# Patient Record
Sex: MALE | Race: WHITE | Employment: UNEMPLOYED | URBAN - METROPOLITAN AREA
[De-identification: names, ages, dates, MRNs, and addresses within clinical notes are randomized per-mention and may not be internally consistent; named-entity substitution may affect disease eponyms.]

---

## 2017-02-07 DIAGNOSIS — F64.9 GENDER IDENTITY DISORDER: Primary | ICD-10-CM

## 2017-03-21 DIAGNOSIS — E29.1 MALE HYPOGONADISM: ICD-10-CM

## 2017-03-21 NOTE — TELEPHONE ENCOUNTER
Request for medication refill:    Date of last visit at clinic: 7/27/16    Please complete refill if appropriate and CLOSE ENCOUNTER.    Closing the encounter signifies the refill is complete.    If refill has been denied, please complete the smart phrase .smirefuse and route it to the [facility] RN TRIAGE pool to inform the patient and the pharmacy.    Sanjana Lockhart RN

## 2017-03-21 NOTE — TELEPHONE ENCOUNTER
[facility] phone call message- patient requesting a refill:    Full Medication Name: Testosterone cyp    Dose: inject 0.25ml/mg into the muscle one time a week    Pharmacy confirmed as   [facility] [id number] [place] [address] AT [address] [address]  Phone: [phone] Fax: [phone]  : Yes    Additional Comments: please fax the paper copy to the pharmacy  OK to leave a message on voice mail? Yes    Primary language: English      needed? No    Call taken on March 21, 2017 at 10:07 AM by Keely Sheppard

## 2017-03-22 NOTE — TELEPHONE ENCOUNTER
Please process per controlled substance protocol. I have reviewed pt's chart and this refill is appropriate. Please provide rx to preceptor to sign, then provide to patient's pharmacy and notify patient. THANKS  Clarissa Stroud MD

## 2017-03-23 ENCOUNTER — OFFICE VISIT (OUTPATIENT)
Dept: FAMILY MEDICINE | Facility: CLINIC | Age: 32
End: 2017-03-23

## 2017-03-23 VITALS
WEIGHT: 150.4 LBS | HEIGHT: 66 IN | HEART RATE: 48 BPM | SYSTOLIC BLOOD PRESSURE: 107 MMHG | TEMPERATURE: 98.4 F | OXYGEN SATURATION: 99 % | RESPIRATION RATE: 16 BRPM | DIASTOLIC BLOOD PRESSURE: 70 MMHG | BODY MASS INDEX: 24.17 KG/M2

## 2017-03-23 DIAGNOSIS — S86.811A STRAIN OF CALF MUSCLE, RIGHT, INITIAL ENCOUNTER: Primary | ICD-10-CM

## 2017-03-23 RX ORDER — TESTOSTERONE CYPIONATE 200 MG/ML
50 INJECTION, SOLUTION INTRAMUSCULAR WEEKLY
Qty: 10 ML | Refills: 3 | Status: SHIPPED | OUTPATIENT
Start: 2017-03-23 | End: 2017-10-09

## 2017-03-23 NOTE — PROGRESS NOTES
"       HPI       Myrna Valadez is a 31 year old who presents with Right calf after an injury at work.      Worker's Compensation Covered Injury       When?: 3/9/17 initially, re-injured on 3/17    What Happened? 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 He shift his weight from his left leg to right leg and felt a sharp pain in his right calf. \"It felt like a pulled muscle.\" On 3/17/17 he was working on a planning day and took a break with his [employer] to double Cuban jump rope and again felt the sharp pain in his right calf.    Where ? [facility]    Work Related? Yes    Has the injury been reported to a manager at your work? Y    Place of employment [employer],  [employer], Phone number [phone]-.    QRC (Qualified Rehabilitation Consultant) Absentfname and number     Able to Work? Yes    Job requirements:prolonged sitting, prolonged standing      Description of pain /Injury:     Location: Right calf    Character: sharp    Radicular pain?  No    Intensity: 4-5/10 at worst,  1/10 at best    Muscle weakness? Yes Details: pt is adjusting the way that they walk to avoid pain.    Pain is fluctuating         Provocative Factors:prolonged standing, walking    Palliative Factors: heat, ice, rest and OTC NSAIDs        Therapies Tried and outcome: ice, rest, activity as tolerated including simple back exercises, NSAIDS, otc use at prescription doses with food in stomach and Physical Therapy           Parafaprofesional. 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              Review of Systems:   + right calf pain              Physical Exam:     Vitals:    03/23/17 1645   BP: 107/70   Pulse: (!) 48   Resp: 16   Temp: 98.4  F (36.9  C)   TempSrc: Oral   SpO2: 99%   Weight: 150 lb 6.4 oz (68.2 kg)   Height: 5' 5.9\" (167.4 cm)     Body mass index is 24.35 " kg/(m^2).    Constitutional: Awake, alert, cooperative, no apparent distress, and appears stated age  HEENT: MMM, no conjunctival pallor  MSK:   Right Knee Exam   Right knee exam is normal.    Range of Motion   Normal right knee ROM    Muscle Strength   Normal right knee strength      + right sided calf tenderness..     Neuro:  No focal neurological deficit  Psych : Good eye contact, well groomed, very interactive and collaborative. Good insight. Thought process is linear and coherent. Associations are tight. Mood is good. Affect euthymic.     No results found for this or any previous visit (from the past 24 hour(s)).    Assessment and Plan      1. Strain of calf muscle, right, initial encounter; work-related injury on 3/17 - appears to have been sprained twice while at work. Will recommend he return to work with some restrictions as well as recommended physical therapy, pain control with ibuprofen/tylenol and then follow up in 2 weeks. Letter completed and sent with patient.   - Physical Therapy Referral  - Limitations at work     There are no discontinued medications.     Options for treatment and follow-up care were reviewed with the patient. Myrna Valadez  engaged in the decision making process and verbalized understanding of the options discussed and agreed with the final plan.    Tawny Hammer MD G3  [facility]  Family Medicine Resident  Pager [phone]

## 2017-03-23 NOTE — PROGRESS NOTES
Preceptor Attestation:   Patient seen and discussed with the resident.   Assessment and plan reviewed with resident and agreed upon.   Supervising Physician:  Ovidio Urban MD  [facility] Medicine

## 2017-03-23 NOTE — MR AVS SNAPSHOT
After Visit Summary   3/23/2017    Myrna Valadez    MRN: [id number]           Patient Information     Date Of Birth          1985        Visit Information        Provider Department      3/23/2017 4:20 PM Tawny Hammer MD [facility]        Today's Diagnoses     Strain of calf muscle, right, initial encounter    -  1       Follow-ups after your visit        Additional Services     [facility]       *This therapy referral will be filtered to a centralized scheduling office at [facility] and the patient will receive a call to schedule an appointment at a [place] location most convenient for them. *     [facility] provides Physical Therapy evaluation and treatment and many specialty services across the [place] system.  If requesting a specialty program, please choose from the list below.    If you have not heard from the scheduling office within 2 business days, please call [phone] for all locations, with the exception of [place], please call [phone].  Treatment: Evaluation & Treatment  Special Instructions/Modalities:   Special Programs: None    Please be aware that coverage of these services is subject to the terms and limitations of your health insurance plan.  Call member services at your health plan with any benefit or coverage questions.      **Note to Provider:  If you are referring outside of [place] for the therapy appointment, please list the name of the location in the  special instructions  above, print the referral and give to the patient to schedule the appointment.                  Who to contact     Please call your clinic at [phone] to:    Ask questions about your health    Make or cancel appointments    Discuss your medicines    Learn about your test results    Speak to your doctor   If you have compliments or concerns about an experience at your clinic, or if you wish to file a  "complaint, please contact [facility] Patient Relations at [phone] or email us at [email or web address]         Additional Information About Your Visit        [employer] Information     [employer] gives you secure access to your electronic health record. If you see a primary care provider, you can also send messages to your care team and make appointments. If you have questions, please call your primary care clinic.  If you do not have a primary care provider, please call [phone] and they will assist you.      [employer] is an electronic gateway that provides easy, online access to your medical records. With [employer], you can request a clinic appointment, read your test results, renew a prescription or communicate with your care team.     To access your existing account, please contact your [facility] Clinic or call [phone] for assistance.        Care EveryWhere ID     This is your Care EveryWhere ID. This could be used by other organizations to access your [place] medical records  [id number]        Your Vitals Were     Pulse Temperature Respirations Height Pulse Oximetry BMI (Body Mass Index)    48 98.4  F (36.9  C) (Oral) 16 5' 5.9\" (167.4 cm) 99% 24.35 kg/m2       Blood Pressure from Last 3 Encounters:   03/23/17 107/70   07/27/16 127/72   05/07/15 127/66    Weight from Last 3 Encounters:   03/23/17 150 lb 6.4 oz (68.2 kg)   07/27/16 146 lb 6.4 oz (66.4 kg)   05/07/15 148 lb 6.4 oz (67.3 kg)              We Performed the Following     [facility]        Primary Care Provider Office Phone # Fax #    Clarissa Stroud [phone]       [facility] [address] [facility] [address]        Thank you!     Thank you for choosing [facility]  for your care. Our goal is always to provide you with excellent care. Hearing back from our patients is one way we can continue to improve our services. Please " "take a few minutes to complete the written survey that you may receive in the mail after your visit with us. Thank you!             Your Updated Medication List - Protect others around you: Learn how to safely use, store and throw away your medicines at www.disposemymeds.org.          This list is accurate as of: 3/23/17  5:17 PM.  Always use your most recent med list.                   Brand Name Dispense Instructions for use    conjugated estrogens cream    PREMARIN    42.5 g    Place 0.5 g vaginally twice a week       FLINTSTONES GUMMIES PO      Take 1 tablet by mouth 2 times daily.       meloxicam 7.5 MG tablet    MOBIC    30 tablet    Take 1 tablet (7.5 mg) by mouth daily       Syringe/Needle (Disp) 22G X 1-1/2\" 1 ML Misc     30 each    Inject 0.25 mLs into the muscle once a week       testosterone cypionate 200 MG/ML injection    DEPOTESTOTERONE CYPIONATE    10 mL    Inject 0.25 mLs (50 mg) into the muscle once a week         "

## 2017-03-23 NOTE — LETTER
[facility]  [address],  [address]  [facility] [address]  [phone]    REPORT OF WORK ABILITY    NOTE TO EMPLOYEE: You must promptly provide a copy of this report to your  employer or worker's compensation insurer, and Qualified Rehabilitation Consultant.    Date: 3/23/2017                     Employee Name: Myrna Valadez         YOB: 1985  Medical Record Number: [id number]   Soc.Sec.No: [id number]  Employer: [employer]               Date of Injury: 3/17/17  Managed Care Organization / Insurance Company Name: UNKNOWN    Diagnosis: right calf strain  Work Related: yes    Permanent Partial Disability(PPD) likely: NO    EMPLOYEE IS ABLE TO WORK: with restrictions from 3/24/17 to 4/7/17 -  Full shift     RESTRICTIONS IF ANY:   Stand: Occasionally (2-4 hours)   Sit: Full time    Walk: Occasionally limited to 30 minutes out of every hour   Kneel/Nelagoney: Frequently (4-6 hours)  Per day   Lift, carry no more than:  20 pounds at a time   May use hands repetitively for:    Simple grasping: yes      Pushing/pulling: yes    Fine manipulation:  yes    Firm grasping:  yes     Operate Foot controls:  Right foot: not applicable   Left foot: not applicable     Bend:  Full time      Stoop: Full time     Twist: Full time      Reach Above Shoulders: Full time     OTHER RESTRICTIONS: None    TREATMENT PLAN/NOTES: may need to restrict work activities for comfort and continue PT and do home program.      Tawny Hammer MD

## 2017-03-23 NOTE — TELEPHONE ENCOUNTER
RN printed pended script, had preceptor sign, and faxed to [facility].    Shonda Cuellar RN

## 2017-04-05 ENCOUNTER — THERAPY VISIT (OUTPATIENT)
Dept: PHYSICAL THERAPY | Facility: CLINIC | Age: 32
End: 2017-04-05
Payer: OTHER MISCELLANEOUS

## 2017-04-05 DIAGNOSIS — M79.661 RIGHT CALF PAIN: Primary | ICD-10-CM

## 2017-04-05 PROCEDURE — 97161 PT EVAL LOW COMPLEX 20 MIN: CPT | Mod: GP | Performed by: PHYSICAL THERAPIST

## 2017-04-05 PROCEDURE — 97530 THERAPEUTIC ACTIVITIES: CPT | Mod: GP | Performed by: PHYSICAL THERAPIST

## 2017-04-05 PROCEDURE — 97110 THERAPEUTIC EXERCISES: CPT | Mod: GP | Performed by: PHYSICAL THERAPIST

## 2017-04-05 NOTE — MR AVS SNAPSHOT
After Visit Summary   4/5/2017    Myrna Valadez    MRN: [id number]           Patient Information     Date Of Birth          1985        Visit Information        Provider Department      4/5/2017 2:40 PM Carlos Boswell PT [facility] Physical Therapy        Today's Diagnoses     Right calf pain    -  1       Follow-ups after your visit        Your next 10 appointments already scheduled     Apr 12, 2017  5:20 PM CDT   WARNER Extremity with Carlos Boswell PT   [facility] Physical Therapy ([facility]  )    [address] [place] [address]   [phone]            Apr 18, 2017  5:20 PM CDT   WARNER Extremity with Ubaldo Zaragoza PT   [facility] Physical Therapy ([facility]  )    [address] [place] [address]   [phone]            Apr 27, 2017  4:00 PM CDT   WARNER Extremity with Carlos Boswell PT   [facility] Physical Therapy ([facility]  )    [address] [place] [address]   [phone]            May 02, 2017  4:00 PM CDT   WARNER Extremity with Carlos Boswell PT   [facility] Physical Therapy ([facility]  )    [address] [place] [address]   [phone]            May 09, 2017  4:00 PM CDT   WARNER Extremity with Carlos Boswell PT   [facility] Physical Therapy ([facility]  )    [address] [place] [address]   [phone]            May 16, 2017  4:00 PM CDT   WARNER Extremity with Carlos Boswell PT   [facility] Physical Therapy ([facility]  )    [address] [place] [address]   [phone]              Who to contact     If you have questions or need follow up information about today's clinic visit or your schedule please contact [facility] directly at [phone].  Normal or non-critical lab and imaging results will be communicated to you by MyChart, letter or phone within 4 business days after the clinic has received the results. If you do not hear from us within 7 days, please contact the clinic through [employer] or phone. If you have a critical or abnormal lab result, we will notify you by phone as soon as possible.  Submit refill requests through [employer] or call your pharmacy and they will forward the refill request to us. Please allow 3 business days for your refill to be completed.          Additional Information About Your Visit        [employer]endr Information     [employer] gives you secure access to your electronic health record. If you see a primary care provider, you can also send messages to your care team and make appointments. If you have questions, please call your primary care clinic.  If you do not have a primary care provider, please call [phone] and they will assist you.        Care EveryWhere ID     This is your Care EveryWhere ID. This could be used by other organizations to access your [place] medical records  [id number]         Blood Pressure from Last 3 Encounters:   03/23/17 107/70   07/27/16 127/72   05/07/15 127/66    Weight from Last 3 Encounters:   03/23/17 68.2 kg (150 lb 6.4 oz)   07/27/16 66.4 kg (146 lb 6.4 oz)   05/07/15 67.3 kg (148 lb 6.4 oz)              We Performed the Following     HC PT EVAL, LOW COMPLEXITY     WARNER INITIAL EVAL REPORT     THERAPEUTIC ACTIVITIES     THERAPEUTIC EXERCISES        Primary Care Provider Office Phone # Fax #    Clarissa Stroud [phone]       [facility] [address] [address]        Thank you!     Thank you for choosing [facility]  for your care. Our goal is always to provide you with excellent care. Hearing back from our patients is one way we can continue to  "improve our services. Please take a few minutes to complete the written survey that you may receive in the mail after your visit with us. Thank you!             Your Updated Medication List - Protect others around you: Learn how to safely use, store and throw away your medicines at www.disposemymeds.org.          This list is accurate as of: 4/5/17 11:59 PM.  Always use your most recent med list.                   Brand Name Dispense Instructions for use    conjugated estrogens cream    PREMARIN    42.5 g    Place 0.5 g vaginally twice a week       FLINTSTONES GUMMIES PO      Take 1 tablet by mouth 2 times daily.       meloxicam 7.5 MG tablet    MOBIC    30 tablet    Take 1 tablet (7.5 mg) by mouth daily       Syringe/Needle (Disp) 22G X 1-1/2\" 1 ML Misc     30 each    Inject 0.25 mLs into the muscle once a week       testosterone cypionate 200 MG/ML injection    DEPOTESTOTERONE CYPIONATE    10 mL    Inject 0.25 mLs (50 mg) into the muscle once a week         "

## 2017-04-05 NOTE — PROGRESS NOTES
Subjective:    Affected Side: R calf.  Condition occurred with:  A wrong landing and a jump.  Condition occurred: at work.  This is a new condition  3/9/17 sprained at work, then resprained again about a week later.  First time was dodging a ball shiftng weight from left to right, second time was jumping rope.  Pt has hx of R ankle injury from running on ice, later gave out during yoga.  Was seen by PT for that, has kept up on that HEP 5x per week.  Pt has altered his gait and decreased activity to avoid the pain..    Patient reports pain:  Medial (calf medial muscle belly, never lateral).  Radiates to: never distal to muscle belly.  Pain is described as stabbing and sharp and is intermittent Pain Scale: no pain currently, 8/10 at worst.  Associated with: had edema initially. Pain is the same all the time.  Symptoms are exacerbated by running, descending stairs and other (pushing off from big toe; jumping/landing) and relieved by ice and other (changing position/shifting weight).  Since onset symptoms are gradually improving.        General health as reported by patient is excellent.  Pertinent medical history includes:  Anemia.  Medical allergies: no.  Other surgeries include:  Other (complete hysterectomy, mastectomy).  Current medications:  Hormone replacement therapy.  Current occupation is paraprofessional - elementary school.  Patient is working in normal job with restrictions.  Primary job tasks include:  Prolonged standing (pushing/pulling, being active with children; has modified activities but is still required to move a lot; not lifting kids up).    Barriers include:  Stairs.    Red flags:  None as reported by the patient.                        Objective:    Standing Alignment:        Lumbar:  Lordosis decr  Pelvis deviations alignment: CCW rotation.  Hip deviations alignment: B femoral medial rotation.  Knee:  Genu varus R and genu varus L  Ankle/foot deviations: slight calcaneal inversion  R.              Physical Exam     Functional Tests:  L single leg stance: femoral medial rotation  R single leg stance: femoral medial rotation, no change in sxs  Partial squat: R femoral add/MR, no change in sxs    Sensation:  Light touch intact and symmetrical B LEs    Strength:  Hip flexion: B 5/5 pain free  Knee extension: B 5/5 pain free  Glut med: B 4+/5 pain free  Hamstrings: B 5/5 pain free  5/5 pain free all motions at ankle, including PF with knee flexed and extended  5/5 L gastrocsoleus, R not tested due to pain with lowering    ROM:  Motion L R Comments   PF 43 41    DF 17 26    DF with knee straight 3 12      Palpation:  Tenderness at medial R gastroc muscle belly    Edema:  None today    Gait:  B Trendelenberg, B femoral MR, decreased push off R        General     ROS    Assessment/Plan:      Patient is a 31 year old female with R calf complaints.    Patient has the following significant findings with corresponding treatment plan.                Diagnosis 1:  R calf pain  Pain -  hot/cold therapy, manual therapy, education and home program  Decreased ROM/flexibility - manual therapy, therapeutic exercise, therapeutic activity and home program  Decreased strength - therapeutic exercise, therapeutic activities and home program  Impaired gait - gait training and home program  Impaired muscle performance - neuro re-education and home program  Decreased function - therapeutic activities and home program  Impaired posture - neuro re-education, therapeutic activities and home program    Therapy Evaluation Codes:   1) History comprised of:   Personal factors that impact the plan of care:      Profession.    Comorbidity factors that impact the plan of care are:      anemia.     Medications impacting care: hormone replacement.  2) Examination of Body Systems comprised of:   Body structures and functions that impact the plan of care:      calf.   Activity limitations that impact the plan of care are:       Jumping, Stairs, Walking and Working.  3) Clinical presentation characteristics are:   Stable/Uncomplicated.  4) Decision-Making    Low complexity using standardized patient assessment instrument and/or measureable assessment of functional outcome.  Cumulative Therapy Evaluation is: Low complexity.    Previous and current functional limitations:  (See Goal Flow Sheet for this information)    Short term and Long term goals: (See Goal Flow Sheet for this information)     Communication ability:  Patient appears to be able to clearly communicate and understand verbal and written communication and follow directions correctly.  Treatment Explanation - The following has been discussed with the patient:   RX ordered/plan of care  Anticipated outcomes  Possible risks and side effects  This patient would benefit from PT intervention to resume normal activities.   Rehab potential is good.    Frequency:  1 X week, once daily  Duration:  for 6 weeks  Discharge Plan:  Achieve all LTG.  Independent in home treatment program.  Reach maximal therapeutic benefit.    Please refer to the daily flowsheet for treatment today, total treatment time and time spent performing 1:1 timed codes.

## 2017-04-05 NOTE — LETTER
[facility] PHYSICAL THERAPY  [address] [address]  [phone]    May 25, 2017    Re: Myrna Valadez   :   1985  MRN:  [id number]   REFERRING PHYSICIAN:   Ovidio Urban    [facility] PHYSICAL THERAPY    Date of Initial Evaluation:  2017  Visits:  1  Rxs Used: 1  Reason for Referral:  Right calf pain    EVALUATION SUMMARY    Subjective:  Affected Side: R calf.  Condition occurred with:  A wrong landing and a jump.  Condition occurred: at work.  This is a new condition  3/9/17 sprained at work, then resprained again about a week later.  First time was dodging a ball shiftng weight from left to right, second time was jumping rope.  Pt has hx of R ankle injury from running on ice, later gave out during yoga.  Was seen by PT for that, has kept up on that HEP 5x per week.  Pt has altered his gait and decreased activity to avoid the pain..    Patient reports pain:  Medial (calf medial muscle belly, never lateral).  Radiates to: never distal to muscle belly.  Pain is described as stabbing and sharp and is intermittent Pain Scale: no pain currently, 8/10 at worst.  Associated with: had edema initially. Pain is the same all the time.  Symptoms are exacerbated by running, descending stairs and other (pushing off from big toe; jumping/landing) and relieved by ice and other (changing position/shifting weight).  Since onset symptoms are gradually improving.        General health as reported by patient is excellent.  Pertinent medical history includes:  Anemia.  Medical allergies: no.  Other surgeries include:  Other (complete hysterectomy, mastectomy).  Current medications:  Hormone replacement therapy.  Current occupation is paraprofessional - elementary school.  Patient is working in normal job with restrictions.  Primary job tasks include:  Prolonged standing (pushing/pulling, being active with children; has  modified activities but is still required to move a lot; not lifting kids up).  Barriers include:  Stairs.  Red flags:  None as reported by the patient.              Objective:  Standing Alignment:    Lumbar:  Lordosis decr  Pelvis deviations alignment: CCW rotation.  Hip deviations alignment: B femoral medial rotation.  Knee:  Genu varus R and genu varus L  Ankle/foot deviations: slight calcaneal inversion R.  Re: Myrna Valadez   :   1985    Functional Tests:  L single leg stance: femoral medial rotation  R single leg stance: femoral medial rotation, no change in sxs  Partial squat: R femoral add/MR, no change in sxs  Sensation:  Light touch intact and symmetrical B LEs  Strength:  Hip flexion: B 5/5 pain free  Knee extension: B 5/5 pain free  Glut med: B 4+/5 pain free  Hamstrings: B 5/5 pain free  5/5 pain free all motions at ankle, including PF with knee flexed and extended  5/5 L gastrocsoleus, R not tested due to pain with lowering    ROM:  Motion L R Comments   PF 43 41    DF 17 26    DF with knee straight 3 12      Palpation:  Tenderness at medial R gastroc muscle belly  Edema:  None today  Gait:  B Trendelenberg, B femoral MR, decreased push off R    Assessment/Plan:    Patient is a 31 year old female with R calf complaints.    Patient has the following significant findings with corresponding treatment plan.                Diagnosis 1:  R calf pain  Pain -  hot/cold therapy, manual therapy, education and home program  Decreased ROM/flexibility - manual therapy, therapeutic exercise, therapeutic activity and home program  Decreased strength - therapeutic exercise, therapeutic activities and home program  Impaired gait - gait training and home program  Impaired muscle performance - neuro re-education and home program  Decreased function - therapeutic activities and home program  Impaired posture - neuro re-education, therapeutic activities and home program    Therapy Evaluation Codes:    1) History comprised of:   Personal factors that impact the plan of care:      Profession.    Comorbidity factors that impact the plan of care are:      anemia.    Re: Myrna Valadez   :   1985     Medications impacting care: hormone replacement.  2) Examination of Body Systems comprised of:   Body structures and functions that impact the plan of care:      calf.   Activity limitations that impact the plan of care are:      Jumping, Stairs, Walking and Working.  3) Clinical presentation characteristics are:   Stable/Uncomplicated.  4) Decision-Making    Low complexity using standardized patient assessment instrument and/or measureable assessment of functional outcome.  Cumulative Therapy Evaluation is: Low complexity.  Previous and current functional limitations:  (See Goal Flow Sheet for this information)    Short term and Long term goals: (See Goal Flow Sheet for this information)   Communication ability:  Patient appears to be able to clearly communicate and understand verbal and written communication and follow directions correctly.  Treatment Explanation - The following has been discussed with the patient:   RX ordered/plan of care  Anticipated outcomes  Possible risks and side effects  This patient would benefit from PT intervention to resume normal activities.   Rehab potential is good.  Frequency:  1 X week, once daily  Duration:  for 6 weeks  Discharge Plan:  Achieve all LTG.  Independent in home treatment program.  Reach maximal therapeutic benefit.              Thank you for your referral.    INQUIRIES  Therapist: Carlos Boswell PT  [facility] PHYSICAL THERAPY  [address] [place] [address]  Phone: [phone]  Fax: [phone]

## 2017-04-06 PROBLEM — M79.661 RIGHT CALF PAIN: Status: ACTIVE | Noted: 2017-04-06

## 2017-04-11 ENCOUNTER — OFFICE VISIT (OUTPATIENT)
Dept: FAMILY MEDICINE | Facility: CLINIC | Age: 32
End: 2017-04-11

## 2017-04-11 VITALS
DIASTOLIC BLOOD PRESSURE: 72 MMHG | SYSTOLIC BLOOD PRESSURE: 112 MMHG | OXYGEN SATURATION: 99 % | HEART RATE: 60 BPM | BODY MASS INDEX: 23.53 KG/M2 | RESPIRATION RATE: 20 BRPM | WEIGHT: 146.4 LBS | HEIGHT: 66 IN | TEMPERATURE: 97.6 F

## 2017-04-11 DIAGNOSIS — M79.661 RIGHT CALF PAIN: Primary | ICD-10-CM

## 2017-04-11 NOTE — PATIENT INSTRUCTIONS
Right Calf Pain  Continue physical therapy  Ibuprofen as needed  Cold and massage as needed    Updated Work Restrictions:    - No lifting greater than 20 pounds  - No pivoting or twisting while lifting  - No running      Thank you for coming to [facility] today.  Lab Testing:  **If you had lab testing today and your results are reassuring or normal they will be mailed to you or sent through [employer]wilt within 7 days.   **If the lab tests need quick action we will call you with the results.  The phone number we will call with results is # [phone] (home) . If this is not the best number please call our clinic and change the number.  Medication Refills:  If you need any refills please call your pharmacy and they will contact us.   If you need to  your refill at a new pharmacy, please contact the new pharmacy directly. The new pharmacy will help you get your medications transferred faster.   Scheduling:  If you have any concerns about today's visit or wish to schedule another appointment please call our office during normal business hours [phone] (8-5:00 M-F)  If a referral was made to a [facility] and you don't get a call from central scheduling please call [phone].  If a Mammogram was ordered for you at [facility] call [phone] to schedule or change your appointment.  If you had an XRay/CT/Ultrasound/MRI ordered the number is [phone] to schedule or change your radiology appointment.   Medical Concerns:  If you have urgent medical concerns please call [phone] at any time of the day.

## 2017-04-11 NOTE — MR AVS SNAPSHOT
After Visit Summary   4/11/2017    Myrna Valadez    MRN: [id number]           Patient Information     Date Of Birth          1985        Visit Information        Provider Department      4/11/2017 3:40 PM Brigitte Marroquin MD [facility]        Care Instructions    Right Calf Pain  Continue physical therapy  Ibuprofen as needed  Cold and massage as needed    Updated Work Restrictions:    - No lifting greater than 20 pounds  - No pivoting or twisting while lifting  - No running      Thank you for coming to [facility] today.  Lab Testing:  **If you had lab testing today and your results are reassuring or normal they will be mailed to you or sent through Weizoom within 7 days.   **If the lab tests need quick action we will call you with the results.  The phone number we will call with results is # [phone] (home) . If this is not the best number please call our clinic and change the number.  Medication Refills:  If you need any refills please call your pharmacy and they will contact us.   If you need to  your refill at a new pharmacy, please contact the new pharmacy directly. The new pharmacy will help you get your medications transferred faster.   Scheduling:  If you have any concerns about today's visit or wish to schedule another appointment please call our office during normal business hours [phone] (8-5:00 M-F)  If a referral was made to a [facility] and you don't get a call from central scheduling please call [phone].  If a Mammogram was ordered for you at [facility] call [phone] to schedule or change your appointment.  If you had an XRay/CT/Ultrasound/MRI ordered the number is [phone] to schedule or change your radiology appointment.   Medical Concerns:  If you have urgent medical concerns please call [phone] at any time of the day.            Follow-ups after your visit        Your next 10  appointments already scheduled     Apr 12, 2017  5:20 PM CDT   [facility] Extremity with Carlos Boswell PT   [facility] [facility] Physical [facility] ([facility]  )    [address] [phone]            Apr 18, 2017  5:20 PM CDT   [facility] Extremity with Ubaldo Zaragoza PT   [facility] ([facility]  )    [address] [place] [id number] [phone]            Apr 27, 2017  4:00 PM CDT   [facility] Extremity with Carlos Boswell PT   [facility] Physical [facility] ([facility]  )    [address] [place] [id number] [phone]            May 02, 2017  4:00 PM CDT   WARNER Extremity with Carlos Boswell PT   [facility] [place] [facility] ([facility]  )    [address] [place] [id number] [phone]            May 09, 2017  4:00 PM CDT   WARNER Extremity with Carlos Boswell PT   [facility] [place] [facility] ([facility]  )    [address] [place] [phone]            May 16, 2017  4:00 PM CDT   WARNER Extremity with Carlos Boswell PT   [facility] [place] [facility] ([facility]  )    [address] [place] [id number] [phone]              Who to contact     Please call your clinic at [phone] to:    Ask questions about your health    Make or cancel appointments    Discuss your medicines    Learn about your test results    Speak to your doctor   If you have compliments or concerns about an experience at your clinic, or if you wish to file a complaint, please contact [facility] Patient Relations at [phone] or email us at [email or web address]         Additional Information About Your Visit        [employer] Information     [employer] gives you secure access to your  "electronic health record. If you see a primary care provider, you can also send messages to your care team and make appointments. If you have questions, please call your primary care clinic.  If you do not have a primary care provider, please call [phone] and they will assist you.      EasyQasa is an electronic gateway that provides easy, online access to your medical records. With EasyQasa, you can request a clinic appointment, read your test results, renew a prescription or communicate with your care team.     To access your existing account, please contact your [facility] or call [phone] for assistance.        Care EveryWhere ID     This is your Care EveryWhere ID. This could be used by other organizations to access your [facility] medical records  [id number]        Your Vitals Were     Pulse Temperature Respirations Height Pulse Oximetry BMI (Body Mass Index)    60 97.6  F (36.4  C) (Oral) 20 5' 6\" (167.6 cm) 99% 23.63 kg/m2       Blood Pressure from Last 3 Encounters:   04/11/17 112/72   03/23/17 107/70   07/27/16 127/72    Weight from Last 3 Encounters:   04/11/17 146 lb 6.4 oz (66.4 kg)   03/23/17 150 lb 6.4 oz (68.2 kg)   07/27/16 146 lb 6.4 oz (66.4 kg)              Today, you had the following     No orders found for display       Primary Care Provider Office Phone # Fax #    Clarissakarina Stroud [phone]       [facility] [address] [place] [address]        Thank you!     Thank you for choosing [facility]  for your care. Our goal is always to provide you with excellent care. Hearing back from our patients is one way we can continue to improve our services. Please take a few minutes to complete the written survey that you may receive in the mail after your visit with us. Thank you!             Your Updated Medication List - Protect others around you: Learn how to safely use, store and throw away your medicines at " "www.disposemymeds.org.          This list is accurate as of: 4/11/17  4:14 PM.  Always use your most recent med list.                   Brand Name Dispense Instructions for use    conjugated estrogens cream    PREMARIN    42.5 g    Place 0.5 g vaginally twice a week       FLINTSTONES GUMMIES PO      Take 1 tablet by mouth 2 times daily.       meloxicam 7.5 MG tablet    MOBIC    30 tablet    Take 1 tablet (7.5 mg) by mouth daily       Syringe/Needle (Disp) 22G X 1-1/2\" 1 ML Misc     30 each    Inject 0.25 mLs into the muscle once a week       testosterone cypionate 200 MG/ML injection    DEPOTESTOTERONE CYPIONATE    10 mL    Inject 0.25 mLs (50 mg) into the muscle once a week         "

## 2017-04-11 NOTE — PROGRESS NOTES
"       HPI       Myrna Valadez is a 31 year old female Patient seen  for a Follow Up of an injury covered by Workman's Compensation    Evaluated in clinic 3/23/17 with R calf strain, twice while at work. PT referral placed and work restrictions given.   Went to PT on 4/5. Doing some gentle gastroc stretches with therabands and seated calf-raises. Standing calf raises and wall stretches causing pain, thus instructed not to do those yet, but understands the trajectory. Is able to be on feet all day pain-free with normal breaks. Issue is with lifting weight and twisting, or just twisting. No running. No longer using ibuprofen. Is icing as needed per PT, once daily and reassess tomorrow in PT. Denies muscle weakness.     Active medical problems and medication list reviewed          Review of Systems:                 Physical Exam:     Vitals:    04/11/17 1536   BP: 112/72   Pulse: 60   Resp: 20   Temp: 97.6  F (36.4  C)   TempSrc: Oral   SpO2: 99%   Weight: 146 lb 6.4 oz (66.4 kg)   Height: 5' 6\" (167.6 cm)         GENERAL: healthy, alert, well nourished, well hydrated, no distress  RLE: no swelling. Nontender. Normal strength.          Results       Assessment and Plan       Right Calf Pain  Continue physical therapy  Ibuprofen as needed  Cold and massage as needed    Updated Work Restrictions:    - No lifting greater than 20 pounds  - No pivoting or twisting while lifting  - No running    Options for treatment and follow-up care were reviewed with the patient and/or guardian. Myrna Valadez and/or guardian engaged in the decision making process and verbalized understanding of the options discussed and agreed with the final plan.    I spent 15 min face to face with the patient and >50% was spent counselling the patient about the above medical conditions, educating patient and discussing the recommendations and followup .    Brigitte Marroquin MD  [facility], " [place]

## 2017-04-11 NOTE — LETTER
RETURN TO WORK/SCHOOL FORM    4/11/2017    Re: Myrna Valadez  1985      To Whom It May Concern:     Myrna Valadez was seen in clinic today..  She may return to work with restrictions on 4/12/17. All previous restrictions lifted except those listed below:     Restrictions:    - No lifting greater than 20 pounds  - No pivoting or twisting while lifting  - No running      Brigitte Marroquin MD  4/11/2017 4:10 PM

## 2017-04-12 ENCOUNTER — THERAPY VISIT (OUTPATIENT)
Dept: PHYSICAL THERAPY | Facility: CLINIC | Age: 32
End: 2017-04-12
Payer: OTHER MISCELLANEOUS

## 2017-04-12 DIAGNOSIS — M79.661 RIGHT CALF PAIN: Primary | ICD-10-CM

## 2017-04-12 PROCEDURE — 97112 NEUROMUSCULAR REEDUCATION: CPT | Mod: GP | Performed by: PHYSICAL THERAPIST

## 2017-04-12 PROCEDURE — 97110 THERAPEUTIC EXERCISES: CPT | Mod: GP | Performed by: PHYSICAL THERAPIST

## 2017-04-18 ENCOUNTER — THERAPY VISIT (OUTPATIENT)
Dept: PHYSICAL THERAPY | Facility: CLINIC | Age: 32
End: 2017-04-18
Payer: OTHER MISCELLANEOUS

## 2017-04-18 DIAGNOSIS — M79.661 RIGHT CALF PAIN: ICD-10-CM

## 2017-04-18 PROCEDURE — 97112 NEUROMUSCULAR REEDUCATION: CPT | Mod: GP | Performed by: PHYSICAL THERAPIST

## 2017-04-18 PROCEDURE — 97110 THERAPEUTIC EXERCISES: CPT | Mod: GP | Performed by: PHYSICAL THERAPIST

## 2017-04-27 ENCOUNTER — THERAPY VISIT (OUTPATIENT)
Dept: PHYSICAL THERAPY | Facility: CLINIC | Age: 32
End: 2017-04-27
Payer: OTHER MISCELLANEOUS

## 2017-04-27 DIAGNOSIS — M79.661 RIGHT CALF PAIN: ICD-10-CM

## 2017-04-27 PROCEDURE — 97110 THERAPEUTIC EXERCISES: CPT | Mod: GP | Performed by: PHYSICAL THERAPIST

## 2017-04-27 PROCEDURE — 97112 NEUROMUSCULAR REEDUCATION: CPT | Mod: GP | Performed by: PHYSICAL THERAPIST

## 2017-04-27 PROCEDURE — 97530 THERAPEUTIC ACTIVITIES: CPT | Mod: GP | Performed by: PHYSICAL THERAPIST

## 2017-05-02 ENCOUNTER — THERAPY VISIT (OUTPATIENT)
Dept: PHYSICAL THERAPY | Facility: CLINIC | Age: 32
End: 2017-05-02
Payer: OTHER MISCELLANEOUS

## 2017-05-02 DIAGNOSIS — M79.661 RIGHT CALF PAIN: ICD-10-CM

## 2017-05-02 PROCEDURE — 97530 THERAPEUTIC ACTIVITIES: CPT | Mod: GP | Performed by: PHYSICAL THERAPIST

## 2017-05-02 PROCEDURE — 97112 NEUROMUSCULAR REEDUCATION: CPT | Mod: GP | Performed by: PHYSICAL THERAPIST

## 2017-05-16 ENCOUNTER — THERAPY VISIT (OUTPATIENT)
Dept: PHYSICAL THERAPY | Facility: CLINIC | Age: 32
End: 2017-05-16
Payer: OTHER MISCELLANEOUS

## 2017-05-16 DIAGNOSIS — M79.661 RIGHT CALF PAIN: ICD-10-CM

## 2017-05-16 PROCEDURE — 97110 THERAPEUTIC EXERCISES: CPT | Mod: GP | Performed by: PHYSICAL THERAPIST

## 2017-05-16 PROCEDURE — 97530 THERAPEUTIC ACTIVITIES: CPT | Mod: GP | Performed by: PHYSICAL THERAPIST

## 2017-05-16 PROCEDURE — 97112 NEUROMUSCULAR REEDUCATION: CPT | Mod: GP | Performed by: PHYSICAL THERAPIST

## 2017-05-16 NOTE — PROGRESS NOTES
Subjective:    HPI                    Objective:    System    Physical Exam    General     ROS    Assessment/Plan:      PROGRESS  REPORT    Progress reporting period is from 4/5/17 to 5/16/17.       SUBJECTIVE  Subjective changes noted by patient:  Pt stated he has been working on progressing his running, feels that it is going well (3 minutes walking/2 min jogging for 30 min).  All the other exercises have been going well.  He has been able to push himself a little more at work, which is also going well, however he has not yet returned to full duty - hoping to do so soon.  He feels he has better understanding of what activities are aggravating and what are not.    Current pain level is 0/10.     Previous pain level was 0/10 (8/10 at worst).   Changes in function:  Yes (See Goal flowsheet attached for changes in current functional level)  Adverse reaction to treatment or activity: None    OBJECTIVE  Changes noted in objective findings:  Improving calf strength and running mechanics, though the pt continues to report increased difficulty R compared to L with LE exercises.  Pt progressing through running interval program well, should be returning to full duty at work soon.     ASSESSMENT/PLAN  Updated problem list and treatment plan: Diagnosis 1:  R calf pain  Pain -  hot/cold therapy, manual therapy, education and home program  Impaired gait - gait training and home program  Impaired muscle performance - neuro re-education and home program  Decreased function - therapeutic activities and home program  STG/LTGs have been met or progress has been made towards goals:  Yes (See Goal flow sheet completed today.)  Assessment of Progress: The patient's condition is improving.  Self Management Plans:  Patient has been instructed in a home treatment program.  I have re-evaluated this patient and find that the nature, scope, duration and intensity of the therapy is appropriate for the medical condition of the  patient.  Myrna continues to require the following intervention to meet STG and LTG's:  PT    Recommendations:  This patient would benefit from continued therapy to ensure safe return to full duty responsibilities at work.    Frequency:  1 X every other week, once daily  Duration:  for 6 weeks        Please refer to the daily flowsheet for treatment today, total treatment time and time spent performing 1:1 timed codes.

## 2017-05-16 NOTE — MR AVS SNAPSHOT
After Visit Summary   5/16/2017    Myrna Valadez    MRN: [id number]           Patient Information     Date Of Birth          1985        Visit Information        Provider Department      5/16/2017 4:00 PM Carlos Boswell PT [facility]        Today's Diagnoses     Right calf pain           Follow-ups after your visit        Your next 10 appointments already scheduled     Jun 06, 2017  4:00 PM CDT   WARN[facility] Extremity with Carlos Boswell PT   [facility] Physical Therapy ([facility]  )    [address] [place] [id number] [phone]              Who to contact     If you have questions or need follow up information about today's clinic visit or your schedule please contact [facility] ATHLETIC [facility] PHYSICAL [facility] directly at [phone].  Normal or non-critical lab and imaging results will be communicated to you by MyChart, letter or phone within 4 business days after the clinic has received the results. If you do not hear from us within 7 days, please contact the clinic through [email or web address] or phone. If you have a critical or abnormal lab result, we will notify you by phone as soon as possible.  Submit refill requests through [employer] or call your pharmacy and they will forward the refill request to us. Please allow 3 business days for your refill to be completed.          Additional Information About Your Visit        MyChart Information     [employer] gives you secure access to your electronic health record. If you see a primary care provider, you can also send messages to your care team and make appointments. If you have questions, please call your primary care clinic.  If you do not have a primary care provider, please call [phone] and they will assist you.        Care EveryWhere ID     This is your Care EveryWhere ID. This could be used by other organizations to access  "your [place] medical records  [id number]         Blood Pressure from Last 3 Encounters:   04/11/17 112/72   03/23/17 107/70   07/27/16 127/72    Weight from Last 3 Encounters:   04/11/17 66.4 kg (146 lb 6.4 oz)   03/23/17 68.2 kg (150 lb 6.4 oz)   07/27/16 66.4 kg (146 lb 6.4 oz)              We Performed the Following     [facility] PROGRESS NOTES REPORT     NEUROMUSCULAR RE-EDUCATION     THERAPEUTIC ACTIVITIES     THERAPEUTIC EXERCISES        Primary Care Provider Office Phone # Fax #    Clarissa Stroud [phone]       [facility] [address] [address]        Thank you!     Thank you for choosing [place] [facility] PHYSICAL THERAPY  for your care. Our goal is always to provide you with excellent care. Hearing back from our patients is one way we can continue to improve our services. Please take a few minutes to complete the written survey that you may receive in the mail after your visit with us. Thank you!             Your Updated Medication List - Protect others around you: Learn how to safely use, store and throw away your medicines at www.disposemymeds.org.          This list is accurate as of: 5/16/17  5:31 PM.  Always use your most recent med list.                   Brand Name Dispense Instructions for use    conjugated estrogens cream    PREMARIN    42.5 g    Place 0.5 g vaginally twice a week       FLINTSTONES GUMMIES PO      Take 1 tablet by mouth 2 times daily.       meloxicam 7.5 MG tablet    MOBIC    30 tablet    Take 1 tablet (7.5 mg) by mouth daily       Syringe/Needle (Disp) 22G X 1-1/2\" 1 ML Misc     30 each    Inject 0.25 mLs into the muscle once a week       testosterone cypionate 200 MG/ML injection    DEPOTESTOTERONE    10 mL    Inject 0.25 mLs (50 mg) into the muscle once a week         "

## 2017-05-16 NOTE — LETTER
[facility] ATHLETIC [facility] PHYSICAL THERAPY  [address] [facility] [address]  [phone]    May 25, 2017    Re: Myrna Valadez   :   1985  MRN:  [id number]   REFERRING PHYSICIAN:   Ovidio Urban    [facility]THLETIC [facility]    Date of Initial Evaluation:  2017  Visits:  6  Rxs Used: 6  Reason for Referral:  Right calf pain    PROGRESS  REPORT     Progress reporting period is from 17 to 17.       SUBJECTIVE  Subjective changes noted by patient:  Pt stated he has been working on progressing his running, feels that it is going well (3 minutes walking/2 min jogging for 30 min).  All the other exercises have been going well.  He has been able to push himself a little more at work, which is also going well, however he has not yet returned to full duty - hoping to do so soon.  He feels he has better understanding of what activities are aggravating and what are not.    Current pain level is 0/10.     Previous pain level was 0/10 (8/10 at worst).   Changes in function:  Yes (See Goal flowsheet attached for changes in current functional level)  Adverse reaction to treatment or activity: None    OBJECTIVE  Changes noted in objective findings:  Improving calf strength and running mechanics, though the pt continues to report increased difficulty R compared to L with LE exercises.  Pt progressing through running interval program well, should be returning to full duty at work soon.     ASSESSMENT/PLAN  Updated problem list and treatment plan: Diagnosis 1:  R calf pain  Pain -  hot/cold therapy, manual therapy, education and home program  Impaired gait - gait training and home program  Impaired muscle performance - neuro re-education and home program  Decreased function - therapeutic activities and home program  STG/LTGs have been met or progress has been made towards goals:  Yes (See Goal flow sheet completed today.)  Assessment of  Progress: The patient's condition is improving.  Re: Myrna Valadez   :   1985    Self Management Plans:  Patient has been instructed in a home treatment program.  I have re-evaluated this patient and find that the nature, scope, duration and intensity of the therapy is appropriate for the medical condition of the patient.  Myrna continues to require the following intervention to meet STG and LTG's:  PT    Recommendations:  This patient would benefit from continued therapy to ensure safe return to full duty responsibilities at work.    Frequency:  1 X every other week, once daily  Duration:  for 6 weeks                Thank you for your referral.    INQUIRIES  Therapist: Carlos Boswell, PT  [facility]  [address] [place] [address]  Phone: [phone]  Fax: [phone]

## 2017-05-23 ENCOUNTER — TELEPHONE (OUTPATIENT)
Dept: PHYSICAL THERAPY | Facility: CLINIC | Age: 32
End: 2017-05-23

## 2017-05-25 NOTE — TELEPHONE ENCOUNTER
Email received from Lynn Mcdonald on 5/24/17 as follows:    I am responding to your phone message to me for authorization for the above patient to be seen for an additional 3 more visits, for a total of 9.  I have authorized this.  This is good thru the end of June.

## 2017-06-06 ENCOUNTER — THERAPY VISIT (OUTPATIENT)
Dept: PHYSICAL THERAPY | Facility: CLINIC | Age: 32
End: 2017-06-06
Payer: OTHER MISCELLANEOUS

## 2017-06-06 DIAGNOSIS — M79.661 RIGHT CALF PAIN: ICD-10-CM

## 2017-06-06 PROCEDURE — 97110 THERAPEUTIC EXERCISES: CPT | Mod: GP | Performed by: PHYSICAL THERAPIST

## 2017-06-06 PROCEDURE — 97112 NEUROMUSCULAR REEDUCATION: CPT | Mod: GP | Performed by: PHYSICAL THERAPIST

## 2017-06-06 PROCEDURE — 97530 THERAPEUTIC ACTIVITIES: CPT | Mod: GP | Performed by: PHYSICAL THERAPIST

## 2017-06-06 NOTE — PROGRESS NOTES
Subjective:    HPI                    Objective:    System    Physical Exam    General     ROS    Assessment/Plan:      DISCHARGE REPORT    Progress reporting period is from 5/16/17 to 6/6/17.       SUBJECTIVE  Subjective changes noted by patient:  Pt has not had any trouble with the calf.  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   Current pain level is 0/10.     Previous pain level was 0/10 (8/10 at worst).   Changes in function:  Yes (See Goal flowsheet attached for changes in current functional level)  Adverse reaction to treatment or activity: None    OBJECTIVE  Changes noted in objective findings:  Yes, improved calf strength (equal bilaterally now), improved running mechanics, femoral control during double and single leg exercises.    ASSESSMENT/PLAN  STG/LTGs have been met or progress has been made towards goals:  Yes (See Goal flow sheet completed today.)  Assessment of Progress: The patient's condition is improving.  Self Management Plans:  Patient has been instructed in a home treatment program.  Myrna continues to require the following intervention to meet STG and LTG's:  PT intervention is no longer required to meet STG/LTG.    Recommendations:  This patient is ready to be discharged from therapy and continue their home treatment program.    Please refer to the daily flowsheet for treatment today, total treatment time and time spent performing 1:1 timed codes.

## 2017-06-06 NOTE — MR AVS SNAPSHOT
After Visit Summary   6/6/2017    Myrna Valadez    MRN: [id number]           Patient Information     Date Of Birth          1985        Visit Information        Provider Department      6/6/2017 4:00 PM Carlos Boswell PT [facility] Athletic [facility] Physical [facility]        Today's Diagnoses     Right calf pain           Follow-ups after your visit        Who to contact     If you have questions or need follow up information about today's clinic visit or your schedule please contact [facility] ATHLETIC [facility] PHYSICAL [facility] directly at [phone].  Normal or non-critical lab and imaging results will be communicated to you by [facility], letter or phone within 4 business days after the clinic has received the results. If you do not hear from us within 7 days, please contact the clinic through [employer] or phone. If you have a critical or abnormal lab result, we will notify you by phone as soon as possible.  Submit refill requests through [employer] or call your pharmacy and they will forward the refill request to us. Please allow 3 business days for your refill to be completed.          Additional Information About Your Visit        MyChart Information     [employer] gives you secure access to your electronic health record. If you see a primary care provider, you can also send messages to your care team and make appointments. If you have questions, please call your primary care clinic.  If you do not have a primary care provider, please call [phone] and they will assist you.        Care EveryWhere ID     This is your Care EveryWhere ID. This could be used by other organizations to access your [place] medical records  [id number]         Blood Pressure from Last 3 Encounters:   04/11/17 112/72   03/23/17 107/70   07/27/16 127/72    Weight from Last 3 Encounters:   04/11/17 66.4 kg (146 lb 6.4 oz)   03/23/17 68.2 kg (150 lb 6.4 oz)   07/27/16 66.4 kg (146 lb  "6.4 oz)              We Performed the Following     WARNER PROGRESS NOTES REPORT     NEUROMUSCULAR RE-EDUCATION     THERAPEUTIC ACTIVITIES     THERAPEUTIC EXERCISES        Primary Care Provider Office Phone # Fax #    Clarissakarina Stroud [phone]       [facility] [address] [place] [id number]        Thank you!     Thank you for choosing [facility] PH[facility] THERAP[facility]  for your care. Our goal is always to provide you with excellent care. Hearing back from our patients is one way we can continue to improve our services. Please take a few minutes to complete the written survey that you may receive in the mail after your visit with us. Thank you!             Your Updated Medication List - Protect others around you: Learn how to safely use, store and throw away your medicines at www.disposemymeds.org.          This list is accurate as of: 6/6/17  4:51 PM.  Always use your most recent med list.                   Brand Name Dispense Instructions for use    conjugated estrogens cream    PREMARIN    42.5 g    Place 0.5 g vaginally twice a week       FLINTSTONES GUMMIES PO      Take 1 tablet by mouth 2 times daily.       meloxicam 7.5 MG tablet    MOBIC    30 tablet    Take 1 tablet (7.5 mg) by mouth daily       Syringe/Needle (Disp) 22G X 1-1/2\" 1 ML Misc     30 each    Inject 0.25 mLs into the muscle once a week       testosterone cypionate 200 MG/ML injection    DEPOTESTOTERONE    10 mL    Inject 0.25 mLs (50 mg) into the muscle once a week         "

## 2017-06-21 DIAGNOSIS — F64.9 GENDER IDENTITY DISORDER: ICD-10-CM

## 2017-10-09 DIAGNOSIS — E29.1 MALE HYPOGONADISM: ICD-10-CM

## 2017-10-09 NOTE — TELEPHONE ENCOUNTER
Request for medication refill:    Date of last visit at clinic: 4-11-17    Please complete refill if appropriate and CLOSE ENCOUNTER.    Closing the encounter signifies the refill is complete.    If refill has been denied, please complete the smart phrase .smirefuse and route it to the [facility] RN TRIAGE pool to inform the patient and the pharmacy.    Michelle Pace MA

## 2017-10-10 RX ORDER — TESTOSTERONE CYPIONATE 200 MG/ML
50 INJECTION, SOLUTION INTRAMUSCULAR WEEKLY
Qty: 10 ML | Refills: 3 | Status: SHIPPED | OUTPATIENT
Start: 2017-10-10 | End: 2018-06-05

## 2017-10-10 NOTE — TELEPHONE ENCOUNTER
Please process per controlled substance protocol. I have reviewed pt's chart and this refill is appropriate. Please provide rx to preceptor to sign, then provide to patient's pharmacy and notify patient.   Clarissa Stroud MD

## 2017-10-10 NOTE — TELEPHONE ENCOUNTER
RN printed pended script, had preceptor sign, and faxed to [facility] pharmacy.    Shonda Cuellar RN

## 2017-10-12 ENCOUNTER — TELEPHONE (OUTPATIENT)
Dept: FAMILY MEDICINE | Facility: CLINIC | Age: 32
End: 2017-10-12

## 2017-10-12 NOTE — TELEPHONE ENCOUNTER
Prior Authorization Retail Medication Request  Medication/Dose: testosterone cypionate (DEPOTESTOTERONE) 200 MG/ML injection  Diagnosis and ICD code: Male hypogonadism [E29.1]   New/Renewal/Insurance Change PA: NEW  Previously Tried and Failed Therapies: See Chart    Insurance ID (if provided):  [id number]  Insurance Phone (if provided): [phone]    Any additional info from fax request:     If you received a fax notification from an outside Pharmacy:  [facility] [address] [place] [address]  Pharmacy #[phone]  Pharmacy Fax:See Chart

## 2017-10-13 NOTE — TELEPHONE ENCOUNTER
[facility] Prior Authorization Team   Phone: [phone]  Fax: [phone]      PA Initiation    Medication: testosterone cypionate (DEPOTESTOTERONE) 200 MG/ML injection  Insurance Company: [employer] - Phone [phone] Fax [phone]  Pharmacy Filling the Rx: [id number] - [place], [place] - [address] AT [facility] [address]  Filling Pharmacy Phone: [phone]  Filling Pharmacy Fax:    Start Date: 10/13/2017  ]

## 2017-10-15 ENCOUNTER — HEALTH MAINTENANCE LETTER (OUTPATIENT)
Age: 32
End: 2017-10-15

## 2017-10-19 NOTE — TELEPHONE ENCOUNTER
[facility] Prior Authorization Team   Phone: [phone]  Fax: [phone]  Prior Authorization Approval    Authorization Effective Date: 10/18/2017  Authorization Expiration Date: 10/18/2018  Medication: testosterone cypionate (DEPOTESTOTERONE) 200 MG/ML injection - Approved  Insurance Company: [employer] - Phone [phone] Fax [phone]  Expected CoPay: 10.36$   Which Pharmacy is filling the prescription (Not needed for infusion/clinic administered): [id number] - [place], [place] - [address] AT [facility] [address]  Pharmacy Notified: Yes  Patient Notified: Yes

## 2018-01-05 ENCOUNTER — TELEPHONE (OUTPATIENT)
Dept: FAMILY MEDICINE | Facility: CLINIC | Age: 33
End: 2018-01-05

## 2018-01-05 NOTE — TELEPHONE ENCOUNTER
[facility] Family Medicine phone call message- general phone call:    Reason for call: Patient called to schedule physical exam appointment and inquired if Dr Maximus would like them to have labs completed prior to appointment. If so, please add lab orders to chart. Note: patient ultimately did not schedule physical exam appointment, will call back to schedule.    Return call needed: Yes, to schedule lab appointment    OK to leave a message on voice mail? Yes    Primary language: English      needed? No    Call taken on January 5, 2018 at 10:48 AM by Lata Camara

## 2018-01-05 NOTE — TELEPHONE ENCOUNTER
Message routed to PCP to review and place orders for labs if appropriate. Patient will call back to schedule appointment    Sanjana Fraga RN

## 2018-01-23 DIAGNOSIS — Z00.00 PREVENTATIVE HEALTH CARE: Primary | ICD-10-CM

## 2018-01-23 DIAGNOSIS — F64.0 GENDER DYSPHORIA IN ADOLESCENT AND ADULT: ICD-10-CM

## 2018-01-23 NOTE — TELEPHONE ENCOUNTER
Will order labs. Pt does NOT have to fast.   Will order STI labs as well unless he objects. Please call

## 2018-01-23 NOTE — TELEPHONE ENCOUNTER
Patient calling to reschedule lab appointment. Rescheduled lab only appointment for 2/8/18 and CPE for 2/16/18. Patient inquiring which labs Dr Stroud would like drawn on 2/8/18 and if fasting is required. Please return call to advise.

## 2018-01-23 NOTE — TELEPHONE ENCOUNTER
Message routed to PCP. Please review and advise  once labs are placed to schedule and if fasting labs ordered.    Shonda Cuellar RN

## 2018-02-08 DIAGNOSIS — Z00.00 PREVENTATIVE HEALTH CARE: ICD-10-CM

## 2018-02-08 DIAGNOSIS — F64.0 GENDER DYSPHORIA IN ADOLESCENT AND ADULT: ICD-10-CM

## 2018-02-08 LAB
ALBUMIN SERPL-MCNC: 4.9 MG/DL (ref 3.8–5)
ALP SERPL-CCNC: 41.9 U/L (ref 31.7–110.5)
ALT SERPL-CCNC: 11.1 U/L (ref 0–45)
AST SERPL-CCNC: 15.2 U/L (ref 0–45)
BILIRUB SERPL-MCNC: 2.2 MG/DL (ref 0.2–1.3)
BILIRUBIN DIRECT: 0.8 MG/DL (ref 0.1–0.3)
CHOLEST SERPL-MCNC: 216.4 MG/DL (ref 0–200)
CHOLEST/HDLC SERPL: 2 {RATIO} (ref 0–5)
GLUCOSE CASUAL: 101 MG/DL (ref 51–200)
HDLC SERPL-MCNC: 108 MG/DL
HGB BLD-MCNC: 13.5 G/DL (ref 11.7–15.7)
LDLC SERPL CALC-MCNC: 99 MG/DL (ref 0–129)
PROT SERPL-MCNC: 7.6 G/DL (ref 6.8–8.8)
TRIGL SERPL-MCNC: 44.7 MG/DL (ref 0–150)
VLDL CHOLESTEROL: 8.9 MG/DL (ref 7–32)

## 2018-02-09 LAB
C TRACH DNA SPEC QL NAA+PROBE: NEGATIVE
HBV SURFACE AB SERPL IA-ACNC: 723.63 M[IU]/ML
HBV SURFACE AG SERPL QL IA: NONREACTIVE
HIV 1+2 AB+HIV1 P24 AG SERPL QL IA: NONREACTIVE
N GONORRHOEA DNA SPEC QL NAA+PROBE: NEGATIVE
SPECIMEN SOURCE: NORMAL
SPECIMEN SOURCE: NORMAL
T PALLIDUM IGG+IGM SER QL: NEGATIVE

## 2018-02-10 LAB — TESTOST SERPL-MCNC: 957 NG/DL (ref 8–60)

## 2018-02-16 ENCOUNTER — OFFICE VISIT (OUTPATIENT)
Dept: FAMILY MEDICINE | Facility: CLINIC | Age: 33
End: 2018-02-16
Payer: COMMERCIAL

## 2018-02-16 VITALS
BODY MASS INDEX: 23.63 KG/M2 | DIASTOLIC BLOOD PRESSURE: 76 MMHG | HEART RATE: 81 BPM | OXYGEN SATURATION: 97 % | TEMPERATURE: 97.4 F | WEIGHT: 146.4 LBS | SYSTOLIC BLOOD PRESSURE: 127 MMHG

## 2018-02-16 DIAGNOSIS — F64.0 GENDER DYSPHORIA IN ADOLESCENT AND ADULT: ICD-10-CM

## 2018-02-16 DIAGNOSIS — N95.2 VAGINAL ATROPHY: ICD-10-CM

## 2018-02-16 DIAGNOSIS — R87.611 PAPANICOLAOU SMEAR OF CERVIX WITH ATYPICAL SQUAMOUS CELLS CANNOT EXCLUDE HIGH GRADE SQUAMOUS INTRAEPITHELIAL LESION (ASC-H): ICD-10-CM

## 2018-02-16 DIAGNOSIS — Z00.00 ROUTINE GENERAL MEDICAL EXAMINATION AT A HEALTH CARE FACILITY: Primary | ICD-10-CM

## 2018-02-16 DIAGNOSIS — Z23 ENCOUNTER FOR IMMUNIZATION: ICD-10-CM

## 2018-02-16 NOTE — MR AVS SNAPSHOT
After Visit Summary   2/16/2018    Myrna Valadez    MRN: [id number]           Patient Information     Date Of Birth          1985        Visit Information        Provider Department      2/16/2018 1:40 PM Clarissa Stroud MD [facility]        Today's Diagnoses     Routine general medical examination at a health care facility    -  1    Vaginal atrophy        Transgender          Care Instructions      Preventive Health Recommendations   Ages 26 - 39  1. Total testosterone in 3 mos on a Friday.  2. Return to clinic in 1 for hormone check     Yearly exam:   See your health care provider every year in order to    Review health changes.     Discuss preventive care.      Review your medicines if you your doctor has prescribed any.    Until age 30: Get a Pap test every three years (more often if you have had an abnormal result).    After age 30: Talk to your doctor about whether you should have a Pap test every 3 years or have a Pap test with HPV screening every 5 years.   You do not need a Pap test if your uterus was removed (hysterectomy) and you have not had cancer.  You should be tested each year for STDs (sexually transmitted diseases), if you're at risk.   Talk to your provider about how often to have your cholesterol checked.  If you are at risk for diabetes, you should have a diabetes test (fasting glucose).  Shots: Get a flu shot each year. Get a tetanus shot every 10 years.   Nutrition:     Eat at least 5 servings of fruits and vegetables each day.    Eat whole-grain bread, whole-wheat pasta and brown rice instead of white grains and rice.    Talk to your provider about Calcium and Vitamin D.     Lifestyle    Exercise at least 150 minutes a week (30 minutes a day, 5 days of the week). This will help you control your weight and prevent disease.    Limit alcohol to one drink per day.    No smoking.     Wear sunscreen to prevent skin cancer.    See your dentist every six  months for an exam and cleaning.            Follow-ups after your visit        Future tests that were ordered for you today     Open Future Orders        Priority Expected Expires Ordered    Testosterone total Routine  4/17/2018 2/16/2018            Who to contact     Please call your clinic at [phone] to:    Ask questions about your health    Make or cancel appointments    Discuss your medicines    Learn about your test results    Speak to your doctor            Additional Information About Your Visit        [employer] Information     W[employer] gives you secure access to your electronic health record. If you see a primary care provider, you can also send messages to your care team and make appointments. If you have questions, please call your primary care clinic.  If you do not have a primary care provider, please call [phone] and they will assist you.      Wink is an electronic gateway that provides easy, online access to your medical records. With [employer], you can request a clinic appointment, read your test results, renew a prescription or communicate with your care team.     To access your existing account, please contact your [facility] or call [phone] for assistance.        Care EveryWhere ID     This is your Care EveryWhere ID. This could be used by other organizations to access your [place] medical records  [id number]        Your Vitals Were     Pulse Temperature Pulse Oximetry BMI (Body Mass Index)          81 97.4  F (36.3  C) (Oral) 97% 23.63 kg/m2         Blood Pressure from Last 3 Encounters:   02/16/18 127/76   04/11/17 112/72   03/23/17 107/70    Weight from Last 3 Encounters:   02/16/18 146 lb 6.4 oz (66.4 kg)   04/11/17 146 lb 6.4 oz (66.4 kg)   03/23/17 150 lb 6.4 oz (68.2 kg)              We Performed the Following     HPV High Risk Types DNA Cervical     Pap imaged thin layer screen with HPV - recommended age 30 - 65 years (select HPV order below)           Today's Medication Changes          These changes are accurate as of 2/16/18  2:40 PM.  If you have any questions, ask your nurse or doctor.               Start taking these medicines.        Dose/Directions    estradiol 2 MG vaginal ring   Commonly known as:  ESTRING   Used for:  Vaginal atrophy   Started by:  Clarissa Stroud MD        Dose:  1 each   Place 1 each vaginally every 3 months   Quantity:  1 each   Refills:  3            Where to get your medicines      These medications were sent to [facility] [address] [place] [address] AT [address] [address]     Phone:  [phone]     estradiol 2 MG vaginal ring                Primary Care Provider Office Phone # Fax #    Clarissa Stroud [phone]       [address] [address]        Equal Access to Services     HAWA COELHO AH: Hadii aly masters hadasho Soomaali, waaxda luqadaha, qaybta kaalmada adeegyada, ruperto quinones haykorey de santiago . So [facility] [phone].    ATENCIÓN: Si habla español, tiene a gupta disposición servicios gratuitos de asistencia lingüística. Chelsey al [phone].    We comply with applicable federal civil rights laws and [place] laws. We do not discriminate on the basis of race, color, national origin, age, disability, sex, sexual orientation, or gender identity.            Thank you!     Thank you for choosing [facility]  for your care. Our goal is always to provide you with excellent care. Hearing back from our patients is one way we can continue to improve our services. Please take a few minutes to complete the written survey that you may receive in the mail after your visit with us. Thank you!             Your Updated Medication List - Protect others around you: Learn how to safely use, store and throw away your medicines at www.disposemymeds.org.          This list is accurate as of 2/16/18  2:40  "PM.  Always use your most recent med list.                   Brand Name Dispense Instructions for use Diagnosis    conjugated estrogens cream    PREMARIN    42.5 g    Place 0.5 g vaginally twice a week    Vaginal atrophy       estradiol 2 MG vaginal ring    ESTRING    1 each    Place 1 each vaginally every 3 months    Vaginal atrophy       FLINTSTONES GUMMIES PO      Take 1 tablet by mouth 2 times daily.        Syringe/Needle (Disp) 22G X 1-1/2\" 1 ML Misc     30 each    Inject 0.25 mLs into the muscle once a week    Gender identity disorder       testosterone cypionate 200 MG/ML injection    DEPOTESTOTERONE    10 mL    Inject 0.25 mLs (50 mg) into the muscle once a week Due for repeat visit 7-2018    Male hypogonadism         "

## 2018-02-16 NOTE — PROGRESS NOTES
"  Physical Note          HPI         Concerns today: considering bottom surgery, discharge with estrogen gel, drinking pattern      Patient Active Problem List   Diagnosis     Male hypogonadism     Problem drinking     High risk HPV infection       Past Medical History:   Diagnosis Date     Aseptic meningitis           Family History   Problem Relation Age of Onset     Dementia Father      frontotemporal dementia        Social: likes to drink and clean, how he manages his anxiety. Tracking how much he drinks since last visit when he had concern about overuse. Asks questions about how much they should drink in a week. Drinks primarily beer. On average has 6 days a month without alcohol. 4 days a month where they have more than 5. Average 2-3 drinks/day. Denies any other addictive substances. All beer. Thinks about smoking mariajuana sometimes but doesn't want \"the hassle\". Friends do not express concern about their drinking. Setting limits and being aware of drinking. No hx of eye openers ever.   The CAGE screening questions (asking whether patients felt they should cut down on drinking, were annoyed by others criticizing her drinking, felt guilty about use, or ever had an eye opener) were asked of the patient to determine possible ETOH or chemical abuse issues.   positive answers are as follows.    Have you ever:  None of the patient's responses to the CAGE screening were positive / Negative CAGE score  FHx: Believes brother is an alcoholic. Sees brother once every 2 years. Brothers wife is heroin addict. No other known fhx..    Diet: Eats a lot of vegetables with protein. Getting calcium and vit d through foods. Goes outside a lot during the winter.   Mental Health: one report of a panic attack. Was able to work through this with therapist, Jie Serrano at [facility], [place]. Happy with therapist. Gender issues resurfacing.  Would like to get bottom surgery, but still have a vagina. Was unaware bottom " surgery could be covered by insurance. [place] going outside of [place] for [place].Saving. Doesn't want metoidioplasty - wants full phallo. Wonders about phallo +keeping vagina, which he likes. Stable on hormones. Review labs today. T high. Took T shot 10pm on Monday night and got blood pulled at 4:20 on Thursday, almost 3 days apart. Will commit to lab draws on Fri. Anxious about changing (reducing) T because feels great on current dose.      Exercise: Runs for exercise, no problems.     Family History   Problem Relation Age of Onset     Dementia Father      frontotemporal dementia      Alcoholism Brother      possible     DIABETES No family hx of      .sochx       Review of Systems:     Review of Systems:  CONSTITUTIONAL: NEGATIVE for fever, chills, change in weight  INTEGUMENTARY/SKIN: NEGATIVE for worrisome rashes, moles or lesions  EYES: NEGATIVE for vision changes or irritation  ENT/MOUTH: NEGATIVE for ear, mouth and throat problems  RESP: NEGATIVE for significant cough or SOB  BREAST: NEGATIVE for masses, tenderness or discharge  CV: NEGATIVE for chest pain, palpitations or peripheral edema  GI: NEGATIVE for nausea, abdominal pain, heartburn, or change in bowel habits  : NEGATIVE for frequency, dysuria, or hematuria  MUSCULOSKELETAL: NEGATIVE for significant arthralgias or myalgia  NEURO: NEGATIVE for weakness, dizziness or paresthesias  ENDOCRINE: NEGATIVE for temperature intolerance, skin/hair changes  HEME/ALLERGY: NEGATIVE for bleeding problems  PSYCHIATRIC: NEGATIVE for changes in mood or affect. Had single panic attacks increasing anxiousness around gender.   Sees dentist 2x/year    This document serves as a record of the services and decisions personally performed and made by Clarissa Stroud MD. It was created on his/her behalf by Venice Lopez, a trained medical scribe. The creation of this document is based the provider's statements to the medical scribe.  Scribe Venice Lopez 1:43 PM,  2018           Social History     Social History     Social History     Marital status: Single     Spouse name: N/A     Number of children: N/A     Years of education: N/A     Occupational History     Not on file.     Social History Main Topics     Smoking status: Never Smoker     Smokeless tobacco: Never Used     Alcohol use No     Drug use: No     Sexual activity: Yes     Other Topics Concern     Not on file     Social History Narrative    Lives alone. Works as a paraprofessional at "[employer] Bikes or runs to commute, very fit. Good dairy intake.        Marital Status: dating someone new  Who lives in your household? Living alone     Has anyone hurt you physically, for example by pushing, hitting, slapping or kicking you or forcing you to have sex? Denies  Do you feel threatened or controlled by a partner, ex-partner or anyone in your life? Denies      Sexual Health     Sexual concerns: Yes   STI History: Neg  Pregnancy History:   LMP No LMP recorded. Patient is not currently having periods (Reason: UNKNOWN). on testosterone   Last Pap Smear Date:   Lab Results   Component Value Date    PAP NIL 2016    PAP NIL 2015    PAP NIL 2009     Abnormal Pap History: hpv other hr +     New partner with all female parts. She does not have any other partners. Eugenides shares fluids with other partners x2. The trio are the only fluid bonded partners they have. Otherwise using barrier protection for all encounters including sex toys from [employer]. Interested in repeat co-testing today to determine if it is safe to have unprotected penetrative sex with new female partner. If fluid bonds w/ new cisfemale partner, will use barrier protection w/ 2 regular male partners.     Using estrogen gel, causing large vol of white/cream discharge. Asks questions about using estrogen ring.       Recommended Screening     Pap/HPV cotest every 5 years for 30-65   Recommended and  patient accepted testing.  Breast CA Screening (>39 yo or 10 y before 1st degree relative diagnosis): Testing not indicated   Chest wall exam is indicated for s/p transmastectomy.          Physical Exam:     Vitals: /76  Pulse 81  Temp 97.4  F (36.3  C) (Oral)  Wt 146 lb 6.4 oz (66.4 kg)  SpO2 97%  BMI 23.63 kg/m2  BMI= Body mass index is 23.63 kg/(m^2).   GENERAL: healthy, alert and no distress  EYES: Eyes grossly normal to inspection, extraocular movements - intact, and PERRL  HENT: ear canals- normal; TMs- normal; Nose- normal; Mouth- no ulcers, no lesions  NECK: no tenderness, no adenopathy, no asymmetry, no masses, no stiffness; thyroid- normal to palpation  RESP: lungs clear to auscultation - no rales, no rhonchi, no wheezes  BREAST: transmasculine chest wall exam no masses, no tenderness, grafted nipples no palpable axillary masses or adenopathy  CV: regular rates and rhythm, normal S1 S2, no S3 or S4 and no murmur, no click or rub -  ABDOMEN: soft, no tenderness, no  hepatosplenomegaly, no masses, normal bowel sounds  MS: extremities- no gross deformities noted, no edema  SKIN: no suspicious lesions, no rashes. Fleshy skin tag on left anterior chest.   NEURO: strength and tone- normal, sensory exam- grossly normal, mentation- intact, speech- normal, reflexes- symmetric  BACK: no CVA tenderness, no paralumbar tenderness  - : cervix- normal, no masses, no discharge, narrowed introitus and enlarged clitoris c/w masculinizing hormone therapy  PSYCH: Alert and oriented times 3; speech- coherent , normal rate and volume; able to articulate logical thoughts, able to abstract reason, no tangential thoughts, no hallucinations or delusions, affect- normal  LYMPHATICS: ant. cervical- normal, post. cervical- normal, axillary- normal, supraclavicular- normal, tiny inguinal lymphadenopathy non tender to palpation       Assessment and Plan      Eugenides was seen today for physical.    Diagnoses and all orders  for this visit:    Routine general medical examination at a health care facility  -     Pap imaged thin layer screen with HPV - recommended age 30 - 65 years (select HPV order below)  -     HPV High Risk Types DNA Cervical  Health Care Maintenance: Normal Physical Exam  1. Routine follow up in one year.  2.Contraception: Details: condoms   3. Never received influenza vaccination, accepts today    Other medical problems addressed during this visit  Problem drinking   need to keep drinking under control which shouldn't be a problem given he is tracking it He is very aware and motivated and cut back since last year when binging was common.   Discussed problem drinking and lack of data about whether to consider him in the male or female risk category  No more than 2 drinks/day recommended   W/o blackouts and w/ neg cage no further recs at this time     Vaginal atrophy  -     estradiol (ESTRING) 2 MG vaginal ring; Place 1 each vaginally every 3 months  Using to prevent post coital bleeding   Trial use several times per week vs daily   This is a much smaller dose than the cream     Gender dysphoria in adult   testosterone is high but lab was early in shot cycle. Feels good. Doesn't necessarily want to decr dose but doesn't need to be >800 since fully masculinized.   -     Testosterone total; Future  Return in 3 mos for total testosterone on a Friday.  Consider surgical options, educated about this     Options for treatment and follow-up care were reviewed with the patient . Judielijah NONA Rory and/or guardian engaged in the decision making process and verbalized understanding of the options discussed and agreed with the final plan.    This document serves as a record of the services and decisions personally performed and made by Clarissa Stroud MD. It was created on his/her behalf by Venice Lopez, a trained medical scribe. The creation of this document is based the provider's statements to the medical scribe.  Hortencia Millard  Jessica 1:43 PM, February 16, 2018  I spent 30 min face to face with the patient and >50% was spent counselling the patient about the above medical conditions, educating patient on their medical conditions, behavioral interventions and supports.This is in addition to the 20 min spent on preventive cares.     Clarissa Stroud MD

## 2018-02-16 NOTE — NURSING NOTE
"Injectable Influenza Immunization Documentation    1.  Has the patient received the information for the injectable influenza vaccine? YES     2. Is the patient 6 months of age or older? YES     3. Does the patient have any of the following contraindications?         Severe allergy to eggs? No     Severe allergic reaction to previous influenza vaccines? No   Severe allergy to latex? No       History of Guillain-Schwertner syndrome? No     Currently have a temperature greater than 100.4F? No        4.  Severely egg allergic patients should have flu vaccine eligibility assessed by an MD, RN, or pharmacist, and those who received flu vaccine should be observed for 15 min by an MD, RN, Pharmacist, Medical Technician, or member of clinic staff.\": YES    5. Latex-allergic patients should be given latex-free influenza vaccine Yes. Please reference the Vaccine latex table to determine if your clinic s product is latex-containing.       Vaccination given by MAULIK Norman          "

## 2018-02-21 PROBLEM — F64.0 GENDER DYSPHORIA IN ADOLESCENT AND ADULT: Status: ACTIVE | Noted: 2018-02-21

## 2018-02-23 LAB
COPATH REPORT: ABNORMAL
PAP: ABNORMAL

## 2018-02-26 PROBLEM — R87.611 PAPANICOLAOU SMEAR OF CERVIX WITH ATYPICAL SQUAMOUS CELLS CANNOT EXCLUDE HIGH GRADE SQUAMOUS INTRAEPITHELIAL LESION (ASC-H): Status: ACTIVE | Noted: 2018-02-26

## 2018-02-26 LAB
FINAL DIAGNOSIS: ABNORMAL
HPV HR 12 DNA CVX QL NAA+PROBE: POSITIVE
HPV16 DNA SPEC QL NAA+PROBE: NEGATIVE
HPV18 DNA SPEC QL NAA+PROBE: NEGATIVE
SPECIMEN DESCRIPTION: ABNORMAL
SPECIMEN SOURCE CVX/VAG CYTO: ABNORMAL

## 2018-02-28 DIAGNOSIS — R87.619 ABNORMAL CERVICAL PAPANICOLAOU SMEAR, UNSPECIFIED ABNORMAL PAP FINDING: Primary | ICD-10-CM

## 2018-03-05 ENCOUNTER — TELEPHONE (OUTPATIENT)
Dept: FAMILY MEDICINE | Facility: CLINIC | Age: 33
End: 2018-03-05

## 2018-03-05 NOTE — TELEPHONE ENCOUNTER
[facility] phone call message- medication clarification/question:    Full Medication Name: estradiol (ESTRING) 2 MG vaginal ring       Question: It was recommenced the patient get a estrogen cream when speaking to Dr. Hodges in regards to questions about scheduling this. Discussed with patient and they were wondering if the above medication is something they are to be using in place of the estrogen cream before their colp/gyn appointment.  If so, how long? Patient would like to be contacted via phone as they check that more often than email. Please send to [facility]  to get appointment scheduled after question for patient is answered.     Pharmacy confirmed as [facility] [id number] [place], [place] [address] AT [address]: Yes    OK to leave a message on voice mail? Yes    Primary language: English      needed? No    Call taken on March 5, 2018 at 11:40 AM by Maral Milian

## 2018-03-05 NOTE — TELEPHONE ENCOUNTER
RN returned call to patient. Relayed message below. Verbalized understanding. Patient has not been able to  the ring yet. But will follow up with the pharmacy and once medication is started, patient will then call to schedule colposcopy 1-2 weeks out.    Shonda Cuellar RN

## 2018-03-05 NOTE — LETTER
2018      Myrna Valadez  : 1985  MRN: [id number]    To Whom it May Concern:      Myrna Valadez is my patient. He was female sex assigned at birth but has been living as male and henceforth in this document will be referred to as male. He has been prescribed an estrogen ring for the treatment vaginal atrophy. He has trialed estrogen cream and had side effects - this ring is a lower, safer dose. Treatment of the vaginal atrophy is considered medically necessary.     I am a [place]-licensed family physician practicing at [facility], [facility]. I am experienced in evaluation and treatment of patients with gender dysphoria, nonbinary gender identities and presentations and in the management of sexual health.     If you have any questions, please do not hesitate to contact me.          Respectfully,           Clarissa Stroud MD

## 2018-03-05 NOTE — Clinical Note
PCS:   Please put in my folder to sign, then will go to PA pool  PA folks: EMILY this is coming. Rx is for vaginal ring.   radha

## 2018-03-05 NOTE — TELEPHONE ENCOUNTER
No note of cream being prescribed prior to Colposcopy. Message routed to PCP to prescribe if patient should be taking and how long patient should take prior to [place]. Please review and advise.    Please route to  to schedule appointment.    Shonda Cuellar RN

## 2018-03-05 NOTE — TELEPHONE ENCOUNTER
Please call Eugenides. He needs vaginal estrogen, doesn't matter if cream or ring.   He has prescription for ring.   He should use daily for a week to 2 weeks before colp.

## 2018-03-16 NOTE — TELEPHONE ENCOUNTER
Patient calling to let provider know that the reason the prescription did not go through insurance is due to gender marker. Pharmacy unable to fill prescription because insurance rejected it due to gender marker.

## 2018-03-19 ENCOUNTER — MEDICAL CORRESPONDENCE (OUTPATIENT)
Dept: HEALTH INFORMATION MANAGEMENT | Facility: CLINIC | Age: 33
End: 2018-03-19

## 2018-03-22 ENCOUNTER — TELEPHONE (OUTPATIENT)
Dept: FAMILY MEDICINE | Facility: CLINIC | Age: 33
End: 2018-03-22

## 2018-03-22 NOTE — TELEPHONE ENCOUNTER
Prior Authorization Retail Medication Request    Medication/Dose: Estring 2MG Vaginal Ring  ICD code (if different than what is on RX):  See chart  Previously Tried and Failed:  See Chart  Rationale:  See Chart    Insurance Name: see chart  Insurance ID: [id number]      Pharmacy Information (if different than what is on RX)  Name:  [facility]  Phone: [phone]

## 2018-03-23 NOTE — TELEPHONE ENCOUNTER
Per [facility], patient has [employer]; BIN-[id number]; PCN-[id number]; ID-[id number]    Central Prior Authorization Team   Phone: [phone]      PA Initiation    Medication: Estring 2MG Vaginal Ring-PA initiated  Insurance Company:    Pharmacy Filling the Rx: [facility] [id number] [address] [address] AT [facility] & [address] [place] [address]  Filling Pharmacy Phone: [phone]  Filling Pharmacy Fax:    Start Date: 3/23/2018

## 2018-03-27 DIAGNOSIS — F64.9 GENDER IDENTITY DISORDER: ICD-10-CM

## 2018-03-27 NOTE — TELEPHONE ENCOUNTER
I called [employer]. They are working on PAs received 3/22 and are not able to tell me if they have this. We need to allow 72 hours.

## 2018-03-28 NOTE — TELEPHONE ENCOUNTER
I spoke to Brigitte at [facility]. Case is currently in review. Tesha will be approving it today and will fax approval to me.

## 2018-03-29 NOTE — TELEPHONE ENCOUNTER
Prior Authorization Approval    Authorization Effective Date: 3/28/2018  Authorization Expiration Date: 3/28/2019  Medication: Estring 2MG Vaginal Ring-PA initiated-Approved-  Approved Dose/Quantity:   Reference #:     Insurance Company: [employer] - Phone [phone] Fax [phone]  Expected CoPay: $270.00     CoPay Card Available:      Foundation Assistance Needed:    Which Pharmacy is filling the prescription (Not needed for infusion/clinic administered): [facility] [address] [place] [address] [address] AT [address]  Pharmacy Notified: Yes  Patient Notified: Yes

## 2018-03-29 NOTE — TELEPHONE ENCOUNTER
Central Prior Authorization Team   Phone: [phone]      There is a letter of medical necessity in the patients chart for this medication, should we ever need it. Just wanted that noted for future reference.

## 2018-05-10 ENCOUNTER — TELEPHONE (OUTPATIENT)
Dept: FAMILY MEDICINE | Facility: CLINIC | Age: 33
End: 2018-05-10

## 2018-05-10 NOTE — TELEPHONE ENCOUNTER
I called the pt about the 3pm today.. Unable to keep the appt due to Glenn gómez does not do colposcopy.  Please have the patient speak with me when they call back as I have other schedule times I can put them in earlier than what colp has available.

## 2018-06-05 DIAGNOSIS — E29.1 MALE HYPOGONADISM: ICD-10-CM

## 2018-06-05 NOTE — TELEPHONE ENCOUNTER

## 2018-06-07 RX ORDER — TESTOSTERONE CYPIONATE 200 MG/ML
50 INJECTION, SOLUTION INTRAMUSCULAR WEEKLY
Qty: 10 ML | Refills: 3 | Status: SHIPPED | OUTPATIENT
Start: 2018-06-07 | End: 2019-08-16

## 2018-06-07 NOTE — TELEPHONE ENCOUNTER
RN printed pended script, had preceptor sign,and faxed to [facility] pharmacy.    Shonda Cuellar RN

## 2018-06-12 ENCOUNTER — OFFICE VISIT (OUTPATIENT)
Dept: FAMILY MEDICINE | Facility: CLINIC | Age: 33
End: 2018-06-12
Payer: COMMERCIAL

## 2018-06-12 VITALS
RESPIRATION RATE: 16 BRPM | DIASTOLIC BLOOD PRESSURE: 68 MMHG | BODY MASS INDEX: 23.82 KG/M2 | OXYGEN SATURATION: 100 % | SYSTOLIC BLOOD PRESSURE: 109 MMHG | HEIGHT: 66 IN | TEMPERATURE: 97.4 F | HEART RATE: 60 BPM | WEIGHT: 148.2 LBS

## 2018-06-12 DIAGNOSIS — R87.611 PAPANICOLAOU SMEAR OF CERVIX WITH ATYPICAL SQUAMOUS CELLS CANNOT EXCLUDE HIGH GRADE SQUAMOUS INTRAEPITHELIAL LESION (ASC-H): ICD-10-CM

## 2018-06-12 DIAGNOSIS — B97.7 HIGH RISK HPV INFECTION: ICD-10-CM

## 2018-06-12 DIAGNOSIS — R87.611 ATYPICAL SQUAMOUS CELLS CANNOT EXCLUDE HIGH GRADE SQUAMOUS INTRAEPITHELIAL LESION ON CYTOLOGIC SMEAR OF CERVIX (ASC-H): Primary | ICD-10-CM

## 2018-06-12 RX ORDER — FOLIC ACID 1 MG/1
2 TABLET ORAL DAILY
Qty: 100 TABLET | Refills: 1 | Status: SHIPPED | OUTPATIENT
Start: 2018-06-12 | End: 2018-09-18

## 2018-06-12 NOTE — LETTER
"June 27, 2018      Myrna NONA Valadez  [address]  [place] [address]        Dear Myrna,    Thank you for getting your care at [facility]. Please see below for your test results.  The findings from inside the cervix were normal.  There were mild changes (CIN1) on 2/3 of the biopsies.  This often heals on it's own.  Because of the previous pap finding of \"ASC-H\" which is a little concerning for development to higher-grade lesions on the cervix, I recommend a follow up pap/HPV test in 12 and 24 months.  In the meantime - take your folic acid!    Resulted Orders   Pap imaged thin layer diagnostic only   Result Value Ref Range    PAP ASC-US (A)     Copath Report         Acc#: [id number]   Signed: 6/18/2018 05:43   MR#: [id number]    SPECIMEN/STAIN PROCESS:  Pap Imaged thin layer prep diagnostic (SurePath, FocalPoint with guided   screening)       Pap-Cyto x 1    SOURCE: Cervical, endocervical  ----------------------------------------------------------------   Pap Imaged thin layer prep diagnostic (SurePath, FocalPoint with guided   screening)  SPECIMEN ADEQUACY:  Satisfactory for evaluation.  -Transitional zone component could not be determined due to atrophy.    CYTOLOGIC INTERPRETATION:    Epithelial cell abnormality:  squamous cell:  atypical squamous cells-of   undetermined significance (ASC-US).    Electronically signed by: Baldemar Bright M.D., PhD    CLINICAL HISTORY:    Currently not having periods: unknown  Complete Hysterectomy, Previous abnormal pap: ASC-H  Date of Last Pap: 2/16/2018,    Papanicolaou Test Limitations:  Cervical cytology is a screening test with   limited sensitivity; regular  screening is critical for cancer prevention; Pap  tests are primarily   effective for the diagnosis/prevention of  squamous cell carcinoma, not adenocarcinomas or other cancers.  TESTING LAB LOCATION:  [facility], [facility], [address] [place] [address] " "[id number], [phone]  Processed and screened at [facility],   [facility]     Surgical pathology exam   Result Value Ref Range    Copath Report       Patient Name: DELROY LIZMAA  MR#: [id number]  Specimen #: [id number]  Collected: 6/12/2018  Received: 6/14/2018  Reported: 6/19/2018 13:25  Ordering Phy(s): DILSHAD PALOMO    For improved result formatting, select 'View Enhanced Report Format' under   Linked Documents section.    SPECIMEN(S):  A: Endocervical curettings  B: Cervical biopsy, 3 o'clock  C: Cervical biopsy, 6 o'clock  D: Cervical biopsy, 12 o'clock    FINAL DIAGNOSIS:  A. Endocervical curettings:  - Focal atypical squamous epithelium (see comment).    B. 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 Cervical biopsy, 12 o'clock:  - Focal atypical squamous epithelium (see comment).    COMMENT:  This case was seen in intradepartmental consultation. The atypia in the   biopsies from the endocervical  curettings and cervical  biopsy 12:00 (specimens \"A\" and \"D\", respectively)   fall short of morphologic criteria  clearly diagnostic of dysplasia although given the findings in the other   biopsies the atypia is likely  secondary to viral transformation of the epithelium.    The patient's prior Pap smear from 2/19/18 is reviewed and the original   diagnosis of \"atypical squamous cells  - cannot exclude high grade lesion\" confirmed ([id number]). Concurrent   molecular testing performed 2/16/18 was  positive for high risk HPV DNA type other than 16 and 18.  A recent Pap   smear concurrent with the present  biopsy from 6/14/18 is reported to show \"atypical squamous cells of   undetermined significance\" confirmed  ([id number]).    Electronically signed out by:    ELAINA Chowdhury " "GILBERTO Maciel    CLINICAL HISTORY:  33-year-old female with history of ASC-H Pap smear.    GROSS:  Four specimen containers with formalin are received labeled with the   patient's name, date of birth, and  medical record number.  Information on the re quisition slip, containers,   and associated labels is confirmed.    A. The specimen is designated \"endocervical curettings\" consisting of a   Cytobrush in formalin from which a 0.5  cm aggregate of mucoid translucent to tan non-cohesive material is   obtained. The entire specimen is filtered  over a tissue wrap. The Cytobrush is scraped and is submitted with the   filtered tissue.  Entirely submitted in  one cassette.    B. The specimen is designated \"cervical biopsy 3:00\". The specimen   consists of a single pale tan soft tissue  fragment measuring 0.2 cm in greatest dimension. The specimen is entirely   submitted in one cassette.    C. The specimen is designated \"cervical biopsy 6:00\". The specimen   consists of a single pale tan soft tissue  fragment measuring 0.4 cm in greatest dimension. The specimen is entirely   submitted in one cassette.    D. The specimen is designated \"cervical biopsy 12:00\". The specimen   consists of a single pale tan soft tissue  fragment measuring 0.3 cm  in greatest dimension. The specimen is entirely   submitted in one cassette. (Dictated  by: Brenna Cassidy 6/14/2018 01:45 PM)    MICROSCOPIC:  A. The sections show portions of squamous and endocervical tissue in a   background of mucous.  A single  atypical squamous fragment is present showing slight variation in nuclear   size with a relative oval size  without clear nuclear irregularity, some the cells showing patent   cytoplasmic vacuolation.    B and C. The sections from each of the biopsies have similar features   showing portions of squamous epithelium  with focal areas of nuclear irregularity and variation in size with a   cytoplasmic vacuolation consistent with  low grade " "koilocytic dysplasia.    D. Central focal areas of squamous epithelium with chronic inflammation   and focal atypia without conclusive  evidence of virally transformed epithelium or dysplasia.    Immunohistochemical stains to p16 and Ki-67 were performed on each of the   biopsies (specimens \"A\" thru \"D\")  show  no evidence of a high grade squamous intraepithelial lesion in any of   the specimens submitted.    The immunohistochemical stain controls were reviewed showing appropriate   reaction patterns for interpretation  of the respective antibodies. (Dictated by: UMESH Maciel MD   06/19/2018)    CPT Codes:  A: 68255-NZ1, 44486-VJX, 22527-XLQ  B: 42666-FQ0, 07686-TGK, 12803-JXJ  C: 50126-FR4, 82530-IIW, 00715-FEQ  D: 05232-LJ6, 55651-ZUW, 85202-WKU    TESTING LAB LOCATION:  [address] [id number] [phone]    COLLECTION SITE:  Client: [facility]  Location: [facility] (B)         If you have any concerns about these results please call and leave a message for me or send a MyChart message to the clinic.    Sincerely,    Paty Mansfield MD    "

## 2018-06-12 NOTE — MR AVS SNAPSHOT
After Visit Summary   6/12/2018    Myrna Valadez    MRN: [id number]           Patient Information     Date Of Birth          1985        Visit Information        Provider Department      6/12/2018 3:40 PM Paty Mansfield MD [facility]        Today's Diagnoses     Atypical squamous cells cannot exclude high grade squamous intraepithelial lesion on cytologic smear of cervix (ASC-H)    -  1      Care Instructions    You had a colposcopy today in order to have a closer look at your cervix to figure out why your testing (pap smear, HPV or exam) was not normal.      If we took some biopsies:  --- you will have some spotting for the next few days.  The spotting will most likely be brown/black in color but might have some red in it.  This is normal.  Please call right away if you have bleeding that is like a menstrual period.      --- you will also have some cramping. The cramping should be mild and be better by the end of the day.  Ibuprofen, 400 - 600 mg every 6 hours is helpful (make sure that you are OK to take ibuprofen).     --- we recommend that you do not have vaginal intercourse, use tampons or douche in the next week. This assures that the biopsies heal safely.    --- you will get a letter and/or a call in the next week with the results of your biopsy.  If they show moderate or severe dysplasia (changes in the cells), we will most likely recommend treatment. Treatment can be either cryotherapy (freezing of the cervix) or LEEP (loop electrical excision of the affected area of your cervix), based on characteristics of the biopsy and how much of the cervix has the dysplasia.  Your provider will help guide you on which treatment is best for you.               Follow-ups after your visit        Who to contact     Please call your clinic at [phone] to:    Ask questions about your health    Make or cancel appointments    Discuss your medicines    Learn about your test  "results    Speak to your doctor            Additional Information About Your Visit        [employer] Information     [employer] gives you secure access to your electronic health record. If you see a primary care provider, you can also send messages to your care team and make appointments. If you have questions, please call your primary care clinic.  If you do not have a primary care provider, please call [phone] and they will assist you.      [employer] is an electronic gateway that provides easy, online access to your medical records. With [employer], you can request a clinic appointment, read your test results, renew a prescription or communicate with your care team.     To access your existing account, please contact your [facility] or call [phone] for assistance.        Care EveryWhere ID     This is your Care EveryWhere ID. This could be used by other organizations to access your [place] medical records  [id number]        Your Vitals Were     Pulse Temperature Respirations Height Pulse Oximetry Breastfeeding?    60 97.4  F (36.3  C) (Oral) 16 5' 6\" (167.6 cm) 100% No    BMI (Body Mass Index)                   23.92 kg/m2            Blood Pressure from Last 3 Encounters:   06/12/18 109/68   02/16/18 127/76   04/11/17 112/72    Weight from Last 3 Encounters:   06/12/18 148 lb 3.2 oz (67.2 kg)   02/16/18 146 lb 6.4 oz (66.4 kg)   04/11/17 146 lb 6.4 oz (66.4 kg)              Today, you had the following     No orders found for display         Today's Medication Changes          These changes are accurate as of 6/12/18  4:37 PM.  If you have any questions, ask your nurse or doctor.               Start taking these medicines.        Dose/Directions    folic acid 1 MG tablet   Commonly known as:  FOLVITE   Used for:  Atypical squamous cells cannot exclude high grade squamous intraepithelial lesion on cytologic smear of cervix (ASC-H)   Started by:  Paty Mansfield MD        Dose:  2 " mg   Take 2 tablets (2 mg) by mouth daily   Quantity:  100 tablet   Refills:  1            Where to get your medicines      These medications were sent to [facility] [id number] [place], [place] [address]A AVE AT [facility] & [address] [place], [place] [address] [address]    Hours:  24-hours Phone:  [phone]     folic acid 1 MG tablet                Primary Care Provider Office Phone # Fax #    Clarissa Stroud [phone]       [address] [address]        Equal Access to Services     [facility]: Hadii aad ku hadasho Soomaali, waaxda luqadaha, qaybta kaalmada adeegyada, ruperto de santiago . So [facility] [phone].    ATENCIÓN: Si habla español, tiene a gupta disposición servicios gratuitos de asistencia lingüística. [facility] [phone].    We comply with applicable federal civil rights laws and [place] laws. We do not discriminate on the basis of race, color, national origin, age, disability, sex, sexual orientation, or gender identity.            Thank you!     Thank you for choosing [facility]  for your care. Our goal is always to provide you with excellent care. Hearing back from our patients is one way we can continue to improve our services. Please take a few minutes to complete the written survey that you may receive in the mail after your visit with us. Thank you!             Your Updated Medication List - Protect others around you: Learn how to safely use, store and throw away your medicines at www.disposemymeds.org.          This list is accurate as of 6/12/18  4:37 PM.  Always use your most recent med list.                   Brand Name Dispense Instructions for use Diagnosis    estradiol 2 MG vaginal ring    ESTRING    1 each    Place 1 each vaginally every 3 months    Vaginal atrophy       FLINTSTONES GUMMIES PO      Take 1 tablet by mouth 2 times daily.        folic acid 1 MG tablet    FOLVITE     "100 tablet    Take 2 tablets (2 mg) by mouth daily    Atypical squamous cells cannot exclude high grade squamous intraepithelial lesion on cytologic smear of cervix (ASC-H)       Syringe/Needle (Disp) 22G X 1-1/2\" 1 ML Misc     30 each    Inject 0.25 mLs into the muscle once a week    Gender identity disorder       testosterone cypionate 200 MG/ML injection    DEPOTESTOTERONE    10 mL    Inject 0.25 mLs (50 mg) into the muscle once a week Due for repeat visit 7-2018    Male hypogonadism         "

## 2018-06-12 NOTE — Clinical Note
EMILY - would you like to call Eugenides with these results?  I did send a letter, but it's nice to give a ring : )  Paty

## 2018-06-12 NOTE — PROGRESS NOTES
[facility]   [facility] Procedure Note    History:  Myrna Valadez is a patient of Clarissa Elliott that  presents for colposcopy for ASC-H HPV high risk positive (other HR HPV not 16 or 18)  STI history: none  Contraception  none  Smoking?  No  Taking folate?   No  ASA in last week? No  NSAID taken today? Yes    Consent: Affirmation of informed consent signed and scanned into medical record. Risks, benefits and alternatives discussed. Patient's questions were elicited and answered.  Procedure safety checklist was completed:  Yes  Time Out (Pause for the Cause) completed: Yes    Labs:   UPT:  Not required, patient has had a hysterectomy/BSO    Procedure:  Patient positioned for colposcopy.   Pap smear repeated.  Acetic acid applied. Lugol's applied.   SCJ seen entirely? No  [place] was satisfactory with biopsy and ECC  Endocervical curettage performed, cervical biopsies taken at 3 o'clock, 6 o'clock and 12 o'clock, specimen labelled and sent to pathology and hemostasis achieved with Monsel's solution  Bleeding controlled with: pressure and Monsel's solution  EBL: 25ml    Findings:   Vagina   vaginal colposcopy not performed    Vulva  vulvar colposcopy not performed    Cervix  Atrophic cervix 2/2 testosterone HRT, therefore changes were not observed in setting of ASC-H pap smear. Random biopsies taken at 3 o'clock, 6 o'clock and 12 o'clock position along with ECC.      Colposcopic Impression: Dysplasia difficult to grade in setting of atrophic cervix             Tolerance:   Patient tolerated procedure well    Plan:  Follow up in 2 weeks for biopsy and pap smear results.  Folic acid 2mg po daily  Discharge instructions discussed including RTC or call if bleeding >1pph, fever, abdominal pain or foul smelling discharge.       Resident: Isabel Oquendo MD  Faculty: Paty Mansfield MD present for and supervised this entire procedure

## 2018-06-12 NOTE — PATIENT INSTRUCTIONS
You had a colposcopy today in order to have a closer look at your cervix to figure out why your testing (pap smear, HPV or exam) was not normal.      If we took some biopsies:  --- you will have some spotting for the next few days.  The spotting will most likely be brown/black in color but might have some red in it.  This is normal.  Please call right away if you have bleeding that is like a menstrual period.      --- you will also have some cramping. The cramping should be mild and be better by the end of the day.  Ibuprofen, 400 - 600 mg every 6 hours is helpful (make sure that you are OK to take ibuprofen).     --- we recommend that you do not have vaginal intercourse, use tampons or douche in the next week. This assures that the biopsies heal safely.    --- you will get a letter and/or a call in the next week with the results of your biopsy.  If they show moderate or severe dysplasia (changes in the cells), we will most likely recommend treatment. Treatment can be either cryotherapy (freezing of the cervix) or LEEP (loop electrical excision of the affected area of your cervix), based on characteristics of the biopsy and how much of the cervix has the dysplasia.  Your provider will help guide you on which treatment is best for you.

## 2018-06-14 NOTE — PROGRESS NOTES
Preceptor Attestation:   Patient seen, evaluated and discussed with the resident. I was present for and supervised the entire procedure. I have verified the content of the note, which accurately reflects my assessment of the patient and the plan of care.   Supervising Physician:  Paty Mansfield MD

## 2018-06-18 LAB
COPATH REPORT: ABNORMAL
PAP: ABNORMAL

## 2018-06-19 LAB — COPATH REPORT: NORMAL

## 2018-07-03 ENCOUNTER — TELEPHONE (OUTPATIENT)
Dept: PLASTIC SURGERY | Facility: CLINIC | Age: 33
End: 2018-07-03

## 2018-07-03 DIAGNOSIS — F64.0 GENDER DYSPHORIA IN ADULT: Primary | ICD-10-CM

## 2018-07-03 NOTE — TELEPHONE ENCOUNTER
Health Call Center    Phone Message    May a detailed message be left on voicemail: no    Reason for Call: Other: Pt called in to schedule a consult for phalloplasty with Dr. Eastman. Pt also wanted to know some detail about Dr. Eastman and his care approaches. Please contact Pt to discuss.     Action Taken: Message routed to:  [facility] ([facility]AT[facility] SURG UC

## 2018-07-06 NOTE — TELEPHONE ENCOUNTER
Left VM for pt to call back to schedule consult and answer questions.     Writer entered in Gender care referral.

## 2018-08-17 ENCOUNTER — MEDICAL CORRESPONDENCE (OUTPATIENT)
Dept: HEALTH INFORMATION MANAGEMENT | Facility: CLINIC | Age: 33
End: 2018-08-17

## 2018-08-20 ENCOUNTER — MEDICAL CORRESPONDENCE (OUTPATIENT)
Dept: HEALTH INFORMATION MANAGEMENT | Facility: CLINIC | Age: 33
End: 2018-08-20

## 2018-08-23 ENCOUNTER — TELEPHONE (OUTPATIENT)
Dept: PLASTIC SURGERY | Facility: CLINIC | Age: 33
End: 2018-08-23

## 2018-08-23 NOTE — TELEPHONE ENCOUNTER
Pt (Myrna or Inocencio, He/Him Pronouns) currently sees Dr. Stroud, pt reports he has been on hormones for 8 years, hysterectomy completed in Oct 2011, Top surgery completed in April 2013. Pt has two letters of support in hand and will bring them both with him.  Pt reports first letter from his regular psychologist Dr. Jie Serrano, and psychiatrist/psychologist Maria Del Rosario Espinal write his second letter. Pt reports he does not smoke and does not have diabetes.    [facility] referral was entered by PCP, pt scheduled for lower surgery consult 11/9/18 1pm.

## 2018-09-18 DIAGNOSIS — R87.611 ATYPICAL SQUAMOUS CELLS CANNOT EXCLUDE HIGH GRADE SQUAMOUS INTRAEPITHELIAL LESION ON CYTOLOGIC SMEAR OF CERVIX (ASC-H): ICD-10-CM

## 2018-09-19 RX ORDER — FOLIC ACID 1 MG/1
2 TABLET ORAL DAILY
Qty: 100 TABLET | Refills: 1 | Status: SHIPPED | OUTPATIENT
Start: 2018-09-19 | End: 2019-02-01

## 2018-10-18 ENCOUNTER — TELEPHONE (OUTPATIENT)
Dept: DERMATOLOGY | Facility: CLINIC | Age: 33
End: 2018-10-18

## 2018-10-18 ENCOUNTER — OFFICE VISIT (OUTPATIENT)
Dept: PLASTIC SURGERY | Facility: CLINIC | Age: 33
End: 2018-10-18
Payer: COMMERCIAL

## 2018-10-18 VITALS
OXYGEN SATURATION: 99 % | SYSTOLIC BLOOD PRESSURE: 123 MMHG | WEIGHT: 148.7 LBS | HEART RATE: 48 BPM | DIASTOLIC BLOOD PRESSURE: 70 MMHG | TEMPERATURE: 97.9 F | HEIGHT: 66 IN | BODY MASS INDEX: 23.9 KG/M2

## 2018-10-18 DIAGNOSIS — F64.0 GENDER DYSPHORIA IN ADULT: Primary | ICD-10-CM

## 2018-10-18 NOTE — NURSING NOTE
Pt contacted and left message to schedule CTA at [phone].  I asked him to call me back with questions.

## 2018-10-18 NOTE — PROGRESS NOTES
"REFERRING PROVIDER: Clarissa Stroud    REASON FOR CONSULTATION: Gender dysphoria, requesting phalloplasty.    HPI: Patient is a 33-year-old right-hand-dominant trans-man who prefers he him pronouns, referred to me by Dr. Clarissa Stroud for possible phalloplasty.  Patient reports that he has been interested in undergoing surgery for the past year.  His main goal with surgery is for the ability to urinate while standing.  He is also interested in penetrative coitus.  Overall, he is hoping to better align his physical body with his chosen gender identity.  He transitioned 10 years ago.  New name is Inocencio.  He has been on hormone therapy for the past 8 years.  This is prescribed through the [place].  He has so far undergone hysterectomy and mastectomy.  However, he still maintains his cervix and vagina.  He reports to me that he is interested and possibly undergoing phalloplasty while keeping his feminine genitalia.    He denies any previous urinary issues including urinary tract infections, kidney stones, and stream abnormalities.  Denies any lower GI issues.  He has had history of HPV.  He has been vaccinated.  He is a currently able to achieve orgasm via clitoral stimulation.  He does masturbate.    MEDS:   Prior to Admission medications    Medication Sig Start Date End Date Taking? Authorizing Provider   folic acid (FOLVITE) 1 MG tablet Take 2 tablets (2 mg) by mouth daily 9/19/18  Yes Clarissa Stroud MD   Pediatric Multivit-Minerals-C (FLINTSTONES GUMMIES PO) Take 1 tablet by mouth 2 times daily.   Yes Reported, Patient   Syringe/Needle, Disp, 22G X 1-1/2\" 1 ML MISC Inject 0.25 mLs into the muscle once a week 3/27/18  Yes Clarissa Stroud MD   testosterone cypionate (DEPOTESTOTERONE) 200 MG/ML injection Inject 0.25 mLs (50 mg) into the muscle once a week Due for repeat visit 7-2018 6/7/18  Yes Kris Rosenthal MD               ALLERGIES: No Known Allergies    PMH:   Past Medical History:   Diagnosis Date     " "Aseptic meningitis        PSH:   Past Surgical History:   Procedure Laterality Date     HYSTERECTOMY      and oophorectomy, but still has cervix and will require pap every 3 years     MASTECTOMY PARTIAL      chest surgery w/ contouring        SH:   Social History   Substance Use Topics     Smoking status: Never Smoker     Smokeless tobacco: Never Used     Alcohol use No   Works as a teaching aid.    FH:   Family History   Problem Relation Age of Onset     Dementia Father      frontotemporal dementia      Alcoholism Brother      possible     Diabetes No family hx of        ROS: Denies chest pain, shortness of breath, MI, CVA, diabetes, DVT, PE, and bleeding disorders.    PHYSICAL EXAMINATION: /70  Pulse (!) 48  Temp 97.9  F (36.6  C) (Oral)  Ht 5' 6\"  Wt 148 lb 11.2 oz  SpO2 99%  BMI 24 kg/m2  General: No acute distress.  Appears masculine.  On exam of the left nondominant forearm, there is no rash or lesion.  Ulnar volar aspect of the forearm is not hirsute.  The radial and dorsal forearm is mildly hirsute.  Skin pinch of the forearm tissues is less than 1 cm.  Zeus's testing shows filling of the hand through the ulnar artery system.  Patient is able to make a composite fist and extend all digits.  Sensation to light touch is intact in all III nerve distributions.  On examination of bilateral groin, there is medial aspect tear.  There is no rash or lesion.  Skin pinch is less than 1 cm.  On examination of bilateral thighs, there is moderate hair.  Skin pinch is approximately 1 cm.  There is no rash or lesion.  On examination of the genitalia, clitoris is hypertrophied.  There is no rash or lesion or scars.    ASSESSMENT: Gender dysphoria, requesting phalloplasty but still keeping vagina and cervix.    PLAN: Patient has obtained 2 letters of support.  Given the thin groin and thigh tissues, if the appropriate perforators are present on CT angiogram, patient is a potential candidate for 2 flap ALT and " SCIP phalloplasty.  Given that his request is atypical, I told him that at this point, we do not perform atypical requests.  If he does wish to undergo removal of his cervix and vagina prior to phalloplasty, he will let us know.  However, I did explain the process of 2 flap ALT & SCIP phalloplasty and the goals of the surgery.  I explained to him that this is a commitment to multiple stages including free tissue transfer, glansplasty, scrotal expansion, and prosthesis placement over many  years with the goals being obtaining ability to urinate while standing and have penetrative coitus once sensation regenerates into the phallus and erectile prosthesis placed.  He understands that this may require multiple stages and may be complicated by flap loss and urethral problems.  I described the procedures in detail today and that it will be as an inpatient with a 1 - 2 week postoperative admission.  I explained the risks to include flap loss, bleeding, infection, urethral stricture, urethral fistula, asymmetry, contour deformity, wound healing problems, DVT/PE, and need for revision surgery.  I explained to him that hair removal is medically necessary over the groin from where the neourethra will be fashioned.  This is to prevent lumen narrowing, urinary abnormalities, and kidney stones.  Patient is willing to undergo hair removal prior surgery.  I explained to him that this may take 6-12 sessions.  I also explained to the patient that the initial surgery will be performed in conjunction with Dr. Raven Andrea my  and Dr. Octavio Carter our colleague in urology.  This is because we require the expertise of different specialties and there is no qualified assistance available.  I will be giving him a referral to see dermatology for hair removal of his bilateral medial groin and thighs, and to obtain a CT angiogram today.  If he wishes to undergo removal of cervix and vagina, we will give him a referral to see  our OBGYN colleagues.    Total time spent with patient was 30 min of which greater than 50% was in counseling.

## 2018-10-18 NOTE — TELEPHONE ENCOUNTER
----- Message from Hudson Chaidez sent at 10/18/2018  8:42 AM CDT -----  Regarding: New Trans URO & Derm referral  Hi Friends,     This patient just had a consult with Dr. Eastman. He needs a urology and derm consult.     THANK YOU!  Hudson

## 2018-10-18 NOTE — LETTER
"10/18/2018      RE: Myrna Valadez  [address]  [facility] [address]       REFERRING PROVIDER: Clarissa Stroud    REASON FOR CONSULTATION: Gender dysphoria, requesting phalloplasty.    HPI: Patient is a 33-year-old right-hand-dominant trans-man who prefers he him pronouns, referred to me by Dr. Clarissa Stroud for possible phalloplasty.  Patient reports that he has been interested in undergoing surgery for the past year.  His main goal with surgery is for the ability to urinate while standing.  He is also interested in penetrative coitus.  Overall, he is hoping to better align his physical body with his chosen gender identity.  He transitioned 10 years ago.  New name is Inocencio.  He has been on hormone therapy for the past 8 years.  This is prescribed through the [place].  He has so far undergone hysterectomy and mastectomy.  However, he still maintains his cervix and vagina.  He reports to me that he is interested and possibly undergoing phalloplasty while keeping his feminine genitalia.    He denies any previous urinary issues including urinary tract infections, kidney stones, and stream abnormalities.  Denies any lower GI issues.  He has had history of HPV.  He has been vaccinated.  He is a currently able to achieve orgasm via clitoral stimulation.  He does masturbate.    MEDS:   Prior to Admission medications    Medication Sig Start Date End Date Taking? Authorizing Provider   folic acid (FOLVITE) 1 MG tablet Take 2 tablets (2 mg) by mouth daily 9/19/18  Yes Clarissa Stroud MD   Pediatric Multivit-Minerals-C (FLINTSTONES GUMMIES PO) Take 1 tablet by mouth 2 times daily.   Yes Reported, Patient   Syringe/Needle, Disp, 22G X 1-1/2\" 1 ML MISC Inject 0.25 mLs into the muscle once a week 3/27/18  Yes Clarissa Stroud MD   testosterone cypionate (DEPOTESTOTERONE) 200 MG/ML injection Inject 0.25 mLs (50 mg) into the muscle once a week Due for repeat visit 7-2018 6/7/18  Yes Kris Rosenthal MD         " "      ALLERGIES: No Known Allergies    PMH:   Past Medical History:   Diagnosis Date     Aseptic meningitis        PSH:   Past Surgical History:   Procedure Laterality Date     HYSTERECTOMY      and oophorectomy, but still has cervix and will require pap every 3 years     MASTECTOMY PARTIAL      chest surgery w/ contouring        SH:   Social History   Substance Use Topics     Smoking status: Never Smoker     Smokeless tobacco: Never Used     Alcohol use No   Works as a teaching aid.    FH:   Family History   Problem Relation Age of Onset     Dementia Father      frontotemporal dementia      Alcoholism Brother      possible     Diabetes No family hx of        ROS: Denies chest pain, shortness of breath, MI, CVA, diabetes, DVT, PE, and bleeding disorders.    PHYSICAL EXAMINATION: /70  Pulse (!) 48  Temp 97.9  F (36.6  C) (Oral)  Ht 5' 6\"  Wt 148 lb 11.2 oz  SpO2 99%  BMI 24 kg/m2  General: No acute distress.  Appears masculine.  On exam of the left nondominant forearm, there is no rash or lesion.  Ulnar volar aspect of the forearm is not hirsute.  The radial and dorsal forearm is mildly hirsute.  Skin pinch of the forearm tissues is less than 1 cm.  Zeus's testing shows filling of the hand through the ulnar artery system.  Patient is able to make a composite fist and extend all digits.  Sensation to light touch is intact in all III nerve distributions.  On examination of bilateral groin, there is medial aspect tear.  There is no rash or lesion.  Skin pinch is less than 1 cm.  On examination of bilateral thighs, there is moderate hair.  Skin pinch is approximately 1 cm.  There is no rash or lesion.  On examination of the genitalia, clitoris is hypertrophied.  There is no rash or lesion or scars.    ASSESSMENT: Gender dysphoria, requesting phalloplasty but still keeping vagina and cervix.    PLAN: Patient has obtained 2 letters of support.  Given the thin groin and thigh tissues, if the appropriate " perforators are present on CT angiogram, patient is a potential candidate for 2 flap ALT and SCIP phalloplasty.  Given that his request is atypical, I told him that at this point, we do not perform atypical requests.  If he does wish to undergo removal of his cervix and vagina prior to phalloplasty, he will let us know.  However,  I did explain the process of 2 flap ALT & SCIP phalloplasty and the goals of the surgery.  I explained to him that this is a commitment to multiple stages including free tissue transfer, glansplasty, scrotal expansion, and prosthesis placement over many  years with the goals being obtaining ability to urinate while standing and have penetrative coitus once sensation regenerates into the phallus and erectile prosthesis placed.  He understands that this may require multiple stages and may be complicated by flap loss and urethral problems.  I described the procedures in detail today and that it will be as an inpatient with a 1 - 2 week postoperative admission.  I explained the risks to include flap loss, bleeding, infection, urethral stricture, urethral fistula, asymmetry, contour deformity, wound healing problems, DVT/PE, and need for revision surgery.  I explained to him that hair removal is medically necessary over the groin from where the neourethra will be fashioned.  This is to prevent lumen narrowing, urinary abnormalities, and kidney stones.  Patient is willing to undergo hair removal prior surgery.  I explained to him that this may take 6-12 sessions.  I also explained to the patient that the initial surgery will be performed in conjunction with Dr. Raven Andrea my  and Dr. Octavio Carter our colleague in urology.  This is because we require the expertise of different specialties and there is no qualified assistance available.  I will be giving him a referral to see dermatology for hair removal of his bilateral medial groin and thighs, and to obtain a CT angiogram  today.  If he wishes to undergo removal of cervix and vagina, we will give him a referral to see our OBGYN colleagues.    Total time spent with patient was 30 min of which greater than 50% was in counseling.    Gavin Eastman MD

## 2018-10-18 NOTE — LETTER
"10/18/2018       RE: Myrna Valadez  [address]  [facility] [address]     Dear Colleague,    Thank you for referring your patient, Myrna Valadez, to the [facility] at [facility]. Please see a copy of my visit note below.    REFERRING PROVIDER: Clarissa Stroud    REASON FOR CONSULTATION: Gender dysphoria, requesting phalloplasty.    HPI: Patient is a 33-year-old right-hand-dominant trans-man who prefers he him pronouns, referred to me by Dr. Clarissa Stroud for possible phalloplasty.  Patient reports that he has been interested in undergoing surgery for the past year.  His main goal with surgery is for the ability to urinate while standing.  He is also interested in penetrative coitus.  Overall, he is hoping to better align his physical body with his chosen gender identity.  He transitioned 10 years ago.  New name is Inocencio.  He has been on hormone therapy for the past 8 years.  This is prescribed through the [place].  He has so far undergone hysterectomy and mastectomy.  However, he still maintains his cervix and vagina.  He reports to me that he is interested and possibly undergoing phalloplasty while keeping his feminine genitalia.    He denies any previous urinary issues including urinary tract infections, kidney stones, and stream abnormalities.  Denies any lower GI issues.  He has had history of HPV.  He has been vaccinated.  He is a currently able to achieve orgasm via clitoral stimulation.  He does masturbate.    MEDS:   Prior to Admission medications    Medication Sig Start Date End Date Taking? Authorizing Provider   folic acid (FOLVITE) 1 MG tablet Take 2 tablets (2 mg) by mouth daily 9/19/18  Yes Clarissa Stroud MD   Pediatric Multivit-Minerals-C (FLINTSTONES GUMMIES PO) Take 1 tablet by mouth 2 times daily.   Yes Reported, Patient   Syringe/Needle, Disp, 22G X 1-1/2\" 1 ML MISC Inject 0.25 mLs into the muscle once a week " "3/27/18  Yes Clarissa Stroud MD   testosterone cypionate (DEPOTESTOTERONE) 200 MG/ML injection Inject 0.25 mLs (50 mg) into the muscle once a week Due for repeat visit 7-2018 6/7/18  Yes Kris Rosenthal MD               ALLERGIES: No Known Allergies    PMH:   Past Medical History:   Diagnosis Date     Aseptic meningitis        PSH:   Past Surgical History:   Procedure Laterality Date     HYSTERECTOMY      and oophorectomy, but still has cervix and will require pap every 3 years     MASTECTOMY PARTIAL      chest surgery w/ contouring        SH:   Social History   Substance Use Topics     Smoking status: Never Smoker     Smokeless tobacco: Never Used     Alcohol use No   Works as a teaching aid.    FH:   Family History   Problem Relation Age of Onset     Dementia Father      frontotemporal dementia      Alcoholism Brother      possible     Diabetes No family hx of        ROS: Denies chest pain, shortness of breath, MI, CVA, diabetes, DVT, PE, and bleeding disorders.    PHYSICAL EXAMINATION: /70  Pulse (!) 48  Temp 97.9  F (36.6  C) (Oral)  Ht 5' 6\"  Wt 148 lb 11.2 oz  SpO2 99%  BMI 24 kg/m2  General: No acute distress.  Appears masculine.  On exam of the left nondominant forearm, there is no rash or lesion.  Ulnar volar aspect of the forearm is not hirsute.  The radial and dorsal forearm is mildly hirsute.  Skin pinch of the forearm tissues is less than 1 cm.  Zeus's testing shows filling of the hand through the ulnar artery system.  Patient is able to make a composite fist and extend all digits.  Sensation to light touch is intact in all III nerve distributions.  On examination of bilateral groin, there is medial aspect tear.  There is no rash or lesion.  Skin pinch is less than 1 cm.  On examination of bilateral thighs, there is moderate hair.  Skin pinch is approximately 1 cm.  There is no rash or lesion.  On examination of the genitalia, clitoris is hypertrophied.  There is no rash or lesion " or scars.    ASSESSMENT: Gender dysphoria, requesting phalloplasty but still keeping vagina and cervix.    PLAN: Patient has obtained 2 letters of support.  Given the thin groin and thigh tissues, if the appropriate perforators are present on CT angiogram, patient is a potential candidate for 2 flap ALT and SCIP phalloplasty.  Given that his request is atypical, I told him that at this point, we do not perform atypical requests.  If he does wish to undergo removal of his cervix and vagina prior to phalloplasty, he will let us know.  However,  I did explain the process of 2 flap ALT & SCIP phalloplasty and the goals of the surgery.  I explained to him that this is a commitment to multiple stages including free tissue transfer, glansplasty, scrotal expansion, and prosthesis placement over many  years with the goals being obtaining ability to urinate while standing and have penetrative coitus once sensation regenerates into the phallus and erectile prosthesis placed.  He understands that this may require multiple stages and may be complicated by flap loss and urethral problems.  I described the procedures in detail today and that it will be as an inpatient with a 1 - 2 week postoperative admission.  I explained the risks to include flap loss, bleeding, infection, urethral stricture, urethral fistula, asymmetry, contour deformity, wound healing problems, DVT/PE, and need for revision surgery.  I explained to him that hair removal is medically necessary over the groin from where the neourethra will be fashioned.  This is to prevent lumen narrowing, urinary abnormalities, and kidney stones.  Patient is willing to undergo hair removal prior surgery.  I explained to him that this may take 6-12 sessions.  I also explained to the patient that the initial surgery will be performed in conjunction with Dr. Raven Andrea my  and Dr. Octavio Carter our colleague in urology.  This is because we require the expertise of  different specialties and there is no qualified assistance available.  I will be giving him a referral to see dermatology for hair removal of his bilateral medial groin and thighs, and to obtain a CT angiogram today.  If he wishes to undergo removal of cervix and vagina, we will give him a referral to see our OBGYN colleagues.    Total time spent with patient was 30 min of which greater than 50% was in counseling.    Again, thank you for allowing me to participate in the care of your patient.      Sincerely,    Gavin Eastman MD

## 2018-10-18 NOTE — NURSING NOTE
"  Chief Complaint   Patient presents with     Consult     New consult for Lower      Vitals:    10/18/18 0646   BP: 123/70   Pulse: (!) 48   Temp: 97.9  F (36.6  C)   TempSrc: Oral   SpO2: 99%   Weight: 148 lb 11.2 oz   Height: 5' 6\"     Body mass index is 24 kg/(m^2).  Elkin Nieto CMA    "

## 2018-10-18 NOTE — TELEPHONE ENCOUNTER
Patient called back. He reports wanting to wait until CT results are reviewed by Dr. Eastman before scheduling a consult w/Dr. Oconnor. Patient provided with direct call back number for scheduling.

## 2018-10-19 ENCOUNTER — RADIANT APPOINTMENT (OUTPATIENT)
Dept: CT IMAGING | Facility: CLINIC | Age: 33
End: 2018-10-19
Attending: PLASTIC SURGERY
Payer: COMMERCIAL

## 2018-10-19 DIAGNOSIS — F64.0 GENDER DYSPHORIA IN ADULT: ICD-10-CM

## 2018-10-19 RX ORDER — IOPAMIDOL 755 MG/ML
150 INJECTION, SOLUTION INTRAVASCULAR ONCE
Status: COMPLETED | OUTPATIENT
Start: 2018-10-19 | End: 2018-10-19

## 2018-10-19 RX ADMIN — IOPAMIDOL 150 ML: 755 INJECTION, SOLUTION INTRAVASCULAR at 17:02

## 2018-10-19 NOTE — DISCHARGE INSTRUCTIONS

## 2018-11-01 ENCOUNTER — PRE VISIT (OUTPATIENT)
Dept: UROLOGY | Facility: CLINIC | Age: 33
End: 2018-11-01

## 2018-11-01 NOTE — TELEPHONE ENCOUNTER
MEDICAL RECORDS REQUEST   [facility]  [address] [place] [address]  PHONE: [phone]  Fax: [phone]        FUTURE VISIT INFORMATION                                                   Myrna Valadez, : 1985 scheduled for future visit at [facility]    APPOINTMENT INFORMATION:    Date: 2018    Provider:  Octavio Carter    Reason for Visit/Diagnosis: Gender Care    REFERRAL INFORMATION:    Referring provider:  Gavin Eastman    Specialty: MD    Referring providers clinic:  Pl[facility]    Clinic contact number:    [phone]  RECORDS REQUESTED FOR VISIT                                                     NOTES  STATUS/DETAILS   OFFICE NOTE from referring provider  yes   OFFICE NOTE from other specialist  no   DISCHARGE SUMMARY from hospital  no   DISCHARGE REPORT from the ER  no   OPERATIVE REPORT  no   MEDICATION LIST  yes       PRE-VISIT CHECKLIST      Record collection complete Yes   Appointment appropriately scheduled           (right time/right provider) Yes   MyChart activation Yes   Questionnaire complete If no, please explain in process     Completed by: Amina Smith

## 2018-11-05 ENCOUNTER — MYC MEDICAL ADVICE (OUTPATIENT)
Dept: PLASTIC SURGERY | Facility: CLINIC | Age: 33
End: 2018-11-05

## 2018-11-23 ENCOUNTER — PRE VISIT (OUTPATIENT)
Dept: UROLOGY | Facility: CLINIC | Age: 33
End: 2018-11-23

## 2018-11-23 NOTE — TELEPHONE ENCOUNTER
Patient with history of gender dysphoria coming in for surgery consult. Patient chart reviewed, no need for call, all records available and ready for appointment.

## 2018-11-29 ASSESSMENT — ENCOUNTER SYMPTOMS
SMELL DISTURBANCE: 0
SINUS CONGESTION: 1
SORE THROAT: 0
NECK MASS: 0
TASTE DISTURBANCE: 0
TROUBLE SWALLOWING: 0
HOARSE VOICE: 0
SINUS PAIN: 0

## 2018-11-30 ENCOUNTER — TELEPHONE (OUTPATIENT)
Dept: DERMATOLOGY | Facility: CLINIC | Age: 33
End: 2018-11-30

## 2018-11-30 ENCOUNTER — OFFICE VISIT (OUTPATIENT)
Dept: UROLOGY | Facility: CLINIC | Age: 33
End: 2018-11-30
Payer: COMMERCIAL

## 2018-11-30 VITALS
WEIGHT: 148.4 LBS | HEART RATE: 52 BPM | DIASTOLIC BLOOD PRESSURE: 75 MMHG | SYSTOLIC BLOOD PRESSURE: 133 MMHG | HEIGHT: 65 IN | BODY MASS INDEX: 24.72 KG/M2

## 2018-11-30 DIAGNOSIS — F64.9 GENDER DYSPHORIA: Primary | ICD-10-CM

## 2018-11-30 ASSESSMENT — PAIN SCALES - GENERAL: PAINLEVEL: NO PAIN (0)

## 2018-11-30 NOTE — NURSING NOTE
"Chief Complaint   Patient presents with     Consult     history of gender dysphoria coming in for surgery consult       Blood pressure 133/75, pulse 52, height 1.651 m (5' 5\"), weight 67.3 kg (148 lb 6.4 oz), not currently breastfeeding. Body mass index is 24.7 kg/(m^2).    Patient Active Problem List   Diagnosis     Problem drinking     High risk HPV infection     Gender dysphoria in adolescent and adult     Papanicolaou smear of cervix with atypical squamous cells cannot exclude high grade squamous intraepithelial lesion (ASC-H)       No Known Allergies    Current Outpatient Prescriptions   Medication Sig Dispense Refill     estradiol (ESTRING) 2 MG vaginal ring Place 1 each vaginally every 3 months (Patient not taking: Reported on 10/18/2018) 1 each 3     folic acid (FOLVITE) 1 MG tablet Take 2 tablets (2 mg) by mouth daily 100 tablet 1     Pediatric Multivit-Minerals-C (FLINTSTONES GUMMIES PO) Take 1 tablet by mouth 2 times daily.       Syringe/Needle, Disp, 22G X 1-1/2\" 1 ML MISC Inject 0.25 mLs into the muscle once a week 30 each 4     testosterone cypionate (DEPOTESTOTERONE) 200 MG/ML injection Inject 0.25 mLs (50 mg) into the muscle once a week Due for repeat visit 7-2018 10 mL 3       Social History   Substance Use Topics     Smoking status: Never Smoker     Smokeless tobacco: Never Used     Alcohol use No       Damaris Valentino LPN  11/30/2018  2:52 PM    "

## 2018-11-30 NOTE — PROGRESS NOTES
[facility] Consult H&P    Name: Myrna Valadez    MRN: [id number]   YOB: 1985                 Chief Complaint:   Gender Dysphoria          History of Present Illness:   Myrna Valadez is a 33 year old transgender male seen in consultation for gender dysphoria    Patient has been living as a male for 10 years.  Preferred pronouns are: male  The patient has been on exogenous hormones since: 9 yrs.  In terms of an intimate relationship, the patient has a female partner.  In terms of fertility, the patient: already had oophorectomy.    The patient has previously undergone hysterectomy and mastectomy. Still has a cervix. Has oophorectomy and salpingectomy.    Long-term surgical goals for the patient include: phalloplasty with erectile device.    The patient is here today expressing interest in phalloplasty. Very interested in ALT phalloplasty.  Feels that arm phalloplasties have been small. He also doesn't want an arm scar.    Saw Dr. Eastman. He suggested a SCIP + ALT phalloplasty.         Past Medical History:     Past Medical History:   Diagnosis Date     Aseptic meningitis      Gender dysphoria in adolescent and adult      High risk HPV infection      Papanicolaou smear of cervix with atypical squamous cells cannot exclude high grade squamous intraepithelial lesion (ASC-H)             Past Surgical History:     Past Surgical History:   Procedure Laterality Date     HYSTERECTOMY      and oophorectomy, but still has cervix and will require pap every 3 years     MASTECTOMY PARTIAL      chest surgery w/ contouring             Social History:     Social History   Substance Use Topics     Smoking status: Never Smoker     Smokeless tobacco: Never Used     Alcohol use Yes            Family History:     Family History   Problem Relation Age of Onset     Dementia Father      frontotemporal dementia      Alcoholism Brother      possible     Diabetes Maternal Grandfather      Diabetes  "No family hx of               Allergies:   No Known Allergies         Medications:     Current Outpatient Prescriptions   Medication Sig     estradiol (ESTRING) 2 MG vaginal ring Place 1 each vaginally every 3 months (Patient not taking: Reported on 10/18/2018)     folic acid (FOLVITE) 1 MG tablet Take 2 tablets (2 mg) by mouth daily     Pediatric Multivit-Minerals-C (FLINTSTONES GUMMIES PO) Take 1 tablet by mouth 2 times daily.     Syringe/Needle, Disp, 22G X 1-1/2\" 1 ML MISC Inject 0.25 mLs into the muscle once a week     testosterone cypionate (DEPOTESTOTERONE) 200 MG/ML injection Inject 0.25 mLs (50 mg) into the muscle once a week Due for repeat visit 7-2018     No current facility-administered medications for this visit.              Review of Systems:    ROS: 14 point ROS neg other than the symptoms noted above in the HPI.          Physical Exam:   /75  Pulse 52  Ht 1.651 m (5' 5\")  Wt 67.3 kg (148 lb 6.4 oz)  BMI 24.7 kg/m2  General: age-appropriate in NAD  HEENT: Head AT/NC, EOMI, CN Grossly intact  Resp: no respiratory distress, lung sounds clear.  CV: heart rate regular, S1, S2.  Lymph: No cervical, supraclavicular or axillary lymphadenopathy  Back: bony spine is non-tender, flanks are nontender  Abdomen: not obese, soft, non-distended, non-tender. No organomegaly  LE: no edema.   Neuro: grossly intact  Motor: excellent strength throughout  Skin: clear of rashes or ecchymoses. Thin arms on skin pinch. Thighs favorable on skin pinch,.            Assessment and Plan:   33 year old transgender male with Gender Dysphoria    Discussed phalloplasty at length with patient. Discussed options of RFFF, ALT, with or without SCIP flap. We discussed the risks and benefits to those approached.     He strongly prefers a thigh flap.    We discussed risks including flap loss, graft loss, urethral stricture/fistula, poor cosmetic result, injury to surrounding structures, hand function loss, DVT/PE, bleeding, " infection, poor sensation and need for revisions. We also discussed some of the urologic consequences of the surgery, such as management of urethral stricture, testicular prosthesis and penile prosthesis.    We also discussed that he still has a cervix as he had a supracervical hysterectomy. The cervix would need to be removed given that it won't be accessible after a usual vaginectomy/phalloplasty and he has a hx of HPV. Trachelectomy could be done separately with a partial vaginectomy robotically. It is possible to remove the cervix with the usual phalloplasty, though I would tentatively plan to do cervix removal and partial vaginectomy separately. I do fear about adding additional steps to an already long and complicated procedure (ALT + SCIP phalloplasty).     I discussed that surgery would generally involve two plastic surgeons and one reconstructive urologist.    He should continue hair removal at this time.     Will discuss with Dr. Raiza Catrer MD   Reconstructive Urology  [facility]

## 2018-11-30 NOTE — MR AVS SNAPSHOT
"              After Visit Summary   11/30/2018    Myrna Valadez    MRN: [id number]           Patient Information     Date Of Birth          1985        Visit Information        Provider Department      11/30/2018 3:00 PM Octavio Carter MD [facility] and [facility]        Today's Diagnoses     Gender dysphoria    -  1       Follow-ups after your visit        Who to contact     Please call your clinic at [phone] to:    Ask questions about your health    Make or cancel appointments    Discuss your medicines    Learn about your test results    Speak to your doctor            Additional Information About Your Visit        MyChart Information     [employer] gives you secure access to your electronic health record. If you see a primary care provider, you can also send messages to your care team and make appointments. If you have questions, please call your primary care clinic.  If you do not have a primary care provider, please call [phone] and they will assist you.      [employer] is an electronic gateway that provides easy, online access to your medical records. With [employer], you can request a clinic appointment, read your test results, renew a prescription or communicate with your care team.     To access your existing account, please contact your [facility] or call [phone] for assistance.        Care EveryWhere ID     This is your Care EveryWhere ID. This could be used by other organizations to access your [facility] medical records  [id number]        Your Vitals Were     Pulse Height BMI (Body Mass Index)             52 1.651 m (5' 5\") 24.7 kg/m2          Blood Pressure from Last 3 Encounters:   11/30/18 133/75   10/18/18 123/70   06/12/18 109/68    Weight from Last 3 Encounters:   11/30/18 67.3 kg (148 lb 6.4 oz)   10/18/18 67.4 kg (148 lb 11.2 oz)   06/12/18 67.2 kg (148 lb 3.2 oz)              Today, you had the following     No " "orders found for display       Primary Care Provider Office Phone # Fax #    Clarissa Stroud [phone]       [address] [address]        Equal Access to Services     MIGUELHAWA MELITON : Hadbeatriz aly masters glenny Franco, waroopada lujia, qasonyata kacliffordda kassandra, ruperto fernandes latedmisael javad. [facility] [phone].    ATENCIÓN: Si habla español, tiene a gupta disposición servicios gratuitos de asistencia lingüística. Robertaame al [phone].    We comply with applicable federal civil rights laws and [place] laws. We do not discriminate on the basis of race, color, national origin, age, disability, sex, sexual orientation, or gender identity.            Thank you!     Thank you for choosing [facility] UROLOGY AND [facility] FOR PROSTATE AND UROLOGIC CANCERS  for your care. Our goal is always to provide you with excellent care. Hearing back from our patients is one way we can continue to improve our services. Please take a few minutes to complete the written survey that you may receive in the mail after your visit with us. Thank you!             Your Updated Medication List - Protect others around you: Learn how to safely use, store and throw away your medicines at www.disposemymeds.org.          This list is accurate as of 11/30/18 11:59 PM.  Always use your most recent med list.                   Brand Name Dispense Instructions for use Diagnosis    estradiol 2 MG vaginal ring    ESTRING    1 each    Place 1 each vaginally every 3 months    Vaginal atrophy       FLINTSTONES GUMMIES PO      Take 1 tablet by mouth 2 times daily.        folic acid 1 MG tablet    FOLVITE    100 tablet    Take 2 tablets (2 mg) by mouth daily    Atypical squamous cells cannot exclude high grade squamous intraepithelial lesion on cytologic smear of cervix (ASC-H)       Syringe/Needle (Disp) 22G X 1-1/2\" 1 ML Misc     30 each    Inject 0.25 mLs into the muscle once a week    Gender identity disorder       " testosterone cypionate 200 MG/ML injection    DEPOTESTOSTERONE    10 mL    Inject 0.25 mLs (50 mg) into the muscle once a week Due for repeat visit 7-2018    Male hypogonadism

## 2018-11-30 NOTE — LETTER
11/30/2018       RE: Myrna Valadez  [address]  [facility] [address]     Dear Colleague,    Thank you for referring your patient, Myrna Valadez, to the [facility] UROLOGY AND INST FOR PROSTATE AND UROLOGIC CANCERS at [facility]. Please see a copy of my visit note below.    [facility] Consult H&P    Name: Myrna Valadez    MRN: [id number]   YOB: 1985                 Chief Complaint:   Gender Dysphoria          History of Present Illness:   Myrna Valadez is a 33 year old transgender male seen in consultation for gender dysphoria    Patient has been living as a male for 10 years.  Preferred pronouns are: male  The patient has been on exogenous hormones since: 9 yrs.  In terms of an intimate relationship, the patient has a female partner.  In terms of fertility, the patient: already had oophorectomy.    The patient has previously undergone hysterectomy and mastectomy. Still has a cervix. Has oophorectomy and salpingectomy.    Long-term surgical goals for the patient include: phalloplasty with erectile device.    The patient is here today expressing interest in phalloplasty. Very interested in ALT phalloplasty.  Feels that arm phalloplasties have been small. He also doesn't want an arm scar.    Saw Dr. Eastman. He suggested a SCIP + ALT phalloplasty.         Past Medical History:     Past Medical History:   Diagnosis Date     Aseptic meningitis      Gender dysphoria in adolescent and adult      High risk HPV infection      Papanicolaou smear of cervix with atypical squamous cells cannot exclude high grade squamous intraepithelial lesion (ASC-H)             Past Surgical History:     Past Surgical History:   Procedure Laterality Date     HYSTERECTOMY      and oophorectomy, but still has cervix and will require pap every 3 years     MASTECTOMY PARTIAL      chest surgery w/ contouring             Social History:     Social  "History   Substance Use Topics     Smoking status: Never Smoker     Smokeless tobacco: Never Used     Alcohol use Yes            Family History:     Family History   Problem Relation Age of Onset     Dementia Father      frontotemporal dementia      Alcoholism Brother      possible     Diabetes Maternal Grandfather      Diabetes No family hx of               Allergies:   No Known Allergies         Medications:     Current Outpatient Prescriptions   Medication Sig     estradiol (ESTRING) 2 MG vaginal ring Place 1 each vaginally every 3 months (Patient not taking: Reported on 10/18/2018)     folic acid (FOLVITE) 1 MG tablet Take 2 tablets (2 mg) by mouth daily     Pediatric Multivit-Minerals-C (FLINTSTONES GUMMIES PO) Take 1 tablet by mouth 2 times daily.     Syringe/Needle, Disp, 22G X 1-1/2\" 1 ML MISC Inject 0.25 mLs into the muscle once a week     testosterone cypionate (DEPOTESTOTERONE) 200 MG/ML injection Inject 0.25 mLs (50 mg) into the muscle once a week Due for repeat visit 7-2018     No current facility-administered medications for this visit.           Physical Exam:   /75  Pulse 52  Ht 1.651 m (5' 5\")  Wt 67.3 kg (148 lb 6.4 oz)  BMI 24.7 kg/m2  General: age-appropriate in NAD  HEENT: Head AT/NC, EOMI, CN Grossly intact  Resp: no respiratory distress, lung sounds clear.  CV: heart rate regular, S1, S2.  Lymph: No cervical, supraclavicular or axillary lymphadenopathy  Back: bony spine is non-tender, flanks are nontender  Abdomen: not obese, soft, non-distended, non-tender. No organomegaly  LE: no edema.   Neuro: grossly intact  Motor: excellent strength throughout  Skin: clear of rashes or ecchymoses. Thin arms on skin pinch. Thighs favorable on skin pinch,.            Assessment and Plan:   33 year old transgender male with Gender Dysphoria    Discussed phalloplasty at length with patient. Discussed options of RFFF, ALT, with or without SCIP flap. We discussed the risks and benefits to those " approached.     He strongly prefers a thigh flap.    We discussed risks including flap loss, graft loss, urethral stricture/fistula, poor cosmetic result, injury to surrounding structures, hand function loss, DVT/PE, bleeding, infection, poor sensation and need for revisions. We also discussed some of the urologic consequences of the surgery, such as management of urethral stricture, testicular prosthesis and penile prosthesis.    We also discussed that he still has a cervix as he had a supracervical hysterectomy. The cervix would need to be removed given that it won't be accessible after a usual vaginectomy/phalloplasty and he has a hx of HPV. Trachelectomy could be done separately with a partial vaginectomy robotically. It is possible to remove the cervix with the usual phalloplasty, though I would tentatively plan to do cervix removal and partial vaginectomy separately. I do fear about adding additional steps to an already long and complicated procedure (ALT + SCIP phalloplasty).     I discussed that surgery would generally involve two plastic surgeons and one reconstructive urologist.    He should continue hair removal at this time.     Will discuss with Dr. Raiza Carter MD   Reconstructive Urology  [facility]

## 2018-12-04 ENCOUNTER — TELEPHONE (OUTPATIENT)
Dept: DERMATOLOGY | Facility: CLINIC | Age: 33
End: 2018-12-04

## 2018-12-12 DIAGNOSIS — F64.9 GENDER DYSPHORIA: Primary | ICD-10-CM

## 2018-12-13 ENCOUNTER — PATIENT OUTREACH (OUTPATIENT)
Dept: PLASTIC SURGERY | Facility: CLINIC | Age: 33
End: 2018-12-13

## 2019-01-02 ENCOUNTER — TELEPHONE (OUTPATIENT)
Dept: DERMATOLOGY | Facility: CLINIC | Age: 34
End: 2019-01-02

## 2019-01-02 ENCOUNTER — OFFICE VISIT (OUTPATIENT)
Dept: DERMATOLOGY | Facility: CLINIC | Age: 34
End: 2019-01-02
Payer: COMMERCIAL

## 2019-01-02 DIAGNOSIS — F64.9 GENDER DYSPHORIA: ICD-10-CM

## 2019-01-02 DIAGNOSIS — R68.89 UNWANTED HAIR: Primary | ICD-10-CM

## 2019-01-02 ASSESSMENT — PAIN SCALES - GENERAL: PAINLEVEL: NO PAIN (0)

## 2019-01-02 NOTE — NURSING NOTE
Dermatology Rooming Note    Myrna Valadez's goals for this visit include:   Chief Complaint   Patient presents with     Derm Problem     Hair removal consult.       Yessi Alvarado LPN

## 2019-01-02 NOTE — TELEPHONE ENCOUNTER
----- Message from Khushbu Oconnor MD sent at 1/2/2019 11:30 AM CST -----  To Whom This May Concern,     We are writing to request coverage of laser hair removal as part of preoperative preparation for phalloplasty to treat gender dysphoria. Laser hair removal will be performed at the tissue donor sites and any genital areas that may result in hair growth postoperatively. These areas include, but are not limited to the abdomen, thighs, mons pubis, and genitals. Hair removal is MEDICALLY NECESSARY where neourethra will be fashioned.  This is to prevent lumen narrowing, urinary abnormalities, and stones. There is no way to remove hair at the site of the neourthra post procedure.     We would expect 1 treatment every 2- 8 weeks for a maximum of 12  treatments.    The CPT Code Description utilized would be 88679: Unlisted procedure, skin, mucous membrane and subcutaneous tissue.   The cost is estimated at $250 to 500 per treatment.     We strive to provide our patient with outstanding care. Therefore, I request you please contact me at [phone] if you have any questions regarding coverage or our treatment rationale.     Khushbu Oconnor MD    Department of Dermatology  [facility]: Phone: [phone], Fax:[phone]  [facility]: Phone: [phone], Fax: [phone]

## 2019-01-02 NOTE — PROGRESS NOTES
Dermatology Laser Intake Checklist:  History of psoriasis:No  History of recent tan, indoor or outdoor tanning/vacation or other sun exposure: No  History of vitiligo:No  Family history of vitiligo:No  Recent other cosmetic procedure(microderm abrasion/peel/hair removal/facial etc):No  History of HSV:No   Did the patient start valtrex: No  For genital laser hair removal patient only: Is there a history of genital warts or condyloma:No  Tattoo in the area to be treated:No  Is patient using hydroquinone:NA   Retinoids and other acne medications stopped for 2 weeks:NA  Has the patient had accutane in the last 6-12 months:No  Pregnant or breastfeeding: No  History of skin cancer in area planned for treatment: No  History of treatment with gold:Does not know  Changes in medical history: No  Photos obtained: No  Does the patient smoke:No  Is the patient on ibuprofen/aspirin/plavix/coumadin/other blood thinner: No  If patient is taking narcotic or diazepam(valium)-does patient have : No  There were no vitals taken for this visit.

## 2019-01-02 NOTE — PATIENT INSTRUCTIONS
[facility] Laser Hair Reduction Instructions    Laser hair reduction: I will have redness, pain and swelling. I may have skin discoloration, bruising and itching. Risks are permenant discoloration, hives, infection scarring, eye injury,hair growth, and blister. The outcome could be no improvement or slight improvement. Multiple treatments are required. All hair will not be removed.     Before the procedure:  Sun Protection:  Do not allow the area to become tan or come in with a tan for a treatment. Tans will increased the risks of the procedure. Do not use spray or any over counter product self tanners prior to the procedure.     Shaving:   Gently shave the area the evening before the procedure.     Other:  Avoid any retinols such as Differin, adapalene, retinol or tretinoin to the treated area 1 week prior. Hold bleaching creams such as hydroquinone until 1 week prior.  Avoid any hair removal procedures such as waxing, electrolysis or other lasers on the skin within 6 weeks prior. Do not have any other cosmetic procedures done on the area within the prior 6 weeks such as chemical peels or dermabrasion.      Post Procedure:  Day 1-7  The healing time for any given treatment varies between clients. The following represents the general recovery phases you might expect. Individual clients may experience variations from this course.     Swelling/Discomfort/Redness:  The most common side effects are erythema and edema(redness and swelling) which generally occur immediately after and typically resolve within 48 hours. Crusting and rarely blistering may occur.       Activity:  Avoid swimming the day of laser hair removal treatment. Also, no rigorous exercise the day of treatment.     Moisturizers and Sunscreens:  Wait until the skin has returned to normal to start topical products.     Sun Protection:  Do not allow the area to become tan or come in with a tan for a treatment. Tans will increased the risks of the procedure.  Do not use spray or any over counter product self tanners prior to the procedure.     Warning signs:  Call the clinic if you have significant pain, increasing redness, pus, blisters/crusting or for any other concerns.     Who should I call with questions?    [facility]: [phone]     [facility]: [phone]    For urgent needs outside of business hours call the [facility] at [phone] and ask for the dermatology resident on call

## 2019-01-02 NOTE — PROGRESS NOTES
[facility] Dermatology Note      Dermatology Problem List:  1.Gender dysphoria    Encounter Date: Jan 2, 2019    CC:   Chief Complaint   Patient presents with     Derm Problem     Hair removal consult.       History of Present Illness:  Mr. Myrna Valadez is a 33 year old female (identifies as male and uses he/his pronouns) who presents for evaluation of laser hair removal in anticipation of gender confirmation surgery.  Want laser hair removal.     Dermatology Safety Transgender Patient Intake:  Is the patient pending upcoming gender affirmation surgery: Yes  IF yes, what type of surgery is the upcoming expected affirmation surgery: phalloplasty  What is the goal date of the upcoming expected affirmation surgery (date/ NA)? Summer 2019   Is the patient on hormonal therapy(if yes list what type (estrogen/stefanie/estradiol or testosoterone) and length of time treated):  Yes, testosterone injections since 2009   Is the patient here for laser hair removal assessment:  Yes  Has the patient had laser hair removal in the past?  No  If, so, how many approximate weeks/months/years prior: N/A  Thurston skin type: I-II    Has the patient ever had any other minimally invasive cosmetic procedures such as the following:  Electrolysis:  No  Botulinum toxin:  No  Fillers (list filler type and location):  No  Chemical peels: No  Lasers: No  Intense pulsed light: No  Broad band light: No  Microneedling: No  Has the patient every had any unknown injectable products or other injectable products? (If yes, please list details if available):  If any minimally invasive  cosmetic procedures performed prior to presentation to our clinic were they performed by a kerry SUMMERS/ NP/RN/ other or maheshwn: N/A    Has the patient had any prior surgeries as follows:  Breast augmentation:  No  Breast removal:  Yes, mastectomy, April 2011  Genital:  Yes, complete hysterectomy w/ retention of cervix 10/2010  Face:  No  Hair  transplant:  No    Was insurance coverage pursued for laser hair removal procedure (Yes/no/pending)?  Yes  Was insurance coverage obtained (yes/no/pending)? Pending     Does the patient have any other concerns regarding the following they would like to address at future appointments:  Skin tone/texture:  No  Rhytides:  No  Scars:  Yes but doesn't want to pursue treatment at this time   Hx of skin cancer:No  History of keloids?:No  History of hypertrophic scars?:No  Other:  No  Does patient have acne concerns: No    Dermatology Laser Intake Checklist:  History of psoriasis:No  History of recent tan, indoor or outdoor tanning/vacation or other sun exposure: No  History of vitiligo:No  Family history of vitiligo:No  Recent other cosmetic procedure(microderm abrasion/peel/hair removal/facial etc):No  History of HSV:No   Did the patient start valtrex: No  For genital laser hair removal patient only: Is there a history of genital warts or condyloma:No  Tattoo in the area to be treated:Does not know, tattoo near area but likely not in treatment zone  Is patient using hydroquinone:No  Retinoids and other acne medications stopped for 2 weeks: N/A  Has the patient had accutane in the last 6-12 months:No  Pregnant or breastfeeding: No  History of skin cancer in area planned for treatment: No  History of treatment with gold:No  Changes in medical history: No  Photos obtained: No  Does the patient smoke:No  Is the patient on ibuprofen/aspirin/plavix/coumadin/other blood thinner: No  If patient is taking narcotic or diazepam(valium)-does patient have : No  There were no vitals taken for this visit.    Past Medical History:   Patient Active Problem List   Diagnosis     Problem drinking     High risk HPV infection     Gender dysphoria in adolescent and adult     Papanicolaou smear of cervix with atypical squamous cells cannot exclude high grade squamous intraepithelial lesion (ASC-H)     Past Medical History:   Diagnosis Date  "    Aseptic meningitis      Gender dysphoria in adolescent and adult      High risk HPV infection      Papanicolaou smear of cervix with atypical squamous cells cannot exclude high grade squamous intraepithelial lesion (ASC-H)      Past Surgical History:   Procedure Laterality Date     HYSTERECTOMY      and oophorectomy, but still has cervix and will require pap every 3 years     MASTECTOMY PARTIAL      chest surgery w/ contouring        Social History:  Patient reports that  has never smoked. she has never used smokeless tobacco. She reports that she drinks alcohol. She reports that she does not use drugs.    Family History:  Family History   Problem Relation Age of Onset     Dementia Father         frontotemporal dementia      Alcoholism Brother         possible     Diabetes Maternal Grandfather      Diabetes No family hx of        Medications:  Current Outpatient Medications   Medication Sig Dispense Refill     Pediatric Multivit-Minerals-C (FLINTSTONES GUMMIES PO) Take 1 tablet by mouth 2 times daily.       Syringe/Needle, Disp, 22G X 1-1/2\" 1 ML MISC Inject 0.25 mLs into the muscle once a week 30 each 4     testosterone cypionate (DEPOTESTOTERONE) 200 MG/ML injection Inject 0.25 mLs (50 mg) into the muscle once a week Due for repeat visit 7-2018 10 mL 3     estradiol (ESTRING) 2 MG vaginal ring Place 1 each vaginally every 3 months (Patient not taking: Reported on 10/18/2018) 1 each 3     folic acid (FOLVITE) 1 MG tablet Take 2 tablets (2 mg) by mouth daily (Patient not taking: Reported on 1/2/2019) 100 tablet 1     testosterone cypionate (DEPOTESTOTERONE CYPIONATE) 200 MG/ML injection Inject 0.2 mLs into the muscle once for 1 dose. 0.2 mL 0        No Known Allergies      Review of Systems:     -Constitutional: Otherwise feeling well today, in usual state of health.       Physical exam:  Vitals: There were no vitals taken for this visit.  GEN: This is a well developed, well-nourished male in no acute distress, " in a pleasant mood.    SKIN: Focused examination of the thighs, hands, forehead, digits, groin was performed.  -terminal hair dark brown color on  type II skin  -No other lesions of concern on areas examined.     Impression/Plan:  1. Gender dysphoria pending phalloplasty- thin groin and thigh issues    Hair emoval is medically necessary over the groin from where the neourethra will be fashioned.   Patient is willing to undergo hair removal prior surgery, removal is medically necessary over the groin from where the neourethra will be fashioned.  This is to prevent lumen narrowing, urinary abnormalities, and kidney stones.   Reviewed each treatment will target existing hair bulbs, but to treat all hair multiple treatments will be needed. Patient informed laser hair removal is not permenant and hair may regrow years later. Reviewed this will not work for blond or white hair. Any blonde or white hair will need electrolysis. Recommend the 755 laser. Reviewed that home laser units may not be as efficacious.   We will initiate a letter to insurance to check for insurance coverage.   We have confirmed the patient is not on minoxidil  Shaving hair prior to LHRwill improve efficacy. Any plucking, waxing, electrolysis, or needs to be avoided t least 4 weeks before LHR. Adhere to strict sun avoidance for a minimum of 6 weeks before and after each treatment.   We reviewed with the patient that even with laser hair removal there is still risk for hair growth within the neovaginal cavity/neourethra resulting in trapping mostuire, skin products, build of of stones or urine flow.     After treatment we recommend the patient wait 3 months after the last planned hair removal treatment before proceeding with surgery, in and effort to confirm that no further hair regrowth will occur.  Hair removal from bilateral thighs and inguinal crease up to iliac crest.      CC aGvin Eastman MD  [address] [place] [address] on close of  this encounter.  Pt to message in 2 weeks to check status of insurance coverage.       Dr. Oconnor staffed the patient.    Staff Involved:  Resident(Huyen)/Staff    Staff Physician Comments:   I saw and evaluated the patient with the resident and I agree with the assessment and plan.  I was present for the examination.    Khushbu Oconnor MD    Department of Dermatology  [facility]: Phone: [phone], Fax:[phone]  [facility]: Phone: [phone], Fax: [phone]

## 2019-01-02 NOTE — LETTER
1/2/2019       RE: Myrna Valadez  [address]  [facility] [address]     Dear Colleague,    Thank you for referring your patient, Myrna Valadez, to the [facility] DERMATOLOGY at [facility]. Please see a copy of my visit note below.    Dermatology Laser Intake Checklist:  History of psoriasis:No  History of recent tan, indoor or outdoor tanning/vacation or other sun exposure: No  History of vitiligo:No  Family history of vitiligo:No  Recent other cosmetic procedure(microderm abrasion/peel/hair removal/facial etc):No  History of HSV:No   Did the patient start valtrex: No  For genital laser hair removal patient only: Is there a history of genital warts or condyloma:No  Tattoo in the area to be treated:No  Is patient using hydroquinone:NA   Retinoids and other acne medications stopped for 2 weeks:NA  Has the patient had accutane in the last 6-12 months:No  Pregnant or breastfeeding: No  History of skin cancer in area planned for treatment: No  History of treatment with gold:Does not know  Changes in medical history: No  Photos obtained: No  Does the patient smoke:No  Is the patient on ibuprofen/aspirin/plavix/coumadin/other blood thinner: No  If patient is taking narcotic or diazepam(valium)-does patient have : No  There were no vitals taken for this visit.        [facility] Dermatology Note      Dermatology Problem List:  1.Gender dysphoria    Encounter Date: Jan 2, 2019    CC:   Chief Complaint   Patient presents with     Derm Problem     Hair removal consult.       History of Present Illness:  Mr. Myrna Valadez is a 33 year old female (identifies as male and uses he/his pronouns) who presents for evaluation of laser hair removal in anticipation of gender confirmation surgery.  Want laser hair removal.     Dermatology Safety Transgender Patient Intake:  Is the patient pending upcoming gender affirmation surgery: Yes  IF yes, what type of  surgery is the upcoming expected affirmation surgery: phalloplasty  What is the goal date of the upcoming expected affirmation surgery (date/ NA)? Summer 2019   Is the patient on hormonal therapy(if yes list what type (estrogen/stefanie/estradiol or testosoterone) and length of time treated):  Yes, testosterone injections since 2009   Is the patient here for laser hair removal assessment:  Yes  Has the patient had laser hair removal in the past?  No  If, so, how many approximate weeks/months/years prior: N/A  Thurston skin type: I-II    Has the patient ever had any other minimally invasive cosmetic procedures such as the following:  Electrolysis:  No  Botulinum toxin:  No  Fillers (list filler type and location):  No  Chemical peels: No  Lasers: No  Intense pulsed light: No  Broad band light: No  Microneedling: No  Has the patient every had any unknown injectable products or other injectable products? (If yes, please list details if available):  If any minimally invasive  cosmetic procedures performed prior to presentation to our clinic were they performed by a kerry SUMMERS/ NP/RN/ other or josen: N/A    Has the patient had any prior surgeries as follows:  Breast augmentation:  No  Breast removal:  Yes, mastectomy, April 2011  Genital:  Yes, complete hysterectomy w/ retention of cervix 10/2010  Face:  No  Hair transplant:  No    Was insurance coverage pursued for laser hair removal procedure (Yes/no/pending)?  Yes  Was insurance coverage obtained (yes/no/pending)? Pending     Does the patient have any other concerns regarding the following they would like to address at future appointments:  Skin tone/texture:  No  Rhytides:  No  Scars:  Yes but doesn't want to pursue treatment at this time   Hx of skin cancer:No  History of keloids?:No  History of hypertrophic scars?:No  Other:  No  Does patient have acne concerns: No    Dermatology Laser Intake Checklist:  History of psoriasis:No  History of recent tan, indoor or  outdoor tanning/vacation or other sun exposure: No  History of vitiligo:No  Family history of vitiligo:No  Recent other cosmetic procedure(microderm abrasion/peel/hair removal/facial etc):No  History of HSV:No   Did the patient start valtrex: No  For genital laser hair removal patient only: Is there a history of genital warts or condyloma:No  Tattoo in the area to be treated:Does not know, tattoo near area but likely not in treatment zone  Is patient using hydroquinone:No  Retinoids and other acne medications stopped for 2 weeks: N/A  Has the patient had accutane in the last 6-12 months:No  Pregnant or breastfeeding: No  History of skin cancer in area planned for treatment: No  History of treatment with gold:No  Changes in medical history: No  Photos obtained: No  Does the patient smoke:No  Is the patient on ibuprofen/aspirin/plavix/coumadin/other blood thinner: No  If patient is taking narcotic or diazepam(valium)-does patient have : No  There were no vitals taken for this visit.    Past Medical History:   Patient Active Problem List   Diagnosis     Problem drinking     High risk HPV infection     Gender dysphoria in adolescent and adult     Papanicolaou smear of cervix with atypical squamous cells cannot exclude high grade squamous intraepithelial lesion (ASC-H)     Past Medical History:   Diagnosis Date     Aseptic meningitis      Gender dysphoria in adolescent and adult      High risk HPV infection      Papanicolaou smear of cervix with atypical squamous cells cannot exclude high grade squamous intraepithelial lesion (ASC-H)      Past Surgical History:   Procedure Laterality Date     HYSTERECTOMY      and oophorectomy, but still has cervix and will require pap every 3 years     MASTECTOMY PARTIAL      chest surgery w/ contouring        Social History:  Patient reports that  has never smoked. she has never used smokeless tobacco. She reports that she drinks alcohol. She reports that she does not use  "drugs.    Family History:  Family History   Problem Relation Age of Onset     Dementia Father         frontotemporal dementia      Alcoholism Brother         possible     Diabetes Maternal Grandfather      Diabetes No family hx of        Medications:  Current Outpatient Medications   Medication Sig Dispense Refill     Pediatric Multivit-Minerals-C (FLINTSTONES GUMMIES PO) Take 1 tablet by mouth 2 times daily.       Syringe/Needle, Disp, 22G X 1-1/2\" 1 ML MISC Inject 0.25 mLs into the muscle once a week 30 each 4     testosterone cypionate (DEPOTESTOTERONE) 200 MG/ML injection Inject 0.25 mLs (50 mg) into the muscle once a week Due for repeat visit 7-2018 10 mL 3     estradiol (ESTRING) 2 MG vaginal ring Place 1 each vaginally every 3 months (Patient not taking: Reported on 10/18/2018) 1 each 3     folic acid (FOLVITE) 1 MG tablet Take 2 tablets (2 mg) by mouth daily (Patient not taking: Reported on 1/2/2019) 100 tablet 1     testosterone cypionate (DEPOTESTOTERONE CYPIONATE) 200 MG/ML injection Inject 0.2 mLs into the muscle once for 1 dose. 0.2 mL 0        No Known Allergies      Review of Systems:     -Constitutional: Otherwise feeling well today, in usual state of health.       Physical exam:  Vitals: There were no vitals taken for this visit.  GEN: This is a well developed, well-nourished male in no acute distress, in a pleasant mood.    SKIN: Focused examination of the thighs, hands, forehead, digits, groin was performed.  -terminal hair dark brown color on  type II skin  -No other lesions of concern on areas examined.     Impression/Plan:  1. Gender dysphoria pending phalloplasty- thin groin and thigh issues    Hair emoval is medically necessary over the groin from where the neourethra will be fashioned.   Patient is willing to undergo hair removal prior surgery, removal is medically necessary over the groin from where the neourethra will be fashioned.  This is to prevent lumen narrowing, urinary " abnormalities, and kidney stones.   Reviewed each treatment will target existing hair bulbs, but to treat all hair multiple treatments will be needed. Patient informed laser hair removal is not permenant and hair may regrow years later. Reviewed this will not work for blond or white hair. Any blonde or white hair will need electrolysis. Recommend the 755 laser. Reviewed that home laser units may not be as efficacious.   We will initiate a letter to insurance to check for insurance coverage.   We have confirmed the patient is not on minoxidil  Shaving hair prior to LHRwill improve efficacy. Any plucking, waxing, electrolysis, or needs to be avoided t least 4 weeks before LHR. Adhere to strict sun avoidance for a minimum of 6 weeks before and after each treatment.   We reviewed with the patient that even with laser hair removal there is still risk for hair growth within the neovaginal cavity/neourethra resulting in trapping mostuire, skin products, build of of stones or urine flow.     After treatment we recommend the patient wait 3 months after the last planned hair removal treatment before proceeding with surgery, in and effort to confirm that no further hair regrowth will occur.  Hair removal from bilateral thighs and inguinal crease up to iliac crest.      CC Gavin Eastman MD  [address] [id number] on close of this encounter.  Pt to message in 2 weeks to check status of insurance coverage.       Dr. Oconnor staffed the patient.    Staff Involved:  Resident(Huyen)/Staff    Staff Physician Comments:   I saw and evaluated the patient with the resident and I agree with the assessment and plan.  I was present for the examination.    Khushbu Oconnor MD    Department of Dermatology  [facility]: Phone: [phone], Fax:[phone]  [facility]: Phone: [phone], Fax:  [phone]

## 2019-01-22 ENCOUNTER — TELEPHONE (OUTPATIENT)
Dept: OBGYN | Facility: CLINIC | Age: 34
End: 2019-01-22

## 2019-01-22 NOTE — TELEPHONE ENCOUNTER
Attempted to reach Inocencio about his appointment scheduled at [facility] for Hyst consult. His appt has been switched so want to make sure he is aware at now appt scheduled for 2/21/19 at 11am. Asked he call triage back to confirm

## 2019-01-22 NOTE — TELEPHONE ENCOUNTER
Inocencio called back and only wants to see Dr Capps for HYST consult. He is aware scheduled now for 2/21 at 11am. He is hoping to get seen sooner. I let him know I'd forward to schedulers in case something opens up sooner.Pt indicated understanding and agreed with plan.

## 2019-01-24 NOTE — TELEPHONE ENCOUNTER
Pt called at this time wanting to schedule the appt for LHR. Appt scheduled for February 1st at 2:45 pm. Prep instructions and address sent via [employer] per pt request.     Paty Perez LPN

## 2019-01-24 NOTE — TELEPHONE ENCOUNTER
I left a message for patient to call [facility].    Treatment area(s): bilateral thighs and inguinal crease up to iliac crest.  Dates of coverage: end date 1/11/20  Treatments covered: 12    Prep instructions:  1. Shave night before or morning of procedure.  2. Avoid plucking, waxing, or electrolysis 4 weeks before.  3. Strict sun avoidance 6 weeks prior to and 6 weeks after treatment.    Melva Schulz RN

## 2019-02-01 ENCOUNTER — OFFICE VISIT (OUTPATIENT)
Dept: DERMATOLOGY | Facility: CLINIC | Age: 34
End: 2019-02-01
Payer: COMMERCIAL

## 2019-02-01 DIAGNOSIS — R68.89 UNWANTED HAIR: ICD-10-CM

## 2019-02-01 DIAGNOSIS — F64.9 GENDER DYSPHORIA: Primary | ICD-10-CM

## 2019-02-01 PROCEDURE — 17999 UNLISTD PX SKN MUC MEMB SUBQ: CPT | Performed by: DERMATOLOGY

## 2019-02-01 ASSESSMENT — PAIN SCALES - GENERAL: PAINLEVEL: NO PAIN (0)

## 2019-02-01 NOTE — NURSING NOTE
Dermatology Laser Intake Checklist:  History of psoriasis:No  History of recent tan, indoor or outdoor tanning/vacation or other sun exposure: No  History of vitiligo:No  Family history of vitiligo:No  Recent other cosmetic procedure(microderm abrasion/peel/hair removal/facial etc):No  History of HSV:No   Did the patient start valtrex: No  For genital laser hair removal patient only: Is there a history of genital warts or condyloma:No  Tattoo in the area to be treated:Does not know, tattoo near area but likely not in treatment zone  Is patient using hydroquinone:No  Retinoids and other acne medications stopped for 2 weeks: N/A  Has the patient had accutane in the last 6-12 months:No  Pregnant or breastfeeding: No  History of skin cancer in area planned for treatment: No  History of treatment with gold:No  Changes in medical history: No  Photos obtained: No  Does the patient smoke:No  Is the patient on ibuprofen/aspirin/plavix/coumadin/other blood thinner: No  If patient is taking narcotic or diazepam(valium)-does patient have : No    Melva Schulz RN

## 2019-02-01 NOTE — PROCEDURES
Laser Hair Removal Gentle Max Prox (755/1064nm)  Procedure Date: 2019    Staff Surgeon: Khushbu Oconnor MD    Assistant: Melva Schulz RN and Angela Figueredo RN    Trimming required prior to treatment in clinic?: yes  Thurston skin type: 2  Diagnosis/Treatment Reason: gender dysphoria and unwanted hair    Treatment#: 1     Laser Settings:  Wavelength(755/1064nm): 755  Location: bilateral inguinal crease and 5cm on either side  Fluence: 14J   Spot size: 18mm  Pulse width:  3 mS ( mS)   Total Number of Pulses: 77  Dynamic cooling spray settin mS  Dynamic cooling device delay:  30 mS    Laser Settings:  Wavelength(755/1064nm): 755  Location: bilateral anterolateral thigh  Fluence: 14J   Spot size: 18mm  Pulse width:  3 mS ( mS)   Total Number of Pulses: 348  Mode(continuous/pulsed):continuous  Dynamic cooling spray settin mS  Dynamic cooling device delay:  30 mS    Anesthesia:  None    Jose used?:No  Ice used?: No    Description of Operation/Procedure:   The nature and purpose of the procedure, associated risks, possible consequences, complications and alternative methods of treatment were explained in detail, this includes but is not limited to hyperpigmentation, hypopigmentation, scarring, bruising, hair loss pain/discomfort, eye injury, paradoxical hair growth, hives, infection, itching  and blister.   We reviewed that the outcome could be any of the following: no improvement, slight improvement or change in skin color & texture, the skin might be permanently lighter or darker, and though uncommon, superficial scarring may occur.  Multiple treatments are required. We reviewed with patients that laser hair removal is a reduction in hair and not permanent.   Consent was obtained. Time-out was performed.  The patient was positioned to optimally expose the area treated. Dynamic cooling was verified and functioning properly.  Protective eyewear was worn by the patient and goggles on all personnel in  the treatment room.  The patient confirmed the site to be treated. The laser energy output was verified by meter reading.  N95 and buffalo filtration was used.     The clinically evident lesion(s) was/were treated with laser beam as above.  The patient tolerated the procedure well and no complications were noted. Post operative instructions were provided. The total laser operation and preparation time was 20 minutes.      Numeric pain scale after treatment: 3/10    The patient will follow-up in 6 weeks.  Bill to insurance.  Melva Schulz RN performed procedure.      Staff Involved:  Staff Only    Melva Schulz RN      I was present for the lamb portions  Khushbu Oconnor MD    Department of Dermatology  [facility]: Phone: [phone], Fax:[phone]  [facility]: Phone: [phone], Fax: [phone]

## 2019-02-01 NOTE — PATIENT INSTRUCTIONS
Gentle Max Laser Hair Reduction Instructions    Laser hair reduction: I will have redness, pain and swelling. I may have skin discoloration, bruising and itching. Risks are permenant discoloration, hives, infection scarring, eye injury,hair growth, and blister. The outcome could be no improvement or slight improvement. Multiple treatments are required. All hair will not be removed.     Before the procedure:  Sun Protection:  Do not allow the area to become tan or come in with a tan for a treatment. Tans will increased the risks of the procedure. Do not use spray or any over counter product self tanners prior to the procedure.     Shaving:   Gently shave the area the evening before the procedure.     Other:  Avoid any retinols such as Differin, adapalene, retinol or tretinoin to the treated area 1 week prior. Hold bleaching creams such as hydroquinone until 1 week prior.  Avoid any hair removal procedures such as waxing, electrolysis or other lasers on the skin within 6 weeks prior. Do not have any other cosmetic procedures done on the area within the prior 6 weeks such as chemical peels or dermabrasion.      Post Procedure:  Day 1-7  The healing time for any given treatment varies between clients. The following represents the general recovery phases you might expect. Individual clients may experience variations from this course.     Swelling/Discomfort/Redness:  The most common side effects are erythema and edema(redness and swelling) which generally occur immediately after and typically resolve within 48 hours. Crusting and rarely blistering may occur.       Activity:  Avoid swimming the day of laser hair removal treatment. Also, no rigorous exercise the day of treatment.     Moisturizers and Sunscreens:  Wait until the skin has returned to normal to start topical products.     Sun Protection:  Do not allow the area to become tan or come in with a tan for a treatment. Tans will increased the risks of the procedure.  Do not use spray or any over counter product self tanners prior to the procedure.     Warning signs:  Call the clinic if you have significant pain, increasing redness, pus, blisters/crusting or for any other concerns.     Who should I call with questions?    [facility]: [phone]     [facility]: [phone]    For urgent needs outside of business hours call the [facility] at [phone] and ask for the dermatology resident on call

## 2019-02-01 NOTE — LETTER
2/1/2019         RE: Myrna Valadez  [address]  [facility] [address]        Dear Colleague,    Thank you for referring your patient, Myrna Valadez, to the [facility]. Please see a copy of my visit note below.    See procedure note    Again, thank you for allowing me to participate in the care of your patient.        Sincerely,        Khushbu Oconnor MD

## 2019-02-21 ENCOUNTER — OFFICE VISIT (OUTPATIENT)
Dept: OBGYN | Facility: CLINIC | Age: 34
End: 2019-02-21
Attending: OBSTETRICS & GYNECOLOGY
Payer: COMMERCIAL

## 2019-02-21 VITALS
HEART RATE: 65 BPM | WEIGHT: 150.2 LBS | HEIGHT: 65 IN | BODY MASS INDEX: 25.02 KG/M2 | SYSTOLIC BLOOD PRESSURE: 105 MMHG | DIASTOLIC BLOOD PRESSURE: 64 MMHG

## 2019-02-21 DIAGNOSIS — F64.0 GENDER DYSPHORIA IN ADULT: Primary | ICD-10-CM

## 2019-02-21 PROCEDURE — G0463 HOSPITAL OUTPT CLINIC VISIT: HCPCS | Mod: ZF

## 2019-02-21 RX ORDER — ACETAMINOPHEN 325 MG/1
975 TABLET ORAL ONCE
Status: CANCELLED | OUTPATIENT
Start: 2019-02-21 | End: 2019-02-21

## 2019-02-21 RX ORDER — CEFAZOLIN SODIUM 2 G/100ML
2 INJECTION, SOLUTION INTRAVENOUS
Status: CANCELLED | OUTPATIENT
Start: 2019-02-21

## 2019-02-21 RX ORDER — CEFAZOLIN SODIUM 1 G/3ML
1 INJECTION, POWDER, FOR SOLUTION INTRAMUSCULAR; INTRAVENOUS SEE ADMIN INSTRUCTIONS
Status: CANCELLED | OUTPATIENT
Start: 2019-02-21

## 2019-02-21 RX ORDER — PHENAZOPYRIDINE HYDROCHLORIDE 100 MG/1
200 TABLET, FILM COATED ORAL ONCE
Status: CANCELLED | OUTPATIENT
Start: 2019-02-21 | End: 2019-02-21

## 2019-02-21 ASSESSMENT — MIFFLIN-ST. JEOR: SCORE: 1387.18

## 2019-02-21 NOTE — NURSING NOTE
Chief Complaint   Patient presents with     Consult For     Surgery consult to remove cervix and part of the vagina.       See MAULIK Pace 2/21/2019

## 2019-02-21 NOTE — PROGRESS NOTES
Gyn Clinic Visit Note  2/21/2019    S: Myrna Valadez is a 33 year old transgender man here today for trachelectomy and vaginectomy consult. Patient is 8 years s/p supracervical hyst, BSO. Plans to undergo phalloplasty with Dr. Eastman and needs to have cervix removed and colpocleisis before that procedure. Currently undergoing laser hair removal treatment in anticipation of skin graft. No other concerns. Cervix was not removed with hysterectomy at time for patient preference, at that time did not think he would ever want bottom surgery and felt benefits of keeping cervix outweighed risks.     PAP smear history:  PAP   Date Value Ref Range Status   06/12/2018 ASC-US (A)  Final   02/16/2018 ASC-H (A), HPV pos  Final   07/27/2016 NIL  Final     Colposcopy:  6/12/18 SHERRI I    PMHx:  Past Medical History:   Diagnosis Date     Aseptic meningitis      Gender dysphoria in adolescent and adult      High risk HPV infection      Papanicolaou smear of cervix with atypical squamous cells cannot exclude high grade squamous intraepithelial lesion (ASC-H)      PSHx:  Past Surgical History:   Procedure Laterality Date     HYSTERECTOMY      and oophorectomy, but still has cervix and will require pap every 3 years     MASTECTOMY PARTIAL      chest surgery w/ contouring      Allergies   No Known Allergies    Social history:  Social History     Socioeconomic History     Marital status: Single     Spouse name: Not on file     Number of children: Not on file     Years of education: Not on file     Highest education level: Not on file   Occupational History     Not on file   Social Needs     Financial resource strain: Not on file     Food insecurity:     Worry: Not on file     Inability: Not on file     Transportation needs:     Medical: Not on file     Non-medical: Not on file   Tobacco Use     Smoking status: Never Smoker     Smokeless tobacco: Never Used   Substance and Sexual Activity     Alcohol use: Yes     Drug use: No     Sexual  "activity: Yes     Partners: Female     Birth control/protection: None   Lifestyle     Physical activity:     Days per week: Not on file     Minutes per session: Not on file     Stress: Not on file   Relationships     Social connections:     Talks on phone: Not on file     Gets together: Not on file     Attends Yarsani service: Not on file     Active member of club or organization: Not on file     Attends meetings of clubs or organizations: Not on file     Relationship status: Not on file     Intimate partner violence:     Fear of current or ex partner: Not on file     Emotionally abused: Not on file     Physically abused: Not on file     Forced sexual activity: Not on file   Other Topics Concern     Parent/sibling w/ CABG, MI or angioplasty before 65F 55M? No   Social History Narrative    Lives alone. 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 Good dairy intake.      Meds:  Current Outpatient Medications   Medication     Pediatric Multivit-Minerals-C (FLINTSTONES GUMMIES PO)     testosterone cypionate (DEPOTESTOTERONE) 200 MG/ML injection     Syringe/Needle, Disp, 22G X 1-1/2\" 1 ML MISC     testosterone cypionate (DEPOTESTOTERONE CYPIONATE) 200 MG/ML injection     No current facility-administered medications for this visit.      O:   /64 (BP Location: Left arm, Patient Position: Chair)   Pulse 65   Ht 1.651 m (5' 5\")   Wt 68.1 kg (150 lb 3.2 oz)   Breastfeeding? No   BMI 24.99 kg/m    Gen: NAD  Remainder of exam deferred    A:  Myrna Valadez is a 33 year old y/o transgender man here for consultation regarding trachelectomy. Discussed plan for da Marianna assested trachelectomy with Dr. Hernández with combined colpocleisis by Dr. Mckinney.  We reviewed both procedures, that typically both would be same day discharge procedures, reviewed reasons why an overnight stay might be needed, typical recovery and activity restrictions following.  " Surgery scheduling request was sent.  Already has two letters of support on record, per patient, will confirm with Hudson Chaidez [place] [facility] [place].  Does have h/o abnormal pap (ASC-H/HPV pos 2/2018, repeat 6 months later ASC-US, colp SHERRI I), do not feel additional testing is needed prior to surgery.     Lily Alcantar MD  OB/GYN Resident-PGY1  2/21/2019 4:08 PM    The patient was seen and examined with Dr. Alcantar.  I have reviewed and edited the above note.    Jodie Capps MD, FACOG

## 2019-02-21 NOTE — LETTER
RE: Myrna Valadez  [address]  [place] [facility] [address]     Dear Colleague,    Thank you for referring your patient, Myrna Valadez, to the [facility] at [facility]. Please see a copy of my visit note below.  Gyn Clinic Visit Note  2/21/2019    S: Myrna Valadez is a 33 year old transgender man here today for trachelectomy and vaginectomy consult. Patient is 8 years s/p supracervical hyst, BSO. Plans to undergo phalloplasty with Dr. Eastman and needs to have cervix removed and colpocleisis before that procedure. Currently undergoing laser hair removal treatment in anticipation of skin graft. No other concerns. Cervix was not removed with hysterectomy at time for patient preference, at that time did not think he would ever want bottom surgery and felt benefits of keeping cervix outweighed risks.     PAP smear history:  PAP   Date Value Ref Range Status   06/12/2018 ASC-US (A)  Final   02/16/2018 ASC-H (A), HPV pos  Final   07/27/2016 NIL  Final     Colposcopy:  6/12/18 SHERRI I    PMHx:  Past Medical History:   Diagnosis Date     Aseptic meningitis      Gender dysphoria in adolescent and adult      High risk HPV infection      Papanicolaou smear of cervix with atypical squamous cells cannot exclude high grade squamous intraepithelial lesion (ASC-H)      PSHx:  Past Surgical History:   Procedure Laterality Date     HYSTERECTOMY      and oophorectomy, but still has cervix and will require pap every 3 years     MASTECTOMY PARTIAL      chest surgery w/ contouring      Allergies   No Known Allergies    Social history:  Social History     Socioeconomic History     Marital status: Single     Spouse name: Not on file     Number of children: Not on file     Years of education: Not on file     Highest education level: Not on file   Occupational History     Not on file   Social Needs     Financial resource strain: Not on file     Food insecurity:     Worry: Not  "on file     Inability: Not on file     Transportation needs:     Medical: Not on file     Non-medical: Not on file   Tobacco Use     Smoking status: Never Smoker     Smokeless tobacco: Never Used   Substance and Sexual Activity     Alcohol use: Yes     Drug use: No     Sexual activity: Yes     Partners: Female     Birth control/protection: None   Lifestyle     Physical activity:     Days per week: Not on file     Minutes per session: Not on file     Stress: Not on file   Relationships     Social connections:     Talks on phone: Not on file     Gets together: Not on file     Attends Amish service: Not on file     Active member of club or organization: Not on file     Attends meetings of clubs or organizations: Not on file     Relationship status: Not on file     Intimate partner violence:     Fear of current or ex partner: Not on file     Emotionally abused: Not on file     Physically abused: Not on file     Forced sexual activity: Not on file   Other Topics Concern     Parent/sibling w/ CABG, MI or angioplasty before 65F 55M? No   Social History Narrative    Lives alone. 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 Bikes or runs to commute, very fit. Good dairy intake.      Meds:  Current Outpatient Medications   Medication     Pediatric Multivit-Minerals-C (FLINTSTONES GUMMIES PO)     testosterone cypionate (DEPOTESTOTERONE) 200 MG/ML injection     Syringe/Needle, Disp, 22G X 1-1/2\" 1 ML MISC     testosterone cypionate (DEPOTESTOTERONE CYPIONATE) 200 MG/ML injection     No current facility-administered medications for this visit.      O:   /64 (BP Location: Left arm, Patient Position: Chair)   Pulse 65   Ht 1.651 m (5' 5\")   Wt 68.1 kg (150 lb 3.2 oz)   Breastfeeding? No   BMI 24.99 kg/m     Gen: NAD  Remainder of exam deferred    A:  Myrna Valadez is a 33 year old y/o transgender man here for consultation regarding trachelectomy. Discussed plan for luan goested " trachelectomy with Dr. Hernández with combined colpocleisis by Dr. Mckinney.  We reviewed both procedures, that typically both would be same day discharge procedures, reviewed reasons why an overnight stay might be needed, typical recovery and activity restrictions following.  Surgery scheduling request was sent.  Already has two letters of support on record, per patient, will confirm with Hudson Chaidez from the [facility].  Does have h/o abnormal pap (ASC-H/HPV pos 2/2018, repeat 6 months later ASC-US, colp SHERRI I), do not feel additional testing is needed prior to surgery.     Lily Alcantar MD  OB/GYN Resident-PGY1  2/21/2019 4:08 PM    The patient was seen and examined with Dr. Alcantar.  I have reviewed and edited the above note.    Jodie Capps MD, FACOG

## 2019-02-25 ENCOUNTER — TELEPHONE (OUTPATIENT)
Dept: OBGYN | Facility: CLINIC | Age: 34
End: 2019-02-25

## 2019-03-01 ENCOUNTER — TELEPHONE (OUTPATIENT)
Dept: OBGYN | Facility: CLINIC | Age: 34
End: 2019-03-01

## 2019-03-04 ENCOUNTER — TELEPHONE (OUTPATIENT)
Dept: OBGYN | Facility: CLINIC | Age: 34
End: 2019-03-04

## 2019-03-04 NOTE — TELEPHONE ENCOUNTER
lvm for patient to call surgery scheduling, need to confirm if patient is available for surgery on 4/24/19.

## 2019-03-08 ENCOUNTER — TELEPHONE (OUTPATIENT)
Dept: OBGYN | Facility: CLINIC | Age: 34
End: 2019-03-08

## 2019-03-08 NOTE — TELEPHONE ENCOUNTER
Confirmed surgery date with patient 7/12/19 with arrival time at 8:00a.m with nothing to eat eight hours before scheduled surgery time and clear liquids up to two hours before scheduled surgery time, informed patient that a map and letter will be mailed out.     to complete the following fields:            CHECKLIST     Google Calendar : Yes     Resident notified: Not Applicable     Clinic schedule blocked:  Not Applicable    Patient notified:Yes      Pre op information sent: Yes     Given to patient over the phone.Yes    Comments:

## 2019-03-19 ENCOUNTER — OFFICE VISIT (OUTPATIENT)
Dept: DERMATOLOGY | Facility: CLINIC | Age: 34
End: 2019-03-19
Payer: COMMERCIAL

## 2019-03-19 DIAGNOSIS — F64.9 GENDER DYSPHORIA: ICD-10-CM

## 2019-03-19 DIAGNOSIS — R68.89 UNWANTED HAIR: Primary | ICD-10-CM

## 2019-03-19 PROCEDURE — 17999 UNLISTD PX SKN MUC MEMB SUBQ: CPT | Performed by: DERMATOLOGY

## 2019-03-19 ASSESSMENT — PAIN SCALES - GENERAL: PAINLEVEL: NO PAIN (0)

## 2019-03-19 NOTE — LETTER
3/19/2019         RE: Myrna Valadez  [address]  [facility] [address]        Dear Colleague,    Thank you for referring your patient, Myrna Valadez, to the [facility]. Please see a copy of my visit note below.    See procedure note.     Again, thank you for allowing me to participate in the care of your patient.        Sincerely,        Khushbu Oconnor MD

## 2019-03-19 NOTE — PROCEDURES
Dermatology Laser Intake Checklist:  History of psoriasis:No  History of recent tan, indoor or outdoor tanning/vacation or other sun exposure: No  History of vitiligo:No  Family history of vitiligo:No  Recent other cosmetic procedure(microderm abrasion/peel/hair removal/facial etc):No  History of HSV:No   Did the patient start valtrex: No  For genital laser hair removal patient only: Is there a history of genital warts or condyloma:No  Tattoo in the area to be treated:Does not know, tattoo near area but likely not in treatment zone  Is patient using hydroquinone:No  Retinoids and other acne medications stopped for 2 weeks: N/A  Has the patient had accutane in the last 6-12 months:No  Pregnant or breastfeeding: No  History of skin cancer in area planned for treatment: No  History of treatment with gold:No  Changes in medical history: No  Photos obtained: No  Does the patient smoke:No  Is the patient on ibuprofen/aspirin/plavix/coumadin/other blood thinner: No  If patient is taking narcotic or diazepam(valium)-does patient have : No      Laser Hair Removal Gentle Max Prox (755/1064nm)  Procedure Date: Mar 19, 2019    Staff Surgeon: Dr.Ronda Oconnor         Assistant: Angela Figueredo RN and Melva Schulz RN     Trimming required prior to treatment in clinic?: no   [place]ston skin type: 2  Diagnosis/Treatment Reason: Unwanted hair    Treatment#: 2       Laser Settings:  Wavelength(755/1064nm): 755  Location: bilateral inguinal creases extending out 2.5cm on each side and bilateral anterolateral thighs  Fluence: 14J  Spot size: 18mm  Pulse width:  3 mS ( mS)   Total Number of Pulses: 458 total for both locations  Dynamic cooling spray settin mS  Dynamic cooling device delay:  30 mS            Anesthesia:  None    Jose used?:No  Ice used?: No    Description of Operation/Procedure:   The nature and purpose of the procedure, associated risks, possible consequences, complications and alternative methods of  treatment were explained in detail, this includes but is not limited to hyperpigmentation, hypopigmentation, scarring, bruising, hair loss pain/discomfort, eye injury, paradoxical hair growth, hives, infection, itching  and blister.   We reviewed that the outcome could be any of the following: no improvement, slight improvement or change in skin color & texture, the skin might be permanently lighter or darker, and though uncommon, superficial scarring may occur.  Multiple treatments are required. We reviewed with patients that laser hair removal is a reduction in hair and not permanent.   Time-out was performed.  The patient was positioned to optimally expose the area treated. Dynamic cooling was verified and functioning properly.  Protective eyewear was worn by the patient and goggles on all personnel in the treatment room.  The patient confirmed the site to be treated. The laser energy output was verified by meter reading.  N95 and buffalo filtration was used.     The clinically evident lesion(s) was/were treated with laser beam as above.  The patient tolerated the procedure well and no complications were noted. Post operative instructions were provided. The total laser operation and preparation time was 20 minutes.      Numeric pain scale after treatment: 0/10    The patient will follow-up in 6 weeks.    RN performed the entire procedure. Billed to insurance    Dr. Khushbu Oconnor staffed the patient was present for identifying and marking target area(s), and calibration of laser and was present for part of the procedure.        Staff Involved:  Staff Only    Angela Figueredo RN     I was present for key portions of the procedure.     Khushbu Oconnor MD    Department of Dermatology  [facility]: Phone: [phone], Fax:[phone]  [facility]: Phone: [phone], Fax: [phone]

## 2019-03-19 NOTE — PATIENT INSTRUCTIONS
Gentle Max Laser Hair Reduction Instructions    Laser hair reduction: I will have redness, pain and swelling. I may have skin discoloration, bruising and itching. Risks are permenant discoloration, hives, infection scarring, eye injury,hair growth, and blister. The outcome could be no improvement or slight improvement. Multiple treatments are required. All hair will not be removed.     Before the procedure:  Sun Protection:  Do not allow the area to become tan or come in with a tan for a treatment. Tans will increased the risks of the procedure. Do not use spray or any over counter product self tanners prior to the procedure.     Shaving:   Gently shave the area the evening before the procedure.     Other:  Avoid any retinols such as Differin, adapalene, retinol or tretinoin to the treated area 1 week prior. Hold bleaching creams such as hydroquinone until 1 week prior.  Avoid any hair removal procedures such as waxing, electrolysis or other lasers on the skin within 6 weeks prior. Do not have any other cosmetic procedures done on the area within the prior 6 weeks such as chemical peels or dermabrasion.      Post Procedure:  Day 1-7  The healing time for any given treatment varies between clients. The following represents the general recovery phases you might expect. Individual clients may experience variations from this course.     Swelling/Discomfort/Redness:  The most common side effects are erythema and edema(redness and swelling) which generally occur immediately after and typically resolve within 48 hours. Crusting and rarely blistering may occur.       Activity:  Avoid swimming the day of laser hair removal treatment. Also, no rigorous exercise the day of treatment.     Moisturizers and Sunscreens:  Wait until the skin has returned to normal to start topical products.     Sun Protection:  Do not allow the area to become tan or come in with a tan for a treatment. Tans will increased the risks of the procedure.  Do not use spray or any over counter product self tanners prior to the procedure.     Warning signs:  Call the clinic if you have significant pain, increasing redness, pus, blisters/crusting or for any other concerns.     Who should I call with questions?    [facility]: [phone]     [facility]: [phone]    For urgent needs outside of business hours call the [facility] at [phone] and ask for the dermatology resident on call

## 2019-04-09 ENCOUNTER — TELEPHONE (OUTPATIENT)
Dept: FAMILY MEDICINE | Facility: CLINIC | Age: 34
End: 2019-04-09

## 2019-04-19 ENCOUNTER — TELEPHONE (OUTPATIENT)
Dept: FAMILY MEDICINE | Facility: CLINIC | Age: 34
End: 2019-04-19

## 2019-04-19 DIAGNOSIS — F64.9 GENDER IDENTITY DISORDER: ICD-10-CM

## 2019-04-19 NOTE — TELEPHONE ENCOUNTER
"[facility] phone call message- patient requesting a refill: Patient requesting refill medication. She is totally out of medication.    Full Medication Name:  Syringe/Needle, Disp, 22G X 1-1/2\" 1 ML MISC  Dose: Route: Inject 0.25 mLs into the muscle once a week - Intramuscular    Pharmacy confirmed as   [facility] [address] [place] [address] AT [address] [address]  Phone: [phone] Fax: [phone]  : Yes    Additional Comments: Requesting for a refill of her medication. Prescription was faxed over a week ago.      OK to leave a message on voice mail? Yes    Primary language: English      needed? No    Call taken on April 19, 2019 at 2:45 PM by Zenia Downey  "

## 2019-04-19 NOTE — TELEPHONE ENCOUNTER
RN sent the syringe needle combo. If patient calls back requesting testosterone, they need an office visit.  Cher Wheeler RN

## 2019-04-30 ENCOUNTER — OFFICE VISIT (OUTPATIENT)
Dept: DERMATOLOGY | Facility: CLINIC | Age: 34
End: 2019-04-30
Payer: COMMERCIAL

## 2019-04-30 DIAGNOSIS — F64.9 GENDER DYSPHORIA: ICD-10-CM

## 2019-04-30 DIAGNOSIS — R68.89 UNWANTED HAIR: Primary | ICD-10-CM

## 2019-04-30 PROCEDURE — 17999 UNLISTD PX SKN MUC MEMB SUBQ: CPT | Performed by: DERMATOLOGY

## 2019-04-30 RX ORDER — OMEGA-3 FATTY ACIDS/FISH OIL 300-1000MG
200 CAPSULE ORAL EVERY 4 HOURS PRN
COMMUNITY
End: 2019-06-21

## 2019-04-30 ASSESSMENT — PAIN SCALES - GENERAL: PAINLEVEL: NO PAIN (0)

## 2019-04-30 NOTE — LETTER
4/30/2019         RE: Myrna Dee  [address]  [place] [address]        Dear Colleague,    Thank you for referring your patient, Myrna Dee, to the [facility]. Please see a copy of my visit note below.    See procedure note.     Again, thank you for allowing me to participate in the care of your patient.        Sincerely,        Dianne Diop MD

## 2019-04-30 NOTE — PATIENT INSTRUCTIONS
Gentle Max Laser Hair Reduction Instructions    Laser hair reduction: I will have redness, pain and swelling. I may have skin discoloration, bruising and itching. Risks are permenant discoloration, hives, infection scarring, eye injury,hair growth, and blister. The outcome could be no improvement or slight improvement. Multiple treatments are required. All hair will not be removed.     Before the procedure:  Sun Protection:  Do not allow the area to become tan or come in with a tan for a treatment. Tans will increased the risks of the procedure. Do not use spray or any over counter product self tanners prior to the procedure.     Shaving:   Gently shave the area the evening before the procedure.     Other:  Avoid any retinols such as Differin, adapalene, retinol or tretinoin to the treated area 1 week prior. Hold bleaching creams such as hydroquinone until 1 week prior.  Avoid any hair removal procedures such as waxing, electrolysis or other lasers on the skin within 6 weeks prior. Do not have any other cosmetic procedures done on the area within the prior 6 weeks such as chemical peels or dermabrasion.      Post Procedure:  Day 1-7  The healing time for any given treatment varies between clients. The following represents the general recovery phases you might expect. Individual clients may experience variations from this course.     Swelling/Discomfort/Redness:  The most common side effects are erythema and edema(redness and swelling) which generally occur immediately after and typically resolve within 48 hours. Crusting and rarely blistering may occur.       Activity:  Avoid swimming the day of laser hair removal treatment. Also, no rigorous exercise the day of treatment.     Moisturizers and Sunscreens:  Wait until the skin has returned to normal to start topical products.     Sun Protection:  Do not allow the area to become tan or come in with a tan for a treatment. Tans will increased the risks of the procedure.  Do not use spray or any over counter product self tanners prior to the procedure.     Warning signs:  Call the clinic if you have significant pain, increasing redness, pus, blisters/crusting or for any other concerns.     Who should I call with questions?    [facility]: [phone]     [facility]: [phone]    For urgent needs outside of business hours call the [facility] at [phone] and ask for the dermatology resident on call

## 2019-04-30 NOTE — NURSING NOTE
Dermatology Laser Intake Checklist:  History of psoriasis:No  History of recent tan, indoor or outdoor tanning/vacation or other sun exposure: No  History of vitiligo:No  Family history of vitiligo:No  Recent other cosmetic procedure(microderm abrasion/peel/hair removal/facial etc):No  History of HSV:No   Did the patient start valtrex: N/A  For genital laser hair removal patient only: Is there a history of genital warts or condyloma:No  Tattoo in the area to be treated:No  Is patient using hydroquinone:No  Retinoids and other acne medications stopped for 2 weeks: N/A  Has the patient had accutane in the last 6-12 months:No  Pregnant or breastfeeding: No  History of skin cancer in area planned for treatment: No  History of treatment with gold:No  Changes in medical history: No  Photos obtained: No  Does the patient smoke:No  Is the patient on ibuprofen/aspirin/plavix/coumadin/other blood thinner: Ibuprofen on Sunday for a headache  If patient is taking narcotic or diazepam(valium)-does patient have : No  Melva Schulz RN

## 2019-04-30 NOTE — PROCEDURES
Laser Hair Removal Gentle Max Prox (755/1064nm)  Procedure Date: 2019    Staff Surgeon: Dianne Diop MD    Assistant: Melva Schulz RN and Angela Figueredo RN    Trimming required prior to treatment in clinic?: yes  [place]urston skin type: 2  Diagnosis/Treatment Reason: gender dysphoria    Treatment#: 3     Laser Settings:  Wavelength(755/1064nm): 755  Location: bilateral inguinal crease with 5cm on either side and up to iliac crest  Fluence: 14J   Spot size: 18mm  Pulse width:  3 mS ( mS)   Total Number of Pulses: 107  Dynamic cooling spray settin mS  Dynamic cooling device delay:  30 mS    Test area Settings  Wavelength(755/1064nm): 755  Location: left anterior thigh  Fluence: 16J   Spot size: 18mm  Pulse width:  3 mS ( mS)   Total Number of Pulses: 2  Mode (continuous/pulsed): pulsed  Dynamic cooling spray settin mS  Dynamic cooling device delay:  30 mS      Laser Settings:  Wavelength(755/1064nm): 755  Location: bilateral anterolateral thigh  Fluence: 18J   Spot size: 18mm  Pulse width:  3 mS ( mS)   Total Number of Pulses: 438  Mode (continuous/pulsed): continuous  Dynamic cooling spray settin mS  Dynamic cooling device delay:  30 mS    Anesthesia:  None    Jose used?:No  Ice used?: No    Description of Operation/Procedure:   The nature and purpose of the procedure, associated risks, possible consequences, complications and alternative methods of treatment were explained in detail, this includes but is not limited to hyperpigmentation, hypopigmentation, scarring, bruising, hair loss pain/discomfort, eye injury, paradoxical hair growth, hives, infection, itching  and blister.   We reviewed that the outcome could be any of the following: no improvement, slight improvement or change in skin color & texture, the skin might be permanently lighter or darker, and though uncommon, superficial scarring may occur.  Multiple treatments are required. We reviewed with patients that laser hair  removal is a reduction in hair and not permanent.   Consent was obtained. Time-out was performed.  The patient was positioned to optimally expose the area treated. Dynamic cooling was verified and functioning properly.  Protective eyewear was worn by the patient and goggles on all personnel in the treatment room.  The patient confirmed the site to be treated. The laser energy output was verified by meter reading.  N95 and buffalo filtration was used.     The clinically evident lesion(s) was/were treated with laser beam as above.  The patient tolerated the procedure well and no complications were noted. Post operative instructions were provided. The total laser operation and preparation time was 20 minutes.      Numeric pain scale after treatment: 4/10    The patient will follow-up in 6 weeks.  Bill to insurance.  Melva Schulz RN performed procedure.    Melva Schulz RN    I was present for key portions of the procedure and immediately available as needed during.     Dianne Diop MD    Department of Dermatology  [facility]: Phone: [phone], Fax:[phone]  [facility]: Phone: [phone], Fax: [phone]

## 2019-05-03 ENCOUNTER — HEALTH MAINTENANCE LETTER (OUTPATIENT)
Age: 34
End: 2019-05-03

## 2019-06-19 ENCOUNTER — OFFICE VISIT (OUTPATIENT)
Dept: UROLOGY | Facility: CLINIC | Age: 34
End: 2019-06-19
Attending: OBSTETRICS & GYNECOLOGY
Payer: COMMERCIAL

## 2019-06-19 VITALS
SYSTOLIC BLOOD PRESSURE: 109 MMHG | HEART RATE: 41 BPM | WEIGHT: 151.1 LBS | HEIGHT: 65 IN | BODY MASS INDEX: 25.18 KG/M2 | DIASTOLIC BLOOD PRESSURE: 67 MMHG

## 2019-06-19 DIAGNOSIS — F64.9 GENDER DYSPHORIA: Primary | ICD-10-CM

## 2019-06-19 PROCEDURE — G0463 HOSPITAL OUTPT CLINIC VISIT: HCPCS | Mod: ZF

## 2019-06-19 ASSESSMENT — PAIN SCALES - GENERAL: PAINLEVEL: NO PAIN (0)

## 2019-06-19 ASSESSMENT — MIFFLIN-ST. JEOR: SCORE: 1552.27

## 2019-06-19 NOTE — LETTER
6/19/2019       RE: Myrna Dee  [address]  [facility] [address]     Dear Colleague,    Thank you for referring your patient, Myrna Dee, to the [facility] at [facility]. Please see a copy of my visit note below.    June 19, 2019    Referring Provider: Referred Self, MD  No address on file    Primary Care Provider: Clarissa Stroud    CC: Here to discuss vaginectomy    HPI:  Myrna Dee is a 34 year old adult who presents to discuss vaginectomy at the time of his scheduled trachelectomy in preparation for phaloplasty. He underwent a laparoscopic supracervical hyst in 2010. 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      Prolapse:  Do you feel a vaginal bulge? no                                      Pressure? no   Do you have to place your fingers in the vagina or in the rectum to have a bowel movement? no  Impact to quality of life? -     Stress Incontinence:  Do you leak urine with cough, sneeze, exercise? no  How often do you leak with cough, sneeze, exercise?  no  How much do you usually leak? no   Do you wear a pad? no If so; no  Impact to quality of life? -    Urge Incontinence:  Do you often get sudden urges to urinate? no  How often do have urges? no  If so, do you leak with these urges? no  How much do you usually leak? no  Impact to quality of life? -    Voids/day:-  Nocturia: -  Fluid intake: 15  Caffeine: 3    Urinating:  Difficulty starting urination or strain to void? no  Weak or intermittent stream? no  Incomplete emptying or dribbling? no  Pain or burning with urination? no  Any blood in your urine? no    GI:  Constipation? no  Frequency stools normal    Straining for stools no  Stool consistency no     Ever leak stool (Accidental Bowel Leakage)? no      If so, how often?               -      If so, do you leak?                   -      Soiling without sensation?  no  History of irritable bowel or Crohn's? no    Sexual/Pain:  Are you currently having sex?. yes  Pain with sex?   no   Sexual Partner: female  Do any of these symptoms interfere with sex? no  Impact to quality of life? -    Prior therapy:  Ever done pelvic floor physical therapy? np  Trial of medication? no  Have you ever tried a pessary? no    Medical History:  Do you have?   High Cholesterol? no     Diabetes? no  High Blood pressure? no     Recurrent UTIs? no  Sleep Apnea? no  Other medical problems: no    Surgical History:    Hysterectomy? 2010   Bladder Surgery? no   Other? Mastectomy 2011     OB/Gyn History:  Pregnancies? 0  Deliveries? 0  Vaginal 0  Section 0  Current birth control? na  Periods? na  When was the first day of your last period? -  Last Pap smear? 2018 Any abnormal? yes  Last mammogram? -  Last colonoscopy? -    Medications/Vitamins/Supplements: reviewed    Drug Allergies: none    Latex Allergy: no  Iodine Allergy no    Family History: (list relationship and age at diagnosis)  Breast cancer? PGM   Ovarian cancer? no   Colon cancer? no  Other? no    Social History:  Marital status:   Do you/ have you ever smoke(d)  cigarettes? no  Drink more than 1 alcoholic beverage a day?  yes  Occupation?     In the past 3 months have you regularly experienced:  Chest pain w/ walking/exercise? no                   Unusual headaches? no  Leg pain w/ walking/exercise? no                       Easy bruising? no  Difficulty breathing w/ walking/exercise? no  Problems with vision? no  Dizziness, falls, or fainting? no  Excessive bleeding from cuts, gums, surgery? no  Other: no    Past Medical History:   Diagnosis Date     Aseptic meningitis      Gender dysphoria in adolescent and adult      High risk HPV infection      Papanicolaou smear of cervix with atypical squamous cells cannot exclude high grade squamous intraepithelial lesion (ASC-H)        Past Surgical History:   Procedure  Laterality Date     HYSTERECTOMY      and oophorectomy, but still has cervix and will require pap every 3 years     MASTECTOMY PARTIAL      chest surgery w/ contouring        Social History     Socioeconomic History     Marital status: Single     Spouse name: Not on file     Number of children: Not on file     Years of education: Not on file     Highest education level: Not on file   Occupational History     Not on file   Social Needs     Financial resource strain: Not on file     Food insecurity:     Worry: Not on file     Inability: Not on file     Transportation needs:     Medical: Not on file     Non-medical: Not on file   Tobacco Use     Smoking status: Never Smoker     Smokeless tobacco: Never Used   Substance and Sexual Activity     Alcohol use: Yes     Drug use: No     Sexual activity: Yes     Partners: Female     Birth control/protection: None   Lifestyle     Physical activity:     Days per week: Not on file     Minutes per session: Not on file     Stress: Not on file   Relationships     Social connections:     Talks on phone: Not on file     Gets together: Not on file     Attends Orthodox service: Not on file     Active member of club or organization: Not on file     Attends meetings of clubs or organizations: Not on file     Relationship status: Not on file     Intimate partner violence:     Fear of current or ex partner: Not on file     Emotionally abused: Not on file     Physically abused: Not on file     Forced sexual activity: Not on file   Other Topics Concern     Parent/sibling w/ CABG, MI or angioplasty before 65F 55M? No   Social History Narrative    Lives alone. 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 Good dairy intake.        Family History   Problem Relation Age of Onset     Dementia Father         frontotemporal dementia      Alcoholism Brother         possible     Diabetes Maternal Grandfather      Diabetes No family hx of   "      ROS    No Known Allergies    Current Outpatient Medications   Medication     ibuprofen (ADVIL/MOTRIN) 200 MG capsule     Pediatric Multivit-Minerals-C (FLINTSTONES GUMMIES PO)     Syringe/Needle, Disp, 22G X 1-1/2\" 1 ML MISC     testosterone cypionate (DEPOTESTOTERONE) 200 MG/ML injection     No current facility-administered medications for this visit.        /67   Pulse (!) 41   Ht 1.651 m (5' 5\")   Wt 68.5 kg (151 lb 1.6 oz)   BMI 25.14 kg/m    No LMP recorded. Patient has had a hysterectomy. Body mass index is 25.14 kg/m .  Mr. Dee is alert, comfortable in no acute distress, non-labored breathing.   Abdomen is soft, non-tender, non-distended, no CVAT.    Normal external female genitalia with clitoral hypertorphy. The urethra was normal appearing.    He has good support on supine strain.  Speculum and bimanual exam are remarkable for normal appearing cervix.        A/P: Judienichiquita Dee is a 34 year old M with gender dysphoria    Discussed trachelectomy and partial vaginectomy. Will d/w Dr. Eastman re: amount of vagina needed for phaloplasty and urethral lengthening. Discussed the r/b/a of surgery. Risks to include but not limited to infection, bleeding, injury to surrounding organs.    A total of 40 minutes were spent with the patient today, > 50% in counseling and coordination of care    Alfonso Mckinney MD  Professor, OB/GYN  Urogynecologist  CC  Patient Care Team:  Clarissa Stroud MD as PCP - General  Gavin Eastman MD as MD (Plastic Surgery)  Hudson Chaidez as Lead Care Coordinator  Octavio Carter MD as MD (Urology)  Leni Hurt RN as Registered Nurse (Urology)                  "

## 2019-06-19 NOTE — PROGRESS NOTES
June 19, 2019    Referring Provider: Referred Self, MD  No address on file    Primary Care Provider: Clarissa Stroud    CC: Here to discuss vaginectomy    HPI:  Myrna Dee is a 34 year old adult who presents to discuss vaginectomy at the time of his scheduled trachelectomy in preparation for phaloplasty. He underwent a laparoscopic supracervical hyst in 2010. 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      Prolapse:  Do you feel a vaginal bulge? no                                      Pressure? no   Do you have to place your fingers in the vagina or in the rectum to have a bowel movement? no  Impact to quality of life? -     Stress Incontinence:  Do you leak urine with cough, sneeze, exercise? no  How often do you leak with cough, sneeze, exercise?  no  How much do you usually leak? no   Do you wear a pad? no If so; no  Impact to quality of life? -    Urge Incontinence:  Do you often get sudden urges to urinate? no  How often do have urges? no  If so, do you leak with these urges? no  How much do you usually leak? no  Impact to quality of life? -    Voids/day:-  Nocturia: -  Fluid intake: 15  Caffeine: 3    Urinating:  Difficulty starting urination or strain to void? no  Weak or intermittent stream? no  Incomplete emptying or dribbling? no  Pain or burning with urination? no  Any blood in your urine? no    GI:  Constipation? no  Frequency stools normal    Straining for stools no  Stool consistency no     Ever leak stool (Accidental Bowel Leakage)? no      If so, how often?               -      If so, do you leak?                   -      Soiling without sensation? no  History of irritable bowel or Crohn's? no    Sexual/Pain:  Are you currently having sex?. yes  Pain with sex?   no   Sexual Partner: female  Do any of these symptoms interfere with sex? no  Impact to quality of life? -    Prior therapy:  Ever done pelvic floor physical therapy? np  Trial of medication?  no  Have you ever tried a pessary? no    Medical History:  Do you have?   High Cholesterol? no     Diabetes? no  High Blood pressure? no     Recurrent UTIs? no  Sleep Apnea? no  Other medical problems: no    Surgical History:    Hysterectomy? 2010   Bladder Surgery? no   Other? Mastectomy      OB/Gyn History:  Pregnancies? 0  Deliveries? 0  Vaginal 0  Section 0  Current birth control? na  Periods? na  When was the first day of your last period? -  Last Pap smear?  Any abnormal? yes  Last mammogram? -  Last colonoscopy? -    Medications/Vitamins/Supplements: reviewed    Drug Allergies: none    Latex Allergy: no  Iodine Allergy no    Family History: (list relationship and age at diagnosis)  Breast cancer? PGM   Ovarian cancer? no   Colon cancer? no  Other? no    Social History:  Marital status:   Do you/ have you ever smoke(d)  cigarettes? no  Drink more than 1 alcoholic beverage a day?  yes  Occupation?     In the past 3 months have you regularly experienced:  Chest pain w/ walking/exercise? no                   Unusual headaches? no  Leg pain w/ walking/exercise? no                       Easy bruising? no  Difficulty breathing w/ walking/exercise? no  Problems with vision? no  Dizziness, falls, or fainting? no  Excessive bleeding from cuts, gums, surgery? no  Other: no    Past Medical History:   Diagnosis Date     Aseptic meningitis      Gender dysphoria in adolescent and adult      High risk HPV infection      Papanicolaou smear of cervix with atypical squamous cells cannot exclude high grade squamous intraepithelial lesion (ASC-H)        Past Surgical History:   Procedure Laterality Date     HYSTERECTOMY      and oophorectomy, but still has cervix and will require pap every 3 years     MASTECTOMY PARTIAL      chest surgery w/ contouring        Social History     Socioeconomic History     Marital status: Single     Spouse name: Not on file     Number of children: Not on file  "    Years of education: Not on file     Highest education level: Not on file   Occupational History     Not on file   Social Needs     Financial resource strain: Not on file     Food insecurity:     Worry: Not on file     Inability: Not on file     Transportation needs:     Medical: Not on file     Non-medical: Not on file   Tobacco Use     Smoking status: Never Smoker     Smokeless tobacco: Never Used   Substance and Sexual Activity     Alcohol use: Yes     Drug use: No     Sexual activity: Yes     Partners: Female     Birth control/protection: None   Lifestyle     Physical activity:     Days per week: Not on file     Minutes per session: Not on file     Stress: Not on file   Relationships     Social connections:     Talks on phone: Not on file     Gets together: Not on file     Attends Buddhist service: Not on file     Active member of club or organization: Not on file     Attends meetings of clubs or organizations: Not on file     Relationship status: Not on file     Intimate partner violence:     Fear of current or ex partner: Not on file     Emotionally abused: Not on file     Physically abused: Not on file     Forced sexual activity: Not on file   Other Topics Concern     Parent/sibling w/ CABG, MI or angioplasty before 65F 55M? No   Social History Narrative    Lives alone. 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 Good dairy intake.        Family History   Problem Relation Age of Onset     Dementia Father         frontotemporal dementia      Alcoholism Brother         possible     Diabetes Maternal Grandfather      Diabetes No family hx of        ROS    No Known Allergies    Current Outpatient Medications   Medication     ibuprofen (ADVIL/MOTRIN) 200 MG capsule     Pediatric Multivit-Minerals-C (FLINTSTONES GUMMIES PO)     Syringe/Needle, Disp, 22G X 1-1/2\" 1 ML MISC     testosterone cypionate (DEPOTESTOTERONE) 200 MG/ML injection     No " "current facility-administered medications for this visit.        /67   Pulse (!) 41   Ht 1.651 m (5' 5\")   Wt 68.5 kg (151 lb 1.6 oz)   BMI 25.14 kg/m   No LMP recorded. Patient has had a hysterectomy. Body mass index is 25.14 kg/m .  Mr. Dee is alert, comfortable in no acute distress, non-labored breathing.   Abdomen is soft, non-tender, non-distended, no CVAT.    Normal external female genitalia with clitoral hypertorphy. The urethra was normal appearing.    He has good support on supine strain.  Speculum and bimanual exam are remarkable for normal appearing cervix.        A/P: Eugenichiquita Dee is a 34 year old M with gender dysphoria    Discussed trachelectomy and partial vaginectomy. Will d/w Dr. Eastman re: amount of vagina needed for phaloplasty and urethral lengthening. Discussed the r/b/a of surgery. Risks to include but not limited to infection, bleeding, injury to surrounding organs.    A total of 40 minutes were spent with the patient today, > 50% in counseling and coordination of care    Alfonso Mckinney MD  Professor, OB/GYN  Urogynecologist  CC  Patient Care Team:  Clarissa Stroud MD as PCP - General  Gavin Eastman MD as MD (Plastic Surgery)  Hudson Chaidez as Lead Care Coordinator  Octavio Carter MD as MD (Urology)  Leni Hurt, RN as Registered Nurse (Urology)  SELF, REFERRED              "

## 2019-06-21 ENCOUNTER — OFFICE VISIT (OUTPATIENT)
Dept: DERMATOLOGY | Facility: CLINIC | Age: 34
End: 2019-06-21
Payer: COMMERCIAL

## 2019-06-21 ENCOUNTER — OFFICE VISIT (OUTPATIENT)
Dept: FAMILY MEDICINE | Facility: CLINIC | Age: 34
End: 2019-06-21
Payer: COMMERCIAL

## 2019-06-21 VITALS
DIASTOLIC BLOOD PRESSURE: 71 MMHG | HEIGHT: 67 IN | SYSTOLIC BLOOD PRESSURE: 106 MMHG | OXYGEN SATURATION: 97 % | HEART RATE: 95 BPM | BODY MASS INDEX: 23.54 KG/M2 | WEIGHT: 150 LBS | TEMPERATURE: 97.8 F

## 2019-06-21 DIAGNOSIS — F64.0 GENDER DYSPHORIA IN ADOLESCENT AND ADULT: ICD-10-CM

## 2019-06-21 DIAGNOSIS — F64.9 GENDER DYSPHORIA: ICD-10-CM

## 2019-06-21 DIAGNOSIS — Z01.818 PREOP GENERAL PHYSICAL EXAM: Primary | ICD-10-CM

## 2019-06-21 LAB — HEMOGLOBIN: 14.4 G/DL (ref 13.3–17.7)

## 2019-06-21 PROCEDURE — 17999 UNLISTD PX SKN MUC MEMB SUBQ: CPT | Performed by: DERMATOLOGY

## 2019-06-21 ASSESSMENT — MIFFLIN-ST. JEOR: SCORE: 1571.09

## 2019-06-21 ASSESSMENT — PAIN SCALES - GENERAL: PAINLEVEL: NO PAIN (0)

## 2019-06-21 NOTE — PATIENT INSTRUCTIONS
Gentle Max Laser Hair Reduction Instructions    Laser hair reduction: I will have redness, pain and swelling. I may have skin discoloration, bruising and itching. Risks are permenant discoloration, hives, infection scarring, eye injury,hair growth, and blister. The outcome could be no improvement or slight improvement. Multiple treatments are required. All hair will not be removed.     Before the procedure:  Sun Protection:  Do not allow the area to become tan or come in with a tan for a treatment. Tans will increased the risks of the procedure. Do not use spray or any over counter product self tanners prior to the procedure.     Shaving:   Gently shave the area the evening before the procedure.     Other:  Avoid any retinols such as Differin, adapalene, retinol or tretinoin to the treated area 1 week prior. Hold bleaching creams such as hydroquinone until 1 week prior.  Avoid any hair removal procedures such as waxing, electrolysis or other lasers on the skin within 6 weeks prior. Do not have any other cosmetic procedures done on the area within the prior 6 weeks such as chemical peels or dermabrasion.      Post Procedure:  Day 1-7  The healing time for any given treatment varies between clients. The following represents the general recovery phases you might expect. Individual clients may experience variations from this course.     Swelling/Discomfort/Redness:  The most common side effects are erythema and edema(redness and swelling) which generally occur immediately after and typically resolve within 48 hours. Crusting and rarely blistering may occur.       Activity:  Avoid swimming the day of laser hair removal treatment. Also, no rigorous exercise the day of treatment.     Moisturizers and Sunscreens:  Wait until the skin has returned to normal to start topical products.     Sun Protection:  Do not allow the area to become tan or come in with a tan for a treatment. Tans will increased the risks of the procedure.  Do not use spray or any over counter product self tanners prior to the procedure.     Warning signs:  Call the clinic if you have significant pain, increasing redness, pus, blisters/crusting or for any other concerns.     Who should I call with questions?    [facility]: [phone]     [facility]: [phone]    For urgent needs outside of business hours call the [facility] at [phone] and ask for the dermatology resident on call

## 2019-06-21 NOTE — PATIENT INSTRUCTIONS
Hormone Therapy  1. Schedule your routine hormone follow-up visit.  - Since shot day is Monday, schedule your visit on a Wednesday or Thursday.    Pre-Op  1. Avoid using ibuprofen five days before surgery--instead, use Tylenol.  2. You can continue using testosterone as prescribed before and after surgery.  3. You can use Dr. Goff's soap since it works for you. Make sure to wash the groin folds and perirectal area well.   4. You should be fasting 8 hours before surgery, no food past 12 AM.  Presurgery Checklist  Bathing or Showering Before Surgery  If instructed, wash with antibacterial soap. Afterward, do not use lotions or powders.  If you are having surgery on the head, you may be asked to shampoo with antibacterial soap. Follow instructions for doing so.

## 2019-06-21 NOTE — LETTER
6/21/2019         RE: Myrna Dee  [address] [facility] [address]        Dear Colleague,    Thank you for referring your patient, Mynra Dee, to the [facility]. Please see a copy of my visit note below.    See procedure    Again, thank you for allowing me to participate in the care of your patient.        Sincerely,        Khushbu Oconnor MD

## 2019-06-21 NOTE — PROCEDURES
Laser Hair Removal Gentle Max Prox (755/1064nm)  Procedure Date: 2019    Staff Surgeon: Khushbu Oconnor MD    Assistant: Melva Schulz RN and Makenzie Barba LPN    Trimming required prior to treatment in clinic?: yes  [place]ston skin type: 2  Diagnosis/Treatment Reason: gender dysphoria    Treatment#: 4     Laser Settings:  Wavelength(755/1064nm): 755  Location: bilateral inguinal crease with 5cm on either side and up to iliac crest  Fluence: 16J   Spot size: 18mm  Pulse width:  3 mS ( mS)   Total Number of Pulses: 66  Dynamic cooling spray settin mS  Dynamic cooling device delay:  30 mS    Laser Settings:  Wavelength(755/1064nm): 755  Location: bilateral anterolateral thigh  Fluence: 18J   Spot size: 18mm  Pulse width:  3 mS ( mS)   Total Number of Pulses: 427  Mode (continuous/pulsed): continuous  Dynamic cooling spray settin mS  Dynamic cooling device delay:  30 mS    Anesthesia:  None    Jose used?:No  Ice used?: No, pt declined    Description of Operation/Procedure:   The nature and purpose of the procedure, associated risks, possible consequences, complications and alternative methods of treatment were explained in detail, this includes but is not limited to hyperpigmentation, hypopigmentation, scarring, bruising, hair loss pain/discomfort, eye injury, paradoxical hair growth, hives, infection, itching  and blister.   We reviewed that the outcome could be any of the following: no improvement, slight improvement or change in skin color & texture, the skin might be permanently lighter or darker, and though uncommon, superficial scarring may occur.  Multiple treatments are required. We reviewed with patients that laser hair removal is a reduction in hair and not permanent.   Consent was obtained. Time-out was performed.  The patient was positioned to optimally expose the area treated. Dynamic cooling was verified and functioning properly.  Protective eyewear was worn by the patient and  goggles on all personnel in the treatment room.  The patient confirmed the site to be treated. The laser energy output was verified by meter reading.  N95 and buffalo filtration was used.     The clinically evident lesion(s) was/were treated with laser beam as above.  The patient tolerated the procedure well and no complications were noted. Post operative instructions were provided. The total laser operation and preparation time was 20 minutes.      Numeric pain scale after treatment: 3/10    The patient will follow-up in 6 weeks.  Bill to insurance.  Melva Schulz RN performed procedure.    Melva Schulz RN    I Was present for key portions of the proceudre.     Khushbu Oconnor MD    Department of Dermatology  [facility]: Phone: [phone], Fax:[phone]  [facility]: Phone: [phone], Fax: [phone]

## 2019-06-21 NOTE — NURSING NOTE
Dermatology Laser Intake Checklist:  History of psoriasis:No  History of recent tan, indoor or outdoor tanning/vacation or other sun exposure: No  History of vitiligo:No  Family history of vitiligo:No  Recent other cosmetic procedure(microderm abrasion/peel/hair removal/facial etc):No  History of HSV:No   Did the patient start valtrex: N/A  For genital laser hair removal patient only: Is there a history of genital warts or condyloma:No  Tattoo in the area to be treated:No  Is patient using hydroquinone:No  Retinoids and other acne medications stopped for 2 weeks: N/A  Has the patient had accutane in the last 6-12 months:No  Pregnant or breastfeeding: No  History of skin cancer in area planned for treatment: No  History of treatment with gold:No  Changes in medical history: No  Photos obtained: No  Does the patient smoke:No  Is the patient on ibuprofen/aspirin/plavix/coumadin/other blood thinner: n/a  If patient is taking narcotic or diazepam(valium)-does patient have : No  Melva Schulz RN

## 2019-06-21 NOTE — PROGRESS NOTES
[facility]  [address] [place] [address],  [address]  [facility] [address]  Phone: [phone]  Fax: [phone]    6/21/2019    Adult PRE-OP Evaluation:    Myrna Dee, 1985 presents for pre-operative evaluation and assessment as requested by , , and , prior to undergoing surgery/procedure for treatment for Gender affirmation.  Proposed procedure: vaginectomy, trachelectomy: full urethral extension, glands, and implant, staged procedure.    Has an appointment on 07/02/19 for plastics consult.    Date of Surgery/ Procedure: 07/12/19  Hospital/Surgical Facility:    [facility]      [address]      Fax: [phone]      Phone: [phone]    Primary Physician: Clarissa Stroud  Type of Anesthesia Anticipated: General  History of anesthesia complications: NONE and YES: Personal HX: increased anxiety, diffculty arousing from one of two prior episodes of general anesthesia. No PONV. Reports anesthia with hysterectomy at [facility] was okay.  History of  abnormal bleeding: NONE   History of blood transfusions: NO  Patient has a Health Care Directive or Living Will:  NO, but is interested in completing this.    Preoperative Questions   1. NO - Do you have a history of heart attack, stroke, stent, bypass or surgery on an artery in the head, neck, heart or legs?  2. NO - Do you ever have any pain or discomfort in your chest?  3. NO - Have you ever had a severe pain across the front of your chest lasting for half an hour or more?  4. NO - Do you have a history of Congestive Heart Failure?  5. NO - Are you troubled by shortness of breath when: walking on the level/ up a slight hill/ at night?  6. NO - Does your chest ever sound wheezy or whistling?  7. NO - Do you currently have a cold, bronchitis or other respiratory infection?  8. NO - Have you had a cold, bronchitis or other respiratory infection within the last 2 weeks?  9. NO - Do you usually have a  "cough?  10. NO - Do you sometimes get pains in the calves of your legs when you walk?  11. NO - Do you or anyone in your family have previous history of blood clots?  12. NO - Do you or does anyone in your family have a serious bleeding problem such as prolonged bleeding following surgeries or cuts?  13. YES - Have you ever had problems with anemia or been told to take iron pills? Was taking iron pills.  14. YES - Have you had any abnormal blood loss such as black, tarry or bloody stools, or abnormal vaginal bleeding? No longer has a uterus.   15. NO - Have you ever had a blood transfusion?  16. YES- Have you or any of your relatives ever had problems with anesthesia? Sister.  17. NO - Do you have sleep apnea, excessive snoring or daytime drowsiness? Does snore.  18. NO - Do you have any prosthetic heart valves?  19. NO - Do you have prosthetic joints?  20. NO - Is there any chance that you may be pregnant?    Social Hx  Has been  for 20 weeks, wife (Kermit) is pregnant due 11/08/19. Recently bought a house in [place]. Employed at [employer].    Hormone Therapy  Injections in the gluteus. Shot day is Monday. Was recommended by surgeons to hold testosterone for a few weeks before and after surgery.    Pre-Op  Has had hysterectomy and total mastectomy. Used reamaining hydrocodone from hysterectomy post top surgery, with improvement in pain.     Patient Active Problem List   Diagnosis     Problem drinking     High risk HPV infection     Gender dysphoria in adolescent and adult     Papanicolaou smear of cervix with atypical squamous cells cannot exclude high grade squamous intraepithelial lesion (ASC-H)       Current Outpatient Medications on File Prior to Visit:  ibuprofen (ADVIL/MOTRIN) 200 MG capsule Take 200 mg by mouth every 4 hours as needed for pain   Pediatric Multivit-Minerals-C (FLINTSTONES GUMMIES PO) Take 1 tablet by mouth 2 times daily.   Syringe/Needle, Disp, 22G X 1-1/2\" 1 ML MISC Inject " 0.25 mLs into the muscle once a week   testosterone cypionate (DEPOTESTOTERONE) 200 MG/ML injection Inject 0.25 mLs (50 mg) into the muscle once a week Due for repeat visit 7-2018     No current facility-administered medications on file prior to visit.     OTC products: NSAIDS, occasionally.    No Known Allergies  Latex Allergy: NO    Social History     Socioeconomic History     Marital status: Single     Spouse name: None     Number of children: None     Years of education: None     Highest education level: None   Occupational History     None   Social Needs     Financial resource strain: None     Food insecurity:     Worry: None     Inability: None     Transportation needs:     Medical: None     Non-medical: None   Tobacco Use     Smoking status: Never Smoker     Smokeless tobacco: Never Used   Substance and Sexual Activity     Alcohol use: Yes     Drug use: No     Sexual activity: Yes     Partners: Female     Birth control/protection: None   Lifestyle     Physical activity:     Days per week: None     Minutes per session: None     Stress: None   Relationships     Social connections:     Talks on phone: None     Gets together: None     Attends Buddhist service: None     Active member of club or organization: None     Attends meetings of clubs or organizations: None     Relationship status: None     Intimate partner violence:     Fear of current or ex partner: None     Emotionally abused: None     Physically abused: None     Forced sexual activity: None   Other Topics Concern     Parent/sibling w/ CABG, MI or angioplasty before 65F 55M? No   Social History Narrative    Lives alone. Works as a paraprofessional at [email or web address] Bikes or runs to commute, very fit. Good dairy intake.      REVIEW OF SYSTEMS:   Constitutional, HEENT, cardiovascular, pulmonary, GI, , musculoskeletal, neuro, skin, endocrine and psych systems are negative, except as otherwise noted.    See HPI for additional  "sx.    This document serves as a record of the services and decisions personally performed and made by Clarissa Stroud MD. It was created on his/her behalf by Mario Alberto Evans, a trained medical scribe. The creation of this document is based the provider's statements to the medical scribe.  Scrpamela Evans 8:05 AM, June 21, 2019  EXAM:     Patient Vitals for the past 24 hrs:   BP Temp Temp src Pulse SpO2 Height Weight   06/21/19 0755 106/71 97.8  F (36.6  C) Oral 95 97 % 1.689 m (5' 6.5\") 68 kg (150 lb)   Vitals were reviewed and were normal, except slightly elevated pulse.    Physical Exam:  Body mass index is 23.85 kg/m .  GENERAL: healthy, alert and no distress  EYES: Eyes grossly normal to inspection, extraocular movements - intact, and PERRL  HENT: Mouth- no ulcers, no lesions  NECK: no tenderness, no adenopathy, no asymmetry, no masses, no stiffness;  RESP: lungs clear to auscultation - no rales, no rhonchi, no wheezes  CV: regular rates and rhythm, normal S1 S2, no S3 or S4 and no murmur, no click or rub -  ABDOMEN: soft, no tenderness, no  hepatosplenomegaly, no masses, normal bowel sounds  MS: extremities- no gross deformities noted, no edema  BACK: no CVA tenderness, no paralumbar tenderness  PSYCH: Alert and oriented times 3; speech- coherent , normal rate and volume; able to articulate logical thoughts. Affect: euthymic.  DIAGNOSTICS:      Hemoglobin (indicated for history of anemia)    RISK ASSESSMENT:     Cardiovascular Risk:  -Patient is able to participate in strenuous activities without chest pain.  -The patient does not have chest pain with exertion.  -Patient does not have a history of congestive heart failure.    -The patient does not have a history of stroke and does not have a history of valvular disease.    Pulmonary Risk:  -In terms of risk factors for pulmonary complication, the patient has no risk factors    Perioperative Complications:  -The patient does not have a history of bleeding or " clotting problems in the past.    -The patient has not had complications from surgeries.    -The patient has a family history of any anesthesia or surgical complications.      IMPRESSION:   Reason for surgery/procedure: gender affirmation    The proposed surgical procedure is considered INTERMEDIATE risk.    For above listed surgery and anesthesia:   Patient is at low risk for surgery/procedure and perioperative/procedure complications.    RECOMMENDATIONS:   Inocencio was seen today for pre-op exam.    Diagnoses and all orders for this visit:    Preop general physical exam  Gender dysphoria in adolescent and adult  -     Hemoglobin (HGB) ([facility])  - Last hair removal today and would not pursue further until after surgery.     Fasting:  NPO for 8 hours prior to surgery.     Preop Plan:  --Approval given to proceed with proposed procedure, without further diagnostic evaluation  --Patient has hx of anemia check Hgb today.  --Patient is to take all other scheduled medications on the day of surgery, but hold NSAIDS.    Medications:  Patient should hold their regular medications the morning of surgery unless otherwise instructed:  Hold ibuprofen, Aleve, for 5 days prior.  Pt can administer testosterone regularly before and after surgery. Will need to hold testosterone for second stage of procedure.    Patient Instructions   Hormone Therapy  1. Schedule your routine hormone follow-up visit.  - Since shot day is Monday, schedule your visit on a Wednesday or Thursday.    Pre-Op  1. Avoid using ibuprofen five days before surgery--instead, use Tylenol.  2. You can continue using testosterone as prescribed before and after surgery.  3. You can use Dr. Goff's soap since it works for you. Make sure to wash the groin folds and perirectal area well.   4. You should be fasting 8 hours before surgery, no food past 12 AM.  Presurgery Checklist  Bathing or Showering Before Surgery  If instructed, wash with antibacterial soap. Afterward,  do not use lotions or powders.  If you are having surgery on the head, you may be asked to shampoo with antibacterial soap. Follow instructions for doing so.    Please contact our office if there are any further questions or information required about this patient.    The information in this document, created by the medical scribe for me, accurately reflects the services I personally performed and the decisions made by me. I have reviewed and approved this document for accuracy prior to leaving the patient care area.    Clarissa Stroud MD  8:05 AM, 06/21/19  End Time: 8:38 AM

## 2019-07-02 ENCOUNTER — OFFICE VISIT (OUTPATIENT)
Dept: PLASTIC SURGERY | Facility: CLINIC | Age: 34
End: 2019-07-02
Payer: COMMERCIAL

## 2019-07-02 ENCOUNTER — TELEPHONE (OUTPATIENT)
Dept: PLASTIC SURGERY | Facility: CLINIC | Age: 34
End: 2019-07-02

## 2019-07-02 DIAGNOSIS — F64.0 GENDER DYSPHORIA IN ADULT: Primary | ICD-10-CM

## 2019-07-02 ASSESSMENT — PAIN SCALES - GENERAL: PAINLEVEL: NO PAIN (0)

## 2019-07-02 NOTE — LETTER
7/2/2019       RE: Myrna Dee  [address] [facility] [address]     Dear Colleague,    Thank you for referring your patient, Myrna Dee, to the [facility] at [facility]. Please see a copy of my visit note below.    Patient returns for a follow-up visit regarding phalloplasty for gender dysphoria.    INTERVAL HISTORY: Patient reports he is currently scheduled for partial vaginectomy in mid July 2019.  A lot has happened in his life and he is expecting to deliver a child in November later this year.  According to the schedule, he would like to have phalloplasty performed in December of this year.  He also reports to me that his insurance status may be changing.  In terms of dimensions, patient is interested in a 6 inch penis with adequate girth.  He has considered his donor sites, and would like to go with 2 flap SCIP and ALT phalloplasty.  He has undergone 4 sessions of hair removal with another 3-4 sessions to go.  He has met with Dr. Carter.    PHYSICAL EXAMINATION:  General: No acute distress.  Appears masculine.  Examination was performed in the presence of chaperone.  This revealed a right groin with skin pinch less than 1 cm.  There is no rash or lesion there.  However, there is a small tattoo will likely be involved in her surgical site.  There is sign the patient is undergoing hair removal.  The left thigh has skin pinch at 1 cm.  There is no rash or lesion there.  There is a dopplerable signal along the septum of the rectus femoris and last vastus lateralis muscles at 24 cm from the ASIS and also at 35 cm from the ASIS.  Hair removal process is visible.    IMAGING: CT angiogram was reviewed.  This showed adequate perforators on the left thigh for an ALT flap.  These were measured to be along the septum of the rectus femoris and vastus lateralis muscles.  One is 24 cm from the ASIS and the other superficial circumflex  iliac artery perforators are intact on the right groin.    ASSESSMENT: Gender dysphoria, potential candidate for pedicled SCIP and ALT 2 flap phalloplasty.    PLAN: We discussed the decision-making tree for staged versus single-stage phalloplasty.  I expand to the patient that stage phalloplasty would require tissue transfer first and then urethral connection 3 to 6 months later.  This would increase the number of visits to the operating room, but will decrease the overall risk of flap loss affecting the urethral reconstruction.  When put this way, patient is potentially interested in undergoing staged phalloplasty.  However, at this point he is still committed to moving towards with a December surgical date and perform area everything in a single stage.  We discussed the procedure in detail today.  I explained the risks to include bleeding, infection, injury return structures, flap loss, graft loss, urethral stricture, urethral fistula, asymmetry, wound healing difficulties, contour deformity, and need for revision surgery.  Patient accepts these risks and wishes to work towards phalloplasty.  He will consider his options for performing the single stage versus in a two-stage manner.    Total time spent with patient was 15 min of which greater than 50% was in counseling.    Again, thank you for allowing me to participate in the care of your patient.      Sincerely,    Gavin Eastman MD

## 2019-07-02 NOTE — PROGRESS NOTES
Patient returns for a follow-up visit regarding phalloplasty for gender dysphoria.    INTERVAL HISTORY: Patient reports he is currently scheduled for partial vaginectomy in mid July 2019.  A lot has happened in his life and he is expecting to deliver a child in November later this year.  According to the schedule, he would like to have phalloplasty performed in December of this year.  He also reports to me that his insurance status may be changing.  In terms of dimensions, patient is interested in a 6 inch penis with adequate girth.  He has considered his donor sites, and would like to go with 2 flap SCIP and ALT phalloplasty.  He has undergone 4 sessions of hair removal with another 3-4 sessions to go.  He has met with Dr. Carter.    PHYSICAL EXAMINATION:  General: No acute distress.  Appears masculine.  Examination was performed in the presence of chaperone.  This revealed a right groin with skin pinch less than 1 cm.  There is no rash or lesion there.  However, there is a small tattoo will likely be involved in her surgical site.  There is sign the patient is undergoing hair removal.  The left thigh has skin pinch at 1 cm.  There is no rash or lesion there.  There is a dopplerable signal along the septum of the rectus femoris and last vastus lateralis muscles at 24 cm from the ASIS and also at 35 cm from the ASIS.  Hair removal process is visible.    IMAGING: CT angiogram was reviewed.  This showed adequate perforators on the left thigh for an ALT flap.  These were measured to be along the septum of the rectus femoris and vastus lateralis muscles.  One is 24 cm from the ASIS and the other superficial circumflex iliac artery perforators are intact on the right groin.    ASSESSMENT: Gender dysphoria, potential candidate for pedicled SCIP and ALT 2 flap phalloplasty.    PLAN: We discussed the decision-making tree for staged versus single-stage phalloplasty.  I expand to the patient that stage phalloplasty would  require tissue transfer first and then urethral connection 3 to 6 months later.  This would increase the number of visits to the operating room, but will decrease the overall risk of flap loss affecting the urethral reconstruction.  When put this way, patient is potentially interested in undergoing staged phalloplasty.  However, at this point he is still committed to moving towards with a December surgical date and perform area everything in a single stage.  We discussed the procedure in detail today.  I explained the risks to include bleeding, infection, injury return structures, flap loss, graft loss, urethral stricture, urethral fistula, asymmetry, wound healing difficulties, contour deformity, and need for revision surgery.  Patient accepts these risks and wishes to work towards phalloplasty.  He will consider his options for performing the single stage versus in a two-stage manner.    Total time spent with patient was 15 min of which greater than 50% was in counseling.

## 2019-07-02 NOTE — NURSING NOTE
Chief Complaint   Patient presents with     RECHECK     Pt here for return for phalloplasty       There were no vitals filed for this visit.    There is no height or weight on file to calculate BMI.      HELENA Madden NREMT                    No vitals taken per provider

## 2019-07-11 ENCOUNTER — ANESTHESIA EVENT (OUTPATIENT)
Dept: SURGERY | Facility: CLINIC | Age: 34
End: 2019-07-11
Payer: COMMERCIAL

## 2019-07-12 ENCOUNTER — ANESTHESIA (OUTPATIENT)
Dept: SURGERY | Facility: CLINIC | Age: 34
End: 2019-07-12
Payer: COMMERCIAL

## 2019-07-12 ENCOUNTER — HOSPITAL ENCOUNTER (OUTPATIENT)
Facility: CLINIC | Age: 34
Discharge: HOME | End: 2019-07-12
Attending: OBSTETRICS & GYNECOLOGY | Admitting: OBSTETRICS & GYNECOLOGY
Payer: COMMERCIAL

## 2019-07-12 VITALS
DIASTOLIC BLOOD PRESSURE: 84 MMHG | TEMPERATURE: 98 F | HEIGHT: 65 IN | RESPIRATION RATE: 12 BRPM | SYSTOLIC BLOOD PRESSURE: 113 MMHG | OXYGEN SATURATION: 98 % | WEIGHT: 150.35 LBS | BODY MASS INDEX: 25.05 KG/M2 | HEART RATE: 73 BPM

## 2019-07-12 DIAGNOSIS — Z90.710 STATUS POST TRACHELECTOMY: Primary | ICD-10-CM

## 2019-07-12 LAB
ABO + RH BLD: NORMAL
ABO + RH BLD: NORMAL
BLD GP AB SCN SERPL QL: NORMAL
BLOOD BANK CMNT PATIENT-IMP: NORMAL
GLUCOSE BLDC GLUCOMTR-MCNC: 84 MG/DL (ref 70–99)
SPECIMEN EXP DATE BLD: NORMAL

## 2019-07-12 PROCEDURE — 82962 GLUCOSE BLOOD TEST: CPT

## 2019-07-12 PROCEDURE — 71000027 ZZH RECOVERY PHASE 2 EACH 15 MINS: Performed by: OBSTETRICS & GYNECOLOGY

## 2019-07-12 PROCEDURE — 86901 BLOOD TYPING SEROLOGIC RH(D): CPT | Performed by: OBSTETRICS & GYNECOLOGY

## 2019-07-12 PROCEDURE — 25000128 H RX IP 250 OP 636: Performed by: OBSTETRICS & GYNECOLOGY

## 2019-07-12 PROCEDURE — 25800030 ZZH RX IP 258 OP 636: Performed by: NURSE ANESTHETIST, CERTIFIED REGISTERED

## 2019-07-12 PROCEDURE — 71000014 ZZH RECOVERY PHASE 1 LEVEL 2 FIRST HR: Performed by: OBSTETRICS & GYNECOLOGY

## 2019-07-12 PROCEDURE — 36000088 ZZH SURGERY LEVEL 8 EA 15 ADDTL MIN - UMMC: Performed by: OBSTETRICS & GYNECOLOGY

## 2019-07-12 PROCEDURE — 88307 TISSUE EXAM BY PATHOLOGIST: CPT | Performed by: OBSTETRICS & GYNECOLOGY

## 2019-07-12 PROCEDURE — 25000128 H RX IP 250 OP 636: Performed by: NURSE ANESTHETIST, CERTIFIED REGISTERED

## 2019-07-12 PROCEDURE — 37000009 ZZH ANESTHESIA TECHNICAL FEE, EACH ADDTL 15 MIN: Performed by: OBSTETRICS & GYNECOLOGY

## 2019-07-12 PROCEDURE — 37000008 ZZH ANESTHESIA TECHNICAL FEE, 1ST 30 MIN: Performed by: OBSTETRICS & GYNECOLOGY

## 2019-07-12 PROCEDURE — 40000170 ZZH STATISTIC PRE-PROCEDURE ASSESSMENT II: Performed by: OBSTETRICS & GYNECOLOGY

## 2019-07-12 PROCEDURE — 88307 TISSUE EXAM BY PATHOLOGIST: CPT | Mod: 26 | Performed by: OBSTETRICS & GYNECOLOGY

## 2019-07-12 PROCEDURE — 71000015 ZZH RECOVERY PHASE 1 LEVEL 2 EA ADDTL HR: Performed by: OBSTETRICS & GYNECOLOGY

## 2019-07-12 PROCEDURE — 36000086 ZZH SURGERY LEVEL 8 1ST 30 MIN UMMC: Performed by: OBSTETRICS & GYNECOLOGY

## 2019-07-12 PROCEDURE — 25000565 ZZH ISOFLURANE, EA 15 MIN: Performed by: OBSTETRICS & GYNECOLOGY

## 2019-07-12 PROCEDURE — 25000125 ZZHC RX 250: Performed by: NURSE ANESTHETIST, CERTIFIED REGISTERED

## 2019-07-12 PROCEDURE — 86850 RBC ANTIBODY SCREEN: CPT | Performed by: OBSTETRICS & GYNECOLOGY

## 2019-07-12 PROCEDURE — 25000132 ZZH RX MED GY IP 250 OP 250 PS 637: Performed by: STUDENT IN AN ORGANIZED HEALTH CARE EDUCATION/TRAINING PROGRAM

## 2019-07-12 PROCEDURE — 27210794 ZZH OR GENERAL SUPPLY STERILE: Performed by: OBSTETRICS & GYNECOLOGY

## 2019-07-12 PROCEDURE — 25000132 ZZH RX MED GY IP 250 OP 250 PS 637: Performed by: OBSTETRICS & GYNECOLOGY

## 2019-07-12 PROCEDURE — C1765 ADHESION BARRIER: HCPCS | Performed by: OBSTETRICS & GYNECOLOGY

## 2019-07-12 PROCEDURE — 25000128 H RX IP 250 OP 636: Performed by: ANESTHESIOLOGY

## 2019-07-12 PROCEDURE — 86900 BLOOD TYPING SEROLOGIC ABO: CPT | Performed by: OBSTETRICS & GYNECOLOGY

## 2019-07-12 RX ORDER — ACETAMINOPHEN 325 MG/1
650 TABLET ORAL
Status: DISCONTINUED | OUTPATIENT
Start: 2019-07-12 | End: 2019-07-12 | Stop reason: HOSPADM

## 2019-07-12 RX ORDER — DEXAMETHASONE SODIUM PHOSPHATE 4 MG/ML
INJECTION, SOLUTION INTRA-ARTICULAR; INTRALESIONAL; INTRAMUSCULAR; INTRAVENOUS; SOFT TISSUE PRN
Status: DISCONTINUED | OUTPATIENT
Start: 2019-07-12 | End: 2019-07-12

## 2019-07-12 RX ORDER — SODIUM CHLORIDE, SODIUM LACTATE, POTASSIUM CHLORIDE, CALCIUM CHLORIDE 600; 310; 30; 20 MG/100ML; MG/100ML; MG/100ML; MG/100ML
INJECTION, SOLUTION INTRAVENOUS CONTINUOUS
Status: DISCONTINUED | OUTPATIENT
Start: 2019-07-12 | End: 2019-07-12 | Stop reason: HOSPADM

## 2019-07-12 RX ORDER — ONDANSETRON 2 MG/ML
4 INJECTION INTRAMUSCULAR; INTRAVENOUS EVERY 30 MIN PRN
Status: DISCONTINUED | OUTPATIENT
Start: 2019-07-12 | End: 2019-07-12 | Stop reason: HOSPADM

## 2019-07-12 RX ORDER — CEFAZOLIN SODIUM 2 G/100ML
2 INJECTION, SOLUTION INTRAVENOUS
Status: COMPLETED | OUTPATIENT
Start: 2019-07-12 | End: 2019-07-12

## 2019-07-12 RX ORDER — NALOXONE HYDROCHLORIDE 0.4 MG/ML
.1-.4 INJECTION, SOLUTION INTRAMUSCULAR; INTRAVENOUS; SUBCUTANEOUS
Status: DISCONTINUED | OUTPATIENT
Start: 2019-07-12 | End: 2019-07-12 | Stop reason: HOSPADM

## 2019-07-12 RX ORDER — KETAMINE HYDROCHLORIDE 10 MG/ML
INJECTION, SOLUTION INTRAMUSCULAR; INTRAVENOUS PRN
Status: DISCONTINUED | OUTPATIENT
Start: 2019-07-12 | End: 2019-07-12

## 2019-07-12 RX ORDER — ACETAMINOPHEN 325 MG/1
975 TABLET ORAL ONCE
Status: COMPLETED | OUTPATIENT
Start: 2019-07-12 | End: 2019-07-12

## 2019-07-12 RX ORDER — MEPERIDINE HYDROCHLORIDE 25 MG/ML
12.5 INJECTION INTRAMUSCULAR; INTRAVENOUS; SUBCUTANEOUS
Status: DISCONTINUED | OUTPATIENT
Start: 2019-07-12 | End: 2019-07-12 | Stop reason: HOSPADM

## 2019-07-12 RX ORDER — ONDANSETRON 4 MG/1
4 TABLET, ORALLY DISINTEGRATING ORAL EVERY 30 MIN PRN
Status: DISCONTINUED | OUTPATIENT
Start: 2019-07-12 | End: 2019-07-12 | Stop reason: HOSPADM

## 2019-07-12 RX ORDER — OXYCODONE HYDROCHLORIDE 5 MG/1
5 TABLET ORAL
Status: COMPLETED | OUTPATIENT
Start: 2019-07-12 | End: 2019-07-12

## 2019-07-12 RX ORDER — PHENAZOPYRIDINE HYDROCHLORIDE 200 MG/1
200 TABLET, FILM COATED ORAL ONCE
Status: COMPLETED | OUTPATIENT
Start: 2019-07-12 | End: 2019-07-12

## 2019-07-12 RX ORDER — HYDROMORPHONE HYDROCHLORIDE 1 MG/ML
.3-.5 INJECTION, SOLUTION INTRAMUSCULAR; INTRAVENOUS; SUBCUTANEOUS EVERY 10 MIN PRN
Status: DISCONTINUED | OUTPATIENT
Start: 2019-07-12 | End: 2019-07-12 | Stop reason: HOSPADM

## 2019-07-12 RX ORDER — PROPOFOL 10 MG/ML
INJECTION, EMULSION INTRAVENOUS PRN
Status: DISCONTINUED | OUTPATIENT
Start: 2019-07-12 | End: 2019-07-12

## 2019-07-12 RX ORDER — BUPIVACAINE HYDROCHLORIDE 2.5 MG/ML
INJECTION, SOLUTION INFILTRATION; PERINEURAL PRN
Status: DISCONTINUED | OUTPATIENT
Start: 2019-07-12 | End: 2019-07-12 | Stop reason: HOSPADM

## 2019-07-12 RX ORDER — LIDOCAINE HYDROCHLORIDE 20 MG/ML
INJECTION, SOLUTION INFILTRATION; PERINEURAL PRN
Status: DISCONTINUED | OUTPATIENT
Start: 2019-07-12 | End: 2019-07-12

## 2019-07-12 RX ORDER — ONDANSETRON 2 MG/ML
INJECTION INTRAMUSCULAR; INTRAVENOUS PRN
Status: DISCONTINUED | OUTPATIENT
Start: 2019-07-12 | End: 2019-07-12

## 2019-07-12 RX ORDER — FENTANYL CITRATE 50 UG/ML
25-50 INJECTION, SOLUTION INTRAMUSCULAR; INTRAVENOUS
Status: DISCONTINUED | OUTPATIENT
Start: 2019-07-12 | End: 2019-07-12 | Stop reason: HOSPADM

## 2019-07-12 RX ORDER — FENTANYL CITRATE 50 UG/ML
INJECTION, SOLUTION INTRAMUSCULAR; INTRAVENOUS PRN
Status: DISCONTINUED | OUTPATIENT
Start: 2019-07-12 | End: 2019-07-12

## 2019-07-12 RX ORDER — CEFAZOLIN SODIUM 1 G/3ML
1 INJECTION, POWDER, FOR SOLUTION INTRAMUSCULAR; INTRAVENOUS SEE ADMIN INSTRUCTIONS
Status: DISCONTINUED | OUTPATIENT
Start: 2019-07-12 | End: 2019-07-12 | Stop reason: HOSPADM

## 2019-07-12 RX ORDER — OXYCODONE HYDROCHLORIDE 5 MG/1
5 TABLET ORAL EVERY 4 HOURS PRN
Qty: 8 TABLET | Refills: 0 | Status: SHIPPED | OUTPATIENT
Start: 2019-07-12 | End: 2019-08-20

## 2019-07-12 RX ORDER — GLYCOPYRROLATE 0.2 MG/ML
INJECTION, SOLUTION INTRAMUSCULAR; INTRAVENOUS PRN
Status: DISCONTINUED | OUTPATIENT
Start: 2019-07-12 | End: 2019-07-12

## 2019-07-12 RX ORDER — SODIUM CHLORIDE, SODIUM LACTATE, POTASSIUM CHLORIDE, CALCIUM CHLORIDE 600; 310; 30; 20 MG/100ML; MG/100ML; MG/100ML; MG/100ML
INJECTION, SOLUTION INTRAVENOUS CONTINUOUS PRN
Status: DISCONTINUED | OUTPATIENT
Start: 2019-07-12 | End: 2019-07-12

## 2019-07-12 RX ORDER — FENTANYL CITRATE 50 UG/ML
25-50 INJECTION, SOLUTION INTRAMUSCULAR; INTRAVENOUS EVERY 5 MIN PRN
Status: DISCONTINUED | OUTPATIENT
Start: 2019-07-12 | End: 2019-07-12 | Stop reason: HOSPADM

## 2019-07-12 RX ORDER — KETOROLAC TROMETHAMINE 30 MG/ML
INJECTION, SOLUTION INTRAMUSCULAR; INTRAVENOUS PRN
Status: DISCONTINUED | OUTPATIENT
Start: 2019-07-12 | End: 2019-07-12

## 2019-07-12 RX ADMIN — CEFAZOLIN SODIUM 2 G: 2 INJECTION, SOLUTION INTRAVENOUS at 10:56

## 2019-07-12 RX ADMIN — ROCURONIUM BROMIDE 20 MG: 10 INJECTION INTRAVENOUS at 11:12

## 2019-07-12 RX ADMIN — PHENAZOPYRIDINE HYDROCHLORIDE 200 MG: 200 TABLET, FILM COATED ORAL at 09:19

## 2019-07-12 RX ADMIN — KETAMINE HYDROCHLORIDE 30 MG: 10 INJECTION, SOLUTION INTRAMUSCULAR; INTRAVENOUS at 10:28

## 2019-07-12 RX ADMIN — FENTANYL CITRATE 25 MCG: 50 INJECTION, SOLUTION INTRAMUSCULAR; INTRAVENOUS at 14:13

## 2019-07-12 RX ADMIN — PROPOFOL 90 MG: 10 INJECTION, EMULSION INTRAVENOUS at 10:28

## 2019-07-12 RX ADMIN — ACETAMINOPHEN 975 MG: 325 TABLET, FILM COATED ORAL at 09:19

## 2019-07-12 RX ADMIN — SUGAMMADEX 140 MG: 100 INJECTION, SOLUTION INTRAVENOUS at 13:07

## 2019-07-12 RX ADMIN — SODIUM CHLORIDE, POTASSIUM CHLORIDE, SODIUM LACTATE AND CALCIUM CHLORIDE: 600; 310; 30; 20 INJECTION, SOLUTION INTRAVENOUS at 10:10

## 2019-07-12 RX ADMIN — ROCURONIUM BROMIDE 20 MG: 10 INJECTION INTRAVENOUS at 12:01

## 2019-07-12 RX ADMIN — GLYCOPYRROLATE 0.2 MG: 0.2 INJECTION, SOLUTION INTRAMUSCULAR; INTRAVENOUS at 10:25

## 2019-07-12 RX ADMIN — FENTANYL CITRATE 50 MCG: 50 INJECTION, SOLUTION INTRAMUSCULAR; INTRAVENOUS at 13:15

## 2019-07-12 RX ADMIN — DEXAMETHASONE SODIUM PHOSPHATE 8 MG: 4 INJECTION, SOLUTION INTRAMUSCULAR; INTRAVENOUS at 10:28

## 2019-07-12 RX ADMIN — LIDOCAINE HYDROCHLORIDE 100 MG: 20 INJECTION, SOLUTION INFILTRATION; PERINEURAL at 10:28

## 2019-07-12 RX ADMIN — FENTANYL CITRATE 50 MCG: 50 INJECTION, SOLUTION INTRAMUSCULAR; INTRAVENOUS at 10:28

## 2019-07-12 RX ADMIN — ROCURONIUM BROMIDE 50 MG: 10 INJECTION INTRAVENOUS at 10:28

## 2019-07-12 RX ADMIN — FENTANYL CITRATE 50 MCG: 50 INJECTION, SOLUTION INTRAMUSCULAR; INTRAVENOUS at 11:16

## 2019-07-12 RX ADMIN — GLYCOPYRROLATE 0.2 MG: 0.2 INJECTION, SOLUTION INTRAMUSCULAR; INTRAVENOUS at 12:05

## 2019-07-12 RX ADMIN — OXYCODONE HYDROCHLORIDE 5 MG: 5 TABLET ORAL at 15:36

## 2019-07-12 RX ADMIN — KETAMINE HYDROCHLORIDE 5 MG: 10 INJECTION, SOLUTION INTRAMUSCULAR; INTRAVENOUS at 11:59

## 2019-07-12 RX ADMIN — KETOROLAC TROMETHAMINE 30 MG: 30 INJECTION, SOLUTION INTRAMUSCULAR at 13:15

## 2019-07-12 RX ADMIN — ONDANSETRON 4 MG: 2 INJECTION INTRAMUSCULAR; INTRAVENOUS at 12:53

## 2019-07-12 RX ADMIN — FENTANYL CITRATE 50 MCG: 50 INJECTION, SOLUTION INTRAMUSCULAR; INTRAVENOUS at 11:12

## 2019-07-12 RX ADMIN — CEFAZOLIN SODIUM 1 G: 2 INJECTION, SOLUTION INTRAVENOUS at 13:00

## 2019-07-12 RX ADMIN — MIDAZOLAM 2 MG: 1 INJECTION INTRAMUSCULAR; INTRAVENOUS at 10:12

## 2019-07-12 ASSESSMENT — MIFFLIN-ST. JEOR: SCORE: 1548.88

## 2019-07-12 NOTE — ANESTHESIA PREPROCEDURE EVALUATION
Anesthesia Pre-Procedure Evaluation    Patient: Myrna Dee   MRN:     [id number] Gender:   adult   Age:    34 year old :      1985        Preoperative Diagnosis: Gender Dysphora   Procedure(s):  Davinci Laparoscopic Trachelectomy  COLPOCLEISIS     Past Medical History:   Diagnosis Date     Aseptic meningitis      Complication of anesthesia     HX slow wake up,HX panic attacks with vicodin postop     Gender dysphoria in adolescent and adult      High risk HPV infection      Papanicolaou smear of cervix with atypical squamous cells cannot exclude high grade squamous intraepithelial lesion (ASC-H)       Past Surgical History:   Procedure Laterality Date     HYSTERECTOMY      and oophorectomy, but still has cervix and will require pap every 3 years, At [facility] MASTECTOMY PARTIAL      chest surgery w/ contouring           Anesthesia Evaluation     . Pt has had prior anesthetic.            ROS/MED HX    ENT/Pulmonary:       Neurologic:     (+)other neuro Past Medical History:    Cardiovascular:         METS/Exercise Tolerance:     Hematologic:         Musculoskeletal:         GI/Hepatic:         Renal/Genitourinary:         Endo:         Psychiatric:     (+) psychiatric history (Gender dysphoria in adolescent and adult)       Infectious Disease:         Malignancy:         Other:                         PHYSICAL EXAM:   Mental Status/Neuro: A/A/O   Airway: Facies: Feasible  Mallampati: I  Mouth/Opening: Full  TM distance: > 6 cm  Neck ROM: Full   Respiratory: Auscultation: CTAB     Resp. Rate: Normal     Resp. Effort: Normal      CV: Rhythm: Regular  Rate: Age appropriate  Heart: Normal Sounds   Comments:      Dental: Normal                  Lab Results   Component Value Date    HGB 14.4 2019    HCT 40.2 2015    .0 2011    POTASSIUM 4.7 2011    CHLORIDE 106.0 2011    CO2 28.0 2011    BUN 11.0 2011    CR 0.8 2011    GLC 99.7 (H) 2016     "ADELAIDE 9.9 04/01/2011    PHOS 3.4 04/01/2011    ALBUMIN 4.4 09/03/2013    PROTTOTAL 7.6 02/08/2018    ALT 11.1 02/08/2018    AST 15.2 02/08/2018    ALKPHOS 41.9 02/08/2018    BILITOTAL 2.2 (H) 02/08/2018    PTT 30 04/01/2011    INR 1.08 04/01/2011    TSH 1.75 10/05/2009       Preop Vitals  BP Readings from Last 3 Encounters:   06/21/19 106/71   06/19/19 109/67   02/21/19 105/64    Pulse Readings from Last 3 Encounters:   06/21/19 95   06/19/19 (!) 41   02/21/19 65      Resp Readings from Last 3 Encounters:   06/12/18 16   04/11/17 20   03/23/17 16    SpO2 Readings from Last 3 Encounters:   06/21/19 97%   10/18/18 99%   06/12/18 100%      Temp Readings from Last 1 Encounters:   06/21/19 36.6  C (97.8  F) (Oral)    Ht Readings from Last 1 Encounters:   06/21/19 1.689 m (5' 6.5\")      Wt Readings from Last 1 Encounters:   06/21/19 68 kg (150 lb)    Estimated body mass index is 23.85 kg/m  as calculated from the following:    Height as of 6/21/19: 1.689 m (5' 6.5\").    Weight as of 6/21/19: 68 kg (150 lb).     LDA:            Assessment:   ASA SCORE: 1       Documentation: H&P complete; Preop Testing complete; Consents complete   Proceeding: Proceed without further delay  Tobacco Use:  NO Active use of Tobacco/UNKNOWN Tobacco use status     Plan:   Anes. Type:  General   Pre-Induction: Midazolam IV; Acetaminophen PO   Induction:  IV (Standard)   Airway: Oral ETT   Access/Monitoring: PIV; 2nd PIV   Maintenance: Balanced   Emergence: Procedure Site   Logistics: Same Day Surgery     Postop Pain/Sedation Strategy:  Standard-Options: Opioids PRN     PONV Management:  Adult Risk Factors:, Non-Smoker, Postop Opioids  Prevention: Ondansetron; Dexamethasone     CONSENT: Direct conversation   Plan and risks discussed with: Patient   Blood Products: Consented (ALL Blood Products)                         Chris Dee MD  "

## 2019-07-12 NOTE — DISCHARGE INSTRUCTIONS
"Discharge Instructions:   Following a Laparoscopy    Comfort:    The amount of discomfort you can expect is very unpredictable.     If you have pain that cannot be controlled with non aspirin medication or with the prescription medication you may have received, you should notify your physician.     You May Experience:    Abdominal tenderness; abdominal cramps (like menstrual cramps).    Low back ache or discomfort radiating to your shoulders, chest, back or neck. This is a result of the gas used to inflate your abdomen during surgery. This gas is absorbed in 24 to 36 hours. The \"knee chest\" position will help relieve this discomfort.    Sore throat for a day or two resulting from the anesthesia tube used during surgery. You may use throat lozenges to help relieve this discomfort.    Black and blue marks on your abdomen.    Drainage:    You may expect a small amount of drainage from the incision on your abdomen and you may change the bandage when necessary.    You may also have a small amount of vaginal drainage for 3 to 4 days; this is normal and no cause for concern. If excessive bleeding occurs, notify your physician.    Do not douche, and use a pad rather than tampons. Do not resume intercourse for at least one week or until bleeding has ceased.    Home Activity:    The day of surgery spend a quiet day at home.    Increase activity as tolerated.    You may bathe or shower, do not soak in bath tub or scrub incisions.    You have no restrictions on your diet. Following surgery, drink plenty of fluids and eat a light meal.    The anesthesia may produce some nausea. If you feel nauseated, stay in bed, keep your head down and try drinking fluids such as Seven-Up, tea or soup.    Notify Physician at Once IF:    You have a fever over 100 degrees. A low grade fever (under 100 degrees) is usual after surgery.    You have severe pain.    You have a large amount of bleeding or drainage.    Rev. 4/2014    Same-Day Surgery "   Adult Discharge Orders & Instructions     For 24 hours after surgery:  1. Get plenty of rest.  A responsible adult must stay with you for at least 24 hours after you leave the hospital.   2. Pain medication can slow your reflexes. Do not drive or use heavy equipment.  If you have weakness or tingling, don't drive or use heavy equipment until this feeling goes away.  3. Mixing alcohol and pain medication can cause dizziness and slow your breathing. It can even be fatal. Do not drink alcohol while taking pain medication.  4. Avoid strenuous or risky activities.  Ask for help when climbing stairs.   5. You may feel lightheaded.  If so, sit for a few minutes before standing.  Have someone help you get up.   6. If you have nausea (feel sick to your stomach), drink only clear liquids such as apple juice, ginger ale, broth or 7-Up.  Rest may also help.  Be sure to drink enough fluids.  Move to a regular diet as you feel able. Take pain medications with a small amount of solid food, such as toast or crackers, to avoid nausea.   7. A slight fever is normal. Call the doctor if your fever is over 100 F (37.7 C) (taken under the tongue) or lasts longer than 24 hours.  8. You may have a dry mouth, muscle aches, trouble sleeping or a sore throat.  These symptoms should go away after 24 hours.  9. Do not make important or legal decisions.   Pain Management:      1. Take pain medication (if prescribed) for pain as directed by your physician.        2. WARNING: If the pain medication you have been prescribed contains Tylenol (acetaminophen), DO NOT take additional doses of Tylenol (acetaminophen).     Call your doctor for any of the followin.  Signs of infection (fever, growing tenderness at the surgery site, severe pain, a large amount of drainage or bleeding, foul-smelling drainage, redness, swelling).    2.  It has been over 8 to 10 hours since surgery and you are still not able to urinate (pee).    3.  Headache for over  24 hours.    4.  Numbness, tingling or weakness the day after surgery (if you had spinal anesthesia).  To contact a doctor, call _____________________________________ or:      [phone] and ask for the Resident On Call for:          __________________________________________ (answered 24 hours a day)      Emergency Department:  [place] [facility]: [phone]  [place] [facility]: [phone]               Rev. 10/2014

## 2019-07-12 NOTE — ANESTHESIA CARE TRANSFER NOTE
Patient: Myrna Dee    Procedure(s):  Davinci Laparoscopic Trachelectomy  Cystoscopy    Diagnosis: Gender Dysphora  Diagnosis Additional Information: No value filed.    Anesthesia Type:   No value filed.     Note:  Airway :Face Mask  Patient transferred to:PACU  Handoff Report: Identifed the Patient, Identified the Reponsible Provider, Reviewed the pertinent medical history, Discussed the surgical course, Reviewed Intra-OP anesthesia mangement and issues during anesthesia, Set expectations for post-procedure period and Allowed opportunity for questions and acknowledgement of understanding      Vitals: (Last set prior to Anesthesia Care Transfer)    CRNA VITALS  7/12/2019 1255 - 7/12/2019 1333      7/12/2019             Resp Rate (observed):  1  (Abnormal)                 Electronically Signed By: DENISE La CRNA  July 12, 2019  1:33 PM

## 2019-07-12 NOTE — ANESTHESIA POSTPROCEDURE EVALUATION
Anesthesia POST Procedure Evaluation    Patient: Myrna Dee   MRN:     [id number] Gender:   adult   Age:    34 year old :      1985        Preoperative Diagnosis: Gender Dysphora   Procedure(s):  Davinci Laparoscopic Trachelectomy  Cystoscopy   Postop Comments: No value filed.       Anesthesia Type:  General  No value filed.    Reportable Event: NO     PAIN: Uncomplicated   Sign Out status: Comfortable, Well controlled pain     PONV: No PONV   Sign Out status:  No Nausea or Vomiting     Neuro/Psych: Uneventful perioperative course   Sign Out Status: Preoperative baseline; Age appropriate mentation     Airway/Resp.: Uneventful perioperative course   Sign Out Status: Non labored breathing, age appropriate RR; Resp. Status within EXPECTED Parameters     CV: Uneventful perioperative course   Sign Out status: Appropriate BP and perfusion indices; Appropriate HR/Rhythm     Disposition:   Sign Out in:  PACU  Disposition:  Phase II; Home  Recovery Course: Uneventful  Follow-Up: Not required           Last Anesthesia Record Vitals:  CRNA VITALS  2019 1255 - 2019 1355      2019             Resp Rate (observed):  1  (Abnormal)           Last PACU Vitals:  Vitals Value Taken Time   /81 2019  2:24 PM   Temp 36.6  C (97.8  F) 2019  2:00 PM   Pulse 57 2019  2:24 PM   Resp 6 2019  2:24 PM   SpO2 99 % 2019  2:26 PM   Temp src     NIBP     Pulse     SpO2     Resp     Temp     Ht Rate     Temp 2     Vitals shown include unvalidated device data.      Electronically Signed By: Adrian Black MD, 2019, 2:37 PM

## 2019-07-12 NOTE — BRIEF OP NOTE
[facility], [place]    Brief Operative Note    Pre-operative diagnosis: Gender Dysphora  Post-operative diagnosis * No post-op diagnosis entered *  Procedure: Procedure(s):  Davinci Laparoscopic Trachelectomy  Cystoscopy  Surgeon: Surgeon(s) and Role:     * Myesha Hernández MD - Primary     * Rosaline Davidson MD - Assisting  Anesthesia: General   IVF: 800 ml  UOP: 340 ml clear urine  Estimated blood loss: 10 ml   Drains: None  Specimens:   ID Type Source Tests Collected by Time Destination   A :  Tissue Cervix SURGICAL PATHOLOGY EXAM Myesha Hernández MD 7/12/2019 12:11 PM      Findings: EUA showed normal female genitalia with clitoral hypertrophy. Urethra normal appearing. Cervix normal appearing. On laparoscopic view normal appearing liver, bowel, bladder. Cervical stump normal appearing. No adhesions to cervix. Bladder backfilled and not involved with cervix. Bilateral ureters identified. Surgical site hemostatic. On cystoscopy bilateral efflux of both ureters. Normal appearing bladder.   Complications: None.  Implants:  * No implants in log *

## 2019-07-13 NOTE — OP NOTE
[facility], [place]  Operative Note    Date of procedure: 19  Patient: Myrna Dee   : 1985    PREOPERATIVE DIAGNOSIS:    - Gender dysphoria    POSTOPERATIVE DIAGNOSIS:    - Same s/p procedure below      PROCEDURE:  Da Marianna assisted laparoscopic trachelectomy, cystoscopy     SURGEON:  Myesha Hernández MD     ASSISTANTS:    - Rosaline Davidson MD PGY-3  - Laquita Schulz MD PGY-1    ANESTHESIA:  General endotracheal and local anesthesia     ESTIMATED BLOOD LOSS:  10 cc.     IV FLUIDS:  800 mL of crystalloid fluid    URINE OUTPUT: 340 mL clear urine     DRAINS IN PLACE AT END OF PROCEDURE: none.     SPECIMENS:  Cervix    INDICATIONS FOR PROCEDURE: Myrna Dee is 34 year old male who desires phalloplasty. He previously underwent supracervical hysterectomy in . He has since had abnormal pap smears. Given that he desires phalloplasty, it was recommended that he undergo trachelectomy prior to that procedure. After thorough discussion of risks, benefits and alternatives he elects to proceed with Davinci assisted laparoscopic trachelectomy.     FINDINGS:  EUA showed normal female genitalia with clitoral hypertrophy. Urethra normal appearing. Cervix normal appearing. On laparoscopic view normal appearing liver, bowel, bladder. Cervical stump normal appearing. No adhesions to cervix. Bladder backfilled and not involved with cervix. Bilateral ureters identified. Surgical site hemostatic. On cystoscopy normal appearing bladder, both ureters were visualized vermiculating at the end of the procedure and brisk jets from the ureteral orifices on cystsoscopy.    COMPLICATIONS:  None.     CONDITION:  Stable.     DETAILS OF PROCEDURE:  The patient was brought to the operating room after reviewing the risks, benefits and alternatives to the procedure.  He was positioned in lithotomy with both arms tucked and prepped and draped in the usual sterile fashion.  SCDs in place.  A daniels  catheter was placed in the bladder. Procedure then turned to the abdomen, in the inferior portion of the umbilicus an 8 mm incision was made in the midline of the abdomen and a Veress needle inserted.  Insufflation began with an opening pressure of 4 mmHg.  Once insufflation was completed, the trocar was placed and camera confirmed placement within the abdominal cavity and there was no evidence of traumatic entry.  The remainder of the ports, 2 robotic ports and the assistant port  were placed under direct visualization. The robotic ports were placed in RLQ and LLQ and assistant port was placed in LUQ. The cervix was elevated with a sponge stick. The bladder was backfilled, no adhesions or involvement of bladder on cervical stump. No bowel adhesions noted. After placement of ports the cervix was dilated and the internal os intentionally perforated. Next the modified KOH manipulator/Advincula Arch was placed and attached to the uterine positioning system.  Next the robot was docked. Survey of the abdomen revealed the above findings. Bilateral ureters were identified. A bladder flap was created first using cautery to open it and then blunt dissection to allow the bladder flap to be below the level of the colpotomy cup.  Next both ureters were again re-identified. At this point, then, the colpotomy was created and the vagina was entered at 12 o'clock and brought around to approximately 3 o'clock and then again from 3 o'clock to 6 o'clock. We then turned to the remaining portion and used cautery to complete the colpotomy. At this point the cervix was removed from the vagina. A balloon was inflated and placed in the vagina to maintain insufflation.  The vaginal cuff was closed using 180 day V-Loc suture.  Once  this was completed we inspected all pedicles and they were found to be hemostatic.  The ureters were visualized bilaterally vermiculating.  Next cystoscopy completed that showed  normal appearing bladder, both  ureters were visualized vermiculating at the end of the procedure.    All skin incisions were closed using 4-0 Monocryl and Dermabond.  Mcintyre catheter was removed from the bladder. Dr. Hernández was present for entire procedure. All other instruments were removed from the vagina and the patient was taken to the recovery room in stable condition.     Rosaline Davidson MD   OB/GYN Resident PGY-3  7/12/2019 11:27 PM    I was present and scrubbed throughout the procedure,  I agree with the note above  Myesha Hernández MD

## 2019-07-16 ENCOUNTER — ALLIED HEALTH/NURSE VISIT (OUTPATIENT)
Dept: FAMILY MEDICINE | Facility: CLINIC | Age: 34
End: 2019-07-16
Payer: COMMERCIAL

## 2019-07-16 ENCOUNTER — MEDICAL CORRESPONDENCE (OUTPATIENT)
Dept: HEALTH INFORMATION MANAGEMENT | Facility: CLINIC | Age: 34
End: 2019-07-16

## 2019-07-16 ENCOUNTER — TELEPHONE (OUTPATIENT)
Dept: OBGYN | Facility: CLINIC | Age: 34
End: 2019-07-16

## 2019-07-16 DIAGNOSIS — Z00.00 HEALTHCARE MAINTENANCE: Primary | ICD-10-CM

## 2019-07-16 LAB — COPATH REPORT: NORMAL

## 2019-07-16 NOTE — TELEPHONE ENCOUNTER
Call to patient with path results, SHERRI 2 with negative margins.  Discussed, recommendation for pap smears for next 20 years of vaginal apex.  Will review how this impacts (if at all) surgical plan.      Patient states that is doing well from a post-op standpoint.  Last night had better sleep and less pain than he had since surgery.  Notes the glue coming off of the abdominal incisions.  Has only had a scan amount of genital spotting.    Myesha Hernández MD

## 2019-08-02 ENCOUNTER — OFFICE VISIT (OUTPATIENT)
Dept: DERMATOLOGY | Facility: CLINIC | Age: 34
End: 2019-08-02
Payer: COMMERCIAL

## 2019-08-02 DIAGNOSIS — F64.9 GENDER DYSPHORIA: ICD-10-CM

## 2019-08-02 PROCEDURE — 17999 UNLISTD PX SKN MUC MEMB SUBQ: CPT | Performed by: DERMATOLOGY

## 2019-08-02 NOTE — PROGRESS NOTES
Laser Hair Removal Gentle Max Prox (755/1064nm)  Procedure Date: Aug 2, 2019    Staff Surgeon: Khushbu Oconnor MD and Fellow Rasheed Jolly MD    Assistant: Shameka Harrell LPN    Trimming required prior to treatment in clinic?: yes  Thurston skin type: 2  Diagnosis/Treatment Reason: gender dysphoria    Treatment#: 5    Laser Settings:  Wavelength(755/1064nm): 755  Location: bilateral inguinal crease with 5cm on either side and up to iliac crest; bilateral anterolateral thigh  Fluence: 18J   Spot size: 18mm  Pulse width:  3 mS ( mS)   Total Number of Pulses: 313  Mode (continuous/pulsed): continuous  Dynamic cooling spray settin mS  Dynamic cooling device delay:  40 mS    Anesthesia:  None    Jose used?:No  Ice used?: No, pt declined    Description of Operation/Procedure:   The nature and purpose of the procedure, associated risks, possible consequences, complications and alternative methods of treatment were explained in detail, this includes but is not limited to hyperpigmentation, hypopigmentation, scarring, bruising, hair loss pain/discomfort, eye injury infection, itching  and blister.   We reviewed that the outcome could be any of the following: no improvement, slight improvement or change in skin color & texture, the skin might be permanently lighter or darker, and though uncommon, superficial scarring may occur.  Multiple treatments are required. We reviewed with patients that laser hair removal is a reduction in hair and not permanent.   Consent was obtained. Time-out was performed.  The patient was positioned to optimally expose the area treated. Dynamic cooling was verified and functioning properly.  Protective eyewear was worn by the patient and goggles on all personnel in the treatment room.  The patient confirmed the site to be treated. The laser energy output was verified by meter reading.  N95 and buffalo filtration was used.     The clinically evident lesion(s) was/were treated with laser beam as  above.  The patient tolerated the procedure well and no complications were noted. Post operative instructions were provided. The total laser operation and preparation time was 20 minutes.      Numeric pain scale after treatment: 3/10    The patient will follow-up in 6 weeks.  Bill to insurance.  Rasheed Jolly MD performed procedure.    Staff: MD Rasheed Murdock MD  PGY5 Dermatology  Mohs Surgery Fellow  [phone]     I was present for lamb portions    Khushbu Oconnor MD    Department of Dermatology  [facility]: Phone: [phone], Fax:[phone]  [facility]: Phone: [phone], Fax: [phone]

## 2019-08-02 NOTE — LETTER
2019         RE: Myrna Dee  [address] [facility] [address]        Dear Colleague,    Thank you for referring your patient, Myrna Dee, to the [facility]. Please see a copy of my visit note below.    Laser Hair Removal Gentle Max Prox (755/1064nm)  Procedure Date: Aug 2, 2019    Staff Surgeon: Khushbu Oconnor MD and Fellow Rasheed Jolly MD    Assistant: Shameka Harrell LPN    Trimming required prior to treatment in clinic?: yes  Thurston skin type: 2  Diagnosis/Treatment Reason: gender dysphoria    Treatment#: 5    Laser Settings:  Wavelength(755/1064nm): 755  Location: bilateral inguinal crease with 5cm on either side and up to iliac crest; bilateral anterolateral thigh  Fluence: 18J   Spot size: 18mm  Pulse width:  3 mS ( mS)   Total Number of Pulses: 313  Mode (continuous/pulsed): continuous  Dynamic cooling spray settin mS  Dynamic cooling device delay:  40 mS    Anesthesia:  None    Jose used?:No  Ice used?: No, pt declined    Description of Operation/Procedure:   The nature and purpose of the procedure, associated risks, possible consequences, complications and alternative methods of treatment were explained in detail, this includes but is not limited to hyperpigmentation, hypopigmentation, scarring, bruising, hair loss pain/discomfort, eye injury infection, itching  and blister.   We reviewed that the outcome could be any of the following: no improvement, slight improvement or change in skin color & texture, the skin might be permanently lighter or darker, and though uncommon, superficial scarring may occur.  Multiple treatments are required. We reviewed with patients that laser hair removal is a reduction in hair and not permanent.   Consent was obtained. Time-out was performed.  The patient was positioned to optimally expose the area treated. Dynamic cooling was verified and functioning properly.  Protective eyewear was worn by the patient and goggles on  all personnel in the treatment room.  The patient confirmed the site to be treated. The laser energy output was verified by meter reading.  N95 and buffalo filtration was used.     The clinically evident lesion(s) was/were treated with laser beam as above.  The patient tolerated the procedure well and no complications were noted. Post operative instructions were provided. The total laser operation and preparation time was 20 minutes.      Numeric pain scale after treatment: 3/10    The patient will follow-up in 6 weeks.  Bill to insurance.  Rasheed Jolly MD performed procedure.    Staff: MD Rasheed Murdock MD  PGY5 Dermatology  Mohs Surgery Fellow  [phone]     I was present for lamb portions    Khushbu Oconnor MD    Department of Dermatology  [facility]: Phone: [phone], Fax:[phone]  [facility]: Phone: [phone], Fax: [phone]          Again, thank you for allowing me to participate in the care of your patient.        Sincerely,        Khushbu Oconnor MD

## 2019-08-02 NOTE — PROCEDURES
Dermatology Laser Intake Checklist:  History of psoriasis:No  History of recent tan, indoor or outdoor tanning/vacation or other sun exposure: No  History of vitiligo:No  Family history of vitiligo:No  Recent other cosmetic procedure(microderm abrasion/peel/hair removal/facial etc):No  History of HSV:No   Did the patient start valtrex: N/A  For genital laser hair removal patient only: Is there a history of genital warts or condyloma:No  Tattoo in the area to be treated:No  Is patient using hydroquinone:No  Retinoids and other acne medications stopped for 2 weeks: N/A  Has the patient had accutane in the last 6-12 months:No  Pregnant or breastfeeding: No  History of skin cancer in area planned for treatment: No  History of treatment with gold:No  Changes in medical history: No  Photos obtained: No  Does the patient smoke:No  Is the patient on ibuprofen/aspirin/plavix/coumadin/other blood thinner: n/a  If patient is taking narcotic or diazepam(valium)-does patient have : No

## 2019-08-02 NOTE — PATIENT INSTRUCTIONS
Gentle Max Laser Hair Reduction Instructions    Laser hair reduction: I will have redness, pain and swelling. I may have skin discoloration, bruising and itching. Risks are permenant discoloration, hives, infection scarring, eye injury,hair growth, and blister. The outcome could be no improvement or slight improvement. Multiple treatments are required. All hair will not be removed.     Before the procedure:  Sun Protection:  Do not allow the area to become tan or come in with a tan for a treatment. Tans will increased the risks of the procedure. Do not use spray or any over counter product self tanners prior to the procedure.     Shaving:   Gently shave the area the evening before the procedure.     Other:  Avoid any retinols such as Differin, adapalene, retinol or tretinoin to the treated area 1 week prior. Hold bleaching creams such as hydroquinone until 1 week prior.  Avoid any hair removal procedures such as waxing, electrolysis or other lasers on the skin within 6 weeks prior. Do not have any other cosmetic procedures done on the area within the prior 6 weeks such as chemical peels or dermabrasion.      Post Procedure:  Day 1-7  The healing time for any given treatment varies between clients. The following represents the general recovery phases you might expect. Individual clients may experience variations from this course.     Swelling/Discomfort/Redness:  The most common side effects are erythema and edema(redness and swelling) which generally occur immediately after and typically resolve within 48 hours. Crusting and rarely blistering may occur.       Activity:  Avoid swimming the day of laser hair removal treatment. Also, no rigorous exercise the day of treatment.     Moisturizers and Sunscreens:  Wait until the skin has returned to normal to start topical products.     Sun Protection:  Do not allow the area to become tan or come in with a tan for a treatment. Tans will increased the risks of the procedure.  Do not use spray or any over counter product self tanners prior to the procedure.     Warning signs:  Call the clinic if you have significant pain, increasing redness, pus, blisters/crusting or for any other concerns.     Who should I call with questions?    [facility]: [phone]     [facility]: [phone]    For urgent needs outside of business hours call the [facility] at [phone] and ask for the dermatology resident on call

## 2019-08-05 ENCOUNTER — TELEPHONE (OUTPATIENT)
Dept: DERMATOLOGY | Facility: CLINIC | Age: 34
End: 2019-08-05

## 2019-08-05 NOTE — TELEPHONE ENCOUNTER
Pt here on 8/2/19 for [facility] appt. Pt states that since his surgery date is being planned for December he would like to move up his appts if possible. Pt called to schedule next two appts 4 weeks apart. Pt did not answer. Left  notifying pt to call back to reschedule appts. Appt on 8/28 at 4pm with  put on hold and on 9/24 with  on hold as well...Angela Figueredo RN

## 2019-08-12 NOTE — TELEPHONE ENCOUNTER
HISTORY OF PRESENT ILLNESS     HPI   Nurse's note has been reviewed and I agree with its content.  SUBJECTIVE:  Caesar Chavis is a 39 year old male presenting with complaint of cough and sinus drainage.  Patient states that symptoms started a month ago, primarily in the chest.  He notes cough initially was non productive but now reports increased phlegm, primarily in the am.  No associated fevers but patient notes 3 days ago symptoms became worse with mild aches and chills.  Patient denies any SOB or wheezing.  Son is now ill with URI as well.    PAST MEDICAL, FAMILY AND SOCIAL HISTORY     The following histories were personally reviewed and updated.  Current medications, Allergies, Past Medical History and Social History    REVIEW OF SYSTEMS     Review of Systems   Constitutional: Negative for chills and fever.   HENT: Positive for postnasal drip. Negative for congestion, ear pain and sore throat.    Eyes: Negative for discharge and redness.   Respiratory: Positive for cough. Negative for shortness of breath.    Cardiovascular: Negative for chest pain.   Musculoskeletal: Negative for myalgias.   Skin: Negative for rash.   Neurological: Negative for headaches.   Hematological: Negative for adenopathy.       PHYSICAL EXAM     Physical Exam   Constitutional: He is oriented to person, place, and time. He appears well-developed and well-nourished. No distress.   HENT:   Head: Normocephalic and atraumatic.   Right Ear: External ear normal.   Left Ear: External ear normal.   Nose: Mucosal edema present. Right sinus exhibits no maxillary sinus tenderness and no frontal sinus tenderness. Left sinus exhibits no maxillary sinus tenderness and no frontal sinus tenderness.   Mouth/Throat: Oropharynx is clear and moist.   Eyes: Conjunctivae are normal. Right eye exhibits no discharge. Left eye exhibits no discharge.   Neck: Normal range of motion. Neck supple.   Cardiovascular: Normal rate and regular rhythm.    No murmur  Pt returned the call and appts moved up and rescheduled...Angela Figueredo RN     heard.  Pulmonary/Chest: Effort normal. He has wheezes (occasional scattered).   Musculoskeletal: Normal range of motion.   Neurological: He is alert and oriented to person, place, and time.   Skin: No rash noted.   Psychiatric: He has a normal mood and affect. His behavior is normal.   Nursing note and vitals reviewed.    EXAM: XR CHEST PA AND LATERAL     CLINICAL HISTORY: cough x 1 month     COMPARISON:None.     FINDINGS: The cardiac silhouette and vascularity are within normal limits.     The lungs are well aerated and clear.     No consolidations or effusions are noted.        IMPRESSION:     Unremarkable chest.     ASSESSMENT/PLAN     Caesar was seen today for cough.    Diagnoses and all orders for this visit:    Cough, persistent  -     XR CHEST PA AND LATERAL; Future    Bronchitis with bronchospasm    Other orders  -     albuterol 108 (90 Base) MCG/ACT inhaler; Inhale 2 puffs into the lungs every 4 hours as needed for Shortness of Breath or Wheezing.  -     azithromycin (ZITHROMAX Z-RENAE) 250 MG tablet; 2 tablets day one, then 1 tablet days 2-5      Patient presents with persist cough.  CXR is negative today for any acute findings.  Will treat with above as directed. Tylenol or Motrin PRN pain/fever.  Push fluids and rest.  Symptomatic treatment is advised.  Return if any worsening of symptoms or concerns.    Patient seen and evaluated by Yaneli Nichole PA-C.    Collaborating physician Dr. Simin Bernal

## 2019-08-16 DIAGNOSIS — E29.1 MALE HYPOGONADISM: ICD-10-CM

## 2019-08-16 RX ORDER — TESTOSTERONE CYPIONATE 200 MG/ML
50 INJECTION, SOLUTION INTRAMUSCULAR WEEKLY
Qty: 10 ML | Refills: 3 | Status: ON HOLD | OUTPATIENT
Start: 2019-08-16 | End: 2020-02-07

## 2019-08-16 NOTE — TELEPHONE ENCOUNTER

## 2019-08-16 NOTE — TELEPHONE ENCOUNTER
Verify that the refill encounter hasn't been started Yes    [facility] phone call message- patient requesting a refill:    Full Medication Name: testosterone cypionate (DEPOTESTOTERONE) 200 MG/ML injection    Dose: Inject 0.25 mLs (50 mg) into the muscle once a week Due for repeat visit 7-2018 -      Pharmacy confirmed as   [id number] - [address] AT [address] [address]  Phone: [phone] Fax: [phone]  : Yes    Medication tab checked to see if medication has been sent  Yes    Additional Comments: Patient is out of medication and due for next injection on 8/19/19.     OK to leave a message on voice mail?     Advised patient refill may take up to 2 business days? No    Primary language: English      needed? No    Call taken on August 16, 2019 at 11:56 AM by Lata Camara    Route to [facility] MED REFILL

## 2019-08-19 RX ORDER — TESTOSTERONE CYPIONATE 200 MG/ML
50 INJECTION, SOLUTION INTRAMUSCULAR WEEKLY
Qty: 10 ML | Refills: 3 | OUTPATIENT
Start: 2019-08-19

## 2019-08-19 NOTE — TELEPHONE ENCOUNTER
Patient calling in to check the status of medication. He is requesting if the medication can be filled by today and he is completely out of medication. Spoke with Christopher and transferred the call to him.The name of the medication is testosterone cypionate (DEPOTESTOSTERONE) 200 MG/ML injection

## 2019-08-19 NOTE — TELEPHONE ENCOUNTER
Called [facility] pharmacy to confirm receiving  Rx-Script for testosterone, per pharmacy staff, it requires priorauthorisation. PA process initiated.    Prior Authorization Retail Medication Request    Medication/Dose: testosterone cypionate (DEPOTESTOTERONE) 200 MG/ML injection 10    ICD code (if different than what is on RX):  N/A  Previously Tried and Failed:  N/A  Rationale:  See Chart.    Insurance Name:  [employer]  Insurance ID:  [id number]       Pharmacy Information (if different than what is on RX)  Name:  [facility]  Phone:  [phone].    Christopher Chu RN

## 2019-08-19 NOTE — TELEPHONE ENCOUNTER
Received transfer call regarding refill request for Testosterone. Rx-script was approved on 08/16/2019 and was faxed to [facility] pharmacy on file as requested.    Christopher Chu RN

## 2019-08-20 ENCOUNTER — OFFICE VISIT (OUTPATIENT)
Dept: OBGYN | Facility: CLINIC | Age: 34
End: 2019-08-20
Attending: OBSTETRICS & GYNECOLOGY
Payer: COMMERCIAL

## 2019-08-20 ENCOUNTER — TELEPHONE (OUTPATIENT)
Dept: FAMILY MEDICINE | Facility: CLINIC | Age: 34
End: 2019-08-20

## 2019-08-20 VITALS
BODY MASS INDEX: 24.63 KG/M2 | WEIGHT: 148 LBS | HEART RATE: 108 BPM | SYSTOLIC BLOOD PRESSURE: 134 MMHG | DIASTOLIC BLOOD PRESSURE: 79 MMHG

## 2019-08-20 DIAGNOSIS — Z90.710 STATUS POST TRACHELECTOMY: Primary | ICD-10-CM

## 2019-08-20 DIAGNOSIS — E29.1 MALE HYPOGONADISM: ICD-10-CM

## 2019-08-20 PROCEDURE — G0463 HOSPITAL OUTPT CLINIC VISIT: HCPCS | Mod: ZF

## 2019-08-20 RX ORDER — TESTOSTERONE CYPIONATE 200 MG/ML
50 INJECTION, SOLUTION INTRAMUSCULAR WEEKLY
Qty: 10 ML | Refills: 3 | OUTPATIENT
Start: 2019-08-20

## 2019-08-20 ASSESSMENT — ANXIETY QUESTIONNAIRES
6. BECOMING EASILY ANNOYED OR IRRITABLE: NOT AT ALL
5. BEING SO RESTLESS THAT IT IS HARD TO SIT STILL: NOT AT ALL
2. NOT BEING ABLE TO STOP OR CONTROL WORRYING: NOT AT ALL
7. FEELING AFRAID AS IF SOMETHING AWFUL MIGHT HAPPEN: NOT AT ALL
GAD7 TOTAL SCORE: 2
1. FEELING NERVOUS, ANXIOUS, OR ON EDGE: SEVERAL DAYS
3. WORRYING TOO MUCH ABOUT DIFFERENT THINGS: SEVERAL DAYS

## 2019-08-20 ASSESSMENT — PAIN SCALES - GENERAL: PAINLEVEL: NO PAIN (0)

## 2019-08-20 ASSESSMENT — PATIENT HEALTH QUESTIONNAIRE - PHQ9
SUM OF ALL RESPONSES TO PHQ QUESTIONS 1-9: 2
5. POOR APPETITE OR OVEREATING: NOT AT ALL

## 2019-08-20 NOTE — TELEPHONE ENCOUNTER
3rd request for meds. Mustaf fielded phone call re: this med on 8/16 per chart. Please evaluate if pt still needs refill.

## 2019-08-20 NOTE — TELEPHONE ENCOUNTER
RN contacted pharmacy and it was able to be picked up and it did not require a prior authorization as was originally stated last week. No action needed from PCP.    RN will leave encounter open so that the PCS team can review any pertinent information.  Cher Wheeler RN

## 2019-08-20 NOTE — TELEPHONE ENCOUNTER
Prior Authorization Retail Medication Request    Medication/Dose: testosterone cypionate (DEPOTESTOSTERONE) 200 MG/ML   ICD code (if different than what is on RX):  Male hypogonadism [E29.1]     Previously Tried and Failed:  See chart  Rationale:  See chart    Insurance Name:  [employer]  Insurance ID:  [id number]      Pharmacy Information (if different than what is on RX)  [facility]  [facility]  Phone:  [phone]

## 2019-08-20 NOTE — NURSING NOTE
Chief Complaint   Patient presents with     Surgical Followup     Trachelectomy 7/12/2019   Tierney Trejo LPN

## 2019-08-20 NOTE — PROGRESS NOTES
[facility]  Gynecology Visit    Reason for Consult: Post op check    HPI:    Myrna Dee is a 34 year old G0, here for post op check, s/p Da Marianna assisted laparoscopic trachelectomy, cystoscopy (on )  in preparation for future phalloplasty procedure. Reports he feels great in the postoperative period. Endorses a few days of vaginal spotting, but resolved within 3 days of surgery. He was biking and running within two weeks of operation. Denies bowel and bladder problems, nausea, emesis. He is unsure when he will have the phalloplasty, is hoping to do so with Dr. Eastman in December.     He has a hx of abnl paps, most recently 18 ASCUS. At the time of surgery on , cervix was sent to pathology and read as SHERRI 2 with negative surgical margins.     GYN History  - LMP: No LMP recorded. Patient has had a hysterectomy.  - Menses: n/a  - Pap Smears: As in HPI     Lab Results   Component Value Date    PAP ASC-US 2018    PAP ASC-H 2018    PAP NIL 2016     - Contraception: n/a, s/p hyst  - Sexual Activity/Concerns: denies concerns   - Hx STIs/UTIs: denies concerns     OBHx  OB History    Para Term  AB Living   0 0 0 0 0 0   SAB TAB Ectopic Multiple Live Births   0 0 0 0 0       PMHx:   Past Medical History:   Diagnosis Date     Aseptic meningitis      Complication of anesthesia     HX slow wake up,HX panic attacks with vicodin postop     Gender dysphoria in adolescent and adult      High risk HPV infection      Papanicolaou smear of cervix with atypical squamous cells cannot exclude high grade squamous intraepithelial lesion (ASC-H)        PSHx:   Past Surgical History:   Procedure Laterality Date     CYSTOSCOPY N/A 2019    Procedure: Cystoscopy;  Surgeon: Myesha Hernández MD;  Location: [facility] OR     DAVINC LAPAROSCOPIC CERVICECTOMY (TRACHELECTOMY) N/A 2019    Procedure: Davinci Laparoscopic Trachelectomy;  Surgeon: Myesha Hernández MD;  Location: [facility] OR      "HYSTERECTOMY      and oophorectomy, but still has cervix and will require pap every 3 years, At [facility]     MASTECTOMY PARTIAL      chest surgery w/ contouring      Meds:   Current Outpatient Medications   Medication     Pediatric Multivit-Minerals-C (FLINTSTONES GUMMIES PO)     Syringe/Needle, Disp, 22G X 1-1/2\" 1 ML MISC     testosterone cypionate (DEPOTESTOSTERONE) 200 MG/ML injection     No current facility-administered medications for this visit.        Allergies:     No Known Allergies     SocHx: Not discussed  FamHx:  Not discussed     ROS: 10-Point ROS negative except as noted in HPI    Physical Exam  /79   Pulse 108   Wt 67.1 kg (148 lb)   Breastfeeding? No   BMI 24.63 kg/m    Gen: Well-appearing, NAD  HEENT: Normocephalic, atraumatic  Abd: Soft, non-tender, non-distended, surgical incisions well healing, non tender, non erythematous  Ext: No LE edema, extremities warm and well perfused    Assessment/Plan:  Myrna NONA Dee is a 34 year old G0 trans male here for post op visit for a Da Marianna assisted laparoscopic trachelectomy, cystoscopy on 7/12.     #History of abnormal paps, s/p trachelectomy with negative surgical margins   -Will need Pap of the vaginal cuff or phallus if vaginal tissue is incorporated into phallus   -Advised patient to get this done within one year, can be done at PCP - [facility].    #Post-op check   -Patient is doing very well from a post-op standpoint, he is happy that he had the procedure done     Return to clinic as needed     Staffed with Dr.Pukite Catrachita Gupta MD  Obstetrics and Gyncology, PGY-1  08/20/19 , 3:31 PM      Appreciate Dr. Gupta's note above, patient also seen and examined by me. I agree with the note above.   Myesha Hernández MD    "

## 2019-08-21 ASSESSMENT — ANXIETY QUESTIONNAIRES: GAD7 TOTAL SCORE: 2

## 2019-08-23 ENCOUNTER — TELEPHONE (OUTPATIENT)
Dept: PLASTIC SURGERY | Facility: CLINIC | Age: 34
End: 2019-08-23

## 2019-08-23 DIAGNOSIS — E29.1 MALE HYPOGONADISM: ICD-10-CM

## 2019-08-23 LAB
ALBUMIN SERPL-MCNC: 5.1 MG/DL (ref 3.8–5)
ALP SERPL-CCNC: 47.8 U/L (ref 31.7–110.7)
ALT SERPL-CCNC: 9.2 U/L (ref 0–45)
AST SERPL-CCNC: 17.6 U/L (ref 0–55)
BILIRUB SERPL-MCNC: 1.6 MG/DL (ref 0.2–1.3)
BILIRUBIN DIRECT: 0.5 MG/DL (ref 0.1–0.3)
CHOLEST SERPL-MCNC: 221 MG/DL (ref 0–200)
CHOLEST/HDLC SERPL: 1.8 {RATIO} (ref 0–5)
GLUCOSE SERPL-MCNC: 73 MG'DL (ref 70–99)
HDLC SERPL-MCNC: 122.2 MG/DL
HGB BLD-MCNC: 13.3 G/DL (ref 13.3–17.7)
LDLC SERPL CALC-MCNC: 88 MG/DL (ref 0–129)
PROT SERPL-MCNC: 7.5 G/DL (ref 6.8–8.8)
TRIGL SERPL-MCNC: 52.8 MG/DL (ref 0–150)
VLDL CHOLESTEROL: 10.6 MG/DL (ref 7–32)

## 2019-08-23 NOTE — TELEPHONE ENCOUNTER
Please see refill requests regarding this medication from 8/16/19, 8/16/19 and 8/20/19 as it looks as though pt was able to  medication without a PA.   KRYSTAL Robertson 10:58 AM August 23, 2019

## 2019-08-23 NOTE — TELEPHONE ENCOUNTER
Hudson, I am ready to request the PA for patient, but I see that both letters are dated over a year ago, 8.17 and 8/20, are they still good until end of August by chance?

## 2019-08-23 NOTE — TELEPHONE ENCOUNTER
left a brief VM with patient, wanting to know if insurance will be changing soon?  i am ready to request a PA for phalloplasty.  noticed in 's email for the codes, this was mentioned

## 2019-08-24 LAB — TESTOST SERPL-MCNC: 812 NG/DL (ref 240–950)

## 2019-08-27 ENCOUNTER — TELEPHONE (OUTPATIENT)
Dept: PLASTIC SURGERY | Facility: CLINIC | Age: 34
End: 2019-08-27

## 2019-08-28 ENCOUNTER — OFFICE VISIT (OUTPATIENT)
Dept: DERMATOLOGY | Facility: CLINIC | Age: 34
End: 2019-08-28
Payer: COMMERCIAL

## 2019-08-28 DIAGNOSIS — F64.9 GENDER DYSPHORIA: ICD-10-CM

## 2019-08-28 PROCEDURE — 99207 ZZC DROP WITH A PROCEDURE: CPT | Performed by: DERMATOLOGY

## 2019-08-28 PROCEDURE — 17999 UNLISTD PX SKN MUC MEMB SUBQ: CPT | Performed by: DERMATOLOGY

## 2019-08-28 ASSESSMENT — PAIN SCALES - GENERAL: PAINLEVEL: NO PAIN (0)

## 2019-08-28 NOTE — PROCEDURES
Laser Hair Removal Gentle Max Prox (755nm)  Procedure Date: Aug 28, 2019    Staff Surgeon: Dianne Diop MD    Assistant: Melva Schulz RN and Lou Espinoza LPN    Trimming required prior to treatment in clinic?: yes  [place] skin type: II  Diagnosis/Treatment Reason: gender dysphoria    Treatment#: 6    Laser Settings:  Wavelength(755/1064nm): 755  Location: bilateral inguinal crease with 5cm on either side and up to iliac crest; bilateral anterolateral thigh  Fluence: 18J   Spot size: 18mm  Pulse width:  3 mS  Total Number of Pulses: 614  Mode (continuous/pulsed): continuous  Dynamic cooling spray settin mS  Dynamic cooling device delay:  40 mS    Anesthesia:  None, pt declined    Jose used?:No  Ice used?: Yes    Description of Operation/Procedure:   The nature and purpose of the procedure, associated risks, possible consequences, complications and alternative methods of treatment were explained in detail, this includes but is not limited to hyperpigmentation, hypopigmentation, scarring, bruising, hair loss pain/discomfort, eye injury infection, itching  and blister.   We reviewed that the outcome could be any of the following: no improvement, slight improvement or change in skin color & texture, the skin might be permanently lighter or darker, and though uncommon, superficial scarring may occur.  Multiple treatments are required. We reviewed with patients that laser hair removal is a reduction in hair and not permanent.   Consent is up to date. Time-out was performed.  The patient was positioned to optimally expose the area treated. Dynamic cooling was verified and functioning properly.  Protective eyewear was worn by the patient and goggles on all personnel in the treatment room.  The patient confirmed the site to be treated. The laser energy output was verified by meter reading.  N95 and buffalo filtration was used.     The clinically evident lesion(s) was/were treated with laser beam as above.   The patient tolerated the procedure well and no complications were noted. Post operative instructions were provided. The total laser operation and preparation time was 20 minutes.      Numeric pain scale after treatment: 4/10    The patient will follow-up in 4-6 weeks.  Phalloplasty pending.  Pt hoping for surgery the end of Dec 2019.    Bill to insurance.    Melva cShulz RN performed procedure.    Dr. Diop was present for key portions.      I was present for identification of areas of treat, assessing safety of laser equipment in the room, calibrating laser and choosing settings, and treatment of a few spots to assure settings were appropriate.   Dianne Diop MD    Department of Dermatology  [facility]: Phone: [phone], Fax:[phone]  [facility]: Phone: [phone], Fax: [phone]

## 2019-08-28 NOTE — LETTER
8/28/2019         RE: Myrna Dee  [address] [place] [facility] [facility] [address]        Dear Colleague,    Thank you for referring your patient, Myrna Dee, to the [facility]. Please see a copy of my visit note below.    See procedure note.    Again, thank you for allowing me to participate in the care of your patient.        Sincerely,        Dianne Diop MD

## 2019-08-28 NOTE — PATIENT INSTRUCTIONS
Gentle Max Laser Hair Reduction Instructions    Laser hair reduction: I will have redness, pain and swelling. I may have skin discoloration, bruising and itching. Risks are permenant discoloration, hives, infection scarring, eye injury,hair growth, and blister. The outcome could be no improvement or slight improvement. Multiple treatments are required. All hair will not be removed.     Before the procedure:  Sun Protection:  Do not allow the area to become tan or come in with a tan for a treatment. Tans will increased the risks of the procedure. Do not use spray or any over counter product self tanners prior to the procedure.     Shaving:   Gently shave the area the evening before the procedure.     Other:  Avoid any retinols such as Differin, adapalene, retinol or tretinoin to the treated area 1 week prior. Hold bleaching creams such as hydroquinone until 1 week prior.  Avoid any hair removal procedures such as waxing, electrolysis or other lasers on the skin within 6 weeks prior. Do not have any other cosmetic procedures done on the area within the prior 6 weeks such as chemical peels or dermabrasion.      Post Procedure:  Day 1-7  The healing time for any given treatment varies between clients. The following represents the general recovery phases you might expect. Individual clients may experience variations from this course.     Swelling/Discomfort/Redness:  The most common side effects are erythema and edema(redness and swelling) which generally occur immediately after and typically resolve within 48 hours. Crusting and rarely blistering may occur.       Activity:  Avoid swimming the day of laser hair removal treatment. Also, no rigorous exercise the day of treatment.     Moisturizers and Sunscreens:  Wait until the skin has returned to normal to start topical products.     Sun Protection:  Do not allow the area to become tan or come in with a tan for a treatment. Tans will increased the risks of the procedure.  Do not use spray or any over counter product self tanners prior to the procedure.     Warning signs:  Call the clinic if you have significant pain, increasing redness, pus, blisters/crusting or for any other concerns.     Who should I call with questions?    [facility]: [phone]     [facility]: [phone]    For urgent needs outside of business hours call the [facility] at [phone] and ask for the dermatology resident on call

## 2019-09-11 ENCOUNTER — OFFICE VISIT (OUTPATIENT)
Dept: FAMILY MEDICINE | Facility: CLINIC | Age: 34
End: 2019-09-11
Payer: COMMERCIAL

## 2019-09-11 VITALS
HEART RATE: 74 BPM | BODY MASS INDEX: 24.36 KG/M2 | TEMPERATURE: 97.8 F | OXYGEN SATURATION: 95 % | SYSTOLIC BLOOD PRESSURE: 119 MMHG | DIASTOLIC BLOOD PRESSURE: 77 MMHG | HEIGHT: 65 IN | WEIGHT: 146.2 LBS

## 2019-09-11 DIAGNOSIS — Z00.00 ROUTINE GENERAL MEDICAL EXAMINATION AT A HEALTH CARE FACILITY: Primary | ICD-10-CM

## 2019-09-11 DIAGNOSIS — B97.7 HIGH RISK HPV INFECTION: ICD-10-CM

## 2019-09-11 DIAGNOSIS — L91.8 SKIN TAG: ICD-10-CM

## 2019-09-11 ASSESSMENT — MIFFLIN-ST. JEOR: SCORE: 1530.04

## 2019-09-11 NOTE — PROGRESS NOTES
Physical Note          HPI   33 y/o individual presents today for routine wellness visit.    Concerns today: skin tag, questions r/t gender affirmation surgeries.    Preventative  Planning on getting back into running. Wife has made comments about drinking, but is not concerned. Surgery scheduled in Dec. 2019. Last surgery was July 2019, had 6 week follow up. Completed three series of HPV immunization in college at [facility] or through college, is not sure. Eats dairy and eats vegetables.    Skin Tag  Scratches skin tag on accident, and finds it bothersome. Also becomes irritated after running. Is interested in removing skin tag. Has a sensitivity to some soaps, but none to ointments.    Mood  Wife is pregnant, will be taking maternity leave from Oct. To Jan. Mom will be helping around the time of his surgery.    See specialty comments for surgery timeline.    Dermatitis  Has found that showering more frequently and moisturizing has helped dermatitis. Sx are exacerbated with bike riding. Has current dermatitis around the genitals. Denies itching, pain.    Social Hx   at [employer]. Lives with wifeMeliza and dogMookie. Meliza is a coordinator for special Ed services. Drinking more heavily, 20 drinks weekly on average, more than 3 drinks in one sitting while doing house work or in a social setting. Bikes for transportation. Runs for fun.    Patient Active Problem List   Diagnosis     Problem drinking     High risk HPV infection     Gender dysphoria in adolescent and adult     Papanicolaou smear of cervix with atypical squamous cells cannot exclude high grade squamous intraepithelial lesion (ASC-H)       Past Medical History:   Diagnosis Date     Aseptic meningitis      Complication of anesthesia     HX slow wake up,HX panic attacks with vicodin postop     Gender dysphoria in adolescent and adult      High risk HPV infection      Papanicolaou smear of cervix with atypical squamous  cells cannot exclude high grade squamous intraepithelial lesion (ASC-H)      Previous Medical Care   [facility]     Family History   Problem Relation Age of Onset     Dementia Father         frontotemporal dementia      Alcoholism Brother         possible     Diabetes Maternal Grandfather      Diabetes No family hx of            Review of Systems:     Review of Systems:  CONSTITUTIONAL: NEGATIVE for fever, chills, change in weight  INTEGUMENTARY/SKIN: POSITIVE for dermatitis of the genitals- mild, asymptomatic since July 2019 surgery.  EYES: POSITIVE for vision changes or irritation- mildly  ENT/MOUTH: NEGATIVE for ear, mouth and throat problems  RESP: NEGATIVE for significant cough or SOB  BREAST: NEGATIVE for masses, tenderness or discharge  CV: NEGATIVE for chest pain, palpitations or peripheral edema  GI: NEGATIVE for nausea, abdominal pain, heartburn, or change in bowel habits  : NEGATIVE for frequency, dysuria, or hematuria  MUSCULOSKELETAL: NEGATIVE for significant arthralgias or myalgia  NEURO: NEGATIVE for weakness, dizziness or paresthesias  ENDOCRINE: NEGATIVE for temperature intolerance, skin/hair changes  HEME/ALLERGY: NEGATIVE for bleeding problems  PSYCHIATRIC: POSITIVE for changes in mood or affect- r/t stress  Sleep:   Do you snore most or the night (as reported by a family member)? No  Do you feel sleepy or extremely tired during most of the day? No    See HPI for additional sx.    This document serves as a record of the services and decisions personally performed and made by Clarissa Stroud MD. It was created on his/her behalf by Mario Alberto Evans, a trained medical scribe. The creation of this document is based the provider's statements to the medical scribe.  Scribkarina Evans 3:28 PM, September 11, 2019       Social History     Social History     Socioeconomic History     Marital status:      Spouse name: Not on file     Number of children: Not on file     Years of education: Not on file      Highest education level: Not on file   Occupational History     Not on file   Social Needs     Financial resource strain: Not on file     Food insecurity:     Worry: Not on file     Inability: Not on file     Transportation needs:     Medical: Not on file     Non-medical: Not on file   Tobacco Use     Smoking status: Never Smoker     Smokeless tobacco: Never Used   Substance and Sexual Activity     Alcohol use: Yes     Drug use: No     Sexual activity: Yes     Partners: Female     Birth control/protection: None   Lifestyle     Physical activity:     Days per week: Not on file     Minutes per session: Not on file     Stress: Not on file   Relationships     Social connections:     Talks on phone: Not on file     Gets together: Not on file     Attends Oriental orthodox service: Not on file     Active member of club or organization: Not on file     Attends meetings of clubs or organizations: Not on file     Relationship status: Not on file     Intimate partner violence:     Fear of current or ex partner: Not on file     Emotionally abused: Not on file     Physically abused: Not on file     Forced sexual activity: Not on file   Other Topics Concern     Parent/sibling w/ CABG, MI or angioplasty before 65F 55M? No   Social History Narrative    Lives with wife (Meliza), bought a house in [place]. Wife pregnant.     Works as a  at [email or web address]  [place].     Bikes or runs to commute, very fit. Good dairy intake.     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        Marital Status:  Who lives in your household? Meliza (wife) and Mookie (dog)    Has anyone hurt you physically, for example by pushing, hitting, slapping or kicking you or forcing you to have sex? Denies  Do you feel threatened or controlled by a partner, ex-partner or anyone in your life? Denies    Sexual Health     Sexual concerns: No   STI History: Pos, HPV  Pregnancy History:   LMP No LMP recorded.  "Patient has had a hysterectomy.   Last Pap Smear Date:   Lab Results   Component Value Date    PAP ASC-US 06/12/2018    PAP ASC-H 02/16/2018    PAP NIL 07/27/2016     Abnormal Pap History: Yes, see problem list.    Recommended Screening     Cholesterol Level (>46 yo or at risk):  Completed.         Physical Exam:   Vitals: Blood Pressure 119/77   Pulse 74   Temperature 97.8  F (36.6  C) (Oral)   Height 1.651 m (5' 5\")   Weight 66.3 kg (146 lb 3.2 oz)   Oxygen Saturation 95%   Body Mass Index 24.33 kg/m    BMI= Body mass index is 24.33 kg/m .   Vitals were reviewed and were normal.    Physical Exam:    GENERAL: healthy, alert and no distress  EYES: Eyes grossly normal to inspection, extraocular movements - intact, and PERRL  HENT: ear canals- normal; TMs- normal; Nose- normal; Mouth- no ulcers, no lesions  NECK: no tenderness, no adenopathy, no asymmetry, no masses, no stiffness; thyroid- normal to palpation  RESP: lungs clear to auscultation - no rales, no rhonchi, no wheezes  CV: regular rates and rhythm, normal S1 S2, no S3 or S4 and no murmur, no click or rub -  ABDOMEN: soft, no tenderness, no  hepatosplenomegaly, no masses, normal bowel sounds  MS: extremities- no gross deformities noted, no edema  SKIN: fleshy skin tag mid chest.  NEURO: strength and tone- normal, sensory exam- grossly normal, mentation- intact, speech- normal, reflexes- symmetric  BACK: no CVA tenderness, no paralumbar tenderness  PSYCH: Alert and oriented times 3; speech- coherent , normal rate and volume; able to articulate logical thoughts, able to abstract reason, affect- bright  LYMPHATICS: ant. cervical- normal, post. cervical- normal, axillary- normal, supraclavicular- normal, inguinal- normal  Assessment and Plan   Inocencio was seen today for physical.    Diagnoses and all orders for this visit:    Routine general medical examination at a health care facility  Cont excellent self care   Watch etoh use     High risk HPV " "infection  Detailed plan per OB note  F/u with Dr. Eastman about retesting and timing of surgeries   Skin tag    [facility] Procedure Note    Inocencio NONA Luiz is a patient of Clarissa Elliott here for skin tag removal   Indication: irritated skin tag.    Consent: Verbal informed consent. Risks, benefits and alternatives were discussed. Patient's questions were elicited and answered.     Preoperative Diagnosis: Skin tags  Postoperative Diagnosis: same    Technique:   Used curved iris scissor to remove 1 skin tag in the following location: mid chest  Skin prep alcohol  Anesthesia none  EBL:   none  Complications:  No  Tolerance:  Pt tolerated procedure well and was in stable condition.     Follow up: Pt was instructed to call if bleeding, severe pain or foul smell.   Follow up PRN.    Patient Instructions   Preventative  1. The amount of drinks you've reported on average put you at an \"at risk\" level. We will continue to check in on this at future appointments.  2. I recommend getting your flu shot this Fall 2019, otherwise you are up to date on your immunizations.  3. You may have to hold your testosterone two weeks prior to some of your future surgeries.  4. Follow up with me in one year for routine wellness visit, or otherwise as needed.    Skin Tag  1. Skin tag removed today.  - Risks discussed today of skin tag removal: pain, bleeding, scarring.  - If you experience some bleeding, hold pressure for 1-2 minutes, and it should resolve.  - Red flag symptoms include: pain and/or redness; if any of these symptoms occur, follow up with me sooner.  2. Cover the area with a Band-Aid until it's fully healed.  3. I recommend wearing your bag on the opposite side, until the area is healed.     Options for treatment and follow-up care were reviewed with the patient . Inocencio Dee and/or guardian engaged in the decision making process and verbalized understanding of the options discussed and agreed with the final " plan.    The information in this document, created by the medical scribe for me, accurately reflects the services I personally performed and the decisions made by me. I have reviewed and approved this document for accuracy prior to leaving the patient care area.    Clarissa Stroud MD  3:28 PM, 09/11/19  End Time: 4:03 PM

## 2019-09-11 NOTE — PATIENT INSTRUCTIONS
"Preventative  1. The amount of drinks you've reported on average put you at an \"at risk\" level. We will continue to check in on this at future appointments.  2. I recommend getting your flu shot this Fall 2019, otherwise you are up to date on your immunizations.  3. You may have to hold your testosterone two weeks prior to some of your future surgeries.  4. Follow up with me in one year for routine wellness visit, or otherwise as needed.    Skin Tag  1. Skin tag removed today.  - Risks discussed today of skin tag removal: pain, bleeding, scarring.  - If you experience some bleeding, hold pressure for 1-2 minutes, and it should resolve.  - Red flag symptoms include: pain and/or redness; if any of these symptoms occur, follow up with me sooner.  2. Cover the area with a Band-Aid until it's fully healed.  3. I recommend wearing your bag on the opposite side, until the area is healed.   "

## 2019-09-17 ENCOUNTER — OFFICE VISIT (OUTPATIENT)
Dept: DERMATOLOGY | Facility: CLINIC | Age: 34
End: 2019-09-17
Payer: COMMERCIAL

## 2019-09-17 ENCOUNTER — TELEPHONE (OUTPATIENT)
Dept: PLASTIC SURGERY | Facility: CLINIC | Age: 34
End: 2019-09-17

## 2019-09-17 DIAGNOSIS — F64.9 GENDER DYSPHORIA: ICD-10-CM

## 2019-09-17 PROCEDURE — 17999 UNLISTD PX SKN MUC MEMB SUBQ: CPT | Performed by: DERMATOLOGY

## 2019-09-17 ASSESSMENT — PAIN SCALES - GENERAL: PAINLEVEL: NO PAIN (0)

## 2019-09-17 NOTE — TELEPHONE ENCOUNTER
talked to [email or web address], not able to fax me a copy of approval #[id number], so left a VM with auth dept to fax me a copy

## 2019-09-17 NOTE — PATIENT INSTRUCTIONS
Gentle Max Laser Hair Reduction Instructions    Laser hair reduction: I will have redness, pain and swelling. I may have skin discoloration, bruising and itching. Risks are permenant discoloration, hives, infection scarring, eye injury,hair growth, and blister. The outcome could be no improvement or slight improvement. Multiple treatments are required. All hair will not be removed.     Before the procedure:  Sun Protection:  Do not allow the area to become tan or come in with a tan for a treatment. Tans will increased the risks of the procedure. Do not use spray or any over counter product self tanners prior to the procedure.     Shaving:   Gently shave the area the evening before the procedure.     Other:  Avoid any retinols such as Differin, adapalene, retinol or tretinoin to the treated area 1 week prior. Hold bleaching creams such as hydroquinone until 1 week prior.  Avoid any hair removal procedures such as waxing, electrolysis or other lasers on the skin within 6 weeks prior. Do not have any other cosmetic procedures done on the area within the prior 6 weeks such as chemical peels or dermabrasion.      Post Procedure:  Day 1-7  The healing time for any given treatment varies between clients. The following represents the general recovery phases you might expect. Individual clients may experience variations from this course.     Swelling/Discomfort/Redness:  The most common side effects are erythema and edema(redness and swelling) which generally occur immediately after and typically resolve within 48 hours. Crusting and rarely blistering may occur.       Activity:  Avoid swimming the day of laser hair removal treatment. Also, no rigorous exercise the day of treatment.     Moisturizers and Sunscreens:  Wait until the skin has returned to normal to start topical products.     Sun Protection:  Do not allow the area to become tan or come in with a tan for a treatment. Tans will increased the risks of the procedure.  Do not use spray or any over counter product self tanners prior to the procedure.     Warning signs:  Call the clinic if you have significant pain, increasing redness, pus, blisters/crusting or for any other concerns.     Who should I call with questions?    [facility]: [phone]     [facility]: [phone]    For urgent needs outside of business hours call the [facility] [[email or web address]] at [phone] and ask for the dermatology resident on call  Laser Hair Removal for Phalloplasty    ? What is it?  Laser hair removal is a medical treatment that uses light to reduce hair. It works by targeting the  pigment (melanin) in darker hair.    ? Is it permanent?  No. Laser hair removal may not be permanent. Hair may regrow years later, but it tends to be less,  thinner and paler than before treatment.    ? Why do I need to have this before my phalloplasty?  Laser hair removal is medically necessary for phalloplasties to avoid hair growth inside the urethra.  This hair growth can block urine or cause stones or infection.    ? What areas on my body will be treated?  We will remove hair from your donor sites. This may involve  taking hair from your thighs, lower arms or groin creases if  your surgeon suggests this (see image).  Note: Removing hair around the anus (perianal) hair is for  looks only (cosmetic) and costs extra.            ? Does it work on blonde or white hair?  No. Laser hair removal will not work for blonde or white hair.  You will need electrolysis (a treatment that uses radio  waves to stop hair growth) for any blonde or white hair.    ? How many treatments do I need?  We suggest 6 to 12 treatments (performed every 3-6  weeks) before surgery. We also suggest waiting 3 months  after the last treatment to make sure you do not need  further treatments.    ? What are the risks?  I will have redness, pain and swelling. I may have skin  discoloration,  bruising and itching. Risks are permanent  discoloration, hives, infection, scarring, eye injury, hair  growth, and blisters. The outcome could be no improvement  or slight improvement. Multiple treatments are required. All  hair will not be removed.    ? Will my insurance pay for this?  We will work with you to try to get insurance coverage. After  your visit, we will send a letter to your insurance through our  financial counselors to check coverage. If you do NOT hear from us in two weeks, please contact  us via [employer] or telephone. For more details on insurance coverage, see the diagram below.    Insurance Workflow    Contact Information  ? [facility]  [address], [place] [address]  Phone Number: [phone]    ? [facility]  [address], [address] floor, [place] [address]  Phone Number: [phone]    ? Transgender Intake & Referral Coordinator for insurance denial navigation:  Phone Number: [phone]    ? For urgent needs outside of business hours: Call [phone] and ask for the  dermatology resident on call.    ? What should I do BEFORE treatment?    6 weeks before treatment  ? Keep your treatment area out of the sun. Do NOT tan the removal area or come in for treatment with a tan. Tans increase the risks of treatment. Do NOT use spray tan or any over-the-counter self-tanners before treatment.  ? Stop waxing, plucking, electrolysis and other laser treatments.  ? Stop any other cosmetic treatments to the area, such as chemical peels or dermabrasion.    1 week before treatment  ? Do NOT use retinols, such as adapalene (Differin) and tretinoin (Retin-A), on the treatment area.  ? Do NOT use any bleaching creams, such as hydroquinone, on the treatment area.    The night before treatment  ? Gently shave the treatment area.

## 2019-09-17 NOTE — PROCEDURES
Laser Hair Removal Gentle Max Prox (755nm)  Procedure Date: Sep 17, 2019    Staff Surgeon: Khushbu Oconnor MD    Assistant: Melva Schulz RN and Shameka Harrell LPN    Trimming required prior to treatment in clinic?: yes  Thurston skin type: II  Diagnosis/Treatment Reason: gender dysphoria    Treatment#: 7    Laser Settings:  Wavelength(755/1064nm): 755  Location: bilateral inguinal crease with 5cm on either side and up to iliac crest; bilateral anterolateral thigh  Fluence: 18J   Spot size: 18mm  Pulse width:  3 mS  Total Number of Pulses: 660  Mode (continuous/pulsed): pulsed  Dynamic cooling spray settin mS  Dynamic cooling device delay:  40 mS    Anesthesia:  None, pt declined    Jose used?:No  Ice used?: Yes     Description of Operation/Procedure:   The nature and purpose of the procedure, associated risks, possible consequences, complications and alternative methods of treatment were explained in detail, this includes but is not limited to hyperpigmentation, hypopigmentation, scarring, bruising, hair loss pain/discomfort, eye injury infection, itching  and blister.   We reviewed that the outcome could be any of the following: no improvement, slight improvement or change in skin color & texture, the skin might be permanently lighter or darker, and though uncommon, superficial scarring may occur.  Multiple treatments are required. We reviewed with patients that laser hair removal is a reduction in hair and not permanent.   Consent is up to date. Time-out was performed.  The patient was positioned to optimally expose the area treated. Dynamic cooling was verified and functioning properly.  Protective eyewear was worn by the patient and goggles on all personnel in the treatment room.  The patient confirmed the site to be treated. The laser energy output was verified by meter reading.  N95 and buffalo filtration was used.     The clinically evident lesion(s) was/were treated with laser beam as above.  The  patient tolerated the procedure well and no complications were noted. Post operative instructions were provided. The total laser operation and preparation time was 15 minutes.      Numeric pain scale after treatment: 5/10    The patient will follow-up in 4-6 weeks.  Phalloplasty pending.  Pt hoping for surgery the end of Dec 2019.    Bill to insurance. Melva Schulz RN performed procedure.      Khushbu Oconnor MD    Department of Dermatology  [facility]: Phone: [phone], Fax:[phone]  [facility]: Phone: [phone], Fax: [phone]

## 2019-09-17 NOTE — LETTER
9/17/2019         RE: Myrna Dee  [address] [facility] [address]        Dear Colleague,    Thank you for referring your patient, Myrna Dee, to the [facility]. Please see a copy of my visit note below.    See procedure.     Again, thank you for allowing me to participate in the care of your patient.        Sincerely,        Khushbu Oconnor MD

## 2019-09-18 ENCOUNTER — TELEPHONE (OUTPATIENT)
Dept: PLASTIC SURGERY | Facility: CLINIC | Age: 34
End: 2019-09-18

## 2019-09-18 NOTE — TELEPHONE ENCOUNTER
received [employer] approval #[id number] for phalloplasty with dr Eastman/Zully/Emma, date span 8/23/19-8/22/20, this insurance terms the end of year 12/31/19 and his deductible has been met

## 2019-09-25 ENCOUNTER — MEDICAL CORRESPONDENCE (OUTPATIENT)
Dept: HEALTH INFORMATION MANAGEMENT | Facility: CLINIC | Age: 34
End: 2019-09-25

## 2019-09-26 DIAGNOSIS — F64.0 GENDER DYSPHORIA IN ADULT: Primary | ICD-10-CM

## 2019-10-02 DIAGNOSIS — F64.0 GENDER DYSPHORIA IN ADULT: Primary | ICD-10-CM

## 2019-10-09 DIAGNOSIS — F64.0 GENDER DYSPHORIA IN ADULT: Primary | ICD-10-CM

## 2019-10-09 RX ORDER — CEFAZOLIN SODIUM 1 G/50ML
1 INJECTION, SOLUTION INTRAVENOUS SEE ADMIN INSTRUCTIONS
Status: CANCELLED | OUTPATIENT
Start: 2019-10-09

## 2019-10-09 RX ORDER — CEFAZOLIN SODIUM 2 G/50ML
2 SOLUTION INTRAVENOUS
Status: CANCELLED | OUTPATIENT
Start: 2019-10-09

## 2019-10-17 ENCOUNTER — OFFICE VISIT (OUTPATIENT)
Dept: DERMATOLOGY | Facility: CLINIC | Age: 34
End: 2019-10-17
Payer: COMMERCIAL

## 2019-10-17 DIAGNOSIS — F64.9 GENDER DYSPHORIA: ICD-10-CM

## 2019-10-17 PROCEDURE — 99207 ZZC DROP WITH A PROCEDURE: CPT | Performed by: DERMATOLOGY

## 2019-10-17 PROCEDURE — 17999 UNLISTD PX SKN MUC MEMB SUBQ: CPT | Performed by: DERMATOLOGY

## 2019-10-17 ASSESSMENT — PAIN SCALES - GENERAL: PAINLEVEL: NO PAIN (0)

## 2019-10-17 NOTE — PROCEDURES
Laser Hair Removal Gentle Max Prox (755nm)  Procedure Date: Oct 17, 2019    Staff Surgeon: Khushbu Oconnor MD    Assistant: Melva Schulz RN and Shameka Harrell LPN    Trimming required prior to treatment in clinic?: yes  Thurston skin type: II  Diagnosis/Treatment Reason: gender dysphoria    Treatment#: 8    Laser Settings:  Wavelength(755/1064nm): 755  Location: bilateral inguinal crease with 5cm on either side and up to iliac crest; bilateral anterolateral thigh  Fluence: 20J   Spot size: 18mm  Pulse width:  3 mS  Total Number of Pulses: 550  Dynamic cooling spray settin mS  Dynamic cooling device delay:  40 mS    Anesthesia:  None, pt declined    Jose used?:No  Ice used?: Yes     Description of Operation/Procedure:   The nature and purpose of the procedure, associated risks, possible consequences, complications and alternative methods of treatment were explained in detail, this includes but is not limited to hyperpigmentation, hypopigmentation, scarring, bruising, hair loss pain/discomfort, eye injury infection, itching  and blister.   We reviewed that the outcome could be any of the following: no improvement, slight improvement or change in skin color & texture, the skin might be permanently lighter or darker, and though uncommon, superficial scarring may occur.  Multiple treatments are required. We reviewed with patients that laser hair removal is a reduction in hair and not permanent.     Consent is up to date. Time-out was performed.  The patient was positioned to optimally expose the area treated. Dynamic cooling was verified and functioning properly.  Protective eyewear was worn by the patient and goggles on all personnel in the treatment room.  The patient confirmed the site to be treated. The laser energy output was verified by meter reading.  N95 and buffalo filtration was used.     The clinically evident lesion(s) was/were treated with laser beam as above.  The patient tolerated the procedure well  and no complications were noted. Post operative instructions were provided. The total laser operation and preparation time was 15 minutes.      Numeric pain scale after treatment: 7/10    The patient will follow-up in 6 weeks.  Phalloplasty pending.  Pt hoping for surgery the end of Dec 2019.    Bill to insurance. Melva Schulz RN performed procedure.    Staff Physician Comments:   I saw and evaluated the patient with the RN and I agree with the assessment and plan and description of the procedure as above.   I personally approved the laser settings and the treatment area.   I was present for the test spots and was immediately available for the entire procedure.     Carroll Flanagan DO    Department of Dermatology  [facility]: Phone: [phone], Fax:[phone]  [facility]: Phone: [phone], Fax: [phone]

## 2019-10-17 NOTE — LETTER
10/17/2019         RE: Myrna Dee  [address] [facility] [address]        Dear Colleague,    Thank you for referring your patient, Myrna Dee, to the [facility]. Please see a copy of my visit note below.    The patient presents for laser hair removal.   See procedure note for details.     Carroll Flanagan DO    Department of Dermatology  [facility]: Phone: [phone], Fax:[phone]  [facility]: Phone: [phone], Fax: [phone]    Again, thank you for allowing me to participate in the care of your patient.        Sincerely,        Carroll Flanagan MD

## 2019-10-17 NOTE — PATIENT INSTRUCTIONS
Gentle Max Laser Hair Reduction Instructions    Laser hair reduction: I will have redness, pain and swelling. I may have skin discoloration, bruising and itching. Risks are permenant discoloration, hives, infection scarring, eye injury,hair growth, and blister. The outcome could be no improvement or slight improvement. Multiple treatments are required. All hair will not be removed.     Before the procedure:  Sun Protection:  Do not allow the area to become tan or come in with a tan for a treatment. Tans will increased the risks of the procedure. Do not use spray or any over counter product self tanners prior to the procedure.     Shaving:   Gently shave the area the evening before the procedure.     Other:  Avoid any retinols such as Differin, adapalene, retinol or tretinoin to the treated area 1 week prior. Hold bleaching creams such as hydroquinone until 1 week prior.  Avoid any hair removal procedures such as waxing, electrolysis or other lasers on the skin within 6 weeks prior. Do not have any other cosmetic procedures done on the area within the prior 6 weeks such as chemical peels or dermabrasion.      Post Procedure:  Day 1-7  The healing time for any given treatment varies between clients. The following represents the general recovery phases you might expect. Individual clients may experience variations from this course.     Swelling/Discomfort/Redness:  The most common side effects are erythema and edema(redness and swelling) which generally occur immediately after and typically resolve within 48 hours. Crusting and rarely blistering may occur.       Activity:  Avoid swimming the day of laser hair removal treatment. Also, no rigorous exercise the day of treatment.     Moisturizers and Sunscreens:  Wait until the skin has returned to normal to start topical products.     Sun Protection:  Do not allow the area to become tan or come in with a tan for a treatment. Tans will increased the risks of the procedure.  Do not use spray or any over counter product self tanners prior to the procedure.     Warning signs:  Call the clinic if you have significant pain, increasing redness, pus, blisters/crusting or for any other concerns.     Who should I call with questions?    [facility]: [phone]     [facility]: [phone]    For urgent needs outside of business hours call the [facility] at [phone] and ask for the dermatology resident on call

## 2019-10-28 ENCOUNTER — TELEPHONE (OUTPATIENT)
Dept: PLASTIC SURGERY | Facility: CLINIC | Age: 34
End: 2019-10-28

## 2019-10-28 NOTE — TELEPHONE ENCOUNTER
----- Message from Hudson Chaidez sent at 10/21/2019  2:46 PM CDT -----  Regarding: Needs to be scheduled  Dr. Raiza Smith put in order for this patient early November. Can you call pt to get him scheduled, please?      Thanks  Hudson

## 2019-10-28 NOTE — TELEPHONE ENCOUNTER
This writer called the patient to schedule procedure with Dr. Gavin Eastman, Dr. Raven Andrea and Dr. Octavio Carter, there was no answer. Left message asking the patient to call the scheduling line, [phone].

## 2019-11-05 ENCOUNTER — TELEPHONE (OUTPATIENT)
Dept: PLASTIC SURGERY | Facility: CLINIC | Age: 34
End: 2019-11-05

## 2019-11-05 PROBLEM — F64.0 GENDER DYSPHORIA IN ADULT: Status: ACTIVE | Noted: 2019-11-05

## 2019-11-05 NOTE — TELEPHONE ENCOUNTER
Patient is scheduled for surgery with Dr. Gavin Eastman, Dr. Octavio Carter, and Dr. Raven Andrea.      Spoke with: Inocencio    Date of Surgery: 02/03/2020    Location: [place] [address]  3rd floor- 3C    Informed patient they will need an adult  YES    Pre-op with surgeon (if applicable): 01/07/2020 with Dr. Eastman    H&P: Scheduled with PAC    Additional imaging/appointments: Not required    Post op: 02/25/2020 with Dr. Eastman    Surgery packet: MyChart     Additional comments: Patient did express frustration with scheduling process. This writer asked the patient to describe concerns and for recommendations from a patient's point of view on how to improve. This writer let the patient know that his concerns would be brought to Dr. Eastman's care team to discuss. Patient expressed understanding.

## 2019-11-06 ENCOUNTER — PRE VISIT (OUTPATIENT)
Dept: SURGERY | Facility: CLINIC | Age: 34
End: 2019-11-06

## 2019-11-06 NOTE — TELEPHONE ENCOUNTER
FUTURE VISIT INFORMATION      SURGERY INFORMATION:    Date: 2/3/20    Location: [facility] OR    Surgeon:  Sharita Christensen    Anesthesia Type:  General    RECORDS REQUESTED FROM:       Primary Care Provider: Clarissa Stroud MD- [facility]

## 2019-11-11 NOTE — PROGRESS NOTES
The patient presents for laser hair removal.   See procedure note for details.     Carroll Flanagan DO    Department of Dermatology  [facility]: Phone: [phone], Fax:[phone]  [facility]: Phone: [phone], Fax: [phone]

## 2019-11-25 ENCOUNTER — OFFICE VISIT (OUTPATIENT)
Dept: DERMATOLOGY | Facility: CLINIC | Age: 34
End: 2019-11-25
Payer: COMMERCIAL

## 2019-11-25 DIAGNOSIS — F64.9 GENDER DYSPHORIA: Primary | ICD-10-CM

## 2019-11-25 PROCEDURE — 17999 UNLISTD PX SKN MUC MEMB SUBQ: CPT | Performed by: DERMATOLOGY

## 2019-11-25 PROCEDURE — 99207 ZZC DROP WITH A PROCEDURE: CPT | Mod: 25 | Performed by: DERMATOLOGY

## 2019-11-25 ASSESSMENT — PAIN SCALES - GENERAL: PAINLEVEL: NO PAIN (0)

## 2019-11-25 NOTE — PROGRESS NOTES
See Procedure Note.    Faheem Lawson MD    Department of Dermatology  [facility]: Phone: [phone], Fax:[phone]  [facility]: Phone: [phone], Fax: [phone]

## 2019-11-25 NOTE — NURSING NOTE
Dermatology Laser Intake Checklist:  History of psoriasis:No  History of recent tan, indoor or outdoor tanning/vacation or other sun exposure: No  History of vitiligo:No  Family history of vitiligo:No  Recent other cosmetic procedure(microderm abrasion/peel/hair removal/facial etc):No  History of HSV:No   Did the patient start valtrex: No  For genital laser hair removal patient only: Is there a history of genital warts or condyloma:No  Tattoo in the area to be treated:No  Is patient using hydroquinone:Yes  Retinoids and other acne medications stopped for 2 weeks:No  Has the patient had accutane in the last 6-12 months:No  Pregnant or breastfeeding: No  History of skin cancer in area planned for treatment: No  History of treatment with gold:No  Changes in medical history: No  Photos obtained: No  Does the patient smoke:No  Is the patient on ibuprofen/aspirin/plavix/coumadin/other blood thinner: No  If patient is taking narcotic or diazepam(valium)-does patient have : No  There were no vitals taken for this visit.

## 2019-11-25 NOTE — PROCEDURES
Laser Hair Removal Gentle Max Prox (755nm)  Procedure Date: 2019    Staff Surgeon: Dr. Renny MD    Assistant: Angela Figueredo RN and Samantha Lara MA     Trimming required prior to treatment in clinic?: yes  Thurston skin type: II  Diagnosis/Treatment Reason: gender dysphoria    Treatment#: 9    Laser Settings:  Wavelength(755/1064nm): 755  Location: bilateral inguinal crease with 5cm on either side and up to iliac crest; bilateral anterolateral thigh  Fluence: 20J   Spot size: 18mm  Pulse width:  3 mS  Total Number of Pulses: 513  Dynamic cooling spray settin mS  Dynamic cooling device delay:  40 mS    Anesthesia:  None, pt declined    Jose used?:No  Ice used?: Yes     Description of Operation/Procedure:   The nature and purpose of the procedure, associated risks, possible consequences, complications and alternative methods of treatment were explained in detail, this includes but is not limited to hyperpigmentation, hypopigmentation, scarring, bruising, hair loss pain/discomfort, eye injury infection, itching  and blister.   We reviewed that the outcome could be any of the following: no improvement, slight improvement or change in skin color & texture, the skin might be permanently lighter or darker, and though uncommon, superficial scarring may occur.  Multiple treatments are required. We reviewed with patients that laser hair removal is a reduction in hair and not permanent.     Consent is up to date. Time-out was performed.  The patient was positioned to optimally expose the area treated. Dynamic cooling was verified and functioning properly.  Protective eyewear was worn by the patient and goggles on all personnel in the treatment room.  The patient confirmed the site to be treated. The laser energy output was verified by meter reading.  N95 and buffalo filtration was used.     The clinically evident lesion(s) was/were treated with laser beam as above.  The patient tolerated the procedure well  and no complications were noted. Post operative instructions were provided. The total laser operation and preparation time was 15 minutes.      Numeric pain scale after treatment: 2/10    The patient will follow-up in 6 weeks.  Phalloplasty pending.  Surgery date scheduled for February of 2020    Bill to insurance. Angela Figueredo performed procedure..Angela Figueredo RN      Staff Physician Comments:   I saw and evaluated the patient with the RN and I agree with the assessment and plan and description of the procedure as above.   I personally approved the laser settings and the treatment area.   I was present for the test spots and was immediately available for the entire procedure.     Staff Physician Comments:   I saw and evaluated the patient with the RN and I agree with the assessment and plan.  I was present for the key portions of the above major procedure and examination, including identifying and marking target area(s), ascertainment of protective equipment utilization, calibration of laser, and for part of the procedure.     Faheem Lawson MD    Department of Dermatology  [facility]: Phone: [phone], Fax:[phone]  [facility]: Phone: [phone], Fax: [phone]

## 2019-11-25 NOTE — LETTER
11/25/2019         RE: Myrna Dee  [address] [facility] [address]        Dear Colleague,    Thank you for referring your patient, Myrna Dee, to the [facility]. Please see a copy of my visit note below.    See Procedure Note.    Faheem Lawson MD    Department of Dermatology  [facility]: Phone: [phone], Fax:[phone]  [facility]: Phone: [phone], Fax: [phone]      Again, thank you for allowing me to participate in the care of your patient.        Sincerely,        Faheem Lawson MD

## 2019-12-30 ENCOUNTER — OFFICE VISIT (OUTPATIENT)
Dept: DERMATOLOGY | Facility: CLINIC | Age: 34
End: 2019-12-30
Payer: COMMERCIAL

## 2019-12-30 DIAGNOSIS — F64.9 GENDER DYSPHORIA: ICD-10-CM

## 2019-12-30 PROCEDURE — 99207 ZZC DROP WITH A PROCEDURE: CPT | Performed by: DERMATOLOGY

## 2019-12-30 PROCEDURE — 17999 UNLISTD PX SKN MUC MEMB SUBQ: CPT | Mod: 58 | Performed by: DERMATOLOGY

## 2019-12-30 ASSESSMENT — PAIN SCALES - GENERAL: PAINLEVEL: NO PAIN (0)

## 2019-12-30 NOTE — PATIENT INSTRUCTIONS
Gentle Max Laser Hair Reduction Instructions    Laser hair reduction: I will have redness, pain and swelling. I may have skin discoloration, bruising and itching. Risks are permenant discoloration, hives, infection scarring, eye injury,hair growth, and blister. The outcome could be no improvement or slight improvement. Multiple treatments are required. All hair will not be removed.     Before the procedure:  Sun Protection:  Do not allow the area to become tan or come in with a tan for a treatment. Tans will increased the risks of the procedure. Do not use spray or any over counter product self tanners prior to the procedure.     Shaving:   Gently shave the area the evening before the procedure.     Other:  Avoid any retinols such as Differin, adapalene, retinol or tretinoin to the treated area 1 week prior. Hold bleaching creams such as hydroquinone until 1 week prior.  Avoid any hair removal procedures such as waxing, electrolysis or other lasers on the skin within 6 weeks prior. Do not have any other cosmetic procedures done on the area within the prior 6 weeks such as chemical peels or dermabrasion.      Post Procedure:  Day 1-7  The healing time for any given treatment varies between clients. The following represents the general recovery phases you might expect. Individual clients may experience variations from this course.     Swelling/Discomfort/Redness:  The most common side effects are erythema and edema(redness and swelling) which generally occur immediately after and typically resolve within 48 hours. Crusting and rarely blistering may occur.       Activity:  Avoid swimming the day of laser hair removal treatment. Also, no rigorous exercise the day of treatment.     Moisturizers and Sunscreens:  Wait until the skin has returned to normal to start topical products.     Sun Protection:  Do not allow the area to become tan or come in with a tan for a treatment. Tans will increased the risks of the procedure.  Do not use spray or any over counter product self tanners prior to the procedure.     Warning signs:  Call the clinic if you have significant pain, increasing redness, pus, blisters/crusting or for any other concerns.     Who should I call with questions?    [facility]: [phone]     [facility]: [phone]    For urgent needs outside of business hours call the [facility] at [phone] and ask for the dermatology resident on call

## 2019-12-30 NOTE — LETTER
12/30/2019         RE: Myrna Dee  [address] [facility] [address]        Dear Colleague,    Thank you for referring your patient, Myrna Dee, to the [facility]. Please see a copy of my visit note below.    See procedure note.     Faheem Lawson MD    Department of Dermatology  [facility]: Phone: [phone], Fax:[phone]  [facility]: Phone: [phone], Fax: [phone]      Again, thank you for allowing me to participate in the care of your patient.        Sincerely,        Faheem Lawson MD

## 2019-12-30 NOTE — NURSING NOTE
Dermatology Laser Intake Checklist:  History of psoriasis:No  History of recent tan, indoor or outdoor tanning/vacation or other sun exposure: No  History of vitiligo:No  Family history of vitiligo:No  Recent other cosmetic procedure(microderm abrasion/peel/hair removal/facial etc):No  History of HSV:No   Did the patient start valtrex: No  For genital laser hair removal patient only: Is there a history of genital warts or condyloma:No  Tattoo in the area to be treated:No  Is patient using hydroquinone: No  Retinoids and other acne medications stopped for 2 weeks:No  Has the patient had accutane in the last 6-12 months:No  Pregnant or breastfeeding: No  History of skin cancer in area planned for treatment: No  History of treatment with gold:No  Changes in medical history: No  Photos obtained: No  Does the patient smoke:No  Is the patient on ibuprofen/aspirin/plavix/coumadin/other blood thinner: No  If patient is taking narcotic or diazepam(valium)-does patient have : No    Melva Schulz RN

## 2019-12-30 NOTE — PROCEDURES
Laser Hair Removal Gentle Max Prox (755nm)  Procedure Date: Dec 30, 2019    Staff Surgeon: Dr. Faheem Lawson    Assistant: Melva Schulz RN and Peace Mejia CMA    Trimming required prior to treatment in clinic?: no  Thurston skin type: II  Diagnosis/Treatment Reason: gender dysphoria    Treatment#: 10    Laser Settings:  Wavelength(755/1064nm): 755  Location: bilateral inguinal crease with 5cm on either side and up to iliac crest; bilateral anterolateral thigh  Fluence: 20J   Spot size: 18mm  Pulse width:  3 mS  Total Number of Pulses: 646  Dynamic cooling spray settin mS  Dynamic cooling device delay:  40 mS    Anesthesia:  None, pt declined    Jose used?:No  Ice used?: Yes     Description of Operation/Procedure:   The nature and purpose of the procedure, associated risks, possible consequences, complications and alternative methods of treatment were explained in detail, this includes but is not limited to hyperpigmentation, hypopigmentation, scarring, bruising, hair loss pain/discomfort, eye injury infection, itching  and blister.  We reviewed that the outcome could be any of the following: no improvement, slight improvement or change in skin color & texture, the skin might be permanently lighter or darker, and though uncommon, superficial scarring may occur.  Multiple treatments are required. We reviewed with patients that laser hair removal is a reduction in hair and not permanent.     Consent is up to date. Time-out was performed.  The patient was positioned to optimally expose the area treated. Dynamic cooling was verified and functioning properly.  Protective eyewear was worn by the patient and goggles on all personnel in the treatment room.  The patient confirmed the site to be treated. The laser energy output was verified by meter reading.  N95 and buffalo filtration was used.     The clinically evident lesion(s) was/were treated with laser beam as above.  The patient tolerated the procedure well  and no complications were noted. Post operative instructions were provided. The total laser operation and preparation time was 15 minutes.      Numeric pain scale after treatment: 4/10    The patient will follow-up prn.  Phalloplasty scheduled 2/2020    Bill to insurance.    Melva Schulz RN performed procedure.    Staff Physician Comments:   I saw and evaluated the patient with the RN and I agree with the assessment and plan.  I was present for the key portions of the above major procedure and examination, including identifying and marking target area(s), ascertainment of protective equipment utilization, calibration of laser, and for part of the procedure.     Faheem Lawson MD    Department of Dermatology  [facility]: Phone: [phone], Fax:[phone]  [facility]: Phone: [phone], Fax: [phone]

## 2019-12-30 NOTE — PROGRESS NOTES
See procedure note.     Faheem Lawson MD    Department of Dermatology  [facility]: Phone: [phone], Fax:[phone]  [facility]: Phone: [phone], Fax: [phone]

## 2020-01-06 ENCOUNTER — TELEPHONE (OUTPATIENT)
Dept: PLASTIC SURGERY | Facility: CLINIC | Age: 35
End: 2020-01-06

## 2020-01-06 NOTE — TELEPHONE ENCOUNTER
This writer called the patient and rescheduled his PAC appointment as the previously scheduled appointment would not be possible as the provider is not available. This writer confirmed a new appointment at 3:30 pm. Patient agreed this appointment would work for him.

## 2020-01-07 ENCOUNTER — OFFICE VISIT (OUTPATIENT)
Dept: PLASTIC SURGERY | Facility: CLINIC | Age: 35
End: 2020-01-07
Payer: COMMERCIAL

## 2020-01-07 ENCOUNTER — OFFICE VISIT (OUTPATIENT)
Dept: SURGERY | Facility: CLINIC | Age: 35
End: 2020-01-07
Payer: COMMERCIAL

## 2020-01-07 ENCOUNTER — PRE VISIT (OUTPATIENT)
Dept: SURGERY | Facility: CLINIC | Age: 35
End: 2020-01-07

## 2020-01-07 VITALS
SYSTOLIC BLOOD PRESSURE: 116 MMHG | WEIGHT: 154 LBS | OXYGEN SATURATION: 98 % | BODY MASS INDEX: 25.66 KG/M2 | TEMPERATURE: 97.9 F | RESPIRATION RATE: 12 BRPM | HEIGHT: 65 IN | HEART RATE: 54 BPM | DIASTOLIC BLOOD PRESSURE: 64 MMHG

## 2020-01-07 VITALS
DIASTOLIC BLOOD PRESSURE: 80 MMHG | HEIGHT: 65 IN | OXYGEN SATURATION: 99 % | WEIGHT: 152 LBS | HEART RATE: 51 BPM | SYSTOLIC BLOOD PRESSURE: 130 MMHG | BODY MASS INDEX: 25.33 KG/M2

## 2020-01-07 DIAGNOSIS — Z01.818 PREOP EXAMINATION: Primary | ICD-10-CM

## 2020-01-07 DIAGNOSIS — F64.0 GENDER DYSPHORIA IN ADULT: Primary | ICD-10-CM

## 2020-01-07 DIAGNOSIS — F64.0 GENDER DYSPHORIA IN ADULT: ICD-10-CM

## 2020-01-07 DIAGNOSIS — Z01.818 PREOP EXAMINATION: ICD-10-CM

## 2020-01-07 LAB
ALBUMIN UR-MCNC: NEGATIVE MG/DL
ANION GAP SERPL CALCULATED.3IONS-SCNC: 5 MMOL/L (ref 3–14)
APPEARANCE UR: CLEAR
BILIRUB UR QL STRIP: NEGATIVE
BUN SERPL-MCNC: 13 MG/DL (ref 7–30)
CALCIUM SERPL-MCNC: 9.2 MG/DL (ref 8.5–10.1)
CHLORIDE SERPL-SCNC: 105 MMOL/L (ref 94–109)
CO2 SERPL-SCNC: 29 MMOL/L (ref 20–32)
COLOR UR AUTO: ABNORMAL
CREAT SERPL-MCNC: 0.83 MG/DL (ref 0.66–1.25)
ERYTHROCYTE [DISTWIDTH] IN BLOOD BY AUTOMATED COUNT: 12.8 % (ref 10–15)
GFR SERPL CREATININE-BSD FRML MDRD: >90 ML/MIN/{1.73_M2}
GLUCOSE SERPL-MCNC: 90 MG/DL (ref 70–99)
GLUCOSE UR STRIP-MCNC: NEGATIVE MG/DL
HCT VFR BLD AUTO: 41.7 % (ref 40–53)
HGB BLD-MCNC: 14.2 G/DL (ref 13.3–17.7)
HGB UR QL STRIP: NEGATIVE
KETONES UR STRIP-MCNC: NEGATIVE MG/DL
LEUKOCYTE ESTERASE UR QL STRIP: NEGATIVE
MCH RBC QN AUTO: 29.8 PG (ref 26.5–33)
MCHC RBC AUTO-ENTMCNC: 34.1 G/DL (ref 31.5–36.5)
MCV RBC AUTO: 88 FL (ref 78–100)
NITRATE UR QL: NEGATIVE
PH UR STRIP: 8 PH (ref 5–7)
PLATELET # BLD AUTO: 230 10E9/L (ref 150–450)
POTASSIUM SERPL-SCNC: 3.7 MMOL/L (ref 3.4–5.3)
RBC # BLD AUTO: 4.76 10E12/L (ref 4.4–5.9)
SODIUM SERPL-SCNC: 138 MMOL/L (ref 133–144)
SOURCE: ABNORMAL
SP GR UR STRIP: 1 (ref 1–1.03)
UROBILINOGEN UR STRIP-MCNC: 0 MG/DL (ref 0–2)
WBC # BLD AUTO: 7.1 10E9/L (ref 4–11)

## 2020-01-07 ASSESSMENT — MIFFLIN-ST. JEOR
SCORE: 1556.35
SCORE: 1571.03

## 2020-01-07 ASSESSMENT — PAIN SCALES - GENERAL
PAINLEVEL: NO PAIN (0)
PAINLEVEL: NO PAIN (0)

## 2020-01-07 NOTE — NURSING NOTE
"Chief Complaint   Patient presents with     RECHECK     pre-op phallo 2/3/20, PAC later today       Vitals:    01/07/20 1352   BP: 130/80   BP Location: Left arm   Patient Position: Chair   Cuff Size: Adult Regular   Pulse: 51   SpO2: 99%   Weight: 68.9 kg (152 lb)   Height: 1.651 m (5' 5\")       Body mass index is 25.29 kg/m .    Gopal Willoughby, EMT    "

## 2020-01-07 NOTE — H&P
Pre-Operative H & P     CC:  Preoperative exam to assess for increased cardiopulmonary risk while undergoing surgery and anesthesia.    Date of Encounter: 1/7/2020  Primary Care Physician:  Clarissa Stroud  Reason for Visit: Gender dysphoria in adult    HPI  Myrna Khalil is a 33 y/o transgender male who presents for pre-operative H&P in preparation for Phalloplasty with left thigh flap and right groin flap with Gavin Eastman MD, Octavio Carter MD & Raven Andrea MD on 2/3/20 at [facility] for treatment of Gender dysphoria in adult.    Mr. Khalil has a history of gender dysphoria. He is s/p hysterectomy and mastectomy and underwent a laparoscopic trachelectomy in 7/2019. He has been counseled on the above procedure to better align his physical body with his chosen gender identity.    He is otherwise healthy.    History was obtained from patient & chart review.     Past Medical History  Past Medical History:   Diagnosis Date     Aseptic meningitis      Complication of anesthesia     HX slow wake up,HX panic attacks with vicodin postop     Gender dysphoria in adolescent and adult      High risk HPV infection      Papanicolaou smear of cervix with atypical squamous cells cannot exclude high grade squamous intraepithelial lesion (ASC-H)        Past Surgical History  Past Surgical History:   Procedure Laterality Date     CYSTOSCOPY N/A 7/12/2019    Procedure: Cystoscopy;  Surgeon: Myesha Hernández MD;  Location: [facility] OR     DAVINCI LAPAROSCOPIC CERVICECTOMY (TRACHELECTOMY) N/A 7/12/2019    Procedure: Davinci Laparoscopic Trachelectomy;  Surgeon: Myesha Hernández MD;  Location: [facility] OR     HYSTERECTOMY      and oophorectomy, but still has cervix and will require pap every 3 years, At [facility]     MASTECTOMY PARTIAL      chest surgery w/ contouring        Hx of Blood transfusions/reactions: no     Hx of abnormal bleeding or anti-platelet use: no    Menstrual history:  "No LMP recorded. Patient has had a hysterectomy.:      Steroid use in the last year: no    Personal or FH with difficulty with Anesthesia:  Slow to awaken, has had panic attacks w/ vicodin postop    Prior to Admission Medications  Current Outpatient Medications   Medication Sig Dispense Refill     Pediatric Multivit-Minerals-C (FLINTSTONES GUMMIES PO) Take 1 tablet by mouth 2 times daily.       Syringe/Needle, Disp, 22G X 1-1/2\" 1 ML MISC Inject 0.25 mLs into the muscle once a week 30 each 4     testosterone cypionate (DEPOTESTOSTERONE) 200 MG/ML injection Inject 0.25 mLs (50 mg) into the muscle once a week (Patient taking differently: Inject 50 mg into the muscle once a week ) 10 mL 3       Allergies  No Known Allergies    Social History  Social History     Socioeconomic History     Marital status:      Spouse name: Not on file     Number of children: Not on file     Years of education: Not on file     Highest education level: Not on file   Occupational History     Not on file   Social Needs     Financial resource strain: Not on file     Food insecurity:     Worry: Not on file     Inability: Not on file     Transportation needs:     Medical: Not on file     Non-medical: Not on file   Tobacco Use     Smoking status: Never Smoker     Smokeless tobacco: Never Used   Substance and Sexual Activity     Alcohol use: Yes     Drug use: No     Sexual activity: Yes     Partners: Female     Birth control/protection: None   Lifestyle     Physical activity:     Days per week: Not on file     Minutes per session: Not on file     Stress: Not on file   Relationships     Social connections:     Talks on phone: Not on file     Gets together: Not on file     Attends Congregational service: Not on file     Active member of club or organization: Not on file     Attends meetings of clubs or organizations: Not on file     Relationship status: Not on file     Intimate partner violence:     Fear of current or ex partner: Not on file     " Emotionally abused: Not on file     Physically abused: Not on file     Forced sexual activity: Not on file   Other Topics Concern     Parent/sibling w/ CABG, MI or angioplasty before 65F 55M? No   Social History Narrative    Lives with wife (Meliza), bought a house in [place]. Wife pregnant.     Works as a  at "[employer]  [place].     Bikes or runs to commute, very fit. Good dairy intake.     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        Family History  Family History   Problem Relation Age of Onset     Dementia Father         frontotemporal dementia      Alcoholism Brother         possible     Diabetes Maternal Grandfather      Anesthesia Reaction Sister      Cardiovascular No family hx of      Deep Vein Thrombosis No family hx of        ROS/MED HX  The complete review of systems is negative other than noted in the HPI or here.  Patient denies recent illness, fever and respiratory infection during past month.  Pt denies steroid use during past year.    ENT/Pulmonary:  - neg pulmonary ROS    (-) tobacco use and asthma   Neurologic: Comment: Hx  meningitis, viral. No residual deficits.    (+)neuropathy - mild carpal tunnel,    (-) seizures and CVA   Cardiovascular:  - neg cardiovascular ROS   (+) ----. : . . . :. . No previous cardiac testing       METS/Exercise Tolerance:  >4 METS   Hematologic:  - neg hematologic  ROS      (-) History of Transfusion   Musculoskeletal:  - neg musculoskeletal ROS       GI/Hepatic:  - neg GI/hepatic ROS       Renal/Genitourinary:  - ROS Renal section negative       Endo: Comment: Receiving testosterone - gender dysphoria     (-) Type II DM   Psychiatric:  - neg psychiatric ROS       Infectious Disease:  - neg infectious disease ROS       Malignancy:      - no malignancy   Other:    (+) no H/O Chronic Pain,             PHYSICAL EXAM:   Mental Status/Neuro: A/A/O; Age Appropriate   Airway: Facies:  "Feasible  Mallampati: I  Mouth/Opening: Full  TM distance: > 6 cm  Neck ROM: Full   Respiratory: Auscultation: CTAB     Resp. Rate: Normal     Resp. Effort: Normal      CV: Rhythm: Regular  Rate: Age appropriate  Heart: Normal Sounds  Edema: None   Comments:      Dental: Normal Dentition              Preop Vitals    BP Readings from Last 3 Encounters:   01/07/20 116/64   01/07/20 130/80   09/11/19 119/77    Pulse Readings from Last 3 Encounters:   01/07/20 54   01/07/20 51   09/11/19 74      Resp Readings from Last 3 Encounters:   01/07/20 12   07/12/19 12   06/12/18 16    SpO2 Readings from Last 3 Encounters:   01/07/20 98%   01/07/20 99%   09/11/19 95%      Temp Readings from Last 1 Encounters:   01/07/20 97.9  F (36.6  C) (Oral)    Ht Readings from Last 1 Encounters:   01/07/20 1.66 m (5' 5.35\")      Wt Readings from Last 1 Encounters:   01/07/20 69.9 kg (154 lb)    Estimated body mass index is 25.35 kg/m  as calculated from the following:    Height as of this encounter: 1.66 m (5' 5.35\").    Weight as of this encounter: 69.9 kg (154 lb).       Temp: 97.9  F (36.6  C) Temp src: Oral BP: 116/64 Pulse: 54   Resp: 12 SpO2: 98 %         154 lbs 0 oz  5' 5.354\"   Body mass index is 25.35 kg/m .    Physical Exam  Constitutional: Awake, alert, cooperative, no apparent distress, and appears stated age.  Eyes: Pupils equal, round and reactive to light, extra ocular muscles intact, sclera clear, conjunctiva normal.  HENT: Normocephalic, oral pharynx with moist mucus membranes, good dentition. No goiter appreciated. No removable dental hardware.  Respiratory: Clear to auscultation bilaterally, no crackles or wheezing. No SOB when supine.  Cardiovascular: Regular rate and rhythm, normal S1 and S2, and no murmur noted.  Carotids +2, no bruits. No edema. Palpable pulses to radial, DP and PT arteries.   GI: Normal bowel sounds, soft, non-distended, non-tender, no masses palpated, no hepatomegaly.    Lymph/Hematologic: No " cervical lymphadenopathy and no supraclavicular lymphadenopathy.  Genitourinary:  deferred  Skin: Warm and dry.  No rashes at anticipated surgical site.   Musculoskeletal: Full ROM of neck. There is no redness, warmth, or swelling of the joints. Gross motor strength is normal.    Neurologic: Awake, alert, oriented to name, place and time. Cranial nerves II-XII are grossly intact. Gait is normal. Ambulates from chair to exam table, seats self, lies supine and sits back up w/o assistance.  Neuropsychiatric: Calm, cooperative. Normal affect. Pleasant. Answers questions appropriately, follows commands w/o difficulty.    Labs today: (personally reviewed)  BMP, CBC, UA, T&S  Color Urine Straw     Final 01/07/2020  4:25 PM 1740   Appearance Urine Clear     Final 01/07/2020  4:25 PM 1740   Glucose Urine Negative   NEG^Negative mg/dL Final 01/07/2020  4:25 PM 1740   Bilirubin Urine Negative   NEG^Negative  Final 01/07/2020  4:25 PM 1740   Ketones Urine Negative   NEG^Negative mg/dL Final 01/07/2020  4:25 PM 1740   Specific Gravity Urine 1.004   1.003 - 1.035  Final 01/07/2020  4:25 PM 1740   Blood Urine Negative   NEG^Negative  Final 01/07/2020  4:25 PM 1740   pH Urine 8.0  High   5.0 - 7.0 pH Final 01/07/2020  4:25 PM 1740   Protein Albumin Urine Negative   NEG^Negative mg/dL Final 01/07/2020  4:25 PM 1740   Urobilinogen mg/dL 0.0   0.0 - 2.0 mg/dL Final 01/07/2020  4:25 PM 1740   Nitrite Urine Negative   NEG^Negative  Final 01/07/2020  4:25 PM 1740   Leukocyte Esterase Urine Negative   NEG^Negative  Final 01/07/2020  4:25 PM 1740   Source     Final 01/07/2020  4:25 PM 1740     WBC 7.1   4.0 - 11.0 10e9/L Final 01/07/2020  4:18 PM 1740   RBC Count 4.76   4.4 - 5.9 10e12/L Final 01/07/2020  4:18 PM 1740   Hemoglobin 14.2   13.3 - 17.7 g/dL Final 01/07/2020  4:18 PM 1740   Hematocrit 41.7   40.0 - 53.0 % Final 01/07/2020  4:18 PM 1740   MCV 88   78 - 100 fl Final 01/07/2020  4:18 PM 1740   MCH 29.8   26.5 - 33.0 pg Final  "01/07/2020  4:18 PM 1740   MCHC 34.1   31.5 - 36.5 g/dL Final 01/07/2020  4:18 PM 1740   RDW 12.8   10.0 - 15.0 % Final 01/07/2020  4:18 PM 1740   Platelet Count 230   150 - 450 10e9/L Final 01/07/2020  4:18 PM 1740     Sodium 138   133 - 144 mmol/L Final 01/07/2020  4:18 PM 1740   Potassium 3.7   3.4 - 5.3 mmol/L Final 01/07/2020  4:18 PM 1740   Chloride 105   94 - 109 mmol/L Final 01/07/2020  4:18 PM 1740   Carbon Dioxide 29   20 - 32 mmol/L Final 01/07/2020  4:18 PM 1740   Anion Gap 5   3 - 14 mmol/L Final 01/07/2020  4:18 PM 1740   Glucose 90   70 - 99 mg/dL Final 01/07/2020  4:18 PM 1740   Urea Nitrogen 13   7 - 30 mg/dL Final 01/07/2020  4:18 PM 1740   Creatinine 0.83   0.66 - 1.25 mg/dL Final 01/07/2020  4:18 PM 1740   GFR Estimate >90   >60 mL/min/ Final 01/07/2020  4:18 PM 1740   Comment:   Non  GFR Calc   Starting 12/18/2018, serum creatinine based estimated GFR (eGFR) will be   calculated using the Chronic Kidney Disease Epidemiology Collaboration   (CKD-EPI) equation.    GFR Estimate If Black >90   >60 mL/min/ Final 01/07/2020  4:18 PM 1740   Comment:    GFR Calc   Starting 12/18/2018, serum creatinine based estimated GFR (eGFR) will be   calculated using the Chronic Kidney Disease Epidemiology Collaboration   (CKD-EPI) equation.    Calcium 9.2   8.5 - 10.1 mg/dL Final 01/07/2020  4:18 PM 1740       ASSESSMENT and PLAN  Eugenides \"Inocencio\" Luiz is a 34 year old adult scheduled to undergo Phalloplasty with left thigh flap and right groin flap with Gavin Eastman MD, Octavio Carter MD & Raven Andrea MD on 2/3/20 at [facility] for treatment of Gender dysphoria in adult.    Pt has had prior anesthetic. Type: General and MAC    History of anesthetic complications, panic w/ vicodin postop    He has the following specific operative considerations:   # ALEIDA 1/8 = low risk  # VTE risk: 0.26%  # Risk of PONV score = 2.  If > 2, anti-emetic " intervention recommended.    # Anesthesia considerations:  Refer to PAC assessment in anesthesia records    CARDIAC: METS >4      # RCRI : High risk surgery (>5% cardiac complication risk).  0.9% risk of major adverse cardiac event.       PULMONARY:     # Never smoked    # No asthma or inhaler use    GI: no GERD      /RENAL:     # s/p hysterectomy, mastectomy, laparoscopic trachelectomy    ENDO: BMI 24    # Receiving testosterone     # No DM    ORTHO: full neck ROM, no TMJ    Patient is optimized and is acceptable candidate for the proposed procedure. No further diagnostic evaluation is needed.    Arrival time, NPO, shower and medication instructions provided by nursing staff today.  Preparing For Your Surgery handout given.    Kimber Keyes PA-C  Preoperative Assessment Center  [facility]  Clinic and Surgery Center  Phone: [phone]  Fax: [phone]

## 2020-01-07 NOTE — PATIENT INSTRUCTIONS
Preparing for Your Surgery      Name:  Myrna Dee   MRN:  [id number]   :  1985   Today's Date:  2020     Arriving for surgery:  Surgery date:  2/3/2020  Arrival time:  5:30AM  Please come to:       [facility] [address]  [address] [place]  [address]    - ? parking is available in front of the hospital      -    Please proceed to [address] on the 3rd floor. [phone]?     - ?If you are in need of directions, wheelchair or escort please stop at the Information Desk in the lobby.  Inform the information person that you are here for surgery; a wheelchair and escort to [address] will be provided.?     What can I eat or drink?  -  You may have solid food or milk products until 8 hours prior to your surgery. 2020, 11:30PM  -  You may have water, apple juice or 7up/Sprite until 2 hours prior to your surgery.2/3/2020, 5:30AM    Which medicines can I take?  Stop Aspirin, Multivitamins and supplements one week prior to surgery.  Hold Ibuprofen for 24 hours and/or Naproxen for 48 hours prior to surgery.       How do I prepare myself?  -  Take two showers: one the night before surgery; and one the morning of surgery.         Use Scrubcare or Hibiclens to wash from neck down, leave soap on your skin for up to one minute.  Do not get soap in your eyes or ears.  You may use your own shampoo and conditioner; no other hair products.   -  Do NOT use lotion, powder, deodorant, or antiperspirant the day of your surgery.  -  Do NOT wear jewelry.  - Do not bring your own medications to the hospital, except for inhalers and eye   drops.  -  Bring your ID and insurance card.    Questions or Concerns:  -If you are scheduled on the [facility] or [facility] and have questions or concerns regarding the day of surgery, please call Preadmission Nursing at [phone].     -If you have health changes between today and your surgery please call your surgeon. For questions after  surgery please call your surgeons office.           AFTER YOUR SURGERY  Breathing exercises   Breathing exercises help you recover faster. Take deep breaths and let the air out slowly. This will:     Help you wake up after surgery.    Help prevent complications like pneumonia.  Preventing complications will help you go home sooner.   We may give you a breathing device (incentive spirometer) to encourage you to breathe deeply.   Nausea and vomiting   You may feel sick to your stomach after surgery; if so, let your nurse know.    Pain control:  After surgery, you may have pain. Our goal is to help you manage your pain. Pain medicine will help you feel comfortable enough to do activities that will help you heal.  These activities may include breathing exercises, walking and physical therapy.   To help your health care team treat your pain we will ask: 1) If you have pain  2) where it is located 3) describe your pain in your words  Methods of pain control include medications given by mouth, vein or by nerve block for some surgeries.  Sequential Compression Device (SCD):  You may need to wear SCD S (also called pneumo boots)on your legs or feet. These are wraps connected to a machine that pumps in air and releases it. The repeated pumping helps prevent blood clots from forming.

## 2020-01-07 NOTE — LETTER
"1/7/2020       RE: Myrna Dee  [address]  [facility] [address]     Dear Colleague,    Thank you for referring your patient, Myrna Dee, to the [facility] PLASTIC AND RECONSTRU[facility]IVE SURGERY at [facility]. Please see a copy of my visit note below.    Patient returns for a preoperative visit prior to undergoing phalloplasty for gender dysphoria.     INTERVAL HISTORY: Patient is prepared to undergo phalloplasty.  He understands the staged nature of the surgery.  He has met with Dr. Carter.    PHYSICAL EXAMINATION:  /80 (BP Location: Left arm, Patient Position: Chair, Cuff Size: Adult Regular)   Pulse 51   Ht 1.651 m (5' 5\")   Wt 68.9 kg (152 lb)   SpO2 99%   BMI 25.29 kg/m     Examination was performed presence of a chaperone.  Left thigh hair removal evident.  Right groin hair removal evident.    ASSESSMENT: Gender dysphoria, candidate for ALT and SCIP flap phalloplasty.    PLAN: We discussed the operation in detail today.  I explained the risks to include flap loss, graft loss, bleeding, infection, injury to surrounding structures, loss of sensation, wound healing difficulties, asymmetry, urethral stricture, urethral fistula, and need for revision surgery.  Patient accepts the associated risks and wishes to proceed.    Total time spent with patient was 15 min of which greater than 50% was in counseling.    Again, thank you for allowing me to participate in the care of your patient.      Sincerely,    Gavin Eastman MD      "

## 2020-01-07 NOTE — TELEPHONE ENCOUNTER
FUTURE VISIT INFORMATION      SURGERY INFORMATION:    Date: 2/3/20    Location: [facility] OR    Surgeon:  Gavin Eastman, Raven Lezama    Anesthesia Type:  General    Procedure: Phalloplasty with left thigh flap and right groin flap    Consult: 7/2/19- Gavin Eastman- [facility]    RECORDS REQUESTED FROM:       Primary Care Provider: Clarissa Nelson

## 2020-01-16 ENCOUNTER — TELEPHONE (OUTPATIENT)
Dept: PLASTIC SURGERY | Facility: CLINIC | Age: 35
End: 2020-01-16

## 2020-01-17 ENCOUNTER — TELEPHONE (OUTPATIENT)
Dept: PLASTIC SURGERY | Facility: CLINIC | Age: 35
End: 2020-01-17

## 2020-01-20 ENCOUNTER — TELEPHONE (OUTPATIENT)
Dept: PLASTIC SURGERY | Facility: CLINIC | Age: 35
End: 2020-01-20

## 2020-01-20 ENCOUNTER — MYC MEDICAL ADVICE (OUTPATIENT)
Dept: PLASTIC SURGERY | Facility: CLINIC | Age: 35
End: 2020-01-20

## 2020-01-20 NOTE — TELEPHONE ENCOUNTER
received a message passed onto me, patient wanting to know if surgery has been approved-yes, nurse left m a vm stating it was all approved for dr Eastman

## 2020-01-30 ENCOUNTER — ANESTHESIA EVENT (OUTPATIENT)
Dept: SURGERY | Facility: CLINIC | Age: 35
DRG: 876 | End: 2020-01-30
Payer: COMMERCIAL

## 2020-02-03 ENCOUNTER — ANESTHESIA (OUTPATIENT)
Dept: SURGERY | Facility: CLINIC | Age: 35
DRG: 876 | End: 2020-02-03
Payer: COMMERCIAL

## 2020-02-03 ENCOUNTER — HOSPITAL ENCOUNTER (INPATIENT)
Facility: CLINIC | Age: 35
LOS: 6 days | Discharge: HOME | DRG: 876 | End: 2020-02-09
Attending: PLASTIC SURGERY | Admitting: PLASTIC SURGERY
Payer: COMMERCIAL

## 2020-02-03 DIAGNOSIS — Z98.890 S/P FLAP GRAFT: Primary | ICD-10-CM

## 2020-02-03 DIAGNOSIS — F64.0 GENDER DYSPHORIA IN ADULT: ICD-10-CM

## 2020-02-03 PROBLEM — F64.9 GENDER DYSPHORIA: Status: ACTIVE | Noted: 2020-02-03

## 2020-02-03 LAB
ABO + RH BLD: NORMAL
ABO + RH BLD: NORMAL
BLD GP AB SCN SERPL QL: NORMAL
BLOOD BANK CMNT PATIENT-IMP: NORMAL
BLOOD BANK CMNT PATIENT-IMP: NORMAL
GLUCOSE BLDC GLUCOMTR-MCNC: 83 MG/DL (ref 70–99)
SPECIMEN EXP DATE BLD: NORMAL

## 2020-02-03 PROCEDURE — 25000128 H RX IP 250 OP 636: Performed by: ANESTHESIOLOGY

## 2020-02-03 PROCEDURE — 25000132 ZZH RX MED GY IP 250 OP 250 PS 637: Performed by: STUDENT IN AN ORGANIZED HEALTH CARE EDUCATION/TRAINING PROGRAM

## 2020-02-03 PROCEDURE — 36000070 ZZH SURGERY LEVEL 5 EA 15 ADDTL MIN - UMMC: Performed by: PLASTIC SURGERY

## 2020-02-03 PROCEDURE — 25000131 ZZH RX MED GY IP 250 OP 636 PS 637: Performed by: PLASTIC SURGERY

## 2020-02-03 PROCEDURE — 25000125 ZZHC RX 250: Performed by: NURSE ANESTHETIST, CERTIFIED REGISTERED

## 2020-02-03 PROCEDURE — 36415 COLL VENOUS BLD VENIPUNCTURE: CPT | Performed by: PLASTIC SURGERY

## 2020-02-03 PROCEDURE — 40000170 ZZH STATISTIC PRE-PROCEDURE ASSESSMENT II: Performed by: PLASTIC SURGERY

## 2020-02-03 PROCEDURE — 25000565 ZZH ISOFLURANE, EA 15 MIN: Performed by: PLASTIC SURGERY

## 2020-02-03 PROCEDURE — 0W4N071 CREATION OF PENIS IN FEMALE PERINEUM WITH AUTOLOGOUS TISSUE SUBSTITUTE, OPEN APPROACH: ICD-10-PCS | Performed by: PLASTIC SURGERY

## 2020-02-03 PROCEDURE — 00000146 ZZHCL STATISTIC GLUCOSE BY METER IP

## 2020-02-03 PROCEDURE — 25000128 H RX IP 250 OP 636: Performed by: PLASTIC SURGERY

## 2020-02-03 PROCEDURE — 25800030 ZZH RX IP 258 OP 636: Performed by: ANESTHESIOLOGY

## 2020-02-03 PROCEDURE — 71000014 ZZH RECOVERY PHASE 1 LEVEL 2 FIRST HR: Performed by: PLASTIC SURGERY

## 2020-02-03 PROCEDURE — 25000125 ZZHC RX 250: Performed by: PLASTIC SURGERY

## 2020-02-03 PROCEDURE — 12000001 ZZH R&B MED SURG/OB UMMC

## 2020-02-03 PROCEDURE — 80048 BASIC METABOLIC PNL TOTAL CA: CPT | Performed by: PLASTIC SURGERY

## 2020-02-03 PROCEDURE — 36000068 ZZH SURGERY LEVEL 5 1ST 30 MIN - UMMC: Performed by: PLASTIC SURGERY

## 2020-02-03 PROCEDURE — 85027 COMPLETE CBC AUTOMATED: CPT | Performed by: PLASTIC SURGERY

## 2020-02-03 PROCEDURE — 25800030 ZZH RX IP 258 OP 636: Performed by: NURSE ANESTHETIST, CERTIFIED REGISTERED

## 2020-02-03 PROCEDURE — 27810169 ZZH OR IMPLANT GENERAL: Performed by: PLASTIC SURGERY

## 2020-02-03 PROCEDURE — 37000009 ZZH ANESTHESIA TECHNICAL FEE, EACH ADDTL 15 MIN: Performed by: PLASTIC SURGERY

## 2020-02-03 PROCEDURE — 25000128 H RX IP 250 OP 636: Performed by: NURSE ANESTHETIST, CERTIFIED REGISTERED

## 2020-02-03 PROCEDURE — 40000014 ZZH STATISTIC ARTERIAL MONITORING DAILY

## 2020-02-03 PROCEDURE — 37000008 ZZH ANESTHESIA TECHNICAL FEE, 1ST 30 MIN: Performed by: PLASTIC SURGERY

## 2020-02-03 PROCEDURE — 71000015 ZZH RECOVERY PHASE 1 LEVEL 2 EA ADDTL HR: Performed by: PLASTIC SURGERY

## 2020-02-03 PROCEDURE — 25800030 ZZH RX IP 258 OP 636: Performed by: STUDENT IN AN ORGANIZED HEALTH CARE EDUCATION/TRAINING PROGRAM

## 2020-02-03 PROCEDURE — 27210794 ZZH OR GENERAL SUPPLY STERILE: Performed by: PLASTIC SURGERY

## 2020-02-03 PROCEDURE — 40000275 ZZH STATISTIC RCP TIME EA 10 MIN

## 2020-02-03 DEVICE — IMPLANTABLE DEVICE: Type: IMPLANTABLE DEVICE | Site: GROIN | Status: FUNCTIONAL

## 2020-02-03 RX ORDER — PROCHLORPERAZINE MALEATE 5 MG
10 TABLET ORAL EVERY 6 HOURS PRN
Status: DISCONTINUED | OUTPATIENT
Start: 2020-02-03 | End: 2020-02-09 | Stop reason: HOSPADM

## 2020-02-03 RX ORDER — PROPOFOL 10 MG/ML
INJECTION, EMULSION INTRAVENOUS PRN
Status: DISCONTINUED | OUTPATIENT
Start: 2020-02-03 | End: 2020-02-03

## 2020-02-03 RX ORDER — DEXAMETHASONE SODIUM PHOSPHATE 4 MG/ML
INJECTION, SOLUTION INTRA-ARTICULAR; INTRALESIONAL; INTRAMUSCULAR; INTRAVENOUS; SOFT TISSUE PRN
Status: DISCONTINUED | OUTPATIENT
Start: 2020-02-03 | End: 2020-02-03

## 2020-02-03 RX ORDER — INDOCYANINE GREEN AND WATER 25 MG
KIT INJECTION PRN
Status: DISCONTINUED | OUTPATIENT
Start: 2020-02-03 | End: 2020-02-03

## 2020-02-03 RX ORDER — HYDROMORPHONE HYDROCHLORIDE 1 MG/ML
.3-.5 INJECTION, SOLUTION INTRAMUSCULAR; INTRAVENOUS; SUBCUTANEOUS
Status: DISCONTINUED | OUTPATIENT
Start: 2020-02-03 | End: 2020-02-09 | Stop reason: HOSPADM

## 2020-02-03 RX ORDER — ONDANSETRON 4 MG/1
4 TABLET, ORALLY DISINTEGRATING ORAL EVERY 30 MIN PRN
Status: DISCONTINUED | OUTPATIENT
Start: 2020-02-03 | End: 2020-02-03 | Stop reason: HOSPADM

## 2020-02-03 RX ORDER — SODIUM CHLORIDE, SODIUM LACTATE, POTASSIUM CHLORIDE, CALCIUM CHLORIDE 600; 310; 30; 20 MG/100ML; MG/100ML; MG/100ML; MG/100ML
INJECTION, SOLUTION INTRAVENOUS CONTINUOUS
Status: DISCONTINUED | OUTPATIENT
Start: 2020-02-03 | End: 2020-02-03 | Stop reason: HOSPADM

## 2020-02-03 RX ORDER — ACETAMINOPHEN 325 MG/1
650 TABLET ORAL EVERY 4 HOURS PRN
Status: DISCONTINUED | OUTPATIENT
Start: 2020-02-06 | End: 2020-02-09 | Stop reason: HOSPADM

## 2020-02-03 RX ORDER — AMOXICILLIN 250 MG
1 CAPSULE ORAL 2 TIMES DAILY
Status: DISCONTINUED | OUTPATIENT
Start: 2020-02-03 | End: 2020-02-09 | Stop reason: HOSPADM

## 2020-02-03 RX ORDER — CEFAZOLIN SODIUM 1 G/3ML
1 INJECTION, POWDER, FOR SOLUTION INTRAMUSCULAR; INTRAVENOUS SEE ADMIN INSTRUCTIONS
Status: DISCONTINUED | OUTPATIENT
Start: 2020-02-03 | End: 2020-02-03 | Stop reason: HOSPADM

## 2020-02-03 RX ORDER — HYDROMORPHONE HYDROCHLORIDE 1 MG/ML
.3-.5 INJECTION, SOLUTION INTRAMUSCULAR; INTRAVENOUS; SUBCUTANEOUS EVERY 5 MIN PRN
Status: DISCONTINUED | OUTPATIENT
Start: 2020-02-03 | End: 2020-02-03 | Stop reason: HOSPADM

## 2020-02-03 RX ORDER — NALOXONE HYDROCHLORIDE 0.4 MG/ML
.1-.4 INJECTION, SOLUTION INTRAMUSCULAR; INTRAVENOUS; SUBCUTANEOUS
Status: DISCONTINUED | OUTPATIENT
Start: 2020-02-03 | End: 2020-02-04

## 2020-02-03 RX ORDER — DEXTROSE MONOHYDRATE, SODIUM CHLORIDE, AND POTASSIUM CHLORIDE 50; .745; 4.5 G/1000ML; G/1000ML; G/1000ML
INJECTION, SOLUTION INTRAVENOUS CONTINUOUS
Status: DISCONTINUED | OUTPATIENT
Start: 2020-02-03 | End: 2020-02-04

## 2020-02-03 RX ORDER — ONDANSETRON 2 MG/ML
4 INJECTION INTRAMUSCULAR; INTRAVENOUS EVERY 6 HOURS PRN
Status: DISCONTINUED | OUTPATIENT
Start: 2020-02-03 | End: 2020-02-09 | Stop reason: HOSPADM

## 2020-02-03 RX ORDER — LIDOCAINE HYDROCHLORIDE 20 MG/ML
INJECTION, SOLUTION INFILTRATION; PERINEURAL PRN
Status: DISCONTINUED | OUTPATIENT
Start: 2020-02-03 | End: 2020-02-03

## 2020-02-03 RX ORDER — FENTANYL CITRATE 50 UG/ML
INJECTION, SOLUTION INTRAMUSCULAR; INTRAVENOUS PRN
Status: DISCONTINUED | OUTPATIENT
Start: 2020-02-03 | End: 2020-02-03

## 2020-02-03 RX ORDER — ONDANSETRON 2 MG/ML
INJECTION INTRAMUSCULAR; INTRAVENOUS PRN
Status: DISCONTINUED | OUTPATIENT
Start: 2020-02-03 | End: 2020-02-03

## 2020-02-03 RX ORDER — ACETAMINOPHEN 325 MG/1
975 TABLET ORAL EVERY 8 HOURS
Status: COMPLETED | OUTPATIENT
Start: 2020-02-03 | End: 2020-02-06

## 2020-02-03 RX ORDER — ONDANSETRON 2 MG/ML
4 INJECTION INTRAMUSCULAR; INTRAVENOUS EVERY 30 MIN PRN
Status: DISCONTINUED | OUTPATIENT
Start: 2020-02-03 | End: 2020-02-03 | Stop reason: HOSPADM

## 2020-02-03 RX ORDER — NALOXONE HYDROCHLORIDE 0.4 MG/ML
.1-.4 INJECTION, SOLUTION INTRAMUSCULAR; INTRAVENOUS; SUBCUTANEOUS
Status: DISCONTINUED | OUTPATIENT
Start: 2020-02-03 | End: 2020-02-09 | Stop reason: HOSPADM

## 2020-02-03 RX ORDER — OXYCODONE HYDROCHLORIDE 5 MG/1
5-10 TABLET ORAL
Status: DISCONTINUED | OUTPATIENT
Start: 2020-02-03 | End: 2020-02-09 | Stop reason: HOSPADM

## 2020-02-03 RX ORDER — FENTANYL CITRATE 50 UG/ML
25-50 INJECTION, SOLUTION INTRAMUSCULAR; INTRAVENOUS
Status: DISCONTINUED | OUTPATIENT
Start: 2020-02-03 | End: 2020-02-03 | Stop reason: HOSPADM

## 2020-02-03 RX ORDER — EPHEDRINE SULFATE 50 MG/ML
INJECTION, SOLUTION INTRAMUSCULAR; INTRAVENOUS; SUBCUTANEOUS PRN
Status: DISCONTINUED | OUTPATIENT
Start: 2020-02-03 | End: 2020-02-03

## 2020-02-03 RX ORDER — HEPARIN SODIUM 1000 [USP'U]/ML
INJECTION, SOLUTION INTRAVENOUS; SUBCUTANEOUS PRN
Status: DISCONTINUED | OUTPATIENT
Start: 2020-02-03 | End: 2020-02-03 | Stop reason: HOSPADM

## 2020-02-03 RX ORDER — ONDANSETRON 4 MG/1
4 TABLET, ORALLY DISINTEGRATING ORAL EVERY 6 HOURS PRN
Status: DISCONTINUED | OUTPATIENT
Start: 2020-02-03 | End: 2020-02-09 | Stop reason: HOSPADM

## 2020-02-03 RX ORDER — LIDOCAINE 40 MG/G
CREAM TOPICAL
Status: DISCONTINUED | OUTPATIENT
Start: 2020-02-03 | End: 2020-02-09 | Stop reason: HOSPADM

## 2020-02-03 RX ORDER — PAPAVERINE HYDROCHLORIDE 30 MG/ML
INJECTION INTRAMUSCULAR; INTRAVENOUS PRN
Status: DISCONTINUED | OUTPATIENT
Start: 2020-02-03 | End: 2020-02-03 | Stop reason: HOSPADM

## 2020-02-03 RX ORDER — LIDOCAINE 40 MG/G
CREAM TOPICAL
Status: DISCONTINUED | OUTPATIENT
Start: 2020-02-03 | End: 2020-02-03 | Stop reason: HOSPADM

## 2020-02-03 RX ORDER — SODIUM CHLORIDE, SODIUM LACTATE, POTASSIUM CHLORIDE, CALCIUM CHLORIDE 600; 310; 30; 20 MG/100ML; MG/100ML; MG/100ML; MG/100ML
INJECTION, SOLUTION INTRAVENOUS CONTINUOUS PRN
Status: DISCONTINUED | OUTPATIENT
Start: 2020-02-03 | End: 2020-02-03

## 2020-02-03 RX ORDER — METHOCARBAMOL 750 MG/1
750 TABLET, FILM COATED ORAL 4 TIMES DAILY PRN
Status: DISCONTINUED | OUTPATIENT
Start: 2020-02-03 | End: 2020-02-09 | Stop reason: HOSPADM

## 2020-02-03 RX ORDER — CEFAZOLIN SODIUM 2 G/100ML
2 INJECTION, SOLUTION INTRAVENOUS
Status: COMPLETED | OUTPATIENT
Start: 2020-02-03 | End: 2020-02-03

## 2020-02-03 RX ORDER — MINERAL OIL
OIL (ML) MISCELLANEOUS PRN
Status: DISCONTINUED | OUTPATIENT
Start: 2020-02-03 | End: 2020-02-03 | Stop reason: HOSPADM

## 2020-02-03 RX ORDER — AMOXICILLIN 250 MG
2 CAPSULE ORAL 2 TIMES DAILY
Status: DISCONTINUED | OUTPATIENT
Start: 2020-02-03 | End: 2020-02-09 | Stop reason: HOSPADM

## 2020-02-03 RX ADMIN — Medication 10 MG: at 07:52

## 2020-02-03 RX ADMIN — POTASSIUM CHLORIDE, DEXTROSE MONOHYDRATE AND SODIUM CHLORIDE: 75; 5; 450 INJECTION, SOLUTION INTRAVENOUS at 19:44

## 2020-02-03 RX ADMIN — ACETAMINOPHEN 975 MG: 325 TABLET, FILM COATED ORAL at 19:44

## 2020-02-03 RX ADMIN — INDOCYANINE GREEN 12.5 MG: KIT INTRAVENOUS at 11:47

## 2020-02-03 RX ADMIN — Medication 5 MG: at 13:03

## 2020-02-03 RX ADMIN — FENTANYL CITRATE 50 MCG: 50 INJECTION, SOLUTION INTRAMUSCULAR; INTRAVENOUS at 11:58

## 2020-02-03 RX ADMIN — SODIUM CHLORIDE, POTASSIUM CHLORIDE, SODIUM LACTATE AND CALCIUM CHLORIDE: 600; 310; 30; 20 INJECTION, SOLUTION INTRAVENOUS at 08:15

## 2020-02-03 RX ADMIN — FENTANYL CITRATE 250 MCG: 50 INJECTION, SOLUTION INTRAMUSCULAR; INTRAVENOUS at 07:52

## 2020-02-03 RX ADMIN — MIDAZOLAM 2 MG: 1 INJECTION INTRAMUSCULAR; INTRAVENOUS at 07:41

## 2020-02-03 RX ADMIN — CEFAZOLIN SODIUM 2 G: 2 INJECTION, SOLUTION INTRAVENOUS at 07:59

## 2020-02-03 RX ADMIN — CEFAZOLIN 1 G: 1 INJECTION, POWDER, FOR SOLUTION INTRAMUSCULAR; INTRAVENOUS at 12:00

## 2020-02-03 RX ADMIN — ROCURONIUM BROMIDE 20 MG: 10 INJECTION INTRAVENOUS at 12:23

## 2020-02-03 RX ADMIN — LIDOCAINE HYDROCHLORIDE 100 MG: 20 INJECTION, SOLUTION INFILTRATION; PERINEURAL at 07:52

## 2020-02-03 RX ADMIN — SUGAMMADEX 200 MG: 100 INJECTION, SOLUTION INTRAVENOUS at 15:28

## 2020-02-03 RX ADMIN — HYDROMORPHONE HYDROCHLORIDE 0.3 MG: 1 INJECTION, SOLUTION INTRAMUSCULAR; INTRAVENOUS; SUBCUTANEOUS at 16:49

## 2020-02-03 RX ADMIN — CEFAZOLIN 1 G: 1 INJECTION, POWDER, FOR SOLUTION INTRAMUSCULAR; INTRAVENOUS at 10:00

## 2020-02-03 RX ADMIN — ROCURONIUM BROMIDE 20 MG: 10 INJECTION INTRAVENOUS at 09:47

## 2020-02-03 RX ADMIN — CEFAZOLIN 1 G: 1 INJECTION, POWDER, FOR SOLUTION INTRAMUSCULAR; INTRAVENOUS at 14:00

## 2020-02-03 RX ADMIN — FENTANYL CITRATE 50 MCG: 50 INJECTION, SOLUTION INTRAMUSCULAR; INTRAVENOUS at 10:13

## 2020-02-03 RX ADMIN — PROPOFOL 200 MG: 10 INJECTION, EMULSION INTRAVENOUS at 07:52

## 2020-02-03 RX ADMIN — ROCURONIUM BROMIDE 20 MG: 10 INJECTION INTRAVENOUS at 10:45

## 2020-02-03 RX ADMIN — SODIUM CHLORIDE, POTASSIUM CHLORIDE, SODIUM LACTATE AND CALCIUM CHLORIDE: 600; 310; 30; 20 INJECTION, SOLUTION INTRAVENOUS at 07:41

## 2020-02-03 RX ADMIN — HYDROMORPHONE HYDROCHLORIDE 0.5 MG: 1 INJECTION, SOLUTION INTRAMUSCULAR; INTRAVENOUS; SUBCUTANEOUS at 14:50

## 2020-02-03 RX ADMIN — ROCURONIUM BROMIDE 70 MG: 10 INJECTION INTRAVENOUS at 07:53

## 2020-02-03 RX ADMIN — FENTANYL CITRATE 50 MCG: 50 INJECTION, SOLUTION INTRAMUSCULAR; INTRAVENOUS at 15:36

## 2020-02-03 RX ADMIN — ONDANSETRON 4 MG: 2 INJECTION INTRAMUSCULAR; INTRAVENOUS at 15:17

## 2020-02-03 RX ADMIN — ROCURONIUM BROMIDE 20 MG: 10 INJECTION INTRAVENOUS at 14:45

## 2020-02-03 RX ADMIN — ROCURONIUM BROMIDE 20 MG: 10 INJECTION INTRAVENOUS at 14:00

## 2020-02-03 RX ADMIN — ROCURONIUM BROMIDE 30 MG: 10 INJECTION INTRAVENOUS at 08:47

## 2020-02-03 RX ADMIN — FENTANYL CITRATE 50 MCG: 50 INJECTION, SOLUTION INTRAMUSCULAR; INTRAVENOUS at 11:02

## 2020-02-03 RX ADMIN — FENTANYL CITRATE 50 MCG: 50 INJECTION, SOLUTION INTRAMUSCULAR; INTRAVENOUS at 16:09

## 2020-02-03 RX ADMIN — SENNOSIDES AND DOCUSATE SODIUM 1 TABLET: 8.6; 5 TABLET ORAL at 19:44

## 2020-02-03 RX ADMIN — ROCURONIUM BROMIDE 20 MG: 10 INJECTION INTRAVENOUS at 13:19

## 2020-02-03 RX ADMIN — HYDROMORPHONE HYDROCHLORIDE 0.5 MG: 1 INJECTION, SOLUTION INTRAMUSCULAR; INTRAVENOUS; SUBCUTANEOUS at 15:42

## 2020-02-03 RX ADMIN — DEXAMETHASONE SODIUM PHOSPHATE 6 MG: 4 INJECTION, SOLUTION INTRA-ARTICULAR; INTRALESIONAL; INTRAMUSCULAR; INTRAVENOUS; SOFT TISSUE at 07:59

## 2020-02-03 RX ADMIN — PROPOFOL 30 MG: 10 INJECTION, EMULSION INTRAVENOUS at 15:36

## 2020-02-03 RX ADMIN — ROCURONIUM BROMIDE 20 MG: 10 INJECTION INTRAVENOUS at 11:45

## 2020-02-03 ASSESSMENT — ACTIVITIES OF DAILY LIVING (ADL)
TOILETING: 0-->INDEPENDENT
BATHING: 0-->INDEPENDENT
DRESS: 0-->INDEPENDENT
RETIRED_EATING: 0-->INDEPENDENT
SWALLOWING: 0-->SWALLOWS FOODS/LIQUIDS WITHOUT DIFFICULTY
AMBULATION: 0-->INDEPENDENT
ADLS_ACUITY_SCORE: 14
COGNITION: 0 - NO COGNITION ISSUES REPORTED
FALL_HISTORY_WITHIN_LAST_SIX_MONTHS: NO
RETIRED_COMMUNICATION: 0-->UNDERSTANDS/COMMUNICATES WITHOUT DIFFICULTY
TRANSFERRING: 0-->INDEPENDENT
PRIOR_FUNCTIONAL_LEVEL_COMMENT: INDEPENDENT

## 2020-02-03 ASSESSMENT — MIFFLIN-ST. JEOR: SCORE: 1553.88

## 2020-02-03 ASSESSMENT — PAIN DESCRIPTION - DESCRIPTORS
DESCRIPTORS: SORE

## 2020-02-03 NOTE — ANESTHESIA POSTPROCEDURE EVALUATION
Anesthesia POST Procedure Evaluation    Patient: Myrna Dee   MRN:     [id number] Gender:   adult   Age:    34 year old :      1985        Preoperative Diagnosis: Gender dysphoria in adult [F64.0]   Procedure(s):  Phalloplasty  with left thigh flap and right groin flap   Postop Comments: No value filed.       Anesthesia Type:  No value filed.  No value filed.    Reportable Event: NO     PAIN: Uncomplicated   Sign Out status: Comfortable, Well controlled pain     PONV: No PONV   Sign Out status:  No Nausea or Vomiting     Neuro/Psych: Uneventful perioperative course   Sign Out Status: Preoperative baseline; Age appropriate mentation     Airway/Resp.: Uneventful perioperative course   Sign Out Status: Non labored breathing, age appropriate RR; Resp. Status within EXPECTED Parameters     CV: Uneventful perioperative course   Sign Out status: Appropriate BP and perfusion indices; Appropriate HR/Rhythm     Disposition:   Sign Out in:  PACU  Disposition:  Floor  Recovery Course: Uneventful  Follow-Up: Not required           Last Anesthesia Record Vitals:  CRNA VITALS  2/3/2020 1543 - 2/3/2020 1636      2/3/2020             NIBP:  (!) 139/103    NIBP Mean:  111    Ht Rate:  105    SpO2:  100 %          Last PACU Vitals:  Vitals Value Taken Time   BP     Temp     Pulse     Resp     SpO2 99 % 2/3/2020  4:35 PM   Temp src     NIBP 139/103 2/3/2020  4:17 PM   Pulse     SpO2 100 % 2/3/2020  4:17 PM   Resp     Temp     Ht Rate 105 2/3/2020  4:17 PM   Temp 2     Vitals shown include unvalidated device data.      Electronically Signed By: Ranjan Crum MD, February 3, 2020, 4:36 PM

## 2020-02-03 NOTE — ANESTHESIA CARE TRANSFER NOTE
Patient: Myrna Dee    Procedure(s):  Phalloplasty  with left thigh flap and right groin flap    Diagnosis: Gender dysphoria in adult [F64.0]  Diagnosis Additional Information: No value filed.    Anesthesia Type:   No value filed.     Note:  Airway :Face Mask  Patient transferred to:PACU  Comments: Pt alert, breathing spontaneously on 6L O2 via FM. VSS. Report shared with RN.Handoff Report: Identifed the Patient, Identified the Reponsible Provider, Reviewed the pertinent medical history, Discussed the surgical course, Reviewed Intra-OP anesthesia mangement and issues during anesthesia, Set expectations for post-procedure period and Allowed opportunity for questions and acknowledgement of understanding      Vitals: (Last set prior to Anesthesia Care Transfer)    CRNA VITALS  2/3/2020 1543 - 2/3/2020 1616      2/3/2020             Resp Rate (observed):  (!) 1                Electronically Signed By: DENISE Butler CRNA  February 3, 2020  4:16 PM

## 2020-02-03 NOTE — OP NOTE
"OPERATIVE REPORT     PREOPERATIVE DIAGNOSIS: Gender Dysphoria     POSTOPERATIVE DIAGNOSIS: Gender Dysphoria     DATE OF SURGERY: 2/3/2020     PROCEDURES PERFORMED:   1. Anterior Urethroplasty - One Stage (Male)  2. Clitoroplasty  3. Adjacent Tissue Transfer Rearrangement (6 x 3 cm bilaterally to create labia minora flaps for urethral flap insetting and preparation for future second stage urethroplasty)      SURGEON: Octavio Carter MD     COSURGEON: Gavin Eastman MD (Cosurgeon needed due to lack of qualified, appropriate level resident assistance, complexity of the procedure spanning the expertise of two separate specialties.)     ASSISTANT: None     ANESTHESIA: General     EBL: 100mL     DRAINS:   1. 16 Fr silicone straight cath (cut short and sutured in place used as urethral stent)  2. 16 Fr urethral catheter to native female urethral meatus into bladder - draining  3. 15 Fr KENNEDY drain into SCIP harvest     COMPLICATIONS: None     INDICATIONS:  Eugenides \"Inocencio\" Luiz is a 34 year old male with gender dysphoria. He meets WPATH guidelines for gender affirming surgery. He met with Dr. Eastman and me to discuss phalloplasty. His goals include a masculine appearing phallus and scrotum, voiding while standing, and penetrative intercourse. He was counseled on flap options and ultimately, the decision was made to undergo a double flap phalloplasty with SCIP and ALT flap. We discussed a staged approach to phalloplasty with the first stage (today's case) as tissue transfer of the flap and preparation of the urethra for later closure. He understands that after today's case, our goal would be to provide a neophallus but not excise the female genitalia or completely connect the entirety of the urethra to the tip of the neophallus. The risks, benefits and alternatives of the multiple treatment options were discussed with him and he elected to proceed. Specifically, this included, but was not limited to: wound infection, poor " cosmetic result, flap loss, nerve damage, anesthesia risks, blood clots, poor sensation, urethral stricture/fistula or urethral hair, and penis of inadequate size.      DESCRIPTION OF PROCEDURE:   After informed consent was obtained and preoperative antibiotics were given, the patient was taken to the operating room, placed supine on the operating table. SCDs and preoperative subcutaneous lovenox were utilized for VTE prophylaxis. General anesthesia was induced and he was intubated. The patient was placed supine. The genitalia and lower abdomen were prepped and draped in a sterile fashion. A timeout was performed. A Mcintyre was placed in the native urethra. 10ml placed in the balloon.     At the start of the case, the plastics team harvested a right SCIP flap. Please see their dictation for full operative details. Once they were nearing completion of flap harvest, I entered the room. During my portion of the case, the plastics team harvested a left ALT flap.     A transverse incision was made over the pubic symphysis, which would be the later landing zone for the neophallus. The body of the clitoris was dissected through this incision. The right clitoral nerve was identified and encircled.      The previously harvested right SCIP was examined. I performed the anterior male urethroplasty at this point independently. There was a ~4.5cm diameter neourethral flap present with a de-epithelialized strip to the rest of the flap. This 4.5cm neourethra was then closed over a 14 Fr straight silicone catheter using 4-0 interrupted PDS suture in a meticulous fashion. Saline was instilled through the urethra to ensure no leaks. A second layer of coverage was performed using multiple runs of 4-0 Vicryl suture over the entire neourethral closure using local subcutaneous tissue to prevent fistula formation. The first suture line was completely buried at this point.     Next, Dr. Eastman and I turned our attention to the clitoroplasty. An  incision was made around the head of the clitoris. Sharp dissection was used to free the clitoris completely from surrounding tissues with care to ensure not to injure the urethral meatus or excise tissue that would be used for future urethral closure. Once the clitoris was completely free, the clitoris was de-epithelialized sharply to allow for burying underneath the phallus. The clitoris was passed into the infrapubic incision and tacked loosely, slightly to the left, to the inferior aspect of the pubis using PDS suture. This buried the clitoris at the middle of the base of the future phallus. This completed the clitoroplasty.    The proximal end of the neourethra underneath the patient's clitoris was tunneled to the midline labia minora just inferior to the head of the clitoris, which corresponded to the location that previously housed the clitoris. We extended the clitoral aperture inferiorly to match the proximal trip of the urethral flap lumen size. The proximal tip of the urethra was passed through this window. 4-0 Vicryl interrupted suture was used to mature the proximal neourethra.    We then performed a local tissue flap tissue rearrangement bilaterally using local tissue flaps of labia minora because the labia minora was redundant. We did not excise labia minora as to allow for future layers of closure during second stage reconstruction. Using 4-0 interrupted Vicryl suture, we performed this closure in a manner that did not obstruct the proximal aspect of the neophallus urethral flap and would allow for future closure of the urethra at his second stage urethral closure surgery. These labia minora flaps measured 6 x 3cm each bilaterally. Dr. Eastman did this on one side while I did the other side to decrease operative time.    Next, the ALT was transposed to the genital field through a tunnel on the left. The performance of the clitoroplasty and urethral maturation left a defect in anterior fourchette of the  vagina which required closure.      The outer flap was then closed over the inner flap using interrupted 2-0 and 3-0 Monocryl. I then completed the urethroplasty by maturing inner flap edges the outer flap edges at the distal tip of the neophallus using absorbable suture.      The urethral stent was left in place through this opening to allow for the neophallus urethra to heal in a tubular fashion.     Plastics team performed nerve coaptation from the left ALT flap nerves to the right clitoral nerve and the left ilioinguinal nerve.      A 15 Upper sorbian KENNEDY drain was placed into the right groin into the SCIP harvest bed.  Incisions were then closed in layers.     The circumferential neophallus base incision and the right inguinal incision were then closed using absorbable sutures. They performed coverage to the left thigh. Drains and dressings were placed by the plastic surgery team.     As attending surgeon, MARISSA, Octavio Carter MD, was present for critical portions of the procedure where I was primary or cosurgeon. For all other portions of the surgery, MARISSA was immediately available.

## 2020-02-03 NOTE — BRIEF OP NOTE
[facility], [place]    Brief Operative Note    Pre-operative diagnosis: Gender dysphoria in adult [F64.0]  Post-operative diagnosis Same as pre-operative diagnosis    Procedure: Procedure(s):  Phalloplasty  with left thigh flap and right groin flap  Surgeon: Surgeon(s) and Role:     * Gavin Eastman MD - Primary     * ZARA Andrea MD - Assisting     * Octavio Carter MD - Assisting     * Martha Wilson MD - Resident - Assisting     * Rocky Mata MD - Resident - Assisting     * Capo Caldwell MD - Resident - Assisting  Anesthesia: General   Estimated blood loss: 100 ml  Drains: Brad-Downs  Specimens: * No specimens in log *  Findings:   See operative note  Complications: None.  Implants:   Implant Name Type Inv. Item Serial No.  Lot No. LRB No. Used   AxoGuard Nerve Connector    [employer] [id number] Bilateral 1       Plan:  - admit to plastics  - q4 flap checks  - bed rest, hip flex to ONLY 30 degrees  - Mcintyre  - CLD only for tonight  - labs in AM  - keep neopenis upright at all times

## 2020-02-03 NOTE — ANESTHESIA PREPROCEDURE EVALUATION
Anesthesia Pre-Procedure Evaluation    Patient: Myrna Dee   MRN:     [id number] Gender:   adult   Age:    34 year old :      1985        Preoperative Diagnosis: Gender dysphoria in adult [F64.0]   Procedure(s):  Phalloplasty  with left thigh flap and right groin flap     Past Medical History:   Diagnosis Date     Aseptic meningitis      Complication of anesthesia     HX slow wake up,HX panic attacks with vicodin postop     Gender dysphoria in adolescent and adult      High risk HPV infection      Papanicolaou smear of cervix with atypical squamous cells cannot exclude high grade squamous intraepithelial lesion (ASC-H)       Past Surgical History:   Procedure Laterality Date     CYSTOSCOPY N/A 2019    Procedure: Cystoscopy;  Surgeon: Myesha Hernández MD;  Location: [facility] OR     DAVINCI LAPAROSCOPIC CERVICECTOMY (TRACHELECTOMY) N/A 2019    Procedure: Davinci Laparoscopic Trachelectomy;  Surgeon: Myesha Hernández MD;  Location: [facility] OR     HYSTERECTOMY      and oophorectomy, but still has cervix and will require pap every 3 years, At [facility]     MASTECTOMY PARTIAL      chest surgery w/ contouring                J[facility] AN PHYSICAL EXAM    LABS:  CBC:   Lab Results   Component Value Date    WBC 7.1 2020    HGB 14.2 2020    HGB 13.3 2019    HCT 41.7 2020    HCT 40.2 2015     2020     BMP:   Lab Results   Component Value Date     2020    .0 2011    POTASSIUM 3.7 2020    POTASSIUM 4.7 2011    CHLORIDE 105 2020    CHLORIDE 106.0 2011    CO2 29 2020    CO2 28.0 2011    BUN 13 2020    BUN 11.0 2011    CR 0.83 2020    CR 0.8 2011    GLC 90 2020    GLC 73.0 2019     COAGS:   Lab Results   Component Value Date    PTT 30 2011    INR 1.08 2011     POC:   Lab Results   Component Value Date    BGM 83 2020     OTHER:   Lab Results   Component Value  "Date    ADELAIDE 9.2 01/07/2020    PHOS 3.4 04/01/2011    ALBUMIN 4.4 09/03/2013    PROTTOTAL 7.5 08/23/2019    ALT 9.2 08/23/2019    AST 17.6 08/23/2019    ALKPHOS 47.8 08/23/2019    BILITOTAL 1.6 (H) 08/23/2019    TSH 1.75 10/05/2009        Preop Vitals    BP Readings from Last 3 Encounters:   02/03/20 (!) 139/103   01/07/20 116/64   01/07/20 130/80    Pulse Readings from Last 3 Encounters:   02/03/20 (!) 45   01/07/20 54   01/07/20 51      Resp Readings from Last 3 Encounters:   02/03/20 11   01/07/20 12   07/12/19 12    SpO2 Readings from Last 3 Encounters:   02/03/20 100%   01/07/20 98%   01/07/20 99%      Temp Readings from Last 1 Encounters:   02/03/20 36.7  C (98.1  F) (Oral)    Ht Readings from Last 1 Encounters:   02/03/20 1.651 m (5' 5\")      Wt Readings from Last 1 Encounters:   02/03/20 68.7 kg (151 lb 7.3 oz)    Estimated body mass index is 25.2 kg/m  as calculated from the following:    Height as of this encounter: 1.651 m (5' 5\").    Weight as of this encounter: 68.7 kg (151 lb 7.3 oz).     LDA:  Peripheral IV 02/03/20 Right Hand (Active)   Number of days: 0       Peripheral IV 02/03/20 Left Hand (Active)   Number of days: 0       Closed/Suction Drain 1 Right Hip Bulb 15 Citizen of [place] (Active)   Number of days: 0       Closed/Suction Drain Left Groin Bulb 15 Citizen [place] (Active)   Number of days: 0       Open Drain Other (Comment) 16 Citizen [place] (Active)   Number of days: 0       Negative Pressure Wound Therapy Leg Upper (Active)   Number of days: 0        Assessment:   ASA SCORE: 1    H&P: History and physical reviewed and following examination; no interval change.   Smoking Status:  Non-Smoker/Unknown   NPO Status: NPO Appropriate     Plan:   Anes. Type:  General   Pre-Medication: None   Induction:  IV (Standard)   Airway: ETT; Oral   Access/Monitoring: PIV   Maintenance: Balanced     Postop Plan:   Postop Pain: Opioids  Postop Sedation/Airway: Not planned     PONV Management:   Adult Risk Factors:, Non-Smoker, Postop " Opioids   Prevention: Ondansetron     CONSENT: Direct conversation                         Ranjan Crum MD

## 2020-02-04 LAB — GLUCOSE BLDC GLUCOMTR-MCNC: 108 MG/DL (ref 70–99)

## 2020-02-04 PROCEDURE — 36415 COLL VENOUS BLD VENIPUNCTURE: CPT | Performed by: PLASTIC SURGERY

## 2020-02-04 PROCEDURE — 80048 BASIC METABOLIC PNL TOTAL CA: CPT | Performed by: PLASTIC SURGERY

## 2020-02-04 PROCEDURE — 12000001 ZZH R&B MED SURG/OB UMMC

## 2020-02-04 PROCEDURE — 25000132 ZZH RX MED GY IP 250 OP 250 PS 637: Performed by: STUDENT IN AN ORGANIZED HEALTH CARE EDUCATION/TRAINING PROGRAM

## 2020-02-04 PROCEDURE — 00000146 ZZHCL STATISTIC GLUCOSE BY METER IP

## 2020-02-04 PROCEDURE — 85027 COMPLETE CBC AUTOMATED: CPT | Performed by: PLASTIC SURGERY

## 2020-02-04 PROCEDURE — 25000128 H RX IP 250 OP 636: Performed by: STUDENT IN AN ORGANIZED HEALTH CARE EDUCATION/TRAINING PROGRAM

## 2020-02-04 RX ADMIN — OXYCODONE HYDROCHLORIDE 5 MG: 5 TABLET ORAL at 10:45

## 2020-02-04 RX ADMIN — OXYCODONE HYDROCHLORIDE 10 MG: 5 TABLET ORAL at 20:44

## 2020-02-04 RX ADMIN — ACETAMINOPHEN 975 MG: 325 TABLET, FILM COATED ORAL at 03:40

## 2020-02-04 RX ADMIN — METHOCARBAMOL 750 MG: 750 TABLET, FILM COATED ORAL at 11:57

## 2020-02-04 RX ADMIN — METHOCARBAMOL 750 MG: 750 TABLET, FILM COATED ORAL at 23:12

## 2020-02-04 RX ADMIN — OXYCODONE HYDROCHLORIDE 5 MG: 5 TABLET ORAL at 14:49

## 2020-02-04 RX ADMIN — METHOCARBAMOL 750 MG: 750 TABLET, FILM COATED ORAL at 17:04

## 2020-02-04 RX ADMIN — SENNOSIDES AND DOCUSATE SODIUM 2 TABLET: 8.6; 5 TABLET ORAL at 08:46

## 2020-02-04 RX ADMIN — OXYCODONE HYDROCHLORIDE 5 MG: 5 TABLET ORAL at 00:06

## 2020-02-04 RX ADMIN — OXYCODONE HYDROCHLORIDE 10 MG: 5 TABLET ORAL at 17:45

## 2020-02-04 RX ADMIN — OXYCODONE HYDROCHLORIDE 5 MG: 5 TABLET ORAL at 06:54

## 2020-02-04 RX ADMIN — ACETAMINOPHEN 975 MG: 325 TABLET, FILM COATED ORAL at 11:56

## 2020-02-04 RX ADMIN — OXYCODONE HYDROCHLORIDE 5 MG: 5 TABLET ORAL at 15:22

## 2020-02-04 RX ADMIN — ACETAMINOPHEN 975 MG: 325 TABLET, FILM COATED ORAL at 20:14

## 2020-02-04 RX ADMIN — ENOXAPARIN SODIUM 40 MG: 40 INJECTION SUBCUTANEOUS at 08:46

## 2020-02-04 RX ADMIN — METHOCARBAMOL 750 MG: 750 TABLET, FILM COATED ORAL at 04:03

## 2020-02-04 RX ADMIN — SENNOSIDES AND DOCUSATE SODIUM 2 TABLET: 8.6; 5 TABLET ORAL at 20:13

## 2020-02-04 ASSESSMENT — PAIN DESCRIPTION - DESCRIPTORS
DESCRIPTORS: DISCOMFORT
DESCRIPTORS: ACHING;SORE
DESCRIPTORS: DISCOMFORT
DESCRIPTORS: DISCOMFORT
DESCRIPTORS: ACHING;SORE

## 2020-02-04 ASSESSMENT — ACTIVITIES OF DAILY LIVING (ADL)
ADLS_ACUITY_SCORE: 13
ADLS_ACUITY_SCORE: 13
ADLS_ACUITY_SCORE: 12
ADLS_ACUITY_SCORE: 13
ADLS_ACUITY_SCORE: 11
ADLS_ACUITY_SCORE: 11

## 2020-02-04 NOTE — PLAN OF CARE
POST OP     POD #0 Phalloplasty with left thigh flap and R Groin flap (per MD note)   VS: Post op VS stable, Afebrile.  Neuro: Intact/stable. Alert and oriented.  Cardiac:  Stable, denies CP.                 Respiratory: Denies SOB, on 2 lpm via nc. IS teaching started. Pt verbalized understanding.  GI/: Bowel sounds audible. With Mcintyre catheter good urine volume. Cares done.  Diet/appetite: Clear liquid diet for tonight per MD order.   Activity: Bedrest   Pain: .Manageable. Schedule po tylenol given.   Skin/drains: Right groin with liquid bandage CDI/EMMY. KENNEDY with scant serosanguinous drainage, dressing intact, strip. Left thigh dressing intact with wound Vac at 125 mmhg, continuous, scant drainage, KENNEDY with scant serosanguinous drainage. Penile surgical incision skin intact. Good pulses, pale pink in color. With drain to keep upright. Flap check every 4 hours. Jose Alfredo hugger.  Lines: PIV left hand with D5 1/2 NS + 10 meq K+, right hand SL. Capnography and PCD.  PLAN: Continue with post op cares.

## 2020-02-04 NOTE — PROGRESS NOTES
Text page with Dr. Martha Wilson to clarify post op orders. Per the said provider she confirmed the followin. Okay for jimmie hugger  2. Clear liquid tonight  3. Flap check every 4 hours  4. Okay for room temperature 75 below.    Will follow order from the provider.

## 2020-02-04 NOTE — PLAN OF CARE
Pt AVSS. Pt sleeping between cares. Flap checks q 4hr. Phalloplasty pink, warm, pulse present via doppler. Right groin incision dermabond, D/I. Left thigh graft black foam drsg and to 125 mmhg sxn via wound vac. No drge noted. Lungs clear, IS enc=3000 ml. Abd round, +bs. Mcintyre patent via female urethra and good uv's noted. KENNEDY x2 with scant to small s/s drge. Pt warner clears. IVF at 50/hr via piv. Pain managed with oxycodone 5mg x1, sched tylenol. LBP -robaxin x1. Slept afterward. Room temp >75 degrees. Jose Alfredo hugger on. Cont to maintain pain control. Flap checks q 4hr.

## 2020-02-04 NOTE — PLAN OF CARE
"POD 1 s/p phalloplasty with pedicled SCIP and ALT flap with STSG donor site coverage per MD note     Pain: L thigh pain and pelvic pain managed with PRN oxy, robaxin, and scheduled tylenol   Nausea: denies   /55 (BP Location: Left arm)   Pulse 94   Temp 98.1  F (36.7  C) (Oral)   Resp 16   Ht 1.651 m (5' 5\")   Wt 68.7 kg (151 lb 7.3 oz)   SpO2 97%   BMI 25.20 kg/m   - CAPNO can be removed at 1900  Output: Adequate output per daniels catheter   Diet: Regular, tolerating well   Activity: Bedrest. Ok for hip flexion up to 30 degrees   Bowel Function: Bowel sounds heard in all quadrants, passing flatus, no bm this shift   Lines: PIV x 2, saline locked, dressing CDI   Incision: R groin incision, liquid bandage, EMMY. Incision via phallus, scant serosanguinous drainage, EMMY. L thigh, UTV, wound vac in place, dressing CDI   Phallus: Doppler pulse present, warm, and pink- continue with Q4 checks  Drains:  KENNEDY x 2, dressing CDI, stripped per orders, small output   Plan: Continue to monitor pain, nausea, VS, output, and ROBF     "

## 2020-02-04 NOTE — PROGRESS NOTES
"Urology  Progress Note  02/04/2020    -NAEON  -Tmax 101.2, Tachycardic up to 109, normotensive, on 1-2 L NC  -Pain controlled  -Tolerating clears  -Reports passing flatus    Exam  /54   Pulse 94   Temp 98.7  F (37.1  C) (Oral)   Resp 16   Ht 1.651 m (5' 5\")   Wt 68.7 kg (151 lb 7.3 oz)   SpO2 98%   BMI 25.20 kg/m    No acute distress  Unlabored breathing  Abdomen soft, nontender, nondistended.  Flap warm, well perfused, good cap refill  Urethral stent sutured in place  R KENNEDY serosanguinous  L KENNEDY serosanguinous  Daniels with clear yellow urine urine in tubing    I/O's (last 24/since midnight)  /370  R KENNEDY 10/5  L KENNEDY 40/15  Wvac 0    Labs  2/3  WBC 13.7  Hgb 14  Cr 0.84    AM labs pending    Assessment/Plan  34 year old adult POD#1 s/p SCIP+ALT phalloplasty for gender dysphoria.     -Continue urethral stent  -Continue daniels until ambulating consistently  -Urology will continue to follow    Seen and examined with the chief resident. Will discuss with Dr. Carter.    Capo Caldwell MD  Urology Resident     Contacting the Urology Team     Please use the following job codes to reach the Urology Team. Note that you must use an in house phone and that job codes cannot receive text pages.     On weekdays, dial [phone] (or star-star-star 777 on the new [employer] telephones) then [phone] to reach the Adult Urology resident or PA on call    On weekdays, dial [phone] (or star-star-star 777 on the new [employer] telephones) then [phone] to reach the Pediatric Urology resident    On weeknights and weekends, dial [phone] (or star-star-star 777 on the new [employer] telephones) then [phone] to reach the Urology resident on call (for both Adult and Pediatrics)              "

## 2020-02-04 NOTE — OR NURSING
Dr Swann came to bedside at aprox 1630 and checked pulse of new phallus Jose Alfredo go applied and checked througout stay in PACU ( see Assessment record) /. Then at approx 1715 Dr Eastamn and Dr Andrea came to bedside to check the new phallus-arterial doppler pulse continued to be present.

## 2020-02-04 NOTE — PROGRESS NOTES
Admitted/transferred from: PACU  2 RN full   skin assessment completed by Paty Garza RN and Jessica Duffy.  Skin assessment finding: issues found Flap left thigh and right groin towards upper lateral side of the abdomen. Surgical incision penile   Interventions/actions: skin interventions Continue monitor for any change in status of the skin per provider order.      Will continue to monitor.

## 2020-02-04 NOTE — OR NURSING
Patient spiked temp to 101.2.  At 1745 Jose Alfredo Hugger decreased to low for approx 20 minutes.  Dr Eastman notified of increased temperature. He came and saw patient at 1800.  Temp was coming down and Pair hugger turned back to Med of 38 degrees per Dr Eastman

## 2020-02-04 NOTE — PROGRESS NOTES
"Plastic surgery progress note    No acute events overnight. Pain controlled. Tolerating CLD. Has not been OOB. Voiding with Mcintyre in    /55 (BP Location: Left arm)   Pulse 94   Temp 98.1  F (36.7  C) (Oral)   Resp 16   Ht 1.651 m (5' 5\")   Wt 68.7 kg (151 lb 7.3 oz)   SpO2 97%   BMI 25.20 kg/m      Awake, alert, NAD  Unlabored breathing   R groin inc c/d/i. KENNEDY ssd  L thigh VAC holding suction. Cap refill 2s  Phalloplasty wwp. Jorgito urethra stented, pink. Doppler with good arterial signal  KENNEDY ssd  Mcintyre in place w/ gene urine    I&O  Mcintyre 825/470  KENNEDY R hip 10/5  KENNEDY L groin 40/15    A/P: 34tM now POD 1 s/p phalloplasty with pedicled SCIP and ALT flap with STSG donor site coverage    - discontinue bairhugger   - Limit hip flexion to 30'  - Bed rest today  - Lovenox  - ADAT  - C/w VAC  - C/w Mcintyre  - Strip and empty drains TID      Seen w/ Dr Raiza Mata MD  Plastic surgery resident  "

## 2020-02-05 ENCOUNTER — APPOINTMENT (OUTPATIENT)
Dept: OCCUPATIONAL THERAPY | Facility: CLINIC | Age: 35
DRG: 876 | End: 2020-02-05
Attending: PLASTIC SURGERY
Payer: COMMERCIAL

## 2020-02-05 PROCEDURE — 25000132 ZZH RX MED GY IP 250 OP 250 PS 637: Performed by: STUDENT IN AN ORGANIZED HEALTH CARE EDUCATION/TRAINING PROGRAM

## 2020-02-05 PROCEDURE — 97535 SELF CARE MNGMENT TRAINING: CPT | Mod: GO | Performed by: OCCUPATIONAL THERAPIST

## 2020-02-05 PROCEDURE — 97167 OT EVAL HIGH COMPLEX 60 MIN: CPT | Mod: GO | Performed by: OCCUPATIONAL THERAPIST

## 2020-02-05 PROCEDURE — 97530 THERAPEUTIC ACTIVITIES: CPT | Mod: GO | Performed by: OCCUPATIONAL THERAPIST

## 2020-02-05 PROCEDURE — 25000128 H RX IP 250 OP 636: Performed by: STUDENT IN AN ORGANIZED HEALTH CARE EDUCATION/TRAINING PROGRAM

## 2020-02-05 PROCEDURE — 12000001 ZZH R&B MED SURG/OB UMMC

## 2020-02-05 PROCEDURE — 97760 ORTHOTIC MGMT&TRAING 1ST ENC: CPT | Mod: GO | Performed by: OCCUPATIONAL THERAPIST

## 2020-02-05 RX ADMIN — ACETAMINOPHEN 975 MG: 325 TABLET, FILM COATED ORAL at 19:57

## 2020-02-05 RX ADMIN — SENNOSIDES AND DOCUSATE SODIUM 1 TABLET: 8.6; 5 TABLET ORAL at 09:02

## 2020-02-05 RX ADMIN — ACETAMINOPHEN 975 MG: 325 TABLET, FILM COATED ORAL at 03:20

## 2020-02-05 RX ADMIN — OXYCODONE HYDROCHLORIDE 10 MG: 5 TABLET ORAL at 00:14

## 2020-02-05 RX ADMIN — OXYCODONE HYDROCHLORIDE 10 MG: 5 TABLET ORAL at 10:57

## 2020-02-05 RX ADMIN — ENOXAPARIN SODIUM 40 MG: 40 INJECTION SUBCUTANEOUS at 09:03

## 2020-02-05 RX ADMIN — OXYCODONE HYDROCHLORIDE 10 MG: 5 TABLET ORAL at 21:49

## 2020-02-05 RX ADMIN — METHOCARBAMOL 750 MG: 750 TABLET, FILM COATED ORAL at 15:54

## 2020-02-05 RX ADMIN — SENNOSIDES AND DOCUSATE SODIUM 2 TABLET: 8.6; 5 TABLET ORAL at 19:57

## 2020-02-05 RX ADMIN — OXYCODONE HYDROCHLORIDE 10 MG: 5 TABLET ORAL at 06:20

## 2020-02-05 RX ADMIN — METHOCARBAMOL 750 MG: 750 TABLET, FILM COATED ORAL at 22:44

## 2020-02-05 RX ADMIN — OXYCODONE HYDROCHLORIDE 10 MG: 5 TABLET ORAL at 18:23

## 2020-02-05 RX ADMIN — OXYCODONE HYDROCHLORIDE 10 MG: 5 TABLET ORAL at 14:39

## 2020-02-05 RX ADMIN — ACETAMINOPHEN 975 MG: 325 TABLET, FILM COATED ORAL at 12:33

## 2020-02-05 RX ADMIN — OXYCODONE HYDROCHLORIDE 10 MG: 5 TABLET ORAL at 03:20

## 2020-02-05 ASSESSMENT — PAIN DESCRIPTION - DESCRIPTORS
DESCRIPTORS: ACHING;SHARP
DESCRIPTORS: ACHING;SHARP
DESCRIPTORS: ACHING;CONSTANT;SORE
DESCRIPTORS: ACHING;CONSTANT;SORE
DESCRIPTORS: ACHING;CONSTANT
DESCRIPTORS: ACHING;SHARP

## 2020-02-05 ASSESSMENT — ACTIVITIES OF DAILY LIVING (ADL)
ADLS_ACUITY_SCORE: 13
PREVIOUS_RESPONSIBILITIES: MEAL PREP;HOUSEKEEPING;LAUNDRY;SHOPPING;MEDICATION MANAGEMENT;FINANCES;DRIVING;CHILD CARE
ADLS_ACUITY_SCORE: 13
ADLS_ACUITY_SCORE: 13

## 2020-02-05 NOTE — CONSULTS
BRIEF SOCIAL WORK NOTE:    Social work consulted for assistance on [employer] home wound vac ordering. Per rounds, chart review, and OT evaluation -home is anticipated/recommended at discharge. RNCC assists with community discharge planning. RNCC was notified and consulted regarding wound vac assistance. Please see RNCC note for detailed information as wound vac ordering was initiated today. SW consult acknowledged and completed at this time. Please re-consult SW for any additional psychosocial needs.     MAVIS Ann, TANYA  7C Surgical/Oncology Unit   Phone: [phone]  Pager: [phone]

## 2020-02-05 NOTE — PLAN OF CARE
POD1  Phalloplasty with left thigh flap and R groin flap (per MD note)   VS: Tmax 99.9 orally otherwise stable, off Capno at 1900H.  Neuro: Neuros intact. Alert and oriented.  Cardiac: Stable.                 Respiratory: O2 above 92% at RA. Using IS every hour when awake.  GI/: Bowel sounds audible, + gas no BM yet. Still with daniels with large amount of urine volume. Cath cares done.    Diet/appetite: On regular diet tolerating well. Denies nausea and vomiting.   Activity: Continue on bedrest.  Pain: C/o pain in lower abdomen, right groin, left thigh and lower back. Managed with scheduled po tylenol, prn oxycodone and robaxin.  Skin/drains: Incision phallus skin well flushed pale pink with good pulses via doppler. Continue with every 4 hours flap changed. Left thigh dressing intact, wound vac in place with scant serosanguinous drainage. KENNEDY in place with small serosanguinous drainage. Left surgical incision, KENNEDY in place with small serosanguinous drainage.   Lines: PIV bilateral hand both are saline lock.   PLAN: Continue with the care plan. Pain management, pulmonary toileting and post op care.   ADDENDUM: Pt's temp at 2200H 100.6 orally, parameters to notify the parameter is 101. Recheck is 98.9 orally.

## 2020-02-05 NOTE — PROGRESS NOTES
Transition Planning Update    D:  Home [employer] Wound Vac insurance inquiry initiated with on line [employer] and attached inpatient notes faxed to [facility] via fax# [phone].  Ph # to [facility]-[phone].  Reference #[id number].  A/P:  Inpatient Care Management RN will continue to follow for updated transition needs. Awaiting insurance authorization at this time for home [employer] Wound Vac need.    ION Huertas.S.N., R.N., P.H.N..  Care Coordinator     Pager   [facility] [place]

## 2020-02-05 NOTE — PROGRESS NOTES
02/05/20 1300   Quick Adds   Type of Visit Initial Occupational Therapy Evaluation   Living Environment   Lives With child(alice), dependent;spouse   Living Arrangements house   Home Accessibility stairs to enter home;stairs within home   Number of Stairs, Main Entrance 2   Number of Stairs, Within Home, Primary other (see comments)  (20)   Stair Railings, Within Home, Primary railings on both sides of stairs   Living Environment Comment bed on main level, has upstairs and downstairs but does not need to use. Wife works daily, pt is stay at home parent to 3 month old daughter.    Self-Care   Usual Activity Tolerance excellent   Current Activity Tolerance moderate   Regular Exercise No   Functional Level   Ambulation 0-->independent   Transferring 0-->independent   Toileting 0-->independent   Bathing 0-->independent   Dressing 0-->independent   Eating 0-->independent   Communication 0-->understands/communicates without difficulty   Swallowing 0-->swallows foods/liquids without difficulty   Cognition 0 - no cognition issues reported   Fall history within last six months no   General Information   Referring Physician Gavin Eastman   Patient/Family Goals Statement return to PLOF, heal well    Additional Occupational Profile Info/Pertinent History of Current Problem  POD 2 s/p L ALT + R SCIP phalloplasty, first stage   Precautions/Limitations   (no hip flexion greater than 60 degrees, erect phallus always)   General Observations pt motivated.    Cognitive Status Examination   Cognitive Comment no concerns.    Visual Perception   Visual Perception Comments no concerns.    Sensory Examination   Sensory Comments no concerns.    Range of Motion (ROM)   ROM Quick Adds Other (describe)   ROM Comment nop deficits withing precautions.    Strength   Manual Muscle Testing Quick Adds Other   Strength Comments B UE/LE grossly WFL   Hand Strength   Hand Strength Comments no concerns.    Coordination   Coordination Comments no  "concerns. shuffling gait 2/2 LE incision site.    Transfer Skill: Sit to Stand   Level of [place]: Sit/Stand minimum assist (75% patients effort)   Lower Body Dressing   Level of [place]: Dress Lower Body maximum assist (25% patients effort)   Grooming   Level of [place]: Grooming stand-by assist   Instrumental Activities of Daily Living (IADL)   Previous Responsibilities meal prep;housekeeping;laundry;shopping;medication management;finances;driving;   IADL Comments fmaily and friends planning on assisting as needed upon DISCH   General Therapy Interventions   Planned Therapy Interventions ADL retraining;IADL retraining;bed mobility training;strengthening;transfer training;home program guidelines;progressive activity/exercise;risk factor education   Clinical Impression   Criteria for Skilled Therapeutic Interventions Met yes, treatment indicated   OT Diagnosis decreased ADL I   Assessment of Occupational Performance 5 or more Performance Deficits   Identified Performance Deficits dressing, bathing, toileting, home making,    Clinical Decision Making (Complexity) Low complexity   Therapy Frequency 5x/week   Predicted Duration of Therapy Intervention (days/wks) 2 weeks   Anticipated Discharge Disposition Home with Assist   Risks and Benefits of Treatment have been explained. Yes   Patient, Family & other staff in agreement with plan of care Yes   Clinical Impression Comments Pt presents to OT with post surgical precuations leading to decreased ADL I. Pt to benefit from skilled OT intervention to address the above problem list. See daily note for treatment provided today.    [facility] TM \"6 Clicks\"   2016, Trustees of [facility], under license to [employer].  All rights reserved.   6 Clicks Short Forms Daily Activity Inpatient Short Form   [facility] AM-PAC  \"6 Clicks\" Daily Activity Inpatient Short Form   1. Putting on and taking off regular lower body " clothing? 2 - A Lot   2. Bathing (including washing, rinsing, drying)? 2 - A Lot   3. Toileting, which includes using toilet, bedpan or urinal? 2 - A Lot   4. Putting on and taking off regular upper body clothing? 3 - A Little   5. Taking care of personal grooming such as brushing teeth? 4 - None   6. Eating meals? 4 - None   Daily Activity Raw Score (Score out of 24.Lower scores equate to lower levels of function) 17   Total Evaluation Time   Total Evaluation Time (Minutes) 5

## 2020-02-05 NOTE — PLAN OF CARE
Discharge Planner OT   Patient plan for discharge: home with assist  Current status: pt ambulating today approx 50 feet with CGA  Barriers to return to prior living situation: post surgical precautions, pain.   Recommendations for discharge: anticipate home with assist  Rationale for recommendations: pt moving well today.        Entered by: Haseeb Gutiérrez 02/05/2020 1:51 PM

## 2020-02-05 NOTE — PLAN OF CARE
"Flap check q 4 hours- warm/soft/pale pink, strong doppler. Penis in upright position per order. Pt is aware of his position.  Left thigh dressing intact with VAC.  L and R KENNEDY  patent.  Bedrest---OOB with OT team walked to desk with SBA.  He has had pain medications: Oxycodone X 1,  he has scheduled Tylenol. He declined Robaxin when offered. Mcintyre patent: large urine output. Pt states: \"I drink a lot of water due to this hot room\". He has had family visitors including an infant.  Bedside report: Yes.  "

## 2020-02-05 NOTE — PLAN OF CARE
A&O. VSS- temp max 100.6, rechecked at 98.9. Having increased pain at beginning of shift, pt reports better control now. Pain controlled with scheduled tylenol, prn oxycodone and prn robaxin. PIV x 2 saline locked. Mcintyre with large output. Passing gas, no BM yet. Regular diet- denies nausea. Flap check [id number]- warm/soft/pale pink/+doppler. Left thigh dressing with wound vac and L KENNEDY in place. R KENNEDY x 1 with surgical incision c/d/i. Bedrest. Hip flexion up to 30 degrees. Continue with plan of care.

## 2020-02-05 NOTE — PROGRESS NOTES
[facility]  Inpatient Primary Care Note    Myrna Dee MRN# [id number]   Age: 34 year old YOB: [birth year] 3:39 PM    Patient admitted: 2/3/2020  5:43 AM  Reason for admission: Gender dysphoria in adult [F64.0]  Primary inpatient team: Plastic Surgery, Dr. Eastman    Home clinic: [facility]  Primary care provider: Clarissa Stroud MD         Primary provider communication:     1. I have reviewed the patient s admission History & Physical: Yes  2. I have reviewed associated daily notes: Yes  3. Additional comments: I visited Inocencio today. He is doing well. Good family support for eventual discharge. Got to see his infant daughter today which was joyful. Was excited to walk!  Offered support to him and Meliza. Got to meet sister Vaishali. Follow up in clinic as needed.     I appreciate the contributions of the inpatient team to the care of my patient.  If there are questions regarding this patient, the best way to contact me is [phone]    Clarissa Stroud MD

## 2020-02-05 NOTE — PROGRESS NOTES
Plastic Surgery Progress Note    Back pain much improved. Patient in good spirits. Tolerating diet.    Temp:  [96.8  F (36  C)-100.6  F (38.1  C)] 96.8  F (36  C)  Pulse:  [72-80] 72  Heart Rate:  [77] 77  Resp:  [16-18] 16  BP: (117-126)/(63-67) 118/67  SpO2:  [97 %-98 %] 98 %  I/O last 3 completed shifts:  In: 5750 [P.O.:5750]  Out: 9755 [Urine:9625; Drains:130]  Both ALT and SCIP flaps pink and well perfused.  Stent and external splint intact.  Daniels draining clear urine.  R groin incision CDI with serosang drain output. High output still.  L thigh VAC intact and holding suction.    CBC  Recent Labs   Lab 02/04/20  0650 02/03/20  1646   WBC 11.1* 13.7*   RBC 3.98* 4.63   HGB 12.1* 14.0   HCT 35.0* 41.0   MCV 88 89   MCH 30.4 30.2   MCHC 34.6 34.1   RDW 13.0 12.9    218     BMP  Recent Labs   Lab 02/04/20  0650 02/03/20  1646   * 140   POTASSIUM 3.5 4.0   CHLORIDE 102 108   ADELAIDE 8.4* 8.7   CO2 26 26   BUN 12 11   CR 0.86 0.84   * 156*       ASSESSMENT: POD 2 s/p L ALT + R SCIP phalloplasty, first stage. Doing well.    PLAN:   - q4h flap checks  - ambulate with assist, hip flexion OK to 60  - PT/OT  - keep daniels in for now  - L thigh VAC to 125 cont  - R groin drain q4h stripping  - room temp normal  - work on home VAC  - plan for discharge later this week or over weekend, pending activity status

## 2020-02-05 NOTE — PROGRESS NOTES
"Urology  Progress Note  02/05/2020    -ISAÍAS  -Tmax 100.6, VSS, on RA overnight  -Had some incisional pain yesterday that is now well controlled  -Tolerating regular diet, no N/V  -+ Flatus  -Continues on bedrest    Exam  /64 (BP Location: Left arm)   Pulse 77   Temp 98.9  F (37.2  C)   Resp 16   Ht 1.651 m (5' 5\")   Wt 68.7 kg (151 lb 7.3 oz)   SpO2 97%   BMI 25.20 kg/m    No acute distress  Unlabored breathing  Abdomen soft, nontender, nondistended.  Flap warm, well perfused, good cap refill  Urethral stent sutured in place  R KENNEDY serosanguinous  L KENNEDY serosanguinous  Daniels with clear yellow urine urine in tubing    I/O's (last 24/since midnight)  UOP 7545/1550  R KENNEDY 35/30  L KENNEDY 65/20  Wvac 0    Labs  3/4 (2/3)  WBC 11.1 (13.7)  Hgb 12.1 (14)  Cr 0.86 (0.84)    AM labs pending    Assessment/Plan  34 year old adult POD#2 s/p SCIP+ALT phalloplasty for gender dysphoria.     -Continue urethral stent  -Continue daniels until ambulating consistently  -Urology will continue to follow    Seen and examined with the chief resident. Will discuss with Dr. Carter.    Maral Jensen MD  PGY-2 Urology  Pager [phone]       Contacting the Urology Team     Please use the following job codes to reach the Urology Team. Note that you must use an in house phone and that job codes cannot receive text pages.     On weekdays, dial [phone] (or star-star-star 777 on the new [employer] telephones) then [phone] to reach the Adult Urology resident or PA on call    On weekdays, dial [phone] (or star-star-star 777 on the new [employer] telephones) then [phone] to reach the Pediatric Urology resident    On weeknights and weekends, dial [phone] (or star-star-star 777 on the new [employer] telephones) then [phone] to reach the Urology resident on call (for both Adult and Pediatrics)              "

## 2020-02-06 ENCOUNTER — APPOINTMENT (OUTPATIENT)
Dept: OCCUPATIONAL THERAPY | Facility: CLINIC | Age: 35
DRG: 876 | End: 2020-02-06
Attending: PLASTIC SURGERY
Payer: COMMERCIAL

## 2020-02-06 PROCEDURE — 25000128 H RX IP 250 OP 636: Performed by: STUDENT IN AN ORGANIZED HEALTH CARE EDUCATION/TRAINING PROGRAM

## 2020-02-06 PROCEDURE — 97535 SELF CARE MNGMENT TRAINING: CPT | Mod: GO | Performed by: OCCUPATIONAL THERAPIST

## 2020-02-06 PROCEDURE — 12000001 ZZH R&B MED SURG/OB UMMC

## 2020-02-06 PROCEDURE — 25000132 ZZH RX MED GY IP 250 OP 250 PS 637: Performed by: STUDENT IN AN ORGANIZED HEALTH CARE EDUCATION/TRAINING PROGRAM

## 2020-02-06 PROCEDURE — 97530 THERAPEUTIC ACTIVITIES: CPT | Mod: GO | Performed by: OCCUPATIONAL THERAPIST

## 2020-02-06 RX ADMIN — OXYCODONE HYDROCHLORIDE 10 MG: 5 TABLET ORAL at 00:59

## 2020-02-06 RX ADMIN — ACETAMINOPHEN 650 MG: 325 TABLET, FILM COATED ORAL at 21:07

## 2020-02-06 RX ADMIN — ACETAMINOPHEN 975 MG: 325 TABLET, FILM COATED ORAL at 12:15

## 2020-02-06 RX ADMIN — ENOXAPARIN SODIUM 40 MG: 40 INJECTION SUBCUTANEOUS at 08:08

## 2020-02-06 RX ADMIN — SENNOSIDES AND DOCUSATE SODIUM 1 TABLET: 8.6; 5 TABLET ORAL at 08:07

## 2020-02-06 RX ADMIN — OXYCODONE HYDROCHLORIDE 10 MG: 5 TABLET ORAL at 10:46

## 2020-02-06 RX ADMIN — OXYCODONE HYDROCHLORIDE 10 MG: 5 TABLET ORAL at 16:45

## 2020-02-06 RX ADMIN — SENNOSIDES AND DOCUSATE SODIUM 2 TABLET: 8.6; 5 TABLET ORAL at 19:45

## 2020-02-06 RX ADMIN — METHOCARBAMOL 750 MG: 750 TABLET, FILM COATED ORAL at 10:46

## 2020-02-06 RX ADMIN — OXYCODONE HYDROCHLORIDE 10 MG: 5 TABLET ORAL at 04:15

## 2020-02-06 RX ADMIN — OXYCODONE HYDROCHLORIDE 10 MG: 5 TABLET ORAL at 19:48

## 2020-02-06 RX ADMIN — METHOCARBAMOL 750 MG: 750 TABLET, FILM COATED ORAL at 22:03

## 2020-02-06 RX ADMIN — ACETAMINOPHEN 975 MG: 325 TABLET, FILM COATED ORAL at 04:17

## 2020-02-06 RX ADMIN — OXYCODONE HYDROCHLORIDE 10 MG: 5 TABLET ORAL at 23:33

## 2020-02-06 RX ADMIN — OXYCODONE HYDROCHLORIDE 10 MG: 5 TABLET ORAL at 08:07

## 2020-02-06 ASSESSMENT — PAIN DESCRIPTION - DESCRIPTORS
DESCRIPTORS: ACHING;DULL
DESCRIPTORS: ACHING;CONSTANT;SORE
DESCRIPTORS: ACHING;SORE
DESCRIPTORS: ACHING
DESCRIPTORS: ACHING
DESCRIPTORS: ACHING;DULL;SHARP
DESCRIPTORS: ACHING

## 2020-02-06 ASSESSMENT — MIFFLIN-ST. JEOR: SCORE: 1545.91

## 2020-02-06 ASSESSMENT — ACTIVITIES OF DAILY LIVING (ADL)
ADLS_ACUITY_SCORE: 13

## 2020-02-06 NOTE — PLAN OF CARE
Flap (penis) check q 4 hours- warm/soft/pink, strong doppler. Penis in upright position per order. Pt is aware of his position.  Left thigh dressing intact with VAC.  L and R KENNEDY  patent.  OT team walked with him in the mathis then settled him in the recliner chair in his room. Patient requested and was given PO Oxycodone after the walk.  Scheduled Tylenol is dc'd. He declined Robaxin when offered. 'Hot room' has been dc'd --the door can be opened per Dr Eastman. Mr Dee has had family visitors.  Bedside report: Yes.

## 2020-02-06 NOTE — PLAN OF CARE
A&O. VSS. Pain controlled with scheduled tylenol and prn oxycodone. PIV x 2 saline locked. Mcintyre with large output. Passing gas, no BM yet. Regular diet- denies nausea. Flap check [id number]- warm/soft/pale pink/+doppler. Left thigh dressing with wound vac and L KENNEDY in place. R KENNEDY x 1 with surgical incision c/d/i. Assist x2. Left hip not to be abducted more than 60 degrees. Continue with plan of care.

## 2020-02-06 NOTE — PLAN OF CARE
"/66 (BP Location: Right arm)   Pulse 65   Temp 99.9  F (37.7  C) (Oral)   Resp 16   Ht 1.651 m (5' 5\")   Wt 68.7 kg (151 lb 7.3 oz)   SpO2 97%   BMI 25.20 kg/m      VSS on RA. Abd and back pain moderately controlled with scheduled Tylenol, PRN oxy and Robaxin. Up with AO2. Sitting restrictions <30*. Left hip not to be abducted more than 60*. Phallus to be kept at 90* angle from abdomen at all times. Ambulated x1 this reji, tolerated well. Mcintyre in place with high UOP. Tolerating reg diet with no N/V. LBM Sunday as reported by pt. R PIV infusing MIVF. L PIV SL. Doppler checks Q4h. Room to be kept at 75* with door closed. Pt resting comfortably between cares. Continue POC.   "

## 2020-02-06 NOTE — PROGRESS NOTES
Plastic Surgery Progress Note     Subjective:  NAEON. Pain controlled, tolerating diet, denies N/V. No BM yet, passing flatus.    Objective:  Temp:  [96.8  F (36  C)-99.9  F (37.7  C)] 99.9  F (37.7  C)  Pulse:  [65-72] 65  Heart Rate:  [65-73] 73  Resp:  [16] 16  BP: (117-121)/(66-67) 121/66  SpO2:  [97 %-98 %] 97 %  I/O last 3 completed shifts:  In: 1350 [P.O.:1350]  Out: 9484 [Urine:9450; Drains:34]    Gen: NAD  Resp: NLB  Abd: Soft, ND, NT  ALT & SCIP flaps pink, WWP; stent & external splint intact  Mcintyre draining clear urine  R groin incision c/d/i, KENNEDY w/ SS OP  L thigh VAC intact, holding suction      A/P: Judienichiquita NONA Dee is a 34 year old adult POD3 s/p L ALT & R SCIP phalloplasty, first stage. Convalescing appropriately.  - Hip flexion to 75'  - ambulate  - d/c Mcintyre  - PT/OT  - L VAC to 125 cont  - R groin drain q4 stripping  - normal room temp  - work on home VAC  - plan for discharge end of week vs weekend, pending activity status      Martha Wilson MD  Plastic Surgery PGY3

## 2020-02-06 NOTE — PROGRESS NOTES
"Urology  Progress Note  02/06/2020    -YOVANY RIVERA, on RA overnight  -Slept \"really well\"  -Pain controlled  -Tolerating regular diet, no N/V  -+ Flatus, no BM as of yet  -Standing up    Exam  /66 (BP Location: Right arm)   Pulse 65   Temp 99.9  F (37.7  C) (Oral)   Resp 16   Ht 1.651 m (5' 5\")   Wt 68.7 kg (151 lb 7.3 oz)   SpO2 97%   BMI 25.20 kg/m    No acute distress  Unlabored breathing  Abdomen soft, nontender, nondistended.  Flap warm, well perfused  Urethral stent sutured in place  R KENNEDY serosanguinous  L KENNEDY serosanguinous  Daniels with clear yellow urine urine in tubing    I/O's (last 24/since midnight)  UOP 48522/1650  R KENNEDY 37/10  L KENNEDY 27/10    Labs  3/4 (2/3)  WBC 11.1 (13.7)  Hgb 12.1 (14)  Cr 0.86 (0.84)    AM labs pending    Assessment/Plan  34 year old adult POD#3 s/p SCIP+ALT phalloplasty for gender dysphoria.     -Continue urethral stent  -Continue daniels until ambulating consistently  -Urology will continue to follow    Seen and examined with the chief resident. Will discuss with Dr. Carter.    Capo Caldwell MD  Urology, PGY-2       Contacting the Urology Team     Please use the following job codes to reach the Urology Team. Note that you must use an in house phone and that job codes cannot receive text pages.     On weekdays, dial [phone] (or star-star-star 777 on the new [employer] telephones) then 0817 to reach the Adult Urology resident or PA on call    On weekdays, dial [phone] (or star-star-star 777 on the new [employer] telephones) then 0818 to reach the Pediatric Urology resident    On weeknights and weekends, dial [phone] (or star-star-star 777 on the new [employer] telephones) then [phone] to reach the Urology resident on call (for both Adult and Pediatrics)              "

## 2020-02-07 ENCOUNTER — APPOINTMENT (OUTPATIENT)
Dept: OCCUPATIONAL THERAPY | Facility: CLINIC | Age: 35
DRG: 876 | End: 2020-02-07
Attending: PLASTIC SURGERY
Payer: COMMERCIAL

## 2020-02-07 PROCEDURE — 25000132 ZZH RX MED GY IP 250 OP 250 PS 637: Performed by: STUDENT IN AN ORGANIZED HEALTH CARE EDUCATION/TRAINING PROGRAM

## 2020-02-07 PROCEDURE — 97530 THERAPEUTIC ACTIVITIES: CPT | Mod: GO | Performed by: OCCUPATIONAL THERAPIST

## 2020-02-07 PROCEDURE — 97535 SELF CARE MNGMENT TRAINING: CPT | Mod: GO | Performed by: OCCUPATIONAL THERAPIST

## 2020-02-07 PROCEDURE — 12000001 ZZH R&B MED SURG/OB UMMC

## 2020-02-07 PROCEDURE — 25000128 H RX IP 250 OP 636: Performed by: STUDENT IN AN ORGANIZED HEALTH CARE EDUCATION/TRAINING PROGRAM

## 2020-02-07 RX ORDER — OXYCODONE HYDROCHLORIDE 5 MG/1
5 TABLET ORAL EVERY 6 HOURS PRN
Qty: 40 TABLET | Refills: 0 | Status: SHIPPED | OUTPATIENT
Start: 2020-02-07 | End: 2020-02-07

## 2020-02-07 RX ORDER — OXYCODONE HYDROCHLORIDE 5 MG/1
5-10 TABLET ORAL EVERY 6 HOURS PRN
Qty: 40 TABLET | Refills: 0 | Status: SHIPPED | OUTPATIENT
Start: 2020-02-07 | End: 2020-09-09

## 2020-02-07 RX ORDER — AMOXICILLIN 250 MG
1 CAPSULE ORAL 2 TIMES DAILY PRN
Qty: 28 TABLET | Refills: 0 | Status: SHIPPED | OUTPATIENT
Start: 2020-02-07 | End: 2020-07-03

## 2020-02-07 RX ADMIN — ACETAMINOPHEN 650 MG: 325 TABLET, FILM COATED ORAL at 15:34

## 2020-02-07 RX ADMIN — ACETAMINOPHEN 650 MG: 325 TABLET, FILM COATED ORAL at 05:43

## 2020-02-07 RX ADMIN — SENNOSIDES AND DOCUSATE SODIUM 1 TABLET: 8.6; 5 TABLET ORAL at 08:57

## 2020-02-07 RX ADMIN — SENNOSIDES AND DOCUSATE SODIUM 1 TABLET: 8.6; 5 TABLET ORAL at 20:50

## 2020-02-07 RX ADMIN — ACETAMINOPHEN 650 MG: 325 TABLET, FILM COATED ORAL at 11:31

## 2020-02-07 RX ADMIN — OXYCODONE HYDROCHLORIDE 10 MG: 5 TABLET ORAL at 09:45

## 2020-02-07 RX ADMIN — METHOCARBAMOL 750 MG: 750 TABLET, FILM COATED ORAL at 22:39

## 2020-02-07 RX ADMIN — OXYCODONE HYDROCHLORIDE 10 MG: 5 TABLET ORAL at 18:13

## 2020-02-07 RX ADMIN — OXYCODONE HYDROCHLORIDE 10 MG: 5 TABLET ORAL at 03:14

## 2020-02-07 RX ADMIN — OXYCODONE HYDROCHLORIDE 10 MG: 5 TABLET ORAL at 21:33

## 2020-02-07 RX ADMIN — OXYCODONE HYDROCHLORIDE 10 MG: 5 TABLET ORAL at 13:40

## 2020-02-07 RX ADMIN — ACETAMINOPHEN 650 MG: 325 TABLET, FILM COATED ORAL at 20:50

## 2020-02-07 RX ADMIN — OXYCODONE HYDROCHLORIDE 10 MG: 5 TABLET ORAL at 06:15

## 2020-02-07 RX ADMIN — ENOXAPARIN SODIUM 40 MG: 40 INJECTION SUBCUTANEOUS at 08:57

## 2020-02-07 ASSESSMENT — ACTIVITIES OF DAILY LIVING (ADL)
ADLS_ACUITY_SCORE: 13

## 2020-02-07 ASSESSMENT — PAIN DESCRIPTION - DESCRIPTORS
DESCRIPTORS: ACHING
DESCRIPTORS: ACHING;SORE
DESCRIPTORS: ACHING;SORE
DESCRIPTORS: ACHING
DESCRIPTORS: ACHING

## 2020-02-07 NOTE — PROGRESS NOTES
Plastic Surgery Progress Note    Ambulated well. Feels well.    Temp:  [96.6  F (35.9  C)-98.6  F (37  C)] 96.6  F (35.9  C)  Pulse:  [66-85] 74  Resp:  [16-18] 16  BP: (109-118)/(58-71) 118/71  SpO2:  [97 %-98 %] 97 %  I/O last 3 completed shifts:  In: 1200 [P.O.:1200]  Out: 5915 [Urine:5870; Drains:45]  Both ALT and SCIP flaps pink and well perfused.  Stent intact. External splint removed.  R groin incision CDI with serosang drain output.  L thigh VAC intact and holding suction.    CBC  Recent Labs   Lab 02/04/20  0650 02/03/20  1646   WBC 11.1* 13.7*   RBC 3.98* 4.63   HGB 12.1* 14.0   HCT 35.0* 41.0   MCV 88 89   MCH 30.4 30.2   MCHC 34.6 34.1   RDW 13.0 12.9    218     BMP  Recent Labs   Lab 02/04/20  0650 02/03/20  1646   * 140   POTASSIUM 3.5 4.0   CHLORIDE 102 108   ADELAIDE 8.4* 8.7   CO2 26 26   BUN 12 11   CR 0.86 0.84   * 156*       ASSESSMENT: POD 4 s/p L ALT + R SCIP phalloplasty, first stage. Doing well.    PLAN:   - phallus external splint removed  - q4h flap checks  - ambulate with assist, hip flexion OK to 90  - may shower  - PT/OT  - L thigh VAC to 125 cont  - drain q4h stripping  - room temp normal  - work on home VAC  - plan for discharge on Sun 2/9 to home

## 2020-02-07 NOTE — PLAN OF CARE
Discharge Planner OT   Patient plan for discharge: home with assist as needed  Current status: pt mod I with basic mobility, SO trained and competent with all assist as needed  Barriers to return to prior living situation: post surgical precautions.   Recommendations for discharge: home with assist.   Rationale for recommendations: see above.        Entered by: Haseeb Gutiérrez 02/07/2020 11:24 AM

## 2020-02-07 NOTE — PROGRESS NOTES
"Urology  Progress Note  02/07/2020    -SONION  -Reports some shivering that resolved with warm blankets  -Had BM yesterday  -Pain controlled  -Tolerating regular diet, no N/V  -Ambulated  -Mcintyre removed and reports spontaneously voiding well    Exam  /71 (BP Location: Right arm)   Pulse 74   Temp 96.6  F (35.9  C) (Oral)   Resp 16   Ht 1.651 m (5' 5\")   Wt 67.9 kg (149 lb 11.2 oz)   SpO2 97%   BMI 24.91 kg/m    No acute distress  Unlabored breathing  Abdomen soft, nontender, nondistended.  Flap warm, well perfused with support in place  Urethral stent sutured in place  R KENNEDY serosanguinous  L KENNEDY serosanguinous    I/O's (last 24/since midnight)  UOP 5870/600  R KENNEDY 25/0  L KENNEDY 25/0    Labs  AM labs pending    Assessment/Plan  34 year old adult POD#4 s/p SCIP+ALT phalloplasty for gender dysphoria.     -Continue urethral stent  -Spontaneously voiding. Monitor UOP.  -Urology will continue to follow    Seen and examined with the chief resident. Will discuss with Dr. Carter.    Capo Caldwell MD  Urology, PGY-2         Contacting the Urology Team     Please use the following job codes to reach the Urology Team. Note that you must use an in house phone and that job codes cannot receive text pages.     On weekdays, dial [phone] (or star-star-star 777 on the new [employer] telephones) then [phone] to reach the Adult Urology resident or PA on call    On weekdays, dial [phone] (or star-star-star 777 on the new [employer] telephones) then 0818 to reach the Pediatric Urology resident    On weeknights and weekends, dial [phone] (or star-star-star 777 on the new [employer] telephones) then [phone] to reach the Urology resident on call (for both Adult and Pediatrics)              "

## 2020-02-07 NOTE — PLAN OF CARE
A&O. VSS. Pain controlled with prn oxycodone. PIV x 2 saline locked. Spontaneously voiding. Passing gas, no BM overnight.  Regular diet- denies nausea. Flap check [id number]- warm/soft/pale pink/+doppler. Left thigh dressing with wound vac and L KENNEDY in place. R KENNEDY x 1 with surgical incision c/d/i. Assist x2. Pt resting comfortably. Continue with plan of care.

## 2020-02-07 NOTE — DISCHARGE SUMMARY
"[facility] Discharge Summary    Myrna Dee MRN: [id number]   YOB: 1985 Age: 34 year old     Date of Admission:  2/3/2020  Date of Discharge::  2/9/2020    Admitting Physician:  Gavin Eastman MD  Discharge Physician:  Dr. Eastman  Primary Care Physician:         Clarissa Stroud          Discharge Diagnosis:   Gender dysphoria         Procedures:   2/3/2020 - phalloplasty w/ L thigh flap & R groin flap          Consultations:   OCCUPATIONAL THERAPY ADULT IP CONSULT  SOCIAL WORK IP CONSULT           Hospital Course:   The patient underwent listed procedure(s) above, which he tolerated without complications. Overall unremarkable hospitalization.  At the time of discharge, he was tolerating PO intake, ambulating, voiding spontaneously without difficulty, and pain was controlled with oral pain medications. The patient was discharged home in stable and improved condition.         Medications Prior to Admission:     Medications Prior to Admission   Medication Sig Dispense Refill Last Dose     Pediatric Multivit-Minerals-C (FLINTSTONES GUMMIES PO) Take 1 tablet by mouth 2 times daily.   Past Week at Unknown time     Syringe/Needle, Disp, 22G X 1-1/2\" 1 ML MISC Inject 0.25 mLs into the muscle once a week 30 each 4 Taking     [DISCONTINUED] testosterone cypionate (DEPOTESTOSTERONE) 200 MG/ML injection Inject 0.25 mLs (50 mg) into the muscle once a week (Patient taking differently: Inject 50 mg into the muscle once a week ) 10 mL 3 Past Month at Unknown time          Discharge Medications:     Current Discharge Medication List      START taking these medications    Details   oxyCODONE (ROXICODONE) 5 MG tablet Take 1-2 tablets (5-10 mg) by mouth every 6 hours as needed for breakthrough pain  Qty: 40 tablet, Refills: 0    Associated Diagnoses: S/P flap graft      senna-docusate (SENOKOT-S/PERICOLACE) 8.6-50 MG tablet Take 1 tablet by mouth 2 times daily as needed for constipation  Qty: 28 tablet, Refills: " "0    Associated Diagnoses: S/P flap graft         CONTINUE these medications which have NOT CHANGED    Details   Pediatric Multivit-Minerals-C (FLINTSTONES GUMMIES PO) Take 1 tablet by mouth 2 times daily.      Syringe/Needle, Disp, 22G X 1-1/2\" 1 ML MISC Inject 0.25 mLs into the muscle once a week  Qty: 30 each, Refills: 4    Associated Diagnoses: Gender identity disorder         STOP taking these medications       testosterone cypionate (DEPOTESTOSTERONE) 200 MG/ML injection Comments:   Reason for Stopping:                  Day of Discharge Physical Exam:   Temp:  [95.5  F (35.3  C)-98.6  F (37  C)] 96.5  F (35.8  C)  Pulse:  [74-84] 74  Heart Rate:  [68-85] 68  Resp:  [16-18] 18  BP: (109-134)/(61-71) 134/64  SpO2:  [95 %-98 %] 98 %  General: A&O, NAD  Chest: NLB on RA  Abd: Soft, ND, NT  Both ALT & SCIP flaps pink & well-perfused, stent intact  R groin incision c/d/i w/ SS drain OP  L thigh VAC intact, holding suction         Discharge Instructions and Follow-Up:        Adult [facility] Follow-up and recommended labs and tests    Follow up with Dr. Eastman on 2/11 as scheduled.    Appointments on [place] and/or [facility] (with [facility] or [facility] provider or service). Call [phone] if you haven't heard regarding these appointments within 7 days of discharge.     Activity    Please keep your salvador-phallus straight up or higher (towards belly). Do NOT let it hang or fall down.  Wear a sling for walking to support your salvador-phallus.  OK to shower as long as your VAC dressings do not get wet.    Please hold your testosterone for 2 weeks.    Activity:  - Start walking as soon as possible to help reduce swelling and lower your chance of blood clots  - No driving for 4 weeks  - No heavy lifting for 6-8 weeks  - Do not smoke - this will delay healing and increases risk of complications    Incisions:  - No tub soaking, bathing, or swimming for 6-8 weeks  - Avoid exposing scars to sun for at least 12 months and always use " "strong sun block if sun exposure is unavoidable  - Keep surgical tape until it peels off  - Keep incisions clean and inspect daily for signs of infection    Drain Care:  - Please keep drain site clean and dry  - Please keep a log of the drainage amounts every time you empty the drain and bring to your clinic appointment  - Please \"strip\" the drain as shown to you during your hospitalization 3 times per day    Medications:  - Do not take any additional Tylenol (acetaminophen) while using a narcotic pain medication which includes acetaminophen  - Do not take more than 3,000mg of acetaminophen in any 24 hour period, as this can cause liver damage  - Do not take any aspirin or any aspirin-containing products for one week after surgery  - Narcotics are for BREAKTHROUGH PAIN ONLY  - Take stool softeners such as Senna or Miralax while you are using narcotics, but stop if you develop diarrhea  - Wean yourself off of narcotic pain medications  - No driving for at least 12 hours after taking narcotic pain medication    Follow-Up:  - Call your primary care provider to touch base regarding your recent admission.    - Call or return sooner than your regularly scheduled visit if you develop any of the following: fever >101.5, uncontrolled pain, uncontrolled nausea or vomiting, as well as increased redness, swelling, or drainage from your wound.  - Follow up with plastic surgery clinic as scheduled     Diet    Follow this diet upon discharge: Orders Placed This Encounter      Regular Diet Adult       Condition at discharge: Stable    "

## 2020-02-07 NOTE — PLAN OF CARE
Reason for Admission: Phalloplasty, first stage.    Activity: Assist of 1-2. Steady gait. Ambulated to bathroom x2 and in hallway x1.     Neuro: A&O x4. Pleasant and calm.     GI/: Voiding spontaneously without difficulty. 1 BM reported this shift.     Diet: Regular, tolerating well.     Incisions/Drains: Left thigh dressing with wound vac and L KENNEDY in place. R KENNEDY x 1 with surgical incision clean, dry, intact.     IV Access: Left and Right PIV saline locked.     Vitals: VSS on RA    Pain: Pt reported pain mostly at Right KENNEDY drain site and left thigh. PRN oxycodone given x2 and robaxin given x1 this shift.     New changes this shift: Pt had BM this shift.    Plan: Flap (penis) check q 4 hours. Penis in upright position per order. Continue with plan of care.

## 2020-02-08 LAB — PLATELET # BLD AUTO: 219 10E9/L (ref 150–450)

## 2020-02-08 PROCEDURE — 85049 AUTOMATED PLATELET COUNT: CPT | Performed by: PLASTIC SURGERY

## 2020-02-08 PROCEDURE — 12000001 ZZH R&B MED SURG/OB UMMC

## 2020-02-08 PROCEDURE — 25000128 H RX IP 250 OP 636: Performed by: STUDENT IN AN ORGANIZED HEALTH CARE EDUCATION/TRAINING PROGRAM

## 2020-02-08 PROCEDURE — 36415 COLL VENOUS BLD VENIPUNCTURE: CPT | Performed by: PLASTIC SURGERY

## 2020-02-08 PROCEDURE — 25000132 ZZH RX MED GY IP 250 OP 250 PS 637: Performed by: STUDENT IN AN ORGANIZED HEALTH CARE EDUCATION/TRAINING PROGRAM

## 2020-02-08 RX ADMIN — SENNOSIDES AND DOCUSATE SODIUM 2 TABLET: 8.6; 5 TABLET ORAL at 08:04

## 2020-02-08 RX ADMIN — OXYCODONE HYDROCHLORIDE 10 MG: 5 TABLET ORAL at 04:19

## 2020-02-08 RX ADMIN — SENNOSIDES AND DOCUSATE SODIUM 2 TABLET: 8.6; 5 TABLET ORAL at 19:57

## 2020-02-08 RX ADMIN — ENOXAPARIN SODIUM 40 MG: 40 INJECTION SUBCUTANEOUS at 08:14

## 2020-02-08 RX ADMIN — OXYCODONE HYDROCHLORIDE 10 MG: 5 TABLET ORAL at 18:51

## 2020-02-08 RX ADMIN — ACETAMINOPHEN 650 MG: 325 TABLET, FILM COATED ORAL at 16:59

## 2020-02-08 RX ADMIN — METHOCARBAMOL 750 MG: 750 TABLET, FILM COATED ORAL at 23:25

## 2020-02-08 RX ADMIN — ACETAMINOPHEN 650 MG: 325 TABLET, FILM COATED ORAL at 04:19

## 2020-02-08 RX ADMIN — ACETAMINOPHEN 650 MG: 325 TABLET, FILM COATED ORAL at 23:25

## 2020-02-08 RX ADMIN — OXYCODONE HYDROCHLORIDE 10 MG: 5 TABLET ORAL at 08:04

## 2020-02-08 RX ADMIN — OXYCODONE HYDROCHLORIDE 10 MG: 5 TABLET ORAL at 11:01

## 2020-02-08 RX ADMIN — OXYCODONE HYDROCHLORIDE 10 MG: 5 TABLET ORAL at 22:13

## 2020-02-08 RX ADMIN — ACETAMINOPHEN 650 MG: 325 TABLET, FILM COATED ORAL at 12:16

## 2020-02-08 RX ADMIN — OXYCODONE HYDROCHLORIDE 10 MG: 5 TABLET ORAL at 14:29

## 2020-02-08 RX ADMIN — ACETAMINOPHEN 650 MG: 325 TABLET, FILM COATED ORAL at 00:35

## 2020-02-08 ASSESSMENT — ACTIVITIES OF DAILY LIVING (ADL)
ADLS_ACUITY_SCORE: 13

## 2020-02-08 ASSESSMENT — PAIN DESCRIPTION - DESCRIPTORS
DESCRIPTORS: SHARP
DESCRIPTORS: CONSTANT;ACHING

## 2020-02-08 NOTE — PLAN OF CARE
VSS. Up with SBA, ambulated in mathis x3. No c/o nausea. PIV saline locked. Tolerating diet. Pain managed with PRN Tylenol and Oxycodone. KENNEDY x2 stripped per orders, scant output. KENNEDY dressings changed. Wound vac dressing to left thigh CDI. Flap checks q 4h, strong doppler pulse present. Blister on pubic area near phallus, started bleeding small amounts this afternoon. Voiding good amounts. Pt declined a shower today, states he would like one tomorrow. Home wound vac delivered to pt's room today. Continue to monitor and with POC.

## 2020-02-08 NOTE — PLAN OF CARE
Writer assumed care of patient from [id number].    AVSS on RA. A&O x4, up with assist x1. Hip flexion OK to 90 degrees. Hypoactive BS/+ passing flatus. No BM this shift. Denies N/V. Tolerating regular diet. Voiding adequately via native urethra. Urethral stent in place. All incisions WNL except 2 blisters in left groin/pubic area unchanged from previous shift. Phallus pink/warm/soft/+ doppler q4h. Bilateral KENNEDY drains to bulb suction w/ serosanguinous output. Left thigh graft site to WoundVac @ ~125mmHg. PIV saline locked. Incisional pain adequately managed w/ PRN Tylenol, PRN Oxycodone, and PRN Robaxin.     Criss Engle RN on 2/8/2020 at 6:07 AM

## 2020-02-08 NOTE — PROGRESS NOTES
"Urology  Progress Note  02/08/2020    -NAEON  -Pain controlled  -Tolerating regular diet, no N/V  - + flatus, + BM  -Ambulated  -Reports spontaneously voiding well    Exam  /74 (BP Location: Right arm)   Pulse 74   Temp 97.1  F (36.2  C) (Oral)   Resp 18   Ht 1.651 m (5' 5\")   Wt 67.9 kg (149 lb 11.2 oz)   SpO2 99%   BMI 24.91 kg/m    No acute distress  Unlabored breathing  Abdomen soft, nontender, nondistended.  Flap warm, well perfused  Urethral stent sutured in place  R KENNEDY serosanguinous  L KENNEDY serosanguinous    I/O's (last 24/since midnight)  UOP 4350/800  R KENNEDY 0/25  L KENNEDY 0/18    Labs  Plt 219    Assessment/Plan  34 year old adult POD#5 s/p SCIP+ALT phalloplasty for gender dysphoria.     -Continue urethral stent  -Spontaneously voiding. Monitor UOP.  -Urology will continue to follow    Seen and examined with the chief resident. Will discuss with Dr. Carter.    Octavio Serna MD  Urology, PGY-4         Contacting the Urology Team     Please use the following job codes to reach the Urology Team. Note that you must use an in house phone and that job codes cannot receive text pages.     On weekdays, dial [phone] (or star-star-star 777 on the new [employer] telephones) then [phone] to reach the Adult Urology resident or PA on call    On weekdays, dial [phone] (or star-star-star 777 on the new [employer] telephones) then 0818 to reach the Pediatric Urology resident    On weeknights and weekends, dial [phone] (or star-star-star 777 on the new [employer] telephones) then [phone] to reach the Urology resident on call (for both Adult and Pediatrics)              "

## 2020-02-08 NOTE — PLAN OF CARE
VSS. Up with SBA, ambulated in mathis x4. Took a shower today. Pain managed with PRN Tylenol and Oxycodone. No c/o nausea. Tolerating reg diet. PIV saline locked. Voiding adequately. Passing flatus, no BM today. KENNEDY drains stripped per orders, scant output. Wound vac to left thigh, dressing reinforced x1. Flap checks q 4h, phallus remains warm and pink with strong doppler pulse. Left groin blisters covered with dressing by MD this morning. Possible discharge tomorrow. Continue with POC.

## 2020-02-08 NOTE — PROGRESS NOTES
Plastic Surgery Progress Note    Doing well, ambulating and has navigated stairs and feels comfortable    Temp:  [96.3  F (35.7  C)-98.7  F (37.1  C)] 97.1  F (36.2  C)  Heart Rate:  [67-81] 67  Resp:  [16-18] 18  BP: (108-134)/(64-74) 111/74  SpO2:  [96 %-99 %] 99 %  I/O last 3 completed shifts:  In: 700 [P.O.:700]  Out: 4143 [Urine:4100; Drains:43]  Both ALT and SCIP flaps pink and well perfused.  Stent intact.  R groin incision CDI with serosang drain output.  L thigh VAC intact and holding suction.  2 blisters along left groin crease, no evidence of infection    CBC  Recent Labs   Lab 02/08/20  0719 02/04/20  0650 02/03/20  1646   WBC  --  11.1* 13.7*   RBC  --  3.98* 4.63   HGB  --  12.1* 14.0   HCT  --  35.0* 41.0   MCV  --  88 89   MCH  --  30.4 30.2   MCHC  --  34.6 34.1   RDW  --  13.0 12.9    187 218     BMP  Recent Labs   Lab 02/04/20  0650 02/03/20  1646   * 140   POTASSIUM 3.5 4.0   CHLORIDE 102 108   ADELAIDE 8.4* 8.7   CO2 26 26   BUN 12 11   CR 0.86 0.84   * 156*       ASSESSMENT: POD 4 s/p L ALT + R SCIP phalloplasty, first stage. Doing well.    PLAN:   - phallus external splint removed  - q4h flap checks  - ambulate with assist, hip flexion OK to 90  - may shower  - PT/OT  - L thigh VAC to 125 cont  - drain q4h stripping  - room temp normal  - home VAC obtained  - plan for discharge on Sun 2/9 to home    Yovana Scott MD

## 2020-02-09 VITALS
OXYGEN SATURATION: 98 % | BODY MASS INDEX: 24.94 KG/M2 | HEIGHT: 65 IN | SYSTOLIC BLOOD PRESSURE: 113 MMHG | RESPIRATION RATE: 18 BRPM | DIASTOLIC BLOOD PRESSURE: 65 MMHG | WEIGHT: 149.7 LBS | TEMPERATURE: 96.2 F | HEART RATE: 77 BPM

## 2020-02-09 PROCEDURE — 25000132 ZZH RX MED GY IP 250 OP 250 PS 637: Performed by: STUDENT IN AN ORGANIZED HEALTH CARE EDUCATION/TRAINING PROGRAM

## 2020-02-09 PROCEDURE — 25000128 H RX IP 250 OP 636: Performed by: STUDENT IN AN ORGANIZED HEALTH CARE EDUCATION/TRAINING PROGRAM

## 2020-02-09 RX ADMIN — SENNOSIDES AND DOCUSATE SODIUM 2 TABLET: 8.6; 5 TABLET ORAL at 08:18

## 2020-02-09 RX ADMIN — ACETAMINOPHEN 650 MG: 325 TABLET, FILM COATED ORAL at 09:13

## 2020-02-09 RX ADMIN — ENOXAPARIN SODIUM 40 MG: 40 INJECTION SUBCUTANEOUS at 08:18

## 2020-02-09 RX ADMIN — ACETAMINOPHEN 650 MG: 325 TABLET, FILM COATED ORAL at 04:04

## 2020-02-09 RX ADMIN — OXYCODONE HYDROCHLORIDE 10 MG: 5 TABLET ORAL at 05:33

## 2020-02-09 RX ADMIN — OXYCODONE HYDROCHLORIDE 10 MG: 5 TABLET ORAL at 10:00

## 2020-02-09 ASSESSMENT — ACTIVITIES OF DAILY LIVING (ADL)
ADLS_ACUITY_SCORE: 13
ADLS_ACUITY_SCORE: 14

## 2020-02-09 ASSESSMENT — PAIN DESCRIPTION - DESCRIPTORS: DESCRIPTORS: SHARP

## 2020-02-09 NOTE — PLAN OF CARE
Discharge orders placed by MD. PIV removed. Reviewed discharge instructions and follow-up with patient/wife. Switched patient over to home wound vac. Discussed and demonstrated how to change home wound vac cannister if needed. KENNEDY dressings changed. KENNEDY drain care and stripping reviewed with patient, sister, and wife. Pt will  medications from discharge pharmacy on the way out. All questions answered.

## 2020-02-09 NOTE — PROGRESS NOTES
"Urology  Progress Note  02/09/2020    -NAEON  -Improved sleep  -AF, VSS, on RA  -Pain controlled on oral meds  -Tolerating regular diet, no N/V  - + flatus, + BM  -Ambulated  -Spontaneously voiding  -Looking forward to discharge    Exam  /68 (BP Location: Left arm)   Pulse 77   Temp 96.9  F (36.1  C) (Oral)   Resp 20   Ht 1.651 m (5' 5\")   Wt 67.9 kg (149 lb 11.2 oz)   SpO2 95%   BMI 24.91 kg/m    No acute distress  Unlabored breathing  Abdomen soft, nontender, nondistended.  Flap warm, well perfused  Urethral stent sutured in place; mild amount of drainage and edema at proximal aspect of stent exiting near native urethra  R KENNEDY scant serous  L KENNEDY serous  Donor site on LLE with vac in place with suction     I/O's (last 24/since midnight)  UOP 4300+1unemasured/450  R KENNEDY 42/5  L KENNEDY 33/3  Stool x1/NR    Labs  No new    Assessment/Plan  34 year old adult POD#6 s/p SCIP+ALT phalloplasty for gender dysphoria.     -Continue urethral stent until follow up  -Spontaneously voiding. Monitor UOP.  -Urology will continue to follow    Seen and examined with the chief resident and staff, Dr. Dee. Will discuss with Dr. Carter.    Capo Caldwell MD  Urology, PGY-2           Contacting the Urology Team     Please use the following job codes to reach the Urology Team. Note that you must use an in house phone and that job codes cannot receive text pages.     On weekdays, dial [phone] (or star-star-star 777 on the new [employer] telephones) then 0817 to reach the Adult Urology resident or PA on call    On weekdays, dial [phone] (or star-star-star 777 on the new [employer] telephones) then 0818 to reach the Pediatric Urology resident    On weeknights and weekends, dial [phone] (or star-star-star 777 on the new [employer] telephones) then [phone] to reach the Urology resident on call (for both Adult and Pediatrics)              "

## 2020-02-09 NOTE — PROGRESS NOTES
"Status: 34 yrs old male admitted on 2/3. POD#5 s/p SCIP+ALT phalloplasty for gender dysphoria. Bilateral KENNEDY drain, stent, Left thigh Wound Vac at -125mmHg.   Dressings and incisions: c/d/i.   LLE CMS: intact, denies numbness and tingling.   Qshift KENNEDY stripping, minimum drainage   Q4 flap checks: pink, warm and pulse present to dopple  VS: /81 (BP Location: Right arm)   Pulse 77   Temp 99.2  F (37.3  C) (Oral)   Resp 16   Ht 1.651 m (5' 5\")   Wt 67.9 kg (149 lb 11.2 oz)   SpO2 96%   BMI 24.91 kg/m    Neuros: Alert and oriented X4. Calm and cooperative with cares.   Cardiovascular: WDL  GI/: Voiding spontaneously via native urethral, had a moderate BM X1 this shift. Passing flatus.   IV: PIV to left hand: SL  Activity: Up with standby assist to the bathroom and in mathis ways   Pain: Incisional ongoing post-op pain managed well with 10mg oxycodone, tylenol and robaxin.   Respiratory/Trach: WDL   Skin: Intact except incisions to groin and left thigh.  Labs: none  Social:  Plan of Care: plan to discharge home on 2/9 (today).     Shaun Michael RN on 2/9/2020 at 5:47 AM    "

## 2020-02-10 NOTE — PLAN OF CARE
Occupational Therapy Discharge Summary    Reason for therapy discharge:    Discharged to home.    Progress towards therapy goal(s). See goals on Care Plan in [facility] electronic health record for goal details.  Goals partially met.  Barriers to achieving goals:   discharge from facility.    Therapy recommendation(s):    No further therapy is recommended.

## 2020-02-11 ENCOUNTER — OFFICE VISIT (OUTPATIENT)
Dept: PLASTIC SURGERY | Facility: CLINIC | Age: 35
End: 2020-02-11
Payer: COMMERCIAL

## 2020-02-11 VITALS
DIASTOLIC BLOOD PRESSURE: 69 MMHG | HEIGHT: 65 IN | SYSTOLIC BLOOD PRESSURE: 133 MMHG | HEART RATE: 63 BPM | TEMPERATURE: 97.5 F | BODY MASS INDEX: 24.91 KG/M2 | OXYGEN SATURATION: 100 %

## 2020-02-11 DIAGNOSIS — F64.0 GENDER DYSPHORIA IN ADULT: Primary | ICD-10-CM

## 2020-02-11 RX ORDER — TESTOSTERONE CYPIONATE 200 MG/ML
INJECTION, SOLUTION INTRAMUSCULAR
COMMUNITY
Start: 2019-12-17 | End: 2020-06-03

## 2020-02-11 ASSESSMENT — PAIN SCALES - GENERAL: PAINLEVEL: MILD PAIN (3)

## 2020-02-11 NOTE — NURSING NOTE
"Chief Complaint   Patient presents with     Surgical Followup     post op phallo L thigh 2/3, c/o burning pain at donor site       Vitals:    02/11/20 1727   BP: 133/69   BP Location: Left arm   Patient Position: Chair   Cuff Size: Adult Regular   Pulse: 63   Temp: 97.5  F (36.4  C)   TempSrc: Oral   SpO2: 100%   Height: 1.651 m (5' 5\")       Body mass index is 24.91 kg/m .    BETH Evans    "

## 2020-02-11 NOTE — LETTER
"2/11/2020       RE: Myrna Dee  [address]  [facility] [address]     Dear Colleague,    Thank you for referring your patient, Myrna Dee, to the [facility] PLASTIC AND RECONSTRUCTIVE SURGERY at [facility]. Please see a copy of my visit note below.    Patient returns for a postoperative follow-up after undergoing for stage phalloplasty for gender dysphoria.    INTERVAL HISTORY: Patient reports he is doing well at home.  Left groin drain is painful.  Drain output is still greater than 25 cc/day on the right side.    PHYSICAL EXAMINATION:  /69 (BP Location: Left arm, Patient Position: Chair, Cuff Size: Adult Regular)   Pulse 63   Temp 97.5  F (36.4  C) (Oral)   Ht 1.651 m (5' 5\")   SpO2 100%   BMI 24.91 kg/m     Genital examination was performed in presence of a chaperone.  Phallus is viable of the outer shaft in both the inner shaft.  Sutures lines are healing appropriately.  Stent is intact.  Right groin drain output is serous in quality.  Left groin drain output is also serous in quality.  There is on a percent skin graft take on the left thigh.  Skin graft donor site is healing appropriately.  No sign of infection.    ASSESSMENT: 1 week status post for stage ALT/SCIP combination flap phalloplasty.  Doing well.    PLAN: Left groin drain removed today.  VAC removal today.  Patient is to begin daily Adaptic dressing changes to the left thigh skin grafted flap donor site.  Stent is to remain in for another week.  Right groin drain to remain in for another week.  Patient may shower on the right side of the body.  I will see the patient back in 1 week to track her healing progress.    Total time spent with patient was 15 min of which greater than 50% was in counseling.    Again, thank you for allowing me to participate in the care of your patient.      Sincerely,    Gavin Eastman MD  "

## 2020-02-12 ENCOUNTER — DOCUMENTATION ONLY (OUTPATIENT)
Dept: OTHER | Facility: CLINIC | Age: 35
End: 2020-02-12

## 2020-02-12 ENCOUNTER — TELEPHONE (OUTPATIENT)
Dept: PLASTIC SURGERY | Facility: CLINIC | Age: 35
End: 2020-02-12

## 2020-02-12 LAB
ANION GAP SERPL CALCULATED.3IONS-SCNC: 4 MMOL/L (ref 3–14)
ANION GAP SERPL CALCULATED.3IONS-SCNC: 7 MMOL/L (ref 3–14)
BUN SERPL-MCNC: 11 MG/DL (ref 7–30)
BUN SERPL-MCNC: 12 MG/DL (ref 7–30)
CALCIUM SERPL-MCNC: 8.4 MG/DL (ref 8.5–10.1)
CALCIUM SERPL-MCNC: 8.7 MG/DL (ref 8.5–10.1)
CHLORIDE SERPL-SCNC: 102 MMOL/L (ref 94–109)
CHLORIDE SERPL-SCNC: 108 MMOL/L (ref 94–109)
CO2 SERPL-SCNC: 26 MMOL/L (ref 20–32)
CO2 SERPL-SCNC: 26 MMOL/L (ref 20–32)
CREAT SERPL-MCNC: 0.84 MG/DL (ref 0.66–1.25)
CREAT SERPL-MCNC: 0.86 MG/DL (ref 0.66–1.25)
ERYTHROCYTE [DISTWIDTH] IN BLOOD BY AUTOMATED COUNT: 12.9 % (ref 10–15)
ERYTHROCYTE [DISTWIDTH] IN BLOOD BY AUTOMATED COUNT: 13 % (ref 10–15)
GFR SERPL CREATININE-BSD FRML MDRD: >90 ML/MIN/{1.73_M2}
GFR SERPL CREATININE-BSD FRML MDRD: >90 ML/MIN/{1.73_M2}
GLUCOSE SERPL-MCNC: 111 MG/DL (ref 70–99)
GLUCOSE SERPL-MCNC: 156 MG/DL (ref 70–99)
HCT VFR BLD AUTO: 35 % (ref 40–53)
HCT VFR BLD AUTO: 41 % (ref 40–53)
HGB BLD-MCNC: 12.1 G/DL (ref 13.3–17.7)
HGB BLD-MCNC: 14 G/DL (ref 13.3–17.7)
MCH RBC QN AUTO: 30.2 PG (ref 26.5–33)
MCH RBC QN AUTO: 30.4 PG (ref 26.5–33)
MCHC RBC AUTO-ENTMCNC: 34.1 G/DL (ref 31.5–36.5)
MCHC RBC AUTO-ENTMCNC: 34.6 G/DL (ref 31.5–36.5)
MCV RBC AUTO: 88 FL (ref 78–100)
MCV RBC AUTO: 89 FL (ref 78–100)
PLATELET # BLD AUTO: 187 10E9/L (ref 150–450)
PLATELET # BLD AUTO: 218 10E9/L (ref 150–450)
POTASSIUM SERPL-SCNC: 3.5 MMOL/L (ref 3.4–5.3)
POTASSIUM SERPL-SCNC: 4 MMOL/L (ref 3.4–5.3)
RBC # BLD AUTO: 3.98 10E12/L (ref 4.4–5.9)
RBC # BLD AUTO: 4.63 10E12/L (ref 4.4–5.9)
SODIUM SERPL-SCNC: 132 MMOL/L (ref 133–144)
SODIUM SERPL-SCNC: 140 MMOL/L (ref 133–144)
WBC # BLD AUTO: 11.1 10E9/L (ref 4–11)
WBC # BLD AUTO: 13.7 10E9/L (ref 4–11)

## 2020-02-12 NOTE — TELEPHONE ENCOUNTER
[facility] Call Center    Phone Message    May a detailed message be left on voicemail: yes     Reason for Call: Other: Jorge from [employer] calling in they need to kow who is in charge of Jeds dressing changes please call back to inform, thank you      Action Taken: Message routed to:  [facility] plastic surgery     Travel Screening: Not Applicable

## 2020-02-12 NOTE — PROGRESS NOTES
"Patient returns for a postoperative follow-up after undergoing for stage phalloplasty for gender dysphoria.    INTERVAL HISTORY: Patient reports he is doing well at home.  Left groin drain is painful.  Drain output is still greater than 25 cc/day on the right side.    PHYSICAL EXAMINATION:  /69 (BP Location: Left arm, Patient Position: Chair, Cuff Size: Adult Regular)   Pulse 63   Temp 97.5  F (36.4  C) (Oral)   Ht 1.651 m (5' 5\")   SpO2 100%   BMI 24.91 kg/m    Genital examination was performed in presence of a chaperone.  Phallus is viable of the outer shaft in both the inner shaft.  Sutures lines are healing appropriately.  Stent is intact.  Right groin drain output is serous in quality.  Left groin drain output is also serous in quality.  There is on a percent skin graft take on the left thigh.  Skin graft donor site is healing appropriately.  No sign of infection.    ASSESSMENT: 1 week status post for stage ALT/SCIP combination flap phalloplasty.  Doing well.    PLAN: Left groin drain removed today.  VAC removal today.  Patient is to begin daily Adaptic dressing changes to the left thigh skin grafted flap donor site.  Stent is to remain in for another week.  Right groin drain to remain in for another week.  Patient may shower on the right side of the body.  I will see the patient back in 1 week to track her healing progress.    Total time spent with patient was 15 min of which greater than 50% was in counseling.  "

## 2020-02-13 NOTE — TELEPHONE ENCOUNTER
Spoke with representative at [facility] and explained that no dressing changes for the VAC were required, this has been discontinued. Provided direct contact information should there be any additional questions or concerns. Myesha HUSTON RNCC

## 2020-02-18 ENCOUNTER — OFFICE VISIT (OUTPATIENT)
Dept: PLASTIC SURGERY | Facility: CLINIC | Age: 35
End: 2020-02-18
Payer: COMMERCIAL

## 2020-02-18 VITALS
OXYGEN SATURATION: 97 % | BODY MASS INDEX: 25.09 KG/M2 | SYSTOLIC BLOOD PRESSURE: 119 MMHG | TEMPERATURE: 97.4 F | HEIGHT: 65 IN | DIASTOLIC BLOOD PRESSURE: 64 MMHG | WEIGHT: 150.6 LBS | HEART RATE: 59 BPM

## 2020-02-18 DIAGNOSIS — F64.0 GENDER DYSPHORIA IN ADULT: Primary | ICD-10-CM

## 2020-02-18 ASSESSMENT — PAIN SCALES - GENERAL: PAINLEVEL: NO PAIN (0)

## 2020-02-18 ASSESSMENT — MIFFLIN-ST. JEOR: SCORE: 1550

## 2020-02-18 NOTE — PROGRESS NOTES
"Patient returns for a postoperative follow-up after undergoing for stage phalloplasty for gender dysphoria.    INTERVAL HISTORY: Patient is doing well.  Left labial pain is resolved.  Drain output is greater than 5060 cc/day.    PHYSICAL EXAMINATION:  /64 (BP Location: Left arm, Patient Position: Sitting, Cuff Size: Adult Regular)   Pulse 59   Temp 97.4  F (36.3  C) (Oral)   Ht 1.651 m (5' 5\")   Wt 68.3 kg (150 lb 9.6 oz)   SpO2 97%   BMI 25.06 kg/m    Genital examination was performed in presence of a chaperone.  Phallus and urethral flaps are healing appropriately.  Suture lines are healing appropriately.  There is no wound.  Left thigh skin graft donor site is healing appropriately.  Left thigh flap donor site is healing appropriately with a skin graft over it.  Stent is intact.  Right groin incision is healing appropriately.  Right groin drain output is serous.    ASSESSMENT: 2 weeks status post for stage ALT/SCIP combination flap phalloplasty.  Doing well.    PLAN: Right groin drain is to remain in for now.  Patient is to continue with daily Adaptic dressing changes.  Urethral flap stent is removed today.  Patient may begin to dangle the phallus temporarily.  He may shower.  I will see him back in 1 to 2 weeks.    Total time spent with patient was 15 min of which greater than 50% was in counseling.  "

## 2020-02-18 NOTE — OP NOTE
DATE OF OPERATION: February 3, 2020     PREOPERATIVE DIAGNOSIS: Gender dysphoria.     POSTOPERATIVE DIAGNOSIS: Gender dysphoria.     PROCEDURES PERFORMED:   1.  Phalloplasty using 2 pedicled fasciocutaneous flaps: right superficial circumflex iliac artery  flap, size 20 x 4.5 cm, for urethral reconstruction and left anterolateral thigh flap, size 15 x 15 cm, for phallus reconstruction. (Both fasciocutaneous flaps were harvested as  flaps, requiring meticulous small vessel dissection and adding at least 1 hour of operative time per flap.)  2.  Intraoperative chemical angiography using the SPY Elite system to evaluate for flap perfusion prior to flap transfer.  3.  Neurorrhaphy with nerve conduit wrap of right anterolateral thigh flap lateral femoral cutaneous nerve first branch to right ilioinguinal nerve under operative microscope using microsurgical technique.  4.  Neurorrhaphy with nerve conduit wrap of the right anterolateral thigh flap lateral femoral cutaneous nerve second branch to right dorsal clitoral nerve under operative microscope using microsurgical technique.  5.  Clitoroplasty.  6.  Bilateral labiaplasty in the form of adjacent tissue transfer of 6 x 3 cm on either side to assist in flap urethral tube insetting in preparation for future second stage urethroplasty.  7.  Split-thickness skin graft to right thigh flap donor site wound, size 15 x 15 cm.  8.  Vacuum-assisted closure device placed over right thigh skin grafted wound, size 15 x 15 cm.     SURGEON: Gavin Eastman MD     CO SURGEON:   1.  Raven Andrea MD (co-surgeon was required due to the prolonged duration and technical difficulty of  flap dissection in the absence of appropriate skilled resident assistance.  Parts 1, 3, and 4 were performed together between Dr. Andrea and Dr. Eastman.  Parts 7 and 8 were performed by Dr. Andrea by himself.)  2.  Octavio Carter MD (co-surgeon was required due to the need of  expertise from 2 separate subspecialties in the absence of appropriate skilled resident assistance.  Parts 5 and 6 were performed together between Dr. Carter and Dr. Eastman.  Dr. Carter performed urethroplasty on the harvested SCIP flap by himself.)     ASSISTANT: Martha Wilson MD     ANESTHESIA: General.     ESTIMATED BLOOD LOSS: 100 mL     FINDINGS: Well perfused flaps following inset.     SPECIMENS: None.     COMPLICATIONS: None.     DRAINS: 15 [place] KENNEDY drain in right groin.  VAC to 125 continuous on left thigh flap donor site.     IMPLANTS: None.     INDICATIONS: Patient is a 34-year-old trans-man who prefers he him pronouns.  He transitioned 10 years ago.  He has been on hormone therapy for 8 years.  He received 2 letters of support for phalloplasty.  Having met all WPATH standards of care guidelines criteria for genital surgery, risks benefits and alternatives were discussed for pedicle flap phalloplasty from the groin and thigh.  Patient accepted the associated risks and wished to proceed with surgery.     DESCRIPTION OF PROCEDURE: Informed consent was obtained in the preoperative holding area.  Patient was marked and taken to the operating room.  Preoperative antibiotics were given.  Bilateral lower leg SCDs were placed.  General anesthesia was obtained.  Patient was then placed into a supine position with arms out on arm boards.  The abdomen, groin, and perineum were then prepped and draped in a sterile fashion.  A timeout was performed.  A sterile Mcintyre catheter was inserted.     The plastic surgery team began the operation.  A 20 x 4.5 cm right groin flap was designed around the superficial circumflex iliac vessels and perforators.  The flap was raised along the superficial fascia until perforators were visualized.  A small cuff of deep fascia was taken with the perforators to ensure no damage to the pedicle.  Both perforators of the deep and superficial branches of the superficial circumflex iliac vessels  were kept with the flap.  Meticulous retrograde dissection of these perforators was then performed down to the femoral vessels.  This was done together with Dr. Raven Andrea under loupe magnification and at times under the operative microscope to ensure no injury to the pedicle.  This dissection added at least 1 hour to the procedure.  Once the flap was completely dissected, it was placed back into its original position under a moist lap.     Attention was then turned to the left thigh.  A 15 x 15 cm anterolateral thigh flap was designed around 1  that was dopplerable.  The most superior horizontal incision was made along with a curvilinear incision towards the groin.  Through this approach, 2 branches of the lateral femoral cutaneous nerve were dissected circumferentially from distal to proximal.  They were then sharply ligated and marked for future neurorrhaphy.  The remaining flap skin incisions were then made and dissection was performed along a superficial fascial layer towards the perforators in all directions until the level of sensory nerve entry into the flap.  The dissection plane was then changed to a subfascial layer to identify the perforators.  These perforators were then isolated and 360 degree fashion.     We determined the groin flap would be transferred with both perforators dissected.  Patient was given 5 cc of ICG dye intravenously.  Intraoperative chemical angiography was then performed using the spy Elite system.  This showed adequate perfusion of both the groin and thigh flaps.     Attention was then turned back to the thigh.  Meticulous retrograde dissection was performed along the perforators.  This  dissection added at least 1 hour to the procedure.  The femoral nerve was left uninjured.  At least 1 large vascular branch was kept to the rectus femoris muscle.  A space was then created underneath the rectus femoris muscle and also the sartorius muscle with care taken not  to damage the underlying large vessels.     A horizontal incision was then made in the pubic region at the midline just at the level of the inferior border of the pubic bone.  This incision was then carried down through the subcutaneous tissue to identify the pubis.  Dissection was then performed superiorly to identify the right external inguinal ring.  Within that, the ilioinguinal nerve was identified and dissected circumferentially as distal as possible before began branching.  The nerve was then ligated at that point intact for future neurorrhaphy.     Simultaneously, Dr. Carter began urethroplasty using the groin flap by tubing it over a catheter and performing a watertight closure.  This will be dictated separately by him.  Once this was performed, he began the clitoroplasty by incising 360 degrees around the clitoris at its junction with a labia minora.  Superficial sharp dissection was then performed around the clitoris to free it from its surrounding tissues.  The clitoris was then brought through the pubic opening and denuded.  Under the operative microscope, the right dorsal clitoral nerve was identified and dissected circumferentially.  It was ligated distally and tagged for future neurorrhaphy.  The denuded clitoris was then tacked to the pubis using a 2-0 PDS suture.     The tubed groin flap underwent a 90 degree clockwise turn and was passed through a subcutaneous tunnel to the pubis.  The inferior end of the tube was brought down towards the opening from the clitoral incision.  Dr. Carter and I then performed bilateral labiaplasty by raising 6 x 3 cm labia minora flaps based laterally to close over the inferior aspect of the urethral flap and prepare for second stage urethral reconstruction.  I performed one side while he performed the other side simultaneously to decrease operative time.  Care was taken not to place any undue tension on the groin flap pedicle.  The groin flap donor site was then  closed in layers over a 15 [place] KENNEDY drain.     Another subcutaneous tunnel was created from the pubic opening to the thigh flap dissection plane.  The anterolateral thigh flap was then passed underneath the rectus femoris muscle, underneath the sartorius muscle, then through the subcutaneous tunnel to reach the pubic opening.  Care was taken to ensure no tension or twist in the pedicle.  Dr. Andrea and I then performed 2 neurorrhaphy's under the operative microscope using microsurgical technique.  The first lateral femoral cutaneous nerve branch of the anterolateral thigh flap was transposed to meet the right ilioinguinal nerve end-to-end without any tension.  This nerve coaptation was performed using 9-0 nylon suture in a 180 degree fashion.  The coaptation site was then wrapped with a nerve conduit wrap, which was closed over using micro gem clips.  The second lateral femoral cutaneous nerve branch of the anterolateral thigh flap was then transposed to meet the right dorsal clitoral nerve end-to-end without any tension.  This coaptation was also performed using 9-0 nylon suture in a 180 degree fashion.  The coaptation site was then also wrapped with a nerve conduit wrap using micro gem clips.     The anterolateral thigh flap was then wrapped around the groin flap so that the flap closure is on the ventral aspect of the phallus.  Dr. Carter then matured the groin flap to the thigh flap to complete the urethroplasty.  The phallus incisions were then closed in layers.  A catheter was placed within the to the groin flap down towards feminine genitalia.  A dopplerable signal was found along the phallus and this was marked with a 5-0 Prolene suture.  Both flaps were well-perfused clinically after closure.  The thigh flap donor site was then closed using a split-thickness skin graft from the left upper thigh and vacuum-assisted closure device.  This was performed by Dr. Andrea and will be dictated separately by him.    All counts were correct at the end of the case.  The patient was then extubated, awakened, transferred to the postoperative area in stable condition.     DISPOSITION: Inpatient floor.

## 2020-02-18 NOTE — LETTER
"2/18/2020       RE: Myrna Dee  [address]  [facility] [address]     Dear Colleague,    Thank you for referring your patient, Myrna Dee, to the [facility]IC [facility] RECONSTRUCTIVE SURGERY at [facility]. Please see a copy of my visit note below.    Patient returns for a postoperative follow-up after undergoing for stage phalloplasty for gender dysphoria.    INTERVAL HISTORY: Patient is doing well.  Left labial pain is resolved.  Drain output is greater than 5060 cc/day.    PHYSICAL EXAMINATION:  /64 (BP Location: Left arm, Patient Position: Sitting, Cuff Size: Adult Regular)   Pulse 59   Temp 97.4  F (36.3  C) (Oral)   Ht 1.651 m (5' 5\")   Wt 68.3 kg (150 lb 9.6 oz)   SpO2 97%   BMI 25.06 kg/m     Genital examination was performed in presence of a chaperone.  Phallus and urethral flaps are healing appropriately.  Suture lines are healing appropriately.  There is no wound.  Left thigh skin graft donor site is healing appropriately.  Left thigh flap donor site is healing appropriately with a skin graft over it.  Stent is intact.  Right groin incision is healing appropriately.  Right groin drain output is serous.    ASSESSMENT: 2 weeks status post for stage ALT/SCIP combination flap phalloplasty.  Doing well.    PLAN: Right groin drain is to remain in for now.  Patient is to continue with daily Adaptic dressing changes.  Urethral flap stent is removed today.  Patient may begin to dangle the phallus temporarily.  He may shower.  I will see him back in 1 to 2 weeks.    Total time spent with patient was 15 min of which greater than 50% was in counseling.    Again, thank you for allowing me to participate in the care of your patient.      Sincerely,    Gavin Eastman MD    " room air

## 2020-02-18 NOTE — NURSING NOTE
"Chief Complaint   Patient presents with     Surgical Followup     Post op visit, phallo 2/3/2020       Vitals:    02/18/20 1501   BP: 119/64   BP Location: Left arm   Patient Position: Sitting   Cuff Size: Adult Regular   Pulse: 59   Temp: 97.4  F (36.3  C)   TempSrc: Oral   SpO2: 97%   Weight: 68.3 kg (150 lb 9.6 oz)   Height: 1.651 m (5' 5\")       Body mass index is 25.06 kg/m .    Kimmy Li LPN                        "

## 2020-02-21 ENCOUNTER — PATIENT OUTREACH (OUTPATIENT)
Dept: PLASTIC SURGERY | Facility: CLINIC | Age: 35
End: 2020-02-21

## 2020-02-21 NOTE — PATIENT INSTRUCTIONS
Left message for pt regarding rescheduling post op appt for earlier in the day. Provided direct contact information and requested call back to discuss. TRACE Neri

## 2020-02-24 ENCOUNTER — HEALTH MAINTENANCE LETTER (OUTPATIENT)
Age: 35
End: 2020-02-24

## 2020-02-25 ENCOUNTER — OFFICE VISIT (OUTPATIENT)
Dept: PLASTIC SURGERY | Facility: CLINIC | Age: 35
End: 2020-02-25
Payer: COMMERCIAL

## 2020-02-25 ENCOUNTER — MEDICAL CORRESPONDENCE (OUTPATIENT)
Dept: HEALTH INFORMATION MANAGEMENT | Facility: CLINIC | Age: 35
End: 2020-02-25

## 2020-02-25 VITALS
HEIGHT: 65 IN | WEIGHT: 148 LBS | OXYGEN SATURATION: 99 % | SYSTOLIC BLOOD PRESSURE: 119 MMHG | BODY MASS INDEX: 24.66 KG/M2 | HEART RATE: 69 BPM | TEMPERATURE: 98.2 F | DIASTOLIC BLOOD PRESSURE: 77 MMHG

## 2020-02-25 DIAGNOSIS — F64.0 GENDER DYSPHORIA IN ADULT: Primary | ICD-10-CM

## 2020-02-25 ASSESSMENT — PAIN SCALES - GENERAL: PAINLEVEL: MILD PAIN (2)

## 2020-02-25 ASSESSMENT — MIFFLIN-ST. JEOR: SCORE: 1538.2

## 2020-02-25 NOTE — LETTER
"2/25/2020       RE: Myrna Dee  [address]  [facility] [address]     Dear Colleague,    Thank you for referring your patient, Myrna Dee, to the [facility] PLASTIC AND RECONSTRUCTIVE SURGERY at [facility]. Please see a copy of my visit note below.    Patient returns for a postoperative follow-up after undergoing for stage phalloplasty for gender dysphoria.    INTERVAL HISTORY: Patient reports he is doing well.  Drain output continues to be in the 30s per day.    PHYSICAL EXAMINATION:  /77 (BP Location: Left arm, Patient Position: Chair, Cuff Size: Adult Regular)   Pulse 69   Temp 98.2  F (36.8  C) (Oral)   Ht 1.651 m (5' 5\")   Wt 67.1 kg (148 lb)   SpO2 99%   BMI 24.63 kg/m     Genital examination was performed in presence of a chaperone.  Phallus and urethral flaps are healing appropriately.  Suture lines are completely healed.  Left thigh skin graft donor site is healing appropriately with Tegaderm.  Left thigh flap donor site is also mostly healed.  Right groin drain output is serous in quality.    ASSESSMENT: 3 weeks status post for stage ALT/SCIP combination flap phalloplasty.    PLAN: Patient may return to remove the right groin drain with us once the output is in the 20s per day for 2 consecutive day.  He is to continue Adaptic dressing changes to the left thigh flap donor site that is skin grafted maybe for another week or so.  He can shower and bathe.  I will see him back in 4 to 6 weeks.    Total time spent with patient was 15 min of which greater than 50% was in counseling.    Again, thank you for allowing me to participate in the care of your patient.      Sincerely,    Gavin Eastman MD      "

## 2020-02-25 NOTE — NURSING NOTE
"Chief Complaint   Patient presents with     Surgical Followup     3wk post op phallo 2/3       Vitals:    02/25/20 0901   BP: 119/77   BP Location: Left arm   Patient Position: Chair   Cuff Size: Adult Regular   Pulse: 69   Temp: 98.2  F (36.8  C)   TempSrc: Oral   SpO2: 99%   Weight: 67.1 kg (148 lb)   Height: 1.651 m (5' 5\")       Body mass index is 24.63 kg/m .    Gopal Willoughby, EMT    "

## 2020-02-25 NOTE — PROGRESS NOTES
"Patient returns for a postoperative follow-up after undergoing for stage phalloplasty for gender dysphoria.    INTERVAL HISTORY: Patient reports he is doing well.  Drain output continues to be in the 30s per day.    PHYSICAL EXAMINATION:  /77 (BP Location: Left arm, Patient Position: Chair, Cuff Size: Adult Regular)   Pulse 69   Temp 98.2  F (36.8  C) (Oral)   Ht 1.651 m (5' 5\")   Wt 67.1 kg (148 lb)   SpO2 99%   BMI 24.63 kg/m    Genital examination was performed in presence of a chaperone.  Phallus and urethral flaps are healing appropriately.  Suture lines are completely healed.  Left thigh skin graft donor site is healing appropriately with Tegaderm.  Left thigh flap donor site is also mostly healed.  Right groin drain output is serous in quality.    ASSESSMENT: 3 weeks status post for stage ALT/SCIP combination flap phalloplasty.    PLAN: Patient may return to remove the right groin drain with us once the output is in the 20s per day for 2 consecutive day.  He is to continue Adaptic dressing changes to the left thigh flap donor site that is skin grafted maybe for another week or so.  He can shower and bathe.  I will see him back in 4 to 6 weeks.    Total time spent with patient was 15 min of which greater than 50% was in counseling.  "

## 2020-02-25 NOTE — PATIENT INSTRUCTIONS
Follow up 4-6 weeks with Dr. Eastman in Clinic.  Call us once the output of your drain is less than 20 mLs for 2 days. We will have you come in to have the drain remove.  Continue dressing changes to the left thigh until open areas are heal. Once healed, apply water based lotion, no scented or glitter lotion.    Please call with any questions or concerns regarding your clinic visit today.    It is a pleasure being involved in your health care.    Contacts post-consultation depending on your need:    Kimmy Li -[phone]    Myesha Knight -[phone]    Radiology Appointments [phone]    Schedule Clinic Appointments [phone] # 1   M-F 7:30 - 5 pm    Clinic Fax Number [phone]    Surgery Scheduling [phone] - Cara [facility]    My Chart is available 24 hours a day and is a secure way to access your records and communicate with your care team.  I strongly recommend signing up if you haven't already done so, if you are comfortable with computers.  If you would like to inquire about this or are having problems with My Chart access, you may call [phone] or go online at [email or web address].  Please allow at least 24 hours for a response and extra time on weekends and Holidays.

## 2020-02-26 NOTE — OP NOTE
DATE OF OPERATION: February 3, 2020     PREOPERATIVE DIAGNOSIS: Gender dysphoria.     POSTOPERATIVE DIAGNOSIS: Gender dysphoria.     PROCEDURES PERFORMED:   1.  Phalloplasty using 2 pedicled fasciocutaneous flaps: right superficial circumflex iliac artery  flap, size 20 x 4.5 cm, for urethral reconstruction and left anterolateral thigh flap, size 15 x 15 cm, for phallus reconstruction. (Both fasciocutaneous flaps were harvested as  flaps, requiring meticulous small vessel dissection and adding at least 1 hour of operative time per flap.)  2.  Neurorrhaphy with nerve conduit wrap of right anterolateral thigh flap lateral femoral cutaneous nerve first branch to right ilioinguinal nerve under operative microscope using microsurgical technique.  3.  Neurorrhaphy with nerve conduit wrap of the right anterolateral thigh flap lateral femoral cutaneous nerve second branch to right dorsal clitoral nerve under operative microscope using microsurgical technique.  4.  Split-thickness skin graft to right thigh flap donor site wound, size 15 x 15 cm.  5.  Vacuum-assisted closure device placed over right thigh skin grafted wound, size 15 x 15 cm.     SURGEON: Raven Andrea MD     CO SURGEON:   1.  Gavin Eastman MD (co-surgeon was required due to the prolonged duration and technical difficulty of  flap dissection in the absence of appropriate skilled resident assistance.  Parts 1, 3, and 4 were performed together between Dr. Andrea and Dr. Eastman)    ASSISTANT: Martha Wilson MD     ANESTHESIA: General.     ESTIMATED BLOOD LOSS: 100 mL     FINDINGS: Well perfused flaps following inset.     SPECIMENS: None.     COMPLICATIONS: None.     DRAINS: 15 Cymro KENNEDY drain in right groin.  VAC to 125 continuous on left thigh flap donor site.     IMPLANTS: None.     INDICATIONS: Patient is a 34-year-old trans-man who prefers he him pronouns.  He transitioned 10 years ago.  He has been on hormone therapy for 8 years.   He received 2 letters of support for phalloplasty.  Having met all WPATH standards of care guidelines criteria for genital surgery, risks benefits and alternatives were discussed for pedicle flap phalloplasty from the groin and thigh.  Patient accepted the associated risks and wished to proceed with surgery.     DESCRIPTION OF PROCEDURE: Informed consent was obtained in the preoperative holding area.  Patient was marked and taken to the operating room.  Preoperative antibiotics were given.  Bilateral lower leg SCDs were placed.  General anesthesia was obtained.  Patient was then placed into a supine position with arms out on arm boards.  The abdomen, groin, and perineum were then prepped and draped in a sterile fashion.  A timeout was performed.  A sterile Mcintyre catheter was inserted.     The plastic surgery team began the operation.  A 20 x 4.5 cm right groin flap was designed around the superficial circumflex iliac vessels and perforators.  The flap was raised along the superficial fascia until perforators were visualized.  A small cuff of deep fascia was taken with the perforators to ensure no damage to the pedicle.  Both perforators of the deep and superficial branches of the superficial circumflex iliac vessels were kept with the flap.  Meticulous retrograde dissection of these perforators was then performed down to the femoral vessels.  This was done together with Dr. Gavin Eastman under loupe magnification and at times under the operative microscope to ensure no injury to the pedicle.  This dissection added at least 1 hour to the procedure.  Once the flap was completely dissected, it was placed back into its original position under a moist lap.     Attention was then turned to the left thigh.  A 15 x 15 cm anterolateral thigh flap was designed around 1  that was dopplerable.  The most superior horizontal incision was made along with a curvilinear incision towards the groin.  Through this approach, 2  branches of the lateral femoral cutaneous nerve were dissected circumferentially from distal to proximal.  They were then sharply ligated and marked for future neurorrhaphy.  The remaining flap skin incisions were then made and dissection was performed along a superficial fascial layer towards the perforators in all directions until the level of sensory nerve entry into the flap.  The dissection plane was then changed to a subfascial layer to identify the perforators.  These perforators were then isolated and 360 degree fashion.     We determined the groin flap would be transferred with both perforators dissected.  Patient was given 5 cc of ICG dye intravenously.  Intraoperative chemical angiography was then performed using the spy Elite system.  This showed adequate perfusion of both the groin and thigh flaps.     Attention was then turned back to the thigh.  Meticulous retrograde dissection was performed along the perforators.  This  dissection added at least 1 hour to the procedure.  The femoral nerve was left uninjured.  At least 1 large vascular branch was kept to the rectus femoris muscle.  A space was then created underneath the rectus femoris muscle and also the sartorius muscle with care taken not to damage the underlying large vessels.     A horizontal incision was then made in the pubic region at the midline just at the level of the inferior border of the pubic bone.  This incision was then carried down through the subcutaneous tissue to identify the pubis.  Dissection was then performed superiorly to identify the right external inguinal ring.  Within that, the ilioinguinal nerve was identified and dissected circumferentially as distal as possible before began branching.  The nerve was then ligated at that point intact for future neurorrhaphy. Under the operative microscope, the right dorsal clitoral nerve was identified and dissected circumferentially.  It was ligated distally and tagged for  future neurorrhaphy.      The tubed groin flap underwent a 90 degree clockwise turn and was passed through a subcutaneous tunnel to the pubis.  The inferior end of the tube was brought down towards the opening from the clitoral incision.  Dr. Carter and Dr. Eastman then performed bilateral labiaplasty by raising 6 x 3 cm labia minora flaps based laterally to close over the inferior aspect of the urethral flap and prepare for second stage urethral reconstruction.  I performed one side while he performed the other side simultaneously to decrease operative time.  Care was taken not to place any undue tension on the groin flap pedicle.  The groin flap donor site was then closed in layers over a 15 Stateless KENNEDY drain.     Another subcutaneous tunnel was created from the pubic opening to the thigh flap dissection plane.  The anterolateral thigh flap was then passed underneath the rectus femoris muscle, underneath the sartorius muscle, then through the subcutaneous tunnel to reach the pubic opening.  Care was taken to ensure no tension or twist in the pedicle.  Dr. Eastman and I then performed 2 neurorrhaphy's under the operative microscope using microsurgical technique.  The first lateral femoral cutaneous nerve branch of the anterolateral thigh flap was transposed to meet the right ilioinguinal nerve end-to-end without any tension.  This nerve coaptation was performed using 9-0 nylon suture in a 180 degree fashion.  The coaptation site was then wrapped with a nerve conduit wrap, which was closed over using micro gem clips.  The second lateral femoral cutaneous nerve branch of the anterolateral thigh flap was then transposed to meet the right dorsal clitoral nerve end-to-end without any tension.  This coaptation was also performed using 9-0 nylon suture in a 180 degree fashion.  The coaptation site was then also wrapped with a nerve conduit wrap using micro gem clips.     The anterolateral thigh flap was then wrapped around the  groin flap so that the flap closure is on the ventral aspect of the phallus.  Dr. Carter then matured the groin flap to the thigh flap to complete the urethroplasty.  The phallus incisions were then closed in layers.  A catheter was placed within the to the groin flap down towards feminine genitalia.  A dopplerable signal was found along the phallus and this was marked with a 5-0 Prolene suture.  Both flaps were well-perfused clinically after closure.  The thigh flap donor site was then closed using a split-thickness skin graft from the left upper thigh using a Pagett dermatome at 0.014 inch thickness and vacuum-assisted closure device as a bolster dressing.   All counts were correct at the end of the case.  The patient was then extubated, awakened, transferred to the postoperative area in stable condition.     DISPOSITION: Inpatient floor.

## 2020-03-02 ENCOUNTER — PATIENT OUTREACH (OUTPATIENT)
Dept: PLASTIC SURGERY | Facility: CLINIC | Age: 35
End: 2020-03-02

## 2020-03-02 NOTE — PATIENT INSTRUCTIONS
Spoke with pt regarding nurse visit to apply Xeroform. Pt scheduled on 3/3/20 at 3:30pm. Pt scheduled for additional follow up with Dr. Eastman on 3/17/20 at 3:45pm. Pt confirms appt dates, times, and location. Myesha HUSTON RNCC

## 2020-03-03 ENCOUNTER — ALLIED HEALTH/NURSE VISIT (OUTPATIENT)
Dept: SURGERY | Facility: CLINIC | Age: 35
End: 2020-03-03
Payer: COMMERCIAL

## 2020-03-03 DIAGNOSIS — Z98.890 POSTOPERATIVE STATE: Primary | ICD-10-CM

## 2020-03-03 NOTE — PROGRESS NOTES
Pt. comes into clinic today at the request of Dr. Gavin Eastman.    This service provided today was under the supervising provider of the day Dr. Eastman, who was available if needed.    Reason for visit: Post op visit.  Pt returns 4 weeks after phalloplasty.    Pts donor site has small area of delayed healing. Pt reports this area has been draining and opening up daily. Xeroform applied to area. Sutures around base of phallus producing scant serous drainage. Sutures removed.     Return to clinic: As scheduled.    Myesha Knight RN  Care Coordinator

## 2020-03-31 ENCOUNTER — VIRTUAL VISIT (OUTPATIENT)
Dept: PLASTIC SURGERY | Facility: CLINIC | Age: 35
End: 2020-03-31
Payer: COMMERCIAL

## 2020-03-31 DIAGNOSIS — F64.0 GENDER DYSPHORIA IN ADULT: Primary | ICD-10-CM

## 2020-03-31 NOTE — PROGRESS NOTES
Patient was called for a postoperative telephone follow-up visit after undergoing first stage phalloplasty for gender dysphoria.    INTERVAL HISTORY: Patient reports he is doing well.  He still has a slow healing scab over the superior portion of the left thigh flap donor site incision.  He is also having some mild tenderness issues over the right groin incision superior aspect where it rubs against the hip.    PHYSICAL EXAMINATION:  Physical examination was deferred today.    ASSESSMENT: 2 months status post for stage ALT/SCIP combination flap phalloplasty.    PLAN: We went over a list of questions over the phone today.  I explained to the patient that his second stage surgery will have to be delayed given the [place] 19 pandemic crisis.  He understood this.  I recommended that he keep the left thigh flap donor site slow healing wound dry and clean until it heals.  Once this occurs, he may let us know and we will plan for second stage phalloplasty.  For now, he is to continue moisturizing his surgical sites.  I do not have any activity restrictions for him other than avoiding prolonged and intense stretching.  We will wait to hear from the patient.    Total time spent with patient was 15 min of which greater than 50% was in counseling.

## 2020-04-30 ENCOUNTER — PRE VISIT (OUTPATIENT)
Dept: UROLOGY | Facility: CLINIC | Age: 35
End: 2020-04-30

## 2020-04-30 NOTE — TELEPHONE ENCOUNTER
Chief Complaint : New Virtual     Hx/Sx: Surgery Consult/s/p 1st stage phalloplasty    Records/Orders: Available     Pt Contacted: n/a    At Rooming: Video Check In

## 2020-05-06 ENCOUNTER — VIRTUAL VISIT (OUTPATIENT)
Dept: UROLOGY | Facility: CLINIC | Age: 35
End: 2020-05-06
Payer: COMMERCIAL

## 2020-05-06 DIAGNOSIS — F64.9 GENDER DYSPHORIA: Primary | ICD-10-CM

## 2020-05-06 NOTE — PATIENT INSTRUCTIONS
Please contact our clinic in 4-6 weeks regarding surgery and prior authorization.     It was a pleasure meeting with you today.  Thank you for allowing me and my team the privilege of caring for you today.  YOU are the reason we are here, and I truly hope we provided you with the excellent service you deserve.  Please let us know if there is anything else we can do for you so that we can be sure you are leaving completely satisfied with your care experience.        Fredy Vieira, EMT

## 2020-05-06 NOTE — PROGRESS NOTES
"Myrna Dee is a 34 year old adult who is being evaluated via a billable video visit.      The patient has been notified of following:     \"This video visit will be conducted via a call between you and your physician/provider. We have found that certain health care needs can be provided without the need for an in-person physical exam.  This service lets us provide the care you need with a video conversation.  If a prescription is necessary we can send it directly to your pharmacy.  If lab work is needed we can place an order for that and you can then stop by our lab to have the test done at a later time.    Video visits are billed at different rates depending on your insurance coverage.  Please reach out to your insurance provider with any questions.    If during the course of the call the physician/provider feels a video visit is not appropriate, you will not be charged for this service.\"    Patient has given verbal consent for Video visit? Yes    How would you like to obtain your AVS? Irma    Patient would like the video invitation sent by: Send to e-mail at: [email or web address]      Video Start Time: 10:14am    Video-Visit Details    Type of service:  Video Visit    Video End Time (time video stopped): 10:39am    Originating Location (pt. Location): Home    Distant Location (provider location):  [facility] UROLOGY AND [facility] FOR PROSTATE AND UROLOGIC CANCERS     Mode of Communication:  Video Conference via Doximity      Octavio Carter MD    Myrna Dee is a 34 year old transgender male s/p first stage phalloplasty with ALT + SCIP on February 3, 2020. Just over 3 months postop.  He's doing well. No significant complications.  Only complaint is that skin donor site took a prolonged time to heal.  Happy with aesthetic appearance.  Did not have glansplasty yet  He notes the tip has some \"leakage of fluid\" which is a very slow/small amount of drainage.     Exam:  Exam:           Constitutional - " No apparent distress. Patient of stated age.               Eyes - no redness or discharge.              Respiratory - no cough. no labored breathing              Musculoskeletal - full range of motion in all extremities              Skin - no visible skin discoloration or lesions              Neurological - no tremors              Psychiatric - no anxiety, alert & oriented                 Phalloplasty appears well healed.  No significant scarring.  Meatus patent    The rest of a comprehensive physical examination is deferred due to PHE (public health emergency) video visit restrictions.    A/P   Doing well after first stage phalloplasty  Less than optimal urethral exam today due to video conferencing.  Will be ready for 2nd stage phalloplasty in 2-3 months.    He is about 3 months out from surgery. I recommend usually being around 5-6 months after first stage phalloplasty prior to having second stage phalloplasty.     This includes:  1. Robotic Assisted Laparoscopic Total Vaginectomy  2. Colpocleisis  3. Second Stage Urethroplasty  4. Vulvectomy  5. Scrotoplasty  6. Cystoscopy with Suprapubic Tube Insertion  7. Adjacent Tissue Transfer Rearrangement (12 x 5cm bilaterally to create pedicled bilateral labial flaps for scrotal reconstruction)  8. Glansplasty (Local Tissue Rearrangement of Distal Aspects of Flap)  9. Split Thickness Skin Graft for Glansplasty     We discussed at length the goals of surgery and the risks of fistula, stricture, bleeding, infection, poor cosmetic result.    We discussed implications and goals of glansplasty    We discussed goals of urethroplasty and voiding while standing.  We discussed vaginectomy and risks.    We discussed ensuring prior authorization is obtained. He will reach out in 4-6 weeks. Perhaps then will work towards prior auth.    Octavio Carter MD    I spent  25 minutes on the face to face video care of Myrna Dee. Over 50% of my time was spent counseling or  coordinating the care of this patient.

## 2020-05-06 NOTE — NURSING NOTE
"Myrna NONA Dee has given verbal permission for a video visit 05/06/20 9:20 AM and would like to be reached at [email or web address]    BETH Agrawal        Called the pt 05/06/20, and 9:20 AM and reviewed their medications, history, and allergies. I then asked that they be in a quiet location with limed distractions so they can hear the provider well.    Chief Complaint   Patient presents with     Video Visit     Surgery Consult/s/p 1st stage phalloplasty       not currently breastfeeding. There is no height or weight on file to calculate BMI.    Patient Active Problem List   Diagnosis     Problem drinking     High risk HPV infection     Gender dysphoria in adolescent and adult     Papanicolaou smear of cervix with atypical squamous cells cannot exclude high grade squamous intraepithelial lesion (ASC-H)     Gender dysphoria in adult     Adjustment reaction     Gender dysphoria       No Known Allergies    Current Outpatient Medications   Medication Sig Dispense Refill     Pediatric Multivit-Minerals-C (FLINTSTONES GUMMIES PO) Take 1 tablet by mouth 2 times daily.       Syringe/Needle, Disp, 22G X 1-1/2\" 1 ML MISC Inject 0.25 mLs into the muscle once a week 30 each 4     testosterone cypionate (DEPOTESTOSTERONE) 200 MG/ML injection INJECT 0.25 ML INTO THE MUSCLES ONCE A WEEK.       oxyCODONE (ROXICODONE) 5 MG tablet Take 1-2 tablets (5-10 mg) by mouth every 6 hours as needed for breakthrough pain (Patient not taking: Reported on 5/6/2020) 40 tablet 0     senna-docusate (SENOKOT-S/PERICOLACE) 8.6-50 MG tablet Take 1 tablet by mouth 2 times daily as needed for constipation (Patient not taking: Reported on 2/18/2020) 28 tablet 0       Social History     Tobacco Use     Smoking status: Never Smoker     Smokeless tobacco: Never Used   Substance Use Topics     Alcohol use: Yes     Drug use: No       BETH Agrawal,  5/6/2020  9:20 AM        "

## 2020-06-01 DIAGNOSIS — F64.0 GENDER DYSPHORIA IN ADOLESCENT AND ADULT: Primary | ICD-10-CM

## 2020-06-01 DIAGNOSIS — F64.9 GENDER IDENTITY DISORDER: ICD-10-CM

## 2020-06-01 NOTE — TELEPHONE ENCOUNTER
"Verify that the refill encounter hasn't been started Yes    [facility] phone call message- patient requesting a refill:    Full Medication Name:   testosterone cypionate (DEPOTESTOSTERONE) 200 MG/ML injection   Syringe/Needle, Disp, 22G X 1-1/2\" 1 ML MISC       Dose: See chart     Pharmacy confirmed as   [facility]  [address], [place] [address]  Ph: [phone]    : Yes    Medication tab checked to see if medication has been sent  Yes    Additional Comments:      OK to leave a message on voice mail? Yes    Advised patient refill may take up to 2 business days? Yes    Primary language: English      needed? No    Call taken on June 1, 2020 at 1:54 PM by Nikole Chavez    Route to P [facility] MED REFILL    "

## 2020-06-03 NOTE — TELEPHONE ENCOUNTER

## 2020-06-07 RX ORDER — TESTOSTERONE CYPIONATE 200 MG/ML
INJECTION, SOLUTION INTRAMUSCULAR
Qty: 13 ML | Refills: 0 | Status: SHIPPED | OUTPATIENT
Start: 2020-06-07 | End: 2021-10-04

## 2020-06-12 ENCOUNTER — MYC MEDICAL ADVICE (OUTPATIENT)
Dept: PLASTIC SURGERY | Facility: CLINIC | Age: 35
End: 2020-06-12

## 2020-06-18 ENCOUNTER — TELEPHONE (OUTPATIENT)
Dept: UROLOGY | Facility: CLINIC | Age: 35
End: 2020-06-18

## 2020-06-29 ENCOUNTER — PRE VISIT (OUTPATIENT)
Dept: UROLOGY | Facility: CLINIC | Age: 35
End: 2020-06-29

## 2020-06-29 NOTE — TELEPHONE ENCOUNTER
Chief Complaint : Return-Virtual    Hx/Sx: discuss 2nd stage phalloplasty    Records/Orders: Available     Pt Contacted: n/a    At Rooming: Check In-   [email or web address]   Mobile: [phone]

## 2020-07-03 ENCOUNTER — VIRTUAL VISIT (OUTPATIENT)
Dept: UROLOGY | Facility: CLINIC | Age: 35
End: 2020-07-03
Payer: COMMERCIAL

## 2020-07-03 DIAGNOSIS — F64.9 GENDER DYSPHORIA: Primary | ICD-10-CM

## 2020-07-03 RX ORDER — NEEDLES, DISPOSABLE 25GX5/8"
NEEDLE, DISPOSABLE MISCELLANEOUS
COMMUNITY
Start: 2020-06-07 | End: 2021-06-07

## 2020-07-03 RX ORDER — SYRINGE, DISPOSABLE, 1 ML
SYRINGE, EMPTY DISPOSABLE MISCELLANEOUS
COMMUNITY
Start: 2020-06-07 | End: 2021-04-09

## 2020-07-03 ASSESSMENT — PAIN SCALES - GENERAL: PAINLEVEL: NO PAIN (0)

## 2020-07-03 NOTE — PROGRESS NOTES
"Video Visit Technology for this patient: Doron Video Visit- Patient was left in waiting room for Domo.      Myrna Dee is a 35 year old adult who is being evaluated via a billable video visit.      The patient has been notified of following:     \"This video visit will be conducted via a call between you and your physician/provider. We have found that certain health care needs can be provided without the need for an in-person physical exam.  This service lets us provide the care you need with a video conversation.  If a prescription is necessary we can send it directly to your pharmacy.  If lab work is needed we can place an order for that and you can then stop by our lab to have the test done at a later time.    Video visits are billed at different rates depending on your insurance coverage.  Please reach out to your insurance provider with any questions.    If during the course of the call the physician/provider feels a video visit is not appropriate, you will not be charged for this service.\"    Patient has given verbal consent for Video visit? Yes  How would you like to obtain your AVS? Domo  Patient would like the video invitation sent by:   Domo virtual waiting room  Will anyone else be joining your video visit? No        Video-Visit Details    Type of service:  Video Visit    Video Start Time: 10:30 AM  Video End Time: 10:56 AM    Originating Location (pt. Location): Home    Distant Location (provider location):  [facility] UROLOGY AND [facility] FOR PROSTATE AND UROLOGIC CANCERS     Platform used for Video Visit: Doron Carter MD      Myrna Dee is a 35 year old transgender male s/p first stage phalloplasty with ALT + SCIP on February 3, 2020. Just over 3 months postop.  He's doing well. No significant complications.  Only complaint is that skin donor site took a prolonged time to heal.  Happy with aesthetic appearance.  Did not have glansplasty yet  He notes the tip has some " "\"leakage of fluid\" which is a very slow/small amount of drainage  Need to touch base with Dr. Hernández and Link prior to GAS. On reply, nothing extra to do. Will ensure we do complete excision of the vaginal apex.  Prior auth sent off for:  1. Robotic Assisted Laparoscopic Total Vaginectomy  2. Colpocleisis  3. Second Stage Urethroplasty & Female Urethroplasty  4. Vulvectomy  5. Scrotoplasty  6. Cystoscopy with Suprapubic Tube Insertion  7. Adjacent Tissue Transfer Rearrangement (12 x 5cm bilaterally to create pedicled bilateral labial flaps for scrotal reconstruction)  8. Glansplasty (Local Tissue Rearrangement of Distal Aspects of Flap)  9. Split Thickness Skin Graft for Glansplasty  He is not scheduled yet.  We did send him some catheters in mail to slide down neourethra to ensure patency.    Octavio Carter MD    I spent 25 minutes on the care of Myrna Dee. Over 50% of my time was spent counseling or coordinating the care of this patient.    "

## 2020-07-03 NOTE — LETTER
"7/3/2020       RE: Myrna Dee  [address]  [facility] [address]     Dear Colleague,    Thank you for referring your patient, Myrna Dee, to the [facility] UROLOGY AND INST FOR PROSTATE AND UROLOGIC CANCERS at [facility]. Please see a copy of my visit note below.    Video Visit Technology for this patient: Doron Video Visit- Patient was left in waiting room for [employer].      Myrna Dee is a 35 year old adult who is being evaluated via a billable video visit.      The patient has been notified of following:     \"This video visit will be conducted via a call between you and your physician/provider. We have found that certain health care needs can be provided without the need for an in-person physical exam.  This service lets us provide the care you need with a video conversation.  If a prescription is necessary we can send it directly to your pharmacy.  If lab work is needed we can place an order for that and you can then stop by our lab to have the test done at a later time.    Video visits are billed at different rates depending on your insurance coverage.  Please reach out to your insurance provider with any questions.    If during the course of the call the physician/provider feels a video visit is not appropriate, you will not be charged for this service.\"    Patient has given verbal consent for Video visit? Yes  How would you like to obtain your AVS? [employer]  Patient would like the video invitation sent by:   [employer] virtual waiting room  Will anyone else be joining your video visit? No        Video-Visit Details    Type of service:  Video Visit    Video Start Time: 10:30 AM  Video End Time: 10:56 AM    Originating Location (pt. Location): Home    Distant Location (provider location):  [facility] UROLOGY AND [facility] FOR PROSTATE AND UROLOGIC CANCERS     Platform used for Video Visit: Doron Carter MD      Myrna Dee is a 35 " "year old transgender male s/p first stage phalloplasty with ALT + SCIP on February 3, 2020. Just over 3 months postop.  He's doing well. No significant complications.  Only complaint is that skin donor site took a prolonged time to heal.  Happy with aesthetic appearance.  Did not have glansplasty yet  He notes the tip has some \"leakage of fluid\" which is a very slow/small amount of drainage  Need to touch base with Dr. Hernández and Link prior to GAS. On reply, nothing extra to do. Will ensure we do complete excision of the vaginal apex.  Prior auth sent off for:  1. Robotic Assisted Laparoscopic Total Vaginectomy  2. Colpocleisis  3. Second Stage Urethroplasty & Female Urethroplasty  4. Vulvectomy  5. Scrotoplasty  6. Cystoscopy with Suprapubic Tube Insertion  7. Adjacent Tissue Transfer Rearrangement (12 x 5cm bilaterally to create pedicled bilateral labial flaps for scrotal reconstruction)  8. Glansplasty (Local Tissue Rearrangement of Distal Aspects of Flap)  9. Split Thickness Skin Graft for Glansplasty  He is not scheduled yet.  We did send him some catheters in mail to slide down neourethra to ensure patency.    Octavio Carter MD    I spent 25 minutes on the care of Myrna Dee. Over 50% of my time was spent counseling or coordinating the care of this patient.      "

## 2020-07-17 ENCOUNTER — TELEPHONE (OUTPATIENT)
Dept: UROLOGY | Facility: CLINIC | Age: 35
End: 2020-07-17

## 2020-07-22 ENCOUNTER — TELEPHONE (OUTPATIENT)
Dept: UROLOGY | Facility: CLINIC | Age: 35
End: 2020-07-22

## 2020-07-22 NOTE — TELEPHONE ENCOUNTER
received [facility] [id number] for span dates 7/17/20-7/17/21 for 2nd stage phalloplasty

## 2020-07-23 DIAGNOSIS — F64.9 GENDER DYSPHORIA: Primary | ICD-10-CM

## 2020-07-23 RX ORDER — HEPARIN SODIUM 5000 [USP'U]/.5ML
5000 INJECTION, SOLUTION INTRAVENOUS; SUBCUTANEOUS
Status: CANCELLED | OUTPATIENT
Start: 2020-07-23

## 2020-07-23 RX ORDER — CEFAZOLIN SODIUM 2 G/50ML
2 SOLUTION INTRAVENOUS
Status: CANCELLED | OUTPATIENT
Start: 2020-07-23

## 2020-07-23 RX ORDER — CEFAZOLIN SODIUM 1 G/50ML
1 INJECTION, SOLUTION INTRAVENOUS SEE ADMIN INSTRUCTIONS
Status: CANCELLED | OUTPATIENT
Start: 2020-07-23

## 2020-07-30 DIAGNOSIS — Z11.59 ENCOUNTER FOR SCREENING FOR OTHER VIRAL DISEASES: Primary | ICD-10-CM

## 2020-07-30 DIAGNOSIS — F64.9 GENDER DYSPHORIA: Primary | ICD-10-CM

## 2020-07-31 ENCOUNTER — TELEPHONE (OUTPATIENT)
Dept: UROLOGY | Facility: CLINIC | Age: 35
End: 2020-07-31

## 2020-07-31 NOTE — TELEPHONE ENCOUNTER
Called patient and patient noted he will need to check with his wife if surgery date will work. Patient will call us back.

## 2020-07-31 NOTE — TELEPHONE ENCOUNTER
Patient called and left a voicemail for writer asking when surgery will be. Sent staff message to Gabriella-op for Dr. Carter.

## 2020-08-04 ENCOUNTER — TELEPHONE (OUTPATIENT)
Dept: UROLOGY | Facility: CLINIC | Age: 35
End: 2020-08-04

## 2020-08-21 ENCOUNTER — PATIENT OUTREACH (OUTPATIENT)
Dept: UROLOGY | Facility: CLINIC | Age: 35
End: 2020-08-21

## 2020-08-21 DIAGNOSIS — N39.0 URINARY TRACT INFECTION: Primary | ICD-10-CM

## 2020-08-21 NOTE — TELEPHONE ENCOUNTER
My-chart message sent to patient to let him know what is expected prior to surgery.   Orders placed for urine to be done when he does PAC.    Zoraida Rodriguez RN   Care Coordinator Urology

## 2020-09-11 ENCOUNTER — ANESTHESIA EVENT (OUTPATIENT)
Dept: SURGERY | Facility: CLINIC | Age: 35
DRG: 876 | End: 2020-09-11
Payer: COMMERCIAL

## 2020-09-11 ENCOUNTER — OFFICE VISIT (OUTPATIENT)
Dept: SURGERY | Facility: CLINIC | Age: 35
End: 2020-09-11
Payer: COMMERCIAL

## 2020-09-11 ENCOUNTER — PRE VISIT (OUTPATIENT)
Dept: SURGERY | Facility: CLINIC | Age: 35
End: 2020-09-11

## 2020-09-11 ENCOUNTER — OFFICE VISIT (OUTPATIENT)
Dept: FAMILY MEDICINE | Facility: CLINIC | Age: 35
End: 2020-09-11
Payer: COMMERCIAL

## 2020-09-11 VITALS
SYSTOLIC BLOOD PRESSURE: 121 MMHG | TEMPERATURE: 97.9 F | WEIGHT: 158 LBS | HEART RATE: 70 BPM | DIASTOLIC BLOOD PRESSURE: 72 MMHG | HEIGHT: 65 IN | OXYGEN SATURATION: 98 % | BODY MASS INDEX: 26.33 KG/M2

## 2020-09-11 VITALS
OXYGEN SATURATION: 98 % | HEIGHT: 65 IN | RESPIRATION RATE: 16 BRPM | DIASTOLIC BLOOD PRESSURE: 77 MMHG | WEIGHT: 159 LBS | HEART RATE: 86 BPM | TEMPERATURE: 98.1 F | BODY MASS INDEX: 26.49 KG/M2 | SYSTOLIC BLOOD PRESSURE: 115 MMHG

## 2020-09-11 DIAGNOSIS — B97.7 HIGH RISK HPV INFECTION: Primary | ICD-10-CM

## 2020-09-11 DIAGNOSIS — F64.9 GENDER DYSPHORIA: ICD-10-CM

## 2020-09-11 DIAGNOSIS — N39.0 URINARY TRACT INFECTION: ICD-10-CM

## 2020-09-11 DIAGNOSIS — R87.611 PAPANICOLAOU SMEAR OF CERVIX WITH ATYPICAL SQUAMOUS CELLS CANNOT EXCLUDE HIGH GRADE SQUAMOUS INTRAEPITHELIAL LESION (ASC-H): ICD-10-CM

## 2020-09-11 DIAGNOSIS — Z01.818 PREOP EXAMINATION: ICD-10-CM

## 2020-09-11 DIAGNOSIS — F64.9 GENDER DYSPHORIA: Primary | ICD-10-CM

## 2020-09-11 LAB
ANION GAP SERPL CALCULATED.3IONS-SCNC: 6 MMOL/L (ref 3–14)
BUN SERPL-MCNC: 12 MG/DL (ref 7–30)
CALCIUM SERPL-MCNC: 9.2 MG/DL (ref 8.5–10.1)
CHLORIDE SERPL-SCNC: 107 MMOL/L (ref 94–109)
CO2 SERPL-SCNC: 30 MMOL/L (ref 20–32)
CREAT SERPL-MCNC: 0.78 MG/DL (ref 0.66–1.25)
ERYTHROCYTE [DISTWIDTH] IN BLOOD BY AUTOMATED COUNT: 13.2 % (ref 10–15)
GFR SERPL CREATININE-BSD FRML MDRD: >90 ML/MIN/{1.73_M2}
GLUCOSE SERPL-MCNC: 76 MG/DL (ref 70–99)
HCT VFR BLD AUTO: 42.1 % (ref 40–53)
HGB BLD-MCNC: 14.2 G/DL (ref 13.3–17.7)
MCH RBC QN AUTO: 30.3 PG (ref 26.5–33)
MCHC RBC AUTO-ENTMCNC: 33.7 G/DL (ref 31.5–36.5)
MCV RBC AUTO: 90 FL (ref 78–100)
PLATELET # BLD AUTO: 235 10E9/L (ref 150–450)
POTASSIUM SERPL-SCNC: 3.6 MMOL/L (ref 3.4–5.3)
RBC # BLD AUTO: 4.68 10E12/L (ref 4.4–5.9)
SODIUM SERPL-SCNC: 142 MMOL/L (ref 133–144)
WBC # BLD AUTO: 5.1 10E9/L (ref 4–11)

## 2020-09-11 ASSESSMENT — ENCOUNTER SYMPTOMS: SEIZURES: 0

## 2020-09-11 ASSESSMENT — MIFFLIN-ST. JEOR
SCORE: 1578.56
SCORE: 1583.1

## 2020-09-11 ASSESSMENT — LIFESTYLE VARIABLES: TOBACCO_USE: 0

## 2020-09-11 ASSESSMENT — PAIN SCALES - GENERAL: PAINLEVEL: NO PAIN (0)

## 2020-09-11 NOTE — H&P
Pre-Operative H & P     CC:  Preoperative exam to assess for increased cardiopulmonary risk while undergoing surgery and anesthesia.    Date of Encounter: 9/11/2020  Primary Care Physician:  Clarissa Stroud  Reason for visit: Gender dysphoria [F64.9]    HPI  Inocencio Dee is a 35 year old adult who presents for pre-operative H & P in preparation for VAGINECTOMY, ROBOT-ASSISTED, CYSTOSCOPY, WITH SUPRAPUBIC CATHETER INSERTION, TRANSGENDER GLANSPLASTY WITH SKIN GRAFT, SCROTOPLASTY with Kirsten Carter and Raiza on 9/24/20 at [facility]. History is obtained from the patient and medical records.     Patient with history of gender dysphoria with preferred pronouns he/him/his. He is s/p hysterectomy and mastectomy and underwent a laparoscopic trachelectomy in 7/2019. On 2/3/20, he underwent first stage phalloplasty with ALT + SCIP. He has continued to follow with Dr. Carter and Raiza and has recovered generally well. Last visit on 7/3/20 with Dr. Carter and he was counseled for next stage procedure.       He is otherwise healthy.    Past Medical History  Past Medical History:   Diagnosis Date     Aseptic meningitis      Complication of anesthesia     HX slow wake up,HX panic attacks with vicodin postop     Gender dysphoria in adolescent and adult      High risk HPV infection      Papanicolaou smear of cervix with atypical squamous cells cannot exclude high grade squamous intraepithelial lesion (ASC-H)        Past Surgical History  Past Surgical History:   Procedure Laterality Date     CYSTOSCOPY N/A 7/12/2019    Procedure: Cystoscopy;  Surgeon: Myesha Hernández MD;  [facility] OR     DAVINCI LAPAROSCOPIC CERVICECTOMY (TRACHELECTOMY) N/A 7/12/2019    Procedure: Davinci Laparoscopic Trachelectomy;  Surgeon: Myesha Hernández MD;  [facility] OR     GRAFT FREE VASCULARIZED (LOCATION) Left 2/3/2020    Procedure: with left thigh flap and right groin flap;  Surgeon: Gavin Eastman MD;   "Location: [facility] OR     HYSTERECTOMY      and oophorectomy, but still has cervix and will require pap every 3 years, At [facility]     MASTECTOMY PARTIAL      chest surgery w/ contouring      TRANSGENDER PHALLOPLASTY Bilateral 2/3/2020    Procedure: Phalloplasty;  Surgeon: Gavin Eastman MD;  [facility]: [facility] OR       Hx of Blood transfusions/reactions: Denies.      Hx of abnormal bleeding or anti-platelet use: Denies.     Menstrual history: No LMP recorded. Patient has had a hysterectomy.    Steroid use in the last year: Denies.     Personal or FH with difficulty with Anesthesia:  Personal or FH with difficulty with Anesthesia:  Slow to awaken, has had panic attacks w/ vicodin postop    Prior to Admission Medications  Current Outpatient Medications   Medication Sig Dispense Refill     Pediatric Multivit-Minerals-C (FLINTSTONES GUMMIES PO) Take 1 tablet by mouth 2 times daily.       testosterone cypionate (DEPOTESTOSTERONE) 200 MG/ML injection INJECT 0.25 ML INTO THE MUSCLES ONCE A WEEK. (Patient taking differently: Inject into the muscle once a week INJECT 0.25 ML INTO THE MUSCLES ONCE A WEEK.) 13 mL 0     B-D SYRINGE LUER-WU 1 ML MISC USE TO INJECT WEEKLY       BD DISP NEEDLES 22G X 1-1/2\" MISC INJECT 0.25 MLS INTO THE MUSCLE ONCE A WEEK       Syringe/Needle, Disp, 22G X 1-1/2\" 1 ML MISC Inject 0.25 mLs into the muscle once a week 30 each 4       Allergies  No Known Allergies    Social History  Social History     Socioeconomic History     Marital status:      Spouse name: Not on file     Number of children: Not on file     Years of education: Not on file     Highest education level: Not on file   Occupational History     Not on file   Social Needs     Financial resource strain: Not on file     Food insecurity     Worry: Not on file     Inability: Not on file     Transportation needs     Medical: Not on file     Non-medical: Not on file   Tobacco Use     Smoking status: Never Smoker     Smokeless tobacco: Never " Used   Substance and Sexual Activity     Alcohol use: Yes     Drug use: No     Sexual activity: Yes     Partners: Female     Birth control/protection: None   Lifestyle     Physical activity     Days per week: Not on file     Minutes per session: Not on file     Stress: Not on file   Relationships     Social connections     Talks on phone: Not on file     Gets together: Not on file     Attends Roman [employer] service: Not on file     Active member of club or organization: Not on file     Attends meetings of clubs or organizations: Not on file     Relationship status: Not on file     Intimate partner violence     Fear of current or ex partner: Not on file     Emotionally abused: Not on file     Physically abused: Not on file     Forced sexual activity: Not on file   Other Topics Concern     Parent/sibling w/ CABG, MI or angioplasty before 65F 55M? No   Social History Narrative    Lives with wife (Meliza), bought a house in [place]. Wife pregnant.     Works as a  at [employer].     Bikes or runs to commute, very fit. Good dairy intake.     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        Family History  Family History   Problem Relation Age of Onset     Dementia Father         frontotemporal dementia      Alcoholism Brother         possible     Diabetes Maternal Grandfather      Anesthesia Reaction Sister      Cardiovascular No family hx of      Deep Vein Thrombosis No family hx of      Preop Vitals    BP Readings from Last 3 Encounters:   02/25/20 119/77   02/18/20 119/64   02/11/20 133/69    Pulse Readings from Last 3 Encounters:   02/25/20 69   02/18/20 59   02/11/20 63      Resp Readings from Last 3 Encounters:   02/09/20 18   01/07/20 12   07/12/19 12    SpO2 Readings from Last 3 Encounters:   02/25/20 99%   02/18/20 97%   02/11/20 100%      Temp Readings from Last 1 Encounters:   02/25/20 98.2  F (36.8  C) (Oral)    Ht Readings from Last 1  "Encounters:   20 1.651 m (5' 5\")      Wt Readings from Last 1 Encounters:   20 67.1 kg (148 lb)    Estimated body mass index is 24.63 kg/m  as calculated from the following:    Height as of 20: 1.651 m (5' 5\").    Weight as of 20: 67.1 kg (148 lb).       ROS/MED HX  The complete review of systems is negative other than noted in the HPI or here.   ENT/Pulmonary:  - neg pulmonary ROS    (-) tobacco use and asthma   Neurologic: Comment: Hx  meningitis, viral. No residual deficits.    (+)neuropathy - mild carpal tunnel,    (-) seizures and CVA   Cardiovascular:  - neg cardiovascular ROS   (+) ----. : . . . :. . No previous cardiac testing       METS/Exercise Tolerance:  >4 METS   Hematologic:  - neg hematologic  ROS      (-) History of Transfusion   Musculoskeletal:  - neg musculoskeletal ROS       GI/Hepatic:  - neg GI/hepatic ROS       Renal/Genitourinary:  - ROS Renal section negative       Endo: Comment: Receiving testosterone - gender dysphoria     (-) Type II DM   Psychiatric:  - neg psychiatric ROS       Infectious Disease:  - neg infectious disease ROS       Malignancy:      - no malignancy   Other:    (+) C-spine cleared: N/A, no H/O Chronic Pain,no other significant disability              PHYSICAL EXAM:   Mental Status/Neuro: A/A/O; Age Appropriate   Airway: Facies: Feasible  Mallampati: I  Mouth/Opening: Full  TM distance: > 6 cm  Neck ROM: Full   Respiratory: Auscultation: CTAB     Resp. Rate: Normal     Resp. Effort: Normal      CV: Rhythm: Regular  Heart: Normal Sounds  Edema: None   Comments:        Temp: 98.1  F (36.7  C) Temp src: Oral BP: 115/77 Pulse: 86   Resp: 16 SpO2: 98 %         159 lbs 0 oz  5' 5\"[PT REPORTED[   Body mass index is 26.46 kg/m .       Physical Exam  Constitutional: Awake, alert, cooperative, no apparent distress, and appears stated age.  Eyes: Pupils equal, round and reactive to light, extra ocular muscles intact, sclera clear, conjunctiva normal. " Glasses on.  HENT: Normocephalic, oral pharynx with moist mucus membranes, good dentition. No goiter appreciated.   Respiratory: Clear to auscultation bilaterally, no crackles or wheezing. No cough or obvious dyspnea.  Cardiovascular: Regular rate and rhythm, normal S1 and S2, and no murmur noted.  Carotids +2, no bruits. No edema. Palpable pulses to radial  DP and PT arteries.   GI: Normal bowel sounds, soft, non-distended, non-tender, no masses palpated, no hepatosplenomegaly.  Surgical scars: right lower side, well healed.  Lymph/Hematologic: No cervical lymphadenopathy and no supraclavicular lymphadenopathy.  Genitourinary:  Deferred.   Skin: Warm and dry.  No rashes at anticipated surgical site.   Musculoskeletal: Full ROM of neck. There is no redness, warmth, or swelling of the joints. Gross motor strength is normal.    Neurologic: Awake, alert, oriented to name, place and time. Cranial nerves II-XII are grossly intact. Gait is normal.   Neuropsychiatric: Calm, cooperative. Normal affect.     Labs: (personally reviewed)  Lab Results   Component Value Date    WBC 5.1 09/11/2020     Lab Results   Component Value Date    RBC 4.68 09/11/2020     Lab Results   Component Value Date    HGB 14.2 09/11/2020     Lab Results   Component Value Date    HCT 42.1 09/11/2020     Lab Results   Component Value Date    MCV 90 09/11/2020     Lab Results   Component Value Date    MCH 30.3 09/11/2020     Lab Results   Component Value Date    MCHC 33.7 09/11/2020     Lab Results   Component Value Date    RDW 13.2 09/11/2020     Lab Results   Component Value Date     09/11/2020     Last Comprehensive Metabolic Panel:  Sodium   Date Value Ref Range Status   09/11/2020 142 133 - 144 mmol/L Final     Potassium   Date Value Ref Range Status   09/11/2020 3.6 3.4 - 5.3 mmol/L Final     Chloride   Date Value Ref Range Status   09/11/2020 107 94 - 109 mmol/L Final     Carbon Dioxide   Date Value Ref Range Status   09/11/2020 30 20 -  32 mmol/L Final     Anion Gap   Date Value Ref Range Status   09/11/2020 6 3 - 14 mmol/L Final     Glucose   Date Value Ref Range Status   09/11/2020 76 70 - 99 mg/dL Final     Urea Nitrogen   Date Value Ref Range Status   09/11/2020 12 7 - 30 mg/dL Final     Creatinine   Date Value Ref Range Status   09/11/2020 0.78 0.66 - 1.25 mg/dL Final     GFR Estimate   Date Value Ref Range Status   09/11/2020 >90 >60 mL/min/[1.73_m2] Final     Comment:     Non  GFR Calc  Starting 12/18/2018, serum creatinine based estimated GFR (eGFR) will be   calculated using the Chronic Kidney Disease Epidemiology Collaboration   (CKD-EPI) equation.       Calcium   Date Value Ref Range Status   09/11/2020 9.2 8.5 - 10.1 mg/dL Final   EKG: Not indicated    Outside records reviewed from: Care Everywhere    ASSESSMENT and PLAN  Inocencio Dee is a 35 year old adult scheduled to undergo VAGINECTOMY, ROBOT-ASSISTED, CYSTOSCOPY, WITH SUPRAPUBIC CATHETER INSERTION, TRANSGENDER GLANSPLASTY WITH SKIN GRAFT, SCROTOPLASTY with Kirsten Carter and Raiza on 9/24/20. He has the following specific operative considerations:   - RCRI : No serious cardiac risks.   - Anesthesia considerations:  Refer to PAC assessment in anesthesia records  - VTE risk: 0.26-0.5%  - ALEIDA # of risks 1/8 = Low risk  - Risk of PONV score = 2.  If > 2, anti-emetic intervention recommended.    --Gender dysphoria s/p multiple procedures, last first stage phalloplasty with ALT + SCIP on 2/3/20. Now preparing for second stage procedure.   --Generally healthy with no significant cardiopulmonary history. Nonsmoker. Good activity tolerance.     Type and screen drawn today    Arrival time, NPO, shower and medication instructions provided by nursing staff today. Preparing For Your Surgery handout given.    Patient is optimized and is acceptable candidate for the proposed procedure.  No further diagnostic evaluation is needed.     Meli Rodriguez, DENISE CNS  Preoperative Assessment  [facility]  Clinic and Surgery Center  Phone: [phone]  Fax: [phone]

## 2020-09-11 NOTE — PROGRESS NOTES
Preceptor Attestation:    Patient seen and evaluated in person. I discussed the patient with the resident. I have verified the content of the note, which accurately reflects my assessment of the patient and the plan of care.   Supervising Physician:  Serena Hodges MD.

## 2020-09-11 NOTE — ANESTHESIA PREPROCEDURE EVALUATION
Anesthesia Pre-Procedure Evaluation    Patient: Myrna Dee   MRN:     [id number] Gender:   adult   Age:    35 year old :      1985        Preoperative Diagnosis: Gender dysphoria [F64.9]   Procedure(s):  VAGINECTOMY, ROBOT-ASSISTED  URETHROPLASTY  CYSTOSCOPY, WITH SUPRAPUBIC CATHETER INSERTION  TRANSGENDER GLANSPLASTY WITH SKIN GRAFT  SCROTOPLASTY     LABS:  CBC:   Lab Results   Component Value Date    WBC 11.1 (H) 2020    WBC 13.7 (H) 2020    HGB 12.1 (L) 2020    HGB 14.0 2020    HCT 35.0 (L) 2020    HCT 41.0 2020     2020     2020     BMP:   Lab Results   Component Value Date     (L) 2020     2020    POTASSIUM 3.5 2020    POTASSIUM 4.0 2020    CHLORIDE 102 2020    CHLORIDE 108 2020    CO2 26 2020    CO2 26 2020    BUN 12 2020    BUN 11 2020    CR 0.86 2020    CR 0.84 2020     (H) 2020     (H) 2020     COAGS:   Lab Results   Component Value Date    PTT 30 2011    INR 1.08 2011     POC:   Lab Results   Component Value Date     (H) 2020     OTHER:   Lab Results   Component Value Date    ADELAIDE 8.4 (L) 2020    PHOS 3.4 2011    ALBUMIN 4.4 2013    PROTTOTAL 7.5 2019    ALT 9.2 2019    AST 17.6 2019    ALKPHOS 47.8 2019    BILITOTAL 1.6 (H) 2019    TSH 1.75 10/05/2009        Preop Vitals    BP Readings from Last 3 Encounters:   20 119/77   20 119/64   20 133/69    Pulse Readings from Last 3 Encounters:   20 69   20 59   20 63      Resp Readings from Last 3 Encounters:   20 18   20 12   19 12    SpO2 Readings from Last 3 Encounters:   20 99%   20 97%   20 100%      Temp Readings from Last 1 Encounters:   20 98.2  F (36.8  C) (Oral)    Ht Readings from Last 1 Encounters:   20 1.651 m  "(5' 5\")      Wt Readings from Last 1 Encounters:   02/25/20 67.1 kg (148 lb)    Estimated body mass index is 24.63 kg/m  as calculated from the following:    Height as of 2/25/20: 1.651 m (5' 5\").    Weight as of 2/25/20: 67.1 kg (148 lb).     LDA:  Closed/Suction Drain 1 Right Hip Bulb 15 Congolese (Active)   Number of days: 221       Closed/Suction Drain Left Groin Bulb 15 Congolese (Active)   Number of days: 221       Open Drain Other (Comment) 16 Congolese (Active)   Number of days: 221       Negative Pressure Wound Therapy Leg Upper (Active)   Number of days: 221        Past Medical History:   Diagnosis Date     Aseptic meningitis      Complication of anesthesia     HX slow wake up,HX panic attacks with vicodin postop     Gender dysphoria in adolescent and adult      High risk HPV infection      Papanicolaou smear of cervix with atypical squamous cells cannot exclude high grade squamous intraepithelial lesion (ASC-H)       Past Surgical History:   Procedure Laterality Date     CYSTOSCOPY N/A 7/12/2019    Procedure: Cystoscopy;  Surgeon: Myesha Hernández MD;  [facility]     DAVINCI LAPAROSCOPIC CERVICECTOMY (TRACHELECTOMY) N/A 7/12/2019    Procedure: Davinci Laparoscopic Trachelectomy;  Surgeon: Myesha Hernández MD;  [facility] OR     GRAFT FREE VASCULARIZED (LOCATION) Left 2/3/2020    Procedure: with left thigh flap and right groin flap;  Surgeon: Gavin Eastman MD;  [facility]     HYSTERECTOMY      and oophorectomy, but still has cervix and will require pap every 3 years, At [facility]     MASTECTOMY PARTIAL      chest surgery w/ contouring      TRANSGENDER PHALLOPLASTY Bilateral 2/3/2020    Procedure: Phalloplasty;  Surgeon: Gavin Eastman MD;  [facility] OR      No Known Allergies     Anesthesia Evaluation     . Pt has had prior anesthetic. Type: General and MAC    History of anesthetic complications    panic w/ vicodin postop      ROS/MED HX    ENT/Pulmonary:  - neg pulmonary ROS    (-) tobacco " use and asthma   Neurologic: Comment: Hx  meningitis, viral. No residual deficits.    (+)neuropathy - mild carpal tunnel,    (-) seizures and CVA   Cardiovascular:  - neg cardiovascular ROS   (+) ----. : . . . :. . No previous cardiac testing       METS/Exercise Tolerance:  >4 METS   Hematologic:  - neg hematologic  ROS      (-) History of Transfusion   Musculoskeletal:  - neg musculoskeletal ROS       GI/Hepatic:  - neg GI/hepatic ROS       Renal/Genitourinary:  - ROS Renal section negative       Endo: Comment: Receiving testosterone - gender dysphoria     (-) Type II DM   Psychiatric:  - neg psychiatric ROS       Infectious Disease:  - neg infectious disease ROS       Malignancy:      - no malignancy   Other:    (+) C-spine cleared: N/A, no H/O Chronic Pain,no other significant disability                        PHYSICAL EXAM:   Mental Status/Neuro: A/A/O; Age Appropriate   Airway: Facies: Feasible  Mallampati: I  Mouth/Opening: Full  TM distance: > 6 cm  Neck ROM: Full   Respiratory: Auscultation: CTAB     Resp. Rate: Normal     Resp. Effort: Normal      CV: Rhythm: Regular  Heart: Normal Sounds  Edema: None   Comments:      Dental: Normal Dentition                Assessment:   ASA SCORE: 1    H&P: History and physical reviewed and following examination; no interval change.   Smoking Status:  Non-Smoker/Unknown   NPO Status: NPO Appropriate     Plan:   Anes. Type:  General   Pre-Medication: None   Induction:  IV (Standard)   Airway: ETT; Oral   Access/Monitoring: PIV   Maintenance: Balanced     Postop Plan:   Postop Pain: Opioids  Postop Sedation/Airway: Not planned     PONV Management:   Adult Risk Factors:, Non-Smoker, Postop Opioids     CONSENT: Direct conversation   Plan and risks discussed with: Patient   Blood Products: Consent Deferred (Minimal Blood Loss)                PAC Discussion and Assessment    ASA Classification: 1  Case is suitable for: Cameron  Anesthetic techniques and relevant risks  discussed: GA  Invasive monitoring and risk discussed: No  Types:   Possibility and Risk of blood transfusion discussed: Yes  NPO instructions given:   Additional anesthetic preparation and risks discussed:   Needs early admission to pre-op area:   Other:     PAC Resident/NP Anesthesia Assessment:  Inocencio Dee is a 35 year old adult scheduled to undergo VAGINECTOMY, ROBOT-ASSISTED, CYSTOSCOPY, WITH SUPRAPUBIC CATHETER INSERTION, TRANSGENDER GLANSPLASTY WITH SKIN GRAFT, SCROTOPLASTY with Kirsten Carter and Raiza on 9/24/20. He has the following specific operative considerations:   - RCRI : No serious cardiac risks.   - VTE risk: 0.26-0.5%  - ALEIDA # of risks 1/8 = Low risk  - Risk of PONV score = 2.  If > 2, anti-emetic intervention recommended.    --Gender dysphoria s/p multiple procedures, last first stage phalloplasty with ALT + SCIP on 2/3/20. Now preparing for second stage procedure.   --Generally healthy with no significant cardiopulmonary history. Nonsmoker. Good activity tolerance.     Type and screen drawn today      Patient is optimized and is acceptable candidate for the proposed procedure.  No further diagnostic evaluation is needed.         Reviewed and Signed by PAC Mid-Level Provider/Resident  Mid-Level Provider/Resident: DENISE Fonseca, RODNEY  Date: 9/11/20  Time: 11:00am    Attending Anesthesiologist Anesthesia Assessment:        Anesthesiologist:   Date:   Time:   Pass/Fail:   Disposition:     PAC Pharmacist Assessment:        Pharmacist:   Date:   Time:    DENISE Ndiaye

## 2020-09-11 NOTE — PATIENT INSTRUCTIONS
Here is the plan from today's visit    1. High risk HPV infection  2. Papanicolaou smear of cervix with atypical squamous cells cannot exclude high grade squamous intraepithelial lesion (ASC-H)  Pap today with HPV test  Results by Feusd - call if abnormal  - Pap imaged thin layer screen with HPV - recommended age 30 - 65 years (select HPV order below)  - HPV High Risk Types DNA Cervical      Please call or return to clinic if your symptoms don't go away.    Follow up plan  Follow up after surgery with Dr Ervin for physical    Thank you for coming to [facility] today.  Lab Testing:  **If you had lab testing today and your results are reassuring or normal they will be mailed to you or sent through [place] within 7 days.   **If the lab tests need quick action we will call you with the results.  The phone number we will call with results is # [phone] (home) . If this is not the best number please call our clinic and change the number.  Medication Refills:  If you need any refills please call your pharmacy and they will contact us.   If you need to  your refill at a new pharmacy, please contact the new pharmacy directly. The new pharmacy will help you get your medications transferred faster.   Scheduling:  If you have any concerns about today's visit or wish to schedule another appointment please call our office during normal business hours [phone] (8-5:00 M-F)  If a referral was made to a [facility] and you don't get a call from central scheduling please call [phone].  If a Mammogram was ordered for you at [facility] call [phone] to schedule or change your appointment.  If you had an XRay/CT/Ultrasound/MRI ordered the number is [phone] to schedule or change your radiology appointment.   Medical Concerns:  If you have urgent medical concerns please call [phone] at any time of the day.    Jermaine Palafox,

## 2020-09-11 NOTE — PATIENT INSTRUCTIONS
Preparing for Your Surgery      Name:  Myrna Dee   MRN:  [id number]   :  1985   Today's Date:  2020       Arriving for surgery:  Surgery date:  2020  Arrival time:  5:30AM    Restrictions due to COVID 19:  Patients are allowed one visitor in the pre-op period  All visitors must wear a mask  No visitors under 18  No ill visitors   parking is not available     Please come to:       [facility] [address] [place] [address]  [id number]     -    Please proceed to the Surgery Lounge on the 3rd floor. [phone]?     - ?If you are in need of directions, wheelchair or escort please stop at the Information Desk in the lobby.  Inform the information person that you are here for surgery; a wheelchair and escort will be provided to the Surgery Lounge .?     What can I eat or drink?  -  You may eat and drink normally for up to 8 hours before your surgery. (Until 2020, 11:30PM)  -  You may have clear liquids until 2 hours before surgery. (Until 2020, 5:30AM)  Examples of clear liquids:  Water  Clear broth  Juices (apple, white grape, white cranberry  and cider) without pulp  Noncarbonated, powder based beverages  (lemonade and Julio César-Aid)  Sodas (Sprite, 7-Up, ginger ale and seltzer)  Coffee or tea (without milk or cream)  Gatorade    -  No Alcohol for at least 24 hours before surgery     Which medicines can I take?    Hold Aspirin for 7 days before surgery.   Hold Multivitamins for 7 days before surgery.  Hold Supplements for 7 days before surgery.  Hold Ibuprofen (Advil, Motrin) for 1 day before surgery--unless otherwise directed by surgeon.  Hold Naproxen (Aleve) for 4 days before surgery.      How do I prepare myself?  - Please shower the evening before and the morning of surgery using Scrubcare or Hibiclens soap.    Use this soap only from the neck to your toes.     Leave the soap on your skin for one minute--then rinse thoroughly.       You may use your own shampoo and conditioner; no other hair products.   - Please remove all jewelry and body piercings.  - No lotions, deodorants or fragrance.  - Bring your ID and insurance card.    - All patients are required to have a Covid-19 test within 4 days of surgery/procedure.      -Patients will be contacted by the [facility] scheduling team within 1 week of surgery to make an appointment.      - Patients may call the Scheduling team at [phone] if they have not been scheduled within 4 days of  surgery.      Questions or Concerns:    - For any questions regarding the day of surgery or your hospital stay, please contact the Pre Admission Nursing Office at [phone].       - If you have health changes between today and your surgery please call your surgeon.       For questions after surgery please call your surgeons office.

## 2020-09-11 NOTE — PROGRESS NOTES
"       HPI       Myrna Dee is a 35 year old  who presents for   Chief Complaint   Patient presents with     Physical     Had his pre[place] [facility]  Is due for his annual physical - won't complete today as insurance will likely not pay for 2 GP visits on the same day. Will just focus on  exam and f/u prior abnormal pap results.    No vaginal bleeding, no significant irritation (typical with T)  No vulvar/pelvic lesions or rashes  Has vaginectomy and urologic surgery coming up  Wife and them have been monogamous, no other partners since last tested, doesn't desire STI testing today.    SocHx: brother is coming into town to help with post-op recovery from the [place]  Has 2 yo at home, and he and wife are planning on having another baby soon. The brother coming in is their sperm donor.    Problem, Medication and Allergy Lists were reviewed and updated if needed..    Patient is an established patient of this clinic..         Review of Systems:   Review of Systems         Physical Exam:     Vitals:    09/11/20 1304   BP: 121/72   BP Location: Left arm   Patient Position: Sitting   Cuff Size: Adult Regular   Pulse: 70   Temp: 97.9  F (36.6  C)   TempSrc: Oral   SpO2: 98%   Weight: 71.7 kg (158 lb)   Height: 1.651 m (5' 5\")     Body mass index is 26.29 kg/m .  Vitals were reviewed and were normal     Physical Exam  Constitutional:       Appearance: Normal appearance.   Cardiovascular:      Rate and Rhythm: Normal rate.   Pulmonary:      Effort: Pulmonary effort is normal. No respiratory distress.   Genitourinary:     Comments: Penis normal, not examined in detal. Labia and vestibule within normal limits, no lesions. Vaginal atrophy typical with testosterone use, no lesions or discharge or bleeding. 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  Neurological:      General: No focal deficit present.      Mental Status: He is alert and oriented to person, place, and time.   Psychiatric:         " Mood and Affect: Mood normal.         Behavior: Behavior normal.           Results:   Results are ordered and pending    Assessment and Plan        Myrna was seen today for physical.    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 Per OBGYN - continue monitoring for abnormal cells with pap and HPV.  Pap sample taken from blind-end vaginal pouch. Will send note communicating results to patient's surgeon Dr Carter.  -     Pap imaged thin layer screen with HPV - recommended age 30 - 65 years (select HPV order below)  -     HPV High Risk Types DNA Cervical  -     C GYN EXAM, EST PT., ANNUAL    F/u with PCP Dr Stroud after      Medications Discontinued During This Encounter   Medication Reason     oxyCODONE (ROXICODONE) 5 MG tablet        Options for treatment and follow-up care were reviewed with the patient. Myrna NONA Luiz  engaged in the decision making process and verbalized understanding of the options discussed and agreed with the final plan.    DO [facility] Resident PGY-3  [phone]

## 2020-09-12 LAB
BACTERIA SPEC CULT: NO GROWTH
Lab: NORMAL
SPECIMEN SOURCE: NORMAL

## 2020-09-14 DIAGNOSIS — F64.9 GENDER DYSPHORIA: Primary | ICD-10-CM

## 2020-09-18 LAB
COPATH REPORT: ABNORMAL
PAP: ABNORMAL

## 2020-09-18 NOTE — RESULT ENCOUNTER NOTE
Óscar Painter, here's the first part of your pap results. This means that the cells look mildly abnormal, possible from some residual pre-cancer changes, or possibly from being on T. I'm not sure about distinguishing the two. The second part of your pap is still cooking, which checks for the HPV virus and can help tell us a little more. I'll forward this result along to Dr Carter too.    Jermaine Palafox, DO

## 2020-09-21 DIAGNOSIS — F64.9 GENDER DYSPHORIA: ICD-10-CM

## 2020-09-21 PROCEDURE — U0003 INFECTIOUS AGENT DETECTION BY NUCLEIC ACID (DNA OR RNA); SEVERE ACUTE RESPIRATORY SYNDROME CORONAVIRUS 2 (SARS-COV-2) (CORONAVIRUS DISEASE [COVID-19]), AMPLIFIED PROBE TECHNIQUE, MAKING USE OF HIGH THROUGHPUT TECHNOLOGIES AS DESCRIBED BY CMS-2020-01-R: HCPCS | Performed by: UROLOGY

## 2020-09-21 NOTE — RESULT ENCOUNTER NOTE
Louie Painter, here is the rest of your pap results. The test is positive for a strain of HPV that is high risk for causing cancer. This, plus those indeterminate cells, tells me we need to be extra careful with the removal of your vagina during surgery. I forwarded these results to Dr Carter and Dr Stroud as well. I'm also going to double check about any monitoring that may be needed after surgery. Please let me know you you have any questions.    Jermaine Palafox, DO

## 2020-09-22 LAB
SARS-COV-2 RNA SPEC QL NAA+PROBE: NOT DETECTED
SPECIMEN SOURCE: NORMAL

## 2020-09-24 ENCOUNTER — HOSPITAL ENCOUNTER (INPATIENT)
Facility: CLINIC | Age: 35
LOS: 2 days | Discharge: HOME | DRG: 876 | End: 2020-09-26
Attending: UROLOGY | Admitting: UROLOGY
Payer: COMMERCIAL

## 2020-09-24 ENCOUNTER — ANESTHESIA (OUTPATIENT)
Dept: SURGERY | Facility: CLINIC | Age: 35
DRG: 876 | End: 2020-09-24
Payer: COMMERCIAL

## 2020-09-24 DIAGNOSIS — F64.9 GENDER DYSPHORIA: ICD-10-CM

## 2020-09-24 LAB
ABO + RH BLD: NORMAL
ABO + RH BLD: NORMAL
BLD GP AB SCN SERPL QL: NORMAL
BLOOD BANK CMNT PATIENT-IMP: NORMAL
BLOOD BANK CMNT PATIENT-IMP: NORMAL
GLUCOSE BLDC GLUCOMTR-MCNC: 84 MG/DL (ref 70–99)
SPECIMEN EXP DATE BLD: NORMAL

## 2020-09-24 PROCEDURE — 25000128 H RX IP 250 OP 636

## 2020-09-24 PROCEDURE — 27210995 ZZH RX 272: Performed by: UROLOGY

## 2020-09-24 PROCEDURE — 25000132 ZZH RX MED GY IP 250 OP 250 PS 637

## 2020-09-24 PROCEDURE — 25800030 ZZH RX IP 258 OP 636: Performed by: ANESTHESIOLOGY

## 2020-09-24 PROCEDURE — 8E0W4CZ ROBOTIC ASSISTED PROCEDURE OF TRUNK REGION, PERCUTANEOUS ENDOSCOPIC APPROACH: ICD-10-PCS | Performed by: UROLOGY

## 2020-09-24 PROCEDURE — 00000146 ZZHCL STATISTIC GLUCOSE BY METER IP

## 2020-09-24 PROCEDURE — 88305 TISSUE EXAM BY PATHOLOGIST: CPT | Performed by: UROLOGY

## 2020-09-24 PROCEDURE — 25000128 H RX IP 250 OP 636: Performed by: ANESTHESIOLOGY

## 2020-09-24 PROCEDURE — C2627 CATH, SUPRAPUBIC/CYSTOSCOPIC: HCPCS | Performed by: UROLOGY

## 2020-09-24 PROCEDURE — 25000301 ZZH OR RX SURGIFLO W/THROMBIN KIT 2ML 1991 OPNP: Performed by: UROLOGY

## 2020-09-24 PROCEDURE — 25800030 ZZH RX IP 258 OP 636

## 2020-09-24 PROCEDURE — 12000001 ZZH R&B MED SURG/OB UMMC

## 2020-09-24 PROCEDURE — 25000125 ZZHC RX 250

## 2020-09-24 PROCEDURE — 71000015 ZZH RECOVERY PHASE 1 LEVEL 2 EA ADDTL HR: Performed by: UROLOGY

## 2020-09-24 PROCEDURE — 71000014 ZZH RECOVERY PHASE 1 LEVEL 2 FIRST HR: Performed by: UROLOGY

## 2020-09-24 PROCEDURE — 37000008 ZZH ANESTHESIA TECHNICAL FEE, 1ST 30 MIN: Performed by: UROLOGY

## 2020-09-24 PROCEDURE — 40000170 ZZH STATISTIC PRE-PROCEDURE ASSESSMENT II: Performed by: UROLOGY

## 2020-09-24 PROCEDURE — 25000566 ZZH SEVOFLURANE, EA 15 MIN: Performed by: UROLOGY

## 2020-09-24 PROCEDURE — 25000125 ZZHC RX 250: Performed by: PLASTIC SURGERY

## 2020-09-24 PROCEDURE — 88307 TISSUE EXAM BY PATHOLOGIST: CPT | Performed by: UROLOGY

## 2020-09-24 PROCEDURE — 25000128 H RX IP 250 OP 636: Performed by: UROLOGY

## 2020-09-24 PROCEDURE — 36000086 ZZH SURGERY LEVEL 8 1ST 30 MIN UMMC: Performed by: UROLOGY

## 2020-09-24 PROCEDURE — 25000128 H RX IP 250 OP 636: Performed by: NURSE ANESTHETIST, CERTIFIED REGISTERED

## 2020-09-24 PROCEDURE — 37000009 ZZH ANESTHESIA TECHNICAL FEE, EACH ADDTL 15 MIN: Performed by: UROLOGY

## 2020-09-24 PROCEDURE — 27210794 ZZH OR GENERAL SUPPLY STERILE: Performed by: UROLOGY

## 2020-09-24 PROCEDURE — 25000125 ZZHC RX 250: Performed by: NURSE ANESTHETIST, CERTIFIED REGISTERED

## 2020-09-24 PROCEDURE — 36000088 ZZH SURGERY LEVEL 8 EA 15 ADDTL MIN - UMMC: Performed by: UROLOGY

## 2020-09-24 PROCEDURE — 25000125 ZZHC RX 250: Performed by: UROLOGY

## 2020-09-24 PROCEDURE — 40000014 ZZH STATISTIC ARTERIAL MONITORING DAILY

## 2020-09-24 PROCEDURE — 0W4N071 CREATION OF PENIS IN FEMALE PERINEUM WITH AUTOLOGOUS TISSUE SUBSTITUTE, OPEN APPROACH: ICD-10-PCS | Performed by: UROLOGY

## 2020-09-24 PROCEDURE — 40000275 ZZH STATISTIC RCP TIME EA 10 MIN

## 2020-09-24 RX ORDER — LIDOCAINE HYDROCHLORIDE 20 MG/ML
INJECTION, SOLUTION INFILTRATION; PERINEURAL PRN
Status: DISCONTINUED | OUTPATIENT
Start: 2020-09-24 | End: 2020-09-24

## 2020-09-24 RX ORDER — FENTANYL CITRATE 50 UG/ML
25-50 INJECTION, SOLUTION INTRAMUSCULAR; INTRAVENOUS
Status: DISCONTINUED | OUTPATIENT
Start: 2020-09-24 | End: 2020-09-24 | Stop reason: HOSPADM

## 2020-09-24 RX ORDER — FENTANYL CITRATE 50 UG/ML
INJECTION, SOLUTION INTRAMUSCULAR; INTRAVENOUS PRN
Status: DISCONTINUED | OUTPATIENT
Start: 2020-09-24 | End: 2020-09-24

## 2020-09-24 RX ORDER — HEPARIN SODIUM 5000 [USP'U]/.5ML
5000 INJECTION, SOLUTION INTRAVENOUS; SUBCUTANEOUS
Status: COMPLETED | OUTPATIENT
Start: 2020-09-24 | End: 2020-09-24

## 2020-09-24 RX ORDER — LIDOCAINE 40 MG/G
CREAM TOPICAL
Status: DISCONTINUED | OUTPATIENT
Start: 2020-09-24 | End: 2020-09-26 | Stop reason: HOSPADM

## 2020-09-24 RX ORDER — CEFAZOLIN SODIUM 1 G/3ML
1 INJECTION, POWDER, FOR SOLUTION INTRAMUSCULAR; INTRAVENOUS EVERY 8 HOURS
Status: COMPLETED | OUTPATIENT
Start: 2020-09-24 | End: 2020-09-25

## 2020-09-24 RX ORDER — BUPIVACAINE HYDROCHLORIDE AND EPINEPHRINE 2.5; 5 MG/ML; UG/ML
INJECTION, SOLUTION INFILTRATION; PERINEURAL PRN
Status: DISCONTINUED | OUTPATIENT
Start: 2020-09-24 | End: 2020-09-24 | Stop reason: HOSPADM

## 2020-09-24 RX ORDER — SODIUM CHLORIDE, SODIUM LACTATE, POTASSIUM CHLORIDE, CALCIUM CHLORIDE 600; 310; 30; 20 MG/100ML; MG/100ML; MG/100ML; MG/100ML
INJECTION, SOLUTION INTRAVENOUS CONTINUOUS PRN
Status: DISCONTINUED | OUTPATIENT
Start: 2020-09-24 | End: 2020-09-24

## 2020-09-24 RX ORDER — LIDOCAINE 40 MG/G
CREAM TOPICAL
Status: DISCONTINUED | OUTPATIENT
Start: 2020-09-24 | End: 2020-09-24 | Stop reason: HOSPADM

## 2020-09-24 RX ORDER — SODIUM CHLORIDE, SODIUM LACTATE, POTASSIUM CHLORIDE, CALCIUM CHLORIDE 600; 310; 30; 20 MG/100ML; MG/100ML; MG/100ML; MG/100ML
INJECTION, SOLUTION INTRAVENOUS CONTINUOUS
Status: DISCONTINUED | OUTPATIENT
Start: 2020-09-24 | End: 2020-09-24 | Stop reason: HOSPADM

## 2020-09-24 RX ORDER — AMOXICILLIN 250 MG
1 CAPSULE ORAL 2 TIMES DAILY
Status: DISCONTINUED | OUTPATIENT
Start: 2020-09-24 | End: 2020-09-26 | Stop reason: HOSPADM

## 2020-09-24 RX ORDER — HYDROMORPHONE HYDROCHLORIDE 1 MG/ML
.3-.5 INJECTION, SOLUTION INTRAMUSCULAR; INTRAVENOUS; SUBCUTANEOUS
Status: DISCONTINUED | OUTPATIENT
Start: 2020-09-24 | End: 2020-09-26 | Stop reason: HOSPADM

## 2020-09-24 RX ORDER — PROPOFOL 10 MG/ML
INJECTION, EMULSION INTRAVENOUS PRN
Status: DISCONTINUED | OUTPATIENT
Start: 2020-09-24 | End: 2020-09-24

## 2020-09-24 RX ORDER — SODIUM CHLORIDE 9 MG/ML
INJECTION, SOLUTION INTRAVENOUS CONTINUOUS
Status: DISCONTINUED | OUTPATIENT
Start: 2020-09-24 | End: 2020-09-26

## 2020-09-24 RX ORDER — NALOXONE HYDROCHLORIDE 0.4 MG/ML
.1-.4 INJECTION, SOLUTION INTRAMUSCULAR; INTRAVENOUS; SUBCUTANEOUS
Status: DISCONTINUED | OUTPATIENT
Start: 2020-09-24 | End: 2020-09-26 | Stop reason: HOSPADM

## 2020-09-24 RX ORDER — ONDANSETRON 4 MG/1
4 TABLET, ORALLY DISINTEGRATING ORAL EVERY 30 MIN PRN
Status: DISCONTINUED | OUTPATIENT
Start: 2020-09-24 | End: 2020-09-24 | Stop reason: HOSPADM

## 2020-09-24 RX ORDER — PROCHLORPERAZINE MALEATE 5 MG
10 TABLET ORAL EVERY 6 HOURS PRN
Status: DISCONTINUED | OUTPATIENT
Start: 2020-09-24 | End: 2020-09-26 | Stop reason: HOSPADM

## 2020-09-24 RX ORDER — BUPIVACAINE HYDROCHLORIDE 2.5 MG/ML
INJECTION, SOLUTION INFILTRATION; PERINEURAL PRN
Status: DISCONTINUED | OUTPATIENT
Start: 2020-09-24 | End: 2020-09-24 | Stop reason: HOSPADM

## 2020-09-24 RX ORDER — ONDANSETRON 4 MG/1
4 TABLET, ORALLY DISINTEGRATING ORAL EVERY 6 HOURS PRN
Status: DISCONTINUED | OUTPATIENT
Start: 2020-09-24 | End: 2020-09-26 | Stop reason: HOSPADM

## 2020-09-24 RX ORDER — ONDANSETRON 2 MG/ML
4 INJECTION INTRAMUSCULAR; INTRAVENOUS EVERY 6 HOURS PRN
Status: DISCONTINUED | OUTPATIENT
Start: 2020-09-24 | End: 2020-09-26 | Stop reason: HOSPADM

## 2020-09-24 RX ORDER — TOLTERODINE 2 MG/1
2 CAPSULE, EXTENDED RELEASE ORAL DAILY
Status: DISCONTINUED | OUTPATIENT
Start: 2020-09-24 | End: 2020-09-26 | Stop reason: HOSPADM

## 2020-09-24 RX ORDER — HYDROMORPHONE HYDROCHLORIDE 1 MG/ML
.3-.5 INJECTION, SOLUTION INTRAMUSCULAR; INTRAVENOUS; SUBCUTANEOUS EVERY 5 MIN PRN
Status: DISCONTINUED | OUTPATIENT
Start: 2020-09-24 | End: 2020-09-24 | Stop reason: HOSPADM

## 2020-09-24 RX ORDER — AMOXICILLIN 250 MG
2 CAPSULE ORAL 2 TIMES DAILY
Status: DISCONTINUED | OUTPATIENT
Start: 2020-09-24 | End: 2020-09-26 | Stop reason: HOSPADM

## 2020-09-24 RX ORDER — DEXAMETHASONE SODIUM PHOSPHATE 4 MG/ML
INJECTION, SOLUTION INTRA-ARTICULAR; INTRALESIONAL; INTRAMUSCULAR; INTRAVENOUS; SOFT TISSUE PRN
Status: DISCONTINUED | OUTPATIENT
Start: 2020-09-24 | End: 2020-09-24

## 2020-09-24 RX ORDER — OXYCODONE HYDROCHLORIDE 5 MG/1
5-10 TABLET ORAL
Status: DISCONTINUED | OUTPATIENT
Start: 2020-09-24 | End: 2020-09-26 | Stop reason: HOSPADM

## 2020-09-24 RX ORDER — ACETAMINOPHEN 325 MG/1
975 TABLET ORAL EVERY 8 HOURS
Status: DISCONTINUED | OUTPATIENT
Start: 2020-09-24 | End: 2020-09-26 | Stop reason: HOSPADM

## 2020-09-24 RX ORDER — MINERAL OIL
OIL (ML) MISCELLANEOUS PRN
Status: DISCONTINUED | OUTPATIENT
Start: 2020-09-24 | End: 2020-09-24 | Stop reason: HOSPADM

## 2020-09-24 RX ORDER — ONDANSETRON 2 MG/ML
4 INJECTION INTRAMUSCULAR; INTRAVENOUS EVERY 30 MIN PRN
Status: DISCONTINUED | OUTPATIENT
Start: 2020-09-24 | End: 2020-09-24 | Stop reason: HOSPADM

## 2020-09-24 RX ORDER — CEFAZOLIN SODIUM 1 G/3ML
1 INJECTION, POWDER, FOR SOLUTION INTRAMUSCULAR; INTRAVENOUS SEE ADMIN INSTRUCTIONS
Status: DISCONTINUED | OUTPATIENT
Start: 2020-09-24 | End: 2020-09-24 | Stop reason: HOSPADM

## 2020-09-24 RX ORDER — NALOXONE HYDROCHLORIDE 0.4 MG/ML
.1-.4 INJECTION, SOLUTION INTRAMUSCULAR; INTRAVENOUS; SUBCUTANEOUS
Status: ACTIVE | OUTPATIENT
Start: 2020-09-24 | End: 2020-09-25

## 2020-09-24 RX ORDER — ONDANSETRON 2 MG/ML
INJECTION INTRAMUSCULAR; INTRAVENOUS PRN
Status: DISCONTINUED | OUTPATIENT
Start: 2020-09-24 | End: 2020-09-24

## 2020-09-24 RX ORDER — ACETAMINOPHEN 325 MG/1
650 TABLET ORAL EVERY 4 HOURS PRN
Status: DISCONTINUED | OUTPATIENT
Start: 2020-09-27 | End: 2020-09-26 | Stop reason: HOSPADM

## 2020-09-24 RX ORDER — CEFAZOLIN SODIUM 2 G/100ML
2 INJECTION, SOLUTION INTRAVENOUS
Status: COMPLETED | OUTPATIENT
Start: 2020-09-24 | End: 2020-09-24

## 2020-09-24 RX ORDER — EPHEDRINE SULFATE 50 MG/ML
INJECTION, SOLUTION INTRAMUSCULAR; INTRAVENOUS; SUBCUTANEOUS PRN
Status: DISCONTINUED | OUTPATIENT
Start: 2020-09-24 | End: 2020-09-24

## 2020-09-24 RX ADMIN — MIDAZOLAM 1 MG: 1 INJECTION INTRAMUSCULAR; INTRAVENOUS at 07:30

## 2020-09-24 RX ADMIN — DEXAMETHASONE SODIUM PHOSPHATE 4 MG: 4 INJECTION, SOLUTION INTRA-ARTICULAR; INTRALESIONAL; INTRAMUSCULAR; INTRAVENOUS; SOFT TISSUE at 07:57

## 2020-09-24 RX ADMIN — Medication 1 G: at 09:54

## 2020-09-24 RX ADMIN — FENTANYL CITRATE 50 MCG: 50 INJECTION, SOLUTION INTRAMUSCULAR; INTRAVENOUS at 08:28

## 2020-09-24 RX ADMIN — ONDANSETRON 4 MG: 2 INJECTION INTRAMUSCULAR; INTRAVENOUS at 14:50

## 2020-09-24 RX ADMIN — OXYCODONE HYDROCHLORIDE 5 MG: 5 TABLET ORAL at 20:56

## 2020-09-24 RX ADMIN — LIDOCAINE HYDROCHLORIDE 20 MG: 20 INJECTION, SOLUTION INFILTRATION; PERINEURAL at 07:37

## 2020-09-24 RX ADMIN — DOCUSATE SODIUM 50 MG AND SENNOSIDES 8.6 MG 2 TABLET: 8.6; 5 TABLET, FILM COATED ORAL at 19:56

## 2020-09-24 RX ADMIN — PROPOFOL 30 MG: 10 INJECTION, EMULSION INTRAVENOUS at 13:37

## 2020-09-24 RX ADMIN — FENTANYL CITRATE 50 MCG: 50 INJECTION, SOLUTION INTRAMUSCULAR; INTRAVENOUS at 10:40

## 2020-09-24 RX ADMIN — FENTANYL CITRATE 25 MCG: 50 INJECTION, SOLUTION INTRAMUSCULAR; INTRAVENOUS at 14:49

## 2020-09-24 RX ADMIN — MIDAZOLAM 1 MG: 1 INJECTION INTRAMUSCULAR; INTRAVENOUS at 07:31

## 2020-09-24 RX ADMIN — ROCURONIUM BROMIDE 20 MG: 10 INJECTION INTRAVENOUS at 09:43

## 2020-09-24 RX ADMIN — ROCURONIUM BROMIDE 10 MG: 10 INJECTION INTRAVENOUS at 11:30

## 2020-09-24 RX ADMIN — ROCURONIUM BROMIDE 10 MG: 10 INJECTION INTRAVENOUS at 11:02

## 2020-09-24 RX ADMIN — ROCURONIUM BROMIDE 20 MG: 10 INJECTION INTRAVENOUS at 12:01

## 2020-09-24 RX ADMIN — Medication 2 G: at 07:53

## 2020-09-24 RX ADMIN — MINERAL OIL AND PETROLATUM 1 INCH: 150; 830 OINTMENT OPHTHALMIC at 07:45

## 2020-09-24 RX ADMIN — HYDROMORPHONE HYDROCHLORIDE 0.25 MG: 1 INJECTION, SOLUTION INTRAMUSCULAR; INTRAVENOUS; SUBCUTANEOUS at 14:20

## 2020-09-24 RX ADMIN — HEPARIN SODIUM 5000 UNITS: 5000 INJECTION, SOLUTION INTRAVENOUS; SUBCUTANEOUS at 06:57

## 2020-09-24 RX ADMIN — SODIUM CHLORIDE, PRESERVATIVE FREE: 5 INJECTION INTRAVENOUS at 17:58

## 2020-09-24 RX ADMIN — ROCURONIUM BROMIDE 80 MG: 10 INJECTION INTRAVENOUS at 07:37

## 2020-09-24 RX ADMIN — OXYCODONE HYDROCHLORIDE 5 MG: 5 TABLET ORAL at 16:46

## 2020-09-24 RX ADMIN — Medication 5 MG: at 07:37

## 2020-09-24 RX ADMIN — ROCURONIUM BROMIDE 10 MG: 10 INJECTION INTRAVENOUS at 13:08

## 2020-09-24 RX ADMIN — SUGAMMADEX 200 MG: 100 INJECTION, SOLUTION INTRAVENOUS at 13:58

## 2020-09-24 RX ADMIN — HYDROMORPHONE HYDROCHLORIDE 0.5 MG: 1 INJECTION, SOLUTION INTRAMUSCULAR; INTRAVENOUS; SUBCUTANEOUS at 15:41

## 2020-09-24 RX ADMIN — HYDROMORPHONE HYDROCHLORIDE 0.25 MG: 1 INJECTION, SOLUTION INTRAMUSCULAR; INTRAVENOUS; SUBCUTANEOUS at 13:55

## 2020-09-24 RX ADMIN — FENTANYL CITRATE 250 MCG: 50 INJECTION, SOLUTION INTRAMUSCULAR; INTRAVENOUS at 07:37

## 2020-09-24 RX ADMIN — OXYCODONE HYDROCHLORIDE 5 MG: 5 TABLET ORAL at 20:00

## 2020-09-24 RX ADMIN — METHYLENE BLUE 10 MG: 5 INJECTION INTRAVENOUS at 10:30

## 2020-09-24 RX ADMIN — ROCURONIUM BROMIDE 20 MG: 10 INJECTION INTRAVENOUS at 08:37

## 2020-09-24 RX ADMIN — Medication 1 G: at 11:54

## 2020-09-24 RX ADMIN — FENTANYL CITRATE 25 MCG: 50 INJECTION, SOLUTION INTRAMUSCULAR; INTRAVENOUS at 14:59

## 2020-09-24 RX ADMIN — HYDROMORPHONE HYDROCHLORIDE 0.5 MG: 1 INJECTION, SOLUTION INTRAMUSCULAR; INTRAVENOUS; SUBCUTANEOUS at 15:11

## 2020-09-24 RX ADMIN — METRONIDAZOLE 500 MG: 500 INJECTION, SOLUTION INTRAVENOUS at 07:55

## 2020-09-24 RX ADMIN — SODIUM CHLORIDE, POTASSIUM CHLORIDE, SODIUM LACTATE AND CALCIUM CHLORIDE: 600; 310; 30; 20 INJECTION, SOLUTION INTRAVENOUS at 07:30

## 2020-09-24 RX ADMIN — ONDANSETRON 4 MG: 2 INJECTION INTRAMUSCULAR; INTRAVENOUS at 13:38

## 2020-09-24 RX ADMIN — PROPOFOL 150 MG: 10 INJECTION, EMULSION INTRAVENOUS at 07:37

## 2020-09-24 RX ADMIN — METRONIDAZOLE 500 MG: 500 INJECTION, SOLUTION INTRAVENOUS at 23:47

## 2020-09-24 RX ADMIN — SODIUM CHLORIDE, POTASSIUM CHLORIDE, SODIUM LACTATE AND CALCIUM CHLORIDE: 600; 310; 30; 20 INJECTION, SOLUTION INTRAVENOUS at 07:38

## 2020-09-24 RX ADMIN — FENTANYL CITRATE 50 MCG: 50 INJECTION, SOLUTION INTRAMUSCULAR; INTRAVENOUS at 11:35

## 2020-09-24 RX ADMIN — ROCURONIUM BROMIDE 10 MG: 10 INJECTION INTRAVENOUS at 10:37

## 2020-09-24 RX ADMIN — CEFAZOLIN 1 G: 1 INJECTION, POWDER, FOR SOLUTION INTRAMUSCULAR; INTRAVENOUS at 19:57

## 2020-09-24 RX ADMIN — OXYCODONE HYDROCHLORIDE 10 MG: 5 TABLET ORAL at 23:57

## 2020-09-24 RX ADMIN — TOLTERODINE 2 MG: 2 CAPSULE, EXTENDED RELEASE ORAL at 17:58

## 2020-09-24 RX ADMIN — METRONIDAZOLE 500 MG: 500 INJECTION, SOLUTION INTRAVENOUS at 17:58

## 2020-09-24 RX ADMIN — ACETAMINOPHEN 975 MG: 325 TABLET, FILM COATED ORAL at 16:45

## 2020-09-24 ASSESSMENT — PAIN DESCRIPTION - DESCRIPTORS
DESCRIPTORS: ACHING;SORE
DESCRIPTORS: ACHING;SORE;TENDER
DESCRIPTORS: ACHING;SORE;TENDER

## 2020-09-24 ASSESSMENT — ACTIVITIES OF DAILY LIVING (ADL)
COGNITION: 0 - NO COGNITION ISSUES REPORTED
TRANSFERRING: 0-->INDEPENDENT
SWALLOWING: 0-->SWALLOWS FOODS/LIQUIDS WITHOUT DIFFICULTY
AMBULATION: 0-->INDEPENDENT
BATHING: 0-->INDEPENDENT
ADLS_ACUITY_SCORE: 15
FALL_HISTORY_WITHIN_LAST_SIX_MONTHS: NO
DRESS: 0-->INDEPENDENT
RETIRED_EATING: 0-->INDEPENDENT
RETIRED_COMMUNICATION: 0-->UNDERSTANDS/COMMUNICATES WITHOUT DIFFICULTY
TOILETING: 0-->INDEPENDENT

## 2020-09-24 ASSESSMENT — MIFFLIN-ST. JEOR: SCORE: 1575.88

## 2020-09-24 NOTE — PLAN OF CARE
Admitted/transferred from: PACU   2 RN full skin assessment completed by Sherry Lopez, NAVIN and Fatoumata Sosa RN.  Skin assessment finding: issues found surgical incisions.  Interventions/actions: other none.     Will continue to monitor.

## 2020-09-24 NOTE — ANESTHESIA POSTPROCEDURE EVALUATION
Anesthesia POST Procedure Evaluation    Patient: Myrna Dee   MRN:     [id number] Gender:   adult   Age:    35 year old :      1985        Preoperative Diagnosis: Gender dysphoria [F64.9]   Procedure(s):  VAGINECTOMY, ROBOT-ASSISTED  URETHROPLASTY  CYSTOSCOPY, WITH SUPRAPUBIC CATHETER INSERTION  TRANSGENDER GLANSPLASTY WITH SKIN GRAFT  SCROTOPLASTY   Postop Comments: No value filed.     Anesthesia Type: General       Disposition: Admission   Postop Pain Control: Uneventful            Sign Out: Well controlled pain   PONV: No   Neuro/Psych: Uneventful            Sign Out: Acceptable/Baseline neuro status   Airway/Respiratory: Uneventful            Sign Out: Acceptable/Baseline resp. status   CV/Hemodynamics: Uneventful            Sign Out: Acceptable CV status   Other NRE: NONE   DID A NON-ROUTINE EVENT OCCUR? No         Last Anesthesia Record Vitals:  CRNA VITALS  2020 1341 - 2020 1440      2020             Pulse:  104    Ht Rate:  102    SpO2:  100 %    Resp Rate (observed):  15    EKG:  Sinus rhythm          Last PACU Vitals:  Vitals Value Taken Time   /70 2020  2:30 PM   Temp 36.7  C (98.1  F) 2020  2:13 PM   Pulse 86 2020  2:39 PM   Resp 16 2020  2:15 PM   SpO2 100 % 2020  2:39 PM   Temp src     NIBP     Pulse     SpO2     Resp     Temp     Ht Rate     Temp 2     Vitals shown include unvalidated device data.      Electronically Signed By: Ranjan Crum MD, 2020, 2:40 PM

## 2020-09-24 NOTE — ANESTHESIA CARE TRANSFER NOTE
Patient: Myrna Dee    Procedure(s):  VAGINECTOMY, ROBOT-ASSISTED  URETHROPLASTY  CYSTOSCOPY, WITH SUPRAPUBIC CATHETER INSERTION  TRANSGENDER GLANSPLASTY WITH SKIN GRAFT  SCROTOPLASTY    Diagnosis: Gender dysphoria [F64.9]  Diagnosis Additional Information: No value filed.    Anesthesia Type:   General     Note:  Airway :Nasal Cannula  Patient transferred to:PACU  Comments: Patient suctioned prior to extubation. Following commands, breathing spontaneously, opening eyes. Extubated to 4LNC. Transferred to PACU. Report to RN. Vital signs stable. Resting comfortably with eyes closed but awakens to voice. Handoff Report: Identifed the Patient, Identified the Reponsible Provider, Reviewed the pertinent medical history, Discussed the surgical course, Reviewed Intra-OP anesthesia mangement and issues during anesthesia, Set expectations for post-procedure period and Allowed opportunity for questions and acknowledgement of understanding      Vitals: (Last set prior to Anesthesia Care Transfer)    CRNA VITALS  9/24/2020 1341 - 9/24/2020 1426      9/24/2020             Pulse:  104    Ht Rate:  102    SpO2:  100 %    Resp Rate (observed):  15    EKG:  Sinus rhythm                Electronically Signed By: DENISE Howell CRNA  September 24, 2020  2:26 PM

## 2020-09-24 NOTE — OP NOTE
OPERATIVE REPORT     PREOPERATIVE DIAGNOSIS:   1. Gender Dysphoria     POSTOPERATIVE DIAGNOSIS:  1. Gender Dysphoria     DATE OF SURGERY: 9/24/2020     PROCEDURES PERFORMED:   Second Stage Phalloplasty which includes:  1. Robotic Assisted Laparoscopic Total Vaginectomy  2. Colpocleisis  3. Female Urethroplasty  4. Second Stage Urethroplasty  5. Vulvectomy  6. Cystoscopy with Suprapubic Tube Insertion  7. Glansplasty (Local Tissue Rearrangement of Distal Aspects of Flap) - 15 x 5cm     SURGEON: Octavio Carter MD  COSURGEON: Gavin Eastman MD (Cosurgeon needed due to lack of qualified, appropriate level resident assistance, complexity of the procedure spanning the expertise of two separate specialties.)  ASSISTANT: Frank Castillo MD & Zaid Villa MD  ANESTHESIA: General  EBL: 100mL  DRAINS: 16 Fr Suprapubic Tube, 16 Fr urethral Mcintyre (capped), Pelvic KENNEDY, Scrotal KENNEDY.  COMPLICATIONS: None  INDICATIONS: Myrna Dee is a 35 year old transgender male with gender dysphoria. He previously underwent first stage phalloplasty, which was a ALT + SCIP double pedicled flap phalloplasty. He is here today for second stage phalloplasty which includes the above procedures. The risks, benefits and alternatives of the multiple treatment options were discussed with him and he elected to proceed. Specifically, this included, but was not limited to: wound infection, flap loss, nerve damage, anesthesia risks, blood clots, poor sensation, urethral stricture/fistula or urethral hair, and penis of inadequate size.     DESCRIPTION OF PROCEDURE: After informed consent was obtained and preoperative antibiotics were given, the patient was taken to the operating room, placed supine on the operating table. SCDs and preoperative subcutaneous heparin were utilized for VTE prophylaxis. General anesthesia was induced and he was intubated. The patient was placed in lithotomy. The genitalia and lower abdomen were prepped and draped in a  sterile fashion. A timeout was performed.     Hydrodissection was performed of the vaginal mucosa using very dilute marcaine with epinephrine.     A 16F Mcintyre catheter was inserted. A stab wound was made above the umbilicus and the abdomen was insufflated with a Veress needle. The remaining 4 ports for a total of 5 were placed in the standard fashion with the assistant port in the left paramedian location, 2 robotic ports on the right side, 1 robotic port on the left side and the camera in the supraumbilical region.      The abdomen was inspected and there was no evidence of bowel injury.      A transverse peritoneal incision was made over the vagina using an EEA sizer in the vagina.     Next, careful dissection of the vagina was performed. Circumferential dissection was performed. skilled nursing down the vaginal canal, we excised the apex of the vagina and removed it transvaginally. This was sent as vaginal apex to pathology. Ultimately, the vagina was entered, and the dissection continued. We continued dissection was performed until the limit of the robotic reach of the instruments. The middle of the vagina was then removed.     We then performed the perineal portion of the vaginectomy. We placed a [place] retractor.  We performed a circumferential incision at the hymenal ring with slightly less distal incision at the dorsal midline. Circumferential dissection was then performed to release the vaginal mucosa. We tracked this dissection to the cut edge of the dissection which had been performed robotically. Once complete, the vaginal mucosa was completely removed and was then passed off as a specimen. We inspected the cavity. There was no remaining vaginal mucosa.     Next, in order to obliterate the vaginal canal and prevent future prolapse into this cavity, a colpocleisis was performed. Multiple 0 PDS sutures was used to approximate the lateral sidewalls of the vaginal muscle, thus obliterating the vaginal space. Once  complete, there was no vaginal cavity left. Surgiflo was placed into the cavity prior to complete closure.      Robotically, the peritoneal incision was closed to exclude the space from the peritoneum. Surgiflo was placed inside the closure as well.     We then performed cystoscopy of the bladder.  The bladder was entered. There were no tumors or stones. There was no bladder injury noted. The bladder was filled to its capacity. The patient was placed in Trendelenberg. A site 2 fingerbreadths above the pubic symphysis in the midline was chosen. A small skin incision was made in the midline. A spinal needle was used to find the trajectory. The needle was visualized to enter the bladder at the dome. The needle was removed. A one-step suprapubic introducer trocar was used to pass from the skin to the bladder. We watched this part using the robotic camera to ensure no bowel injury. The trocar was removed leaving the sheath. A 16 Fr catheter was placed through the sheath. 10cc was placed in the balloon. Balloon was visualized to be in the bladder. The sheath was removed. The catheter was placed to gravity drainage. Bladder was emptied. Scope was removed.     The robotic trocars were removed.     The skin was closed with subcuticular 4-0 Monocryl stitch. Dermabond was placed.      Next, I performed a female urethroplasty in order to lengthen the native female urethra. This is a necessary step during phalloplasty to allow the neophallus urethra to connect to the native female urethra. We placed the labia minora on stretch. We marked the peak of the labia minora. Injectable saline was used to hydrodissect the labia minora. An incision was made over the markings longitudinally. The freed labia minora were then closed using 5-0 running PDS suture - starting at the distal aspect of the female urethra. As we approached the proximal phallic urethra, we stopped the closure. This completed the female urethroplasty (also known as  creation of pars fixa).     Next, the second stage urethroplasty was performed. We examined the proximal phallic urethral hole. The proximal phallic hole was only 18 Fr. Thus we performed a ventral urethrotomy. Ultimately, the pendulous urethra was 24 Fr. We then performed lateral dissection along the labia minora. Dissection was performed to allow for tension-free closure. We advanced the ventral labia to meet the urethrotomy. With this augmented distal anastomosis, the urethra remained 22Fr. Then, the second stage urethroplasty was completed using multiple runs of 5-0 PDS suture for a water tight closure. Prior to complete closure, a 16 Fr urethral catheter was placed from the meatus to the bladder. Next, to buttress our urethroplasty, we performed a multiple layer closure.  The bulbospongiosus muscle was freed bilaterally from it's lateral and medial attachments. This allowed for a layer of coverage of our entire urethral closure. The bulbospongiosus muscle was approximated at the midline using running absorbable suture. We then freed up a subcutaneous fat layer, which was also closed over the bulbospongiosus muscle. There was adequate hemostasis. This completed the second stage urethroplasty.     I then turned our attention to vulvectomy. The rest of the anterior labia minora (near where the clitoris used to be) and the posterior labia minora (posterior fourchette) were excised using a combination of sharp and electrocautery dissection. This was taken with great care not to injure the female urethra or excise tissue for scrotoplasty. Hemostasis was achieved. The labia minora was then passed off the field thus completing the vulvectomy.     Dr. Eastman then performed a complex scrotoplasty. He cut the labial skin to create anteriorly based flaps. The tips of the labial flaps were joined in the midline and rotated superiorly to create the midline aspect of the scrotum. The perineum was closed in layers using  absorbable suture to create a male perineum. See his note for full details.     We then turned our attention to glansplasty. We made a transverse curvilinear incision 4cm from the tip of the penis at the dorsal midline. This curved inward to about 2cm from the tip more ventrally. Dissection continued in the subcutaneous tissues into the fatty layer.     A flap was made distally for about one centimeter circumferentially to allow for rolling of the tissue in order to produce a corona of the glans.     The remaining gap of de-epithelialized neophallus was measured. The size of the skin defect was 15cm x 5cm.     Dr. Eastman performed split thickness skin graft harvest from the right leg for glansplasty. An Aquacel dressing was placed over the skin graft donor site with Tegaderm.      The graft was sewn into place using running 5-0 Vicryl suture for the proximal skin edge.      Next, the distal edge of the graft was sewn to the edge of the rolled coronal flap using a combination of 5-0 interrupted and running vicryl.     In a mid-point of the width of the flap, U stitches were placed through the skin graft and the distal aspect of the coronal flap in an interrupted fashion to provide the appearance of a coronal sulcus. This also served to quilt the skin graft, prevent hematoma formation and allow for a recessed coronal sulcus. Excess graft was trimmed.     The wounds was irrigated. A gentle bolster was made with Xeroform gauze and Toby wrap. The patient was extubated and taken to the postoperative recovery area without apparent complication.     As attending surgeon, MARISSA, Octavio Carter MD, was present for the entire procedure where MARISSA was primary or cosurgeon and immediately available for the rest of the procedure.

## 2020-09-25 LAB
ANION GAP SERPL CALCULATED.3IONS-SCNC: 6 MMOL/L (ref 3–14)
BUN SERPL-MCNC: 7 MG/DL (ref 7–30)
CALCIUM SERPL-MCNC: 8.1 MG/DL (ref 8.5–10.1)
CHLORIDE SERPL-SCNC: 105 MMOL/L (ref 94–109)
CO2 SERPL-SCNC: 26 MMOL/L (ref 20–32)
CREAT SERPL-MCNC: 0.77 MG/DL (ref 0.66–1.25)
ERYTHROCYTE [DISTWIDTH] IN BLOOD BY AUTOMATED COUNT: 13.1 % (ref 10–15)
GFR SERPL CREATININE-BSD FRML MDRD: >90 ML/MIN/{1.73_M2}
GLUCOSE BLDC GLUCOMTR-MCNC: 102 MG/DL (ref 70–99)
GLUCOSE SERPL-MCNC: 101 MG/DL (ref 70–99)
HCT VFR BLD AUTO: 32.6 % (ref 40–53)
HGB BLD-MCNC: 11.1 G/DL (ref 13.3–17.7)
MCH RBC QN AUTO: 30.6 PG (ref 26.5–33)
MCHC RBC AUTO-ENTMCNC: 34 G/DL (ref 31.5–36.5)
MCV RBC AUTO: 90 FL (ref 78–100)
PLATELET # BLD AUTO: 191 10E9/L (ref 150–450)
POTASSIUM SERPL-SCNC: 3.5 MMOL/L (ref 3.4–5.3)
RBC # BLD AUTO: 3.63 10E12/L (ref 4.4–5.9)
SODIUM SERPL-SCNC: 137 MMOL/L (ref 133–144)
WBC # BLD AUTO: 10.1 10E9/L (ref 4–11)

## 2020-09-25 PROCEDURE — 25800030 ZZH RX IP 258 OP 636

## 2020-09-25 PROCEDURE — 80048 BASIC METABOLIC PNL TOTAL CA: CPT

## 2020-09-25 PROCEDURE — 40000141 ZZH STATISTIC PERIPHERAL IV START W/O US GUIDANCE

## 2020-09-25 PROCEDURE — 85027 COMPLETE CBC AUTOMATED: CPT

## 2020-09-25 PROCEDURE — 25000132 ZZH RX MED GY IP 250 OP 250 PS 637

## 2020-09-25 PROCEDURE — 36415 COLL VENOUS BLD VENIPUNCTURE: CPT

## 2020-09-25 PROCEDURE — 25000128 H RX IP 250 OP 636

## 2020-09-25 PROCEDURE — 12000001 ZZH R&B MED SURG/OB UMMC

## 2020-09-25 PROCEDURE — 00000146 ZZHCL STATISTIC GLUCOSE BY METER IP

## 2020-09-25 RX ADMIN — METRONIDAZOLE 500 MG: 500 INJECTION, SOLUTION INTRAVENOUS at 05:31

## 2020-09-25 RX ADMIN — SODIUM CHLORIDE, PRESERVATIVE FREE: 5 INJECTION INTRAVENOUS at 21:01

## 2020-09-25 RX ADMIN — HYDROMORPHONE HYDROCHLORIDE 0.3 MG: 1 INJECTION, SOLUTION INTRAMUSCULAR; INTRAVENOUS; SUBCUTANEOUS at 21:00

## 2020-09-25 RX ADMIN — CEFAZOLIN 1 G: 1 INJECTION, POWDER, FOR SOLUTION INTRAMUSCULAR; INTRAVENOUS at 20:22

## 2020-09-25 RX ADMIN — CEFAZOLIN 1 G: 1 INJECTION, POWDER, FOR SOLUTION INTRAMUSCULAR; INTRAVENOUS at 13:11

## 2020-09-25 RX ADMIN — ACETAMINOPHEN 975 MG: 325 TABLET, FILM COATED ORAL at 00:00

## 2020-09-25 RX ADMIN — CEFAZOLIN 1 G: 1 INJECTION, POWDER, FOR SOLUTION INTRAMUSCULAR; INTRAVENOUS at 03:45

## 2020-09-25 RX ADMIN — SODIUM CHLORIDE, PRESERVATIVE FREE: 5 INJECTION INTRAVENOUS at 08:15

## 2020-09-25 RX ADMIN — OXYCODONE HYDROCHLORIDE 10 MG: 5 TABLET ORAL at 12:17

## 2020-09-25 RX ADMIN — METRONIDAZOLE 500 MG: 500 INJECTION, SOLUTION INTRAVENOUS at 11:35

## 2020-09-25 RX ADMIN — OXYCODONE HYDROCHLORIDE 10 MG: 5 TABLET ORAL at 05:59

## 2020-09-25 RX ADMIN — OXYCODONE HYDROCHLORIDE 10 MG: 5 TABLET ORAL at 21:49

## 2020-09-25 RX ADMIN — OXYCODONE HYDROCHLORIDE 10 MG: 5 TABLET ORAL at 18:32

## 2020-09-25 RX ADMIN — ACETAMINOPHEN 975 MG: 325 TABLET, FILM COATED ORAL at 09:05

## 2020-09-25 RX ADMIN — OXYCODONE HYDROCHLORIDE 10 MG: 5 TABLET ORAL at 03:01

## 2020-09-25 RX ADMIN — DOCUSATE SODIUM 50 MG AND SENNOSIDES 8.6 MG 1 TABLET: 8.6; 5 TABLET, FILM COATED ORAL at 20:22

## 2020-09-25 RX ADMIN — OXYCODONE HYDROCHLORIDE 10 MG: 5 TABLET ORAL at 09:05

## 2020-09-25 RX ADMIN — TOLTERODINE 2 MG: 2 CAPSULE, EXTENDED RELEASE ORAL at 08:00

## 2020-09-25 RX ADMIN — OXYCODONE HYDROCHLORIDE 10 MG: 5 TABLET ORAL at 15:26

## 2020-09-25 RX ADMIN — ACETAMINOPHEN 975 MG: 325 TABLET, FILM COATED ORAL at 17:17

## 2020-09-25 ASSESSMENT — ACTIVITIES OF DAILY LIVING (ADL)
ADLS_ACUITY_SCORE: 15

## 2020-09-25 ASSESSMENT — PAIN DESCRIPTION - DESCRIPTORS
DESCRIPTORS: ACHING;PRESSURE
DESCRIPTORS: ACHING;CRAMPING
DESCRIPTORS: ACHING
DESCRIPTORS: ACHING;PRESSURE

## 2020-09-25 NOTE — PROGRESS NOTES
"Urology Daily Progress Note    24 hour events/Subjective:     - No acute events overnight, doing \"really well\"   - Pain well controlled on current regimen, bothered by bladder spasms   - Tolerating diet ; no nausea or vomiting   - Not yet passing flatus.   - Ambulated yesterday    O:  Vitals: /60 (BP Location: Left arm)   Pulse 87   Temp 98.2  F (36.8  C) (Oral)   Resp 14   Ht 1.651 m (5' 5\")   Wt 71.4 kg (157 lb 6.5 oz)   SpO2 99%   BMI 26.19 kg/m      General: Alert, interactive, in NAD  Resp: Non-labored breathing on 1LNC  Abdomen: Soft, appropriately tender, suprapubic tube in place  : incisions c/d/i with xeroform and kerlix, small blood clot  Ext: Warm and well perfused. R thigh donor site intact   Mcintyre: capped   Suprapubic tube: clear yellow urine   Abdominal drain: Scant   Scrotal drain: serosanguinous    I/O  UOP  580/700 SPT  Abdominal drain 0/0  Scrotal drain 5/0    Labs  Pending this AM    Assessment/Plan  35 year old y/o adult POD#1  s/p Robotic Assisted Laparoscopic Total Vaginectomy, colpocleisis, urethroplasty, vulvectomy, cystoscopy with SPT insertion, glansplasty with urology and plastic surgery for gender dysphoria. Scrotal drain managed by plastics team, abdominal drain by urology.     Note - plan changes for the day are in BOLD  NEURO Pain well controlled on scheduled tylenol, PRN oxy and dilaudid   CV HDS.    PULM Aggressive pulmonary toilet and I/S.   FEN/GI IVF: discontinue 100/h NS  Diet: regular diet  Bowel regimen: senna, PRN milk of mag    Continue SPT to drainage, detrol 2 mg for bladder spasms.   HEME Hgb as above. CTM   ID Afebrile, no leukocytosis.    Antibiotics: Completed periop antibiotics, ancef and flagyl x 24 hrs postop   ENDO No issues   ACTIVITY Up as tolerated  Ambulate at least TID    PPx SCDs, ambulation   HOME RX ON HOLD Hold testosterone for 4 weeks   DISPO Floor, likely home tomorrow with drain removal prior     To be discussed with  " Emma    --    Francesca Love MD  PGY2 uroloy    I saw and evaluated the patient with Dr. Carter and agree with the resident note and plan of care.     Brooklynn Castillo MD  Reconstructive Urology Fellow  Date of encounter: 9/25/2020

## 2020-09-25 NOTE — PLAN OF CARE
"/60 (BP Location: Left arm)   Pulse 87   Temp 98.2  F (36.8  C) (Oral)   Resp 14   Ht 1.651 m (5' 5\")   Wt 71.4 kg (157 lb 6.5 oz)   SpO2 99%   BMI 26.19 kg/m    Hypertensive but not within notifying parameters, all other VSS on 1 LPM NC. A&Ox4. Pain managed with scheduled tylenol and PRN oxycodone. Denies nausea, on regular diet. Left PIV infusing IVMF in between antibiotics, right PIV SL. Groin incisions with fluffs in place, medium amount of sanguinous drainage overnight. Right thigh donor site with moderate amount of sanguinous drainage under, otherwise dressing CDI. JPx2 to bulb suction with no output overnight, stripper per orders. Ameya penis warm with good cap refill and doppler pulse audible, cathter capped, see provider notification. Suprapubic catheter positional with AUO. Denies gas, no BM post operatively yet. Up with A1. Continue POC.   "

## 2020-09-25 NOTE — PLAN OF CARE
AVSS on room air. Ambulating SBA with IV pole for support. A+Ox4. Pain managed with scheduled tylenol and PRN oxy Q3H. Tolerating regular diet w/o nausea or vomiting. Left PIV running NS @ 100 ml/hr. Flagyl and ancef administered as scheduled. Right PIV removed d/t occlusion. Upper KENNEDY drain emptying serosanguineous fluid. Groin KENNEDY drain emptying dark, red blood- MD notified and aware. Abd lap sites CDI. Right thigh donor site has moderate amt of sanguinous drainage under transparent CDI dressing. Groin incisions w/ fluffs in place have serosanguinous drainage. Ameya penis warm, pink with good capillary refill, and doppler pulse audible. New urethra remained capped. Suprapubic catheter draining large amounts of urine, pt states this is within his baseline. Continues to deny gas. Will continue plan of care.

## 2020-09-25 NOTE — PLAN OF CARE
POD 0. Pt arrived on 7C from PACU around 1720. AVSS on 1 L nasal cannula. CAPNO in place. Pt taking sched tylenol and prn oxycodone for pain. Abd lap sites EMMY. Groin incisions w/ fluffs in place, small amt of serosanguinous drainage noted. R thigh donor site w/ dressing c/d/i. X2 JPs to bulb suction w/ bloody drainage. Suprapubic catheter w/ adequate UOP. Pt has not passed gas. Regular diet, reported nausea before eating, but pt stated it resolved w/ out intervention. Pt ambulated in room w/ assist of 2. X1 PIV SL, x1 PIV infusing MIVF. Cont to monitor pain management, surgical sites, UOP, and ROBF. Cont w/ POC.

## 2020-09-25 NOTE — PROVIDER NOTIFICATION
"Notified MD at 0215  regarding order clarification.      Spoke With: urology Resident    Orders were not obtained.    Comments: Paged MD to ask for order clarification of patient care order stating  \"Please perform flap checks q4h to ensure that the distal tip of the mp-penis is still perfused\". Per evening report flap checks not being preformed, and no previous checks reported or recorded. MD responded and stated that for tonight we should be making sure penis has adequate capillary refill, and day team will clarify with plastics the extent and need for flap checks. Will continue to monitor.       "

## 2020-09-25 NOTE — PROGRESS NOTES
Plastic Surgery Progress Note    Subjective/Interval History:  No acute events.pain tolerated. Appetite okay, eating regular diet with no associated nausea/vomitng. Voiding through suprapubic catheter     Objective:  Temp:  [97.2  F (36.2  C)-99.2  F (37.3  C)] 97.2  F (36.2  C)  Pulse:  [] 75  Resp:  [8-17] 8  BP: (114-146)/(55-89) 116/58  SpO2:  [92 %-100 %] 98 %    NAD   Non labored breathing on RA   RRR  Suprapubic catheter draining clear yellow urine   Neophallus soft, warm, pink, normal capillary refill. Incisions c/d/i   R thigh donor site dressed and intact     Assessment/Plan:   Myrna Dee is a 35 year old transgender male with gender dysphoria. He previously underwent first stage phalloplasty, which was a ALT + SCIP double pedicled flap phalloplasty. He is now s/p second stage glansplasty.     Scrotal drain to be removed by plastics team before discharge   dispo per urology team       Jeff Rivero MD   Plastic & Reconstructive Surgery PGY-1

## 2020-09-26 ENCOUNTER — PATIENT OUTREACH (OUTPATIENT)
Dept: CARE COORDINATION | Facility: CLINIC | Age: 35
End: 2020-09-26

## 2020-09-26 VITALS
HEIGHT: 65 IN | BODY MASS INDEX: 26.23 KG/M2 | TEMPERATURE: 96.9 F | OXYGEN SATURATION: 99 % | DIASTOLIC BLOOD PRESSURE: 82 MMHG | WEIGHT: 157.41 LBS | RESPIRATION RATE: 14 BRPM | HEART RATE: 82 BPM | SYSTOLIC BLOOD PRESSURE: 125 MMHG

## 2020-09-26 LAB
ANION GAP SERPL CALCULATED.3IONS-SCNC: 4 MMOL/L (ref 3–14)
BUN SERPL-MCNC: 6 MG/DL (ref 7–30)
CALCIUM SERPL-MCNC: 8.2 MG/DL (ref 8.5–10.1)
CHLORIDE SERPL-SCNC: 110 MMOL/L (ref 94–109)
CO2 SERPL-SCNC: 25 MMOL/L (ref 20–32)
CREAT SERPL-MCNC: 0.85 MG/DL (ref 0.66–1.25)
ERYTHROCYTE [DISTWIDTH] IN BLOOD BY AUTOMATED COUNT: 13.3 % (ref 10–15)
GFR SERPL CREATININE-BSD FRML MDRD: >90 ML/MIN/{1.73_M2}
GLUCOSE SERPL-MCNC: 95 MG/DL (ref 70–99)
HCT VFR BLD AUTO: 35.7 % (ref 40–53)
HGB BLD-MCNC: 11.6 G/DL (ref 13.3–17.7)
MCH RBC QN AUTO: 30.1 PG (ref 26.5–33)
MCHC RBC AUTO-ENTMCNC: 32.5 G/DL (ref 31.5–36.5)
MCV RBC AUTO: 93 FL (ref 78–100)
PLATELET # BLD AUTO: 190 10E9/L (ref 150–450)
POTASSIUM SERPL-SCNC: 3.8 MMOL/L (ref 3.4–5.3)
RBC # BLD AUTO: 3.85 10E12/L (ref 4.4–5.9)
SODIUM SERPL-SCNC: 139 MMOL/L (ref 133–144)
WBC # BLD AUTO: 8.6 10E9/L (ref 4–11)

## 2020-09-26 PROCEDURE — 80048 BASIC METABOLIC PNL TOTAL CA: CPT

## 2020-09-26 PROCEDURE — 36415 COLL VENOUS BLD VENIPUNCTURE: CPT

## 2020-09-26 PROCEDURE — 25000132 ZZH RX MED GY IP 250 OP 250 PS 637

## 2020-09-26 PROCEDURE — 25000128 H RX IP 250 OP 636: Performed by: UROLOGY

## 2020-09-26 PROCEDURE — 85027 COMPLETE CBC AUTOMATED: CPT

## 2020-09-26 PROCEDURE — 25000128 H RX IP 250 OP 636

## 2020-09-26 PROCEDURE — 90686 IIV4 VACC NO PRSV 0.5 ML IM: CPT | Performed by: UROLOGY

## 2020-09-26 RX ORDER — CIPROFLOXACIN 500 MG/1
500 TABLET, FILM COATED ORAL ONCE
Qty: 1 TABLET | Refills: 0 | Status: SHIPPED | OUTPATIENT
Start: 2020-09-26 | End: 2020-09-26

## 2020-09-26 RX ORDER — SIMETHICONE 80 MG
80 TABLET,CHEWABLE ORAL EVERY 6 HOURS PRN
Status: DISCONTINUED | OUTPATIENT
Start: 2020-09-26 | End: 2020-09-26 | Stop reason: HOSPADM

## 2020-09-26 RX ORDER — OXYCODONE HYDROCHLORIDE 5 MG/1
5 TABLET ORAL EVERY 6 HOURS PRN
Qty: 12 TABLET | Refills: 0 | Status: SHIPPED | OUTPATIENT
Start: 2020-09-26 | End: 2020-09-29

## 2020-09-26 RX ADMIN — OXYCODONE HYDROCHLORIDE 10 MG: 5 TABLET ORAL at 09:24

## 2020-09-26 RX ADMIN — OXYCODONE HYDROCHLORIDE 10 MG: 5 TABLET ORAL at 13:19

## 2020-09-26 RX ADMIN — DOCUSATE SODIUM 50 MG AND SENNOSIDES 8.6 MG 2 TABLET: 8.6; 5 TABLET, FILM COATED ORAL at 08:21

## 2020-09-26 RX ADMIN — INFLUENZA A VIRUS A/GUANGDONG-MAONAN/SWL1536/2019 CNIC-1909 (H1N1) ANTIGEN (FORMALDEHYDE INACTIVATED), INFLUENZA A VIRUS A/HONG KONG/2671/2019 (H3N2) ANTIGEN (FORMALDEHYDE INACTIVATED), INFLUENZA B VIRUS B/PHUKET/3073/2013 ANTIGEN (FORMALDEHYDE INACTIVATED), AND INFLUENZA B VIRUS B/WASHINGTON/02/2019 ANTIGEN (FORMALDEHYDE INACTIVATED) 0.5 ML: 15; 15; 15; 15 INJECTION, SUSPENSION INTRAMUSCULAR at 13:22

## 2020-09-26 RX ADMIN — OXYCODONE HYDROCHLORIDE 10 MG: 5 TABLET ORAL at 06:13

## 2020-09-26 RX ADMIN — OXYCODONE HYDROCHLORIDE 10 MG: 5 TABLET ORAL at 01:50

## 2020-09-26 RX ADMIN — TOLTERODINE 2 MG: 2 CAPSULE, EXTENDED RELEASE ORAL at 08:21

## 2020-09-26 RX ADMIN — ACETAMINOPHEN 975 MG: 325 TABLET, FILM COATED ORAL at 08:38

## 2020-09-26 RX ADMIN — ACETAMINOPHEN 975 MG: 325 TABLET, FILM COATED ORAL at 00:12

## 2020-09-26 RX ADMIN — HYDROMORPHONE HYDROCHLORIDE 0.5 MG: 1 INJECTION, SOLUTION INTRAMUSCULAR; INTRAVENOUS; SUBCUTANEOUS at 03:38

## 2020-09-26 ASSESSMENT — PAIN DESCRIPTION - DESCRIPTORS
DESCRIPTORS: PRESSURE
DESCRIPTORS: OTHER (COMMENT)
DESCRIPTORS: DISCOMFORT
DESCRIPTORS: SORE
DESCRIPTORS: DISCOMFORT
DESCRIPTORS: DISCOMFORT

## 2020-09-26 ASSESSMENT — ACTIVITIES OF DAILY LIVING (ADL)
ADLS_ACUITY_SCORE: 13
ADLS_ACUITY_SCORE: 15

## 2020-09-26 NOTE — PROGRESS NOTES
"Plastic surgery progress note    No acute events overnight. Pain controlled. Tolerating PO. Ambulating    /82 (BP Location: Left arm, Cuff Size: Adult Regular)   Pulse 82   Temp 96.9  F (36.1  C) (Oral)   Resp 14   Ht 1.651 m (5' 5\")   Wt 71.4 kg (157 lb 6.5 oz)   SpO2 99%   BMI 26.19 kg/m      Awake, alert, NAD  Unlabored breathing  Non tachycardic  Abd soft, non distended  Salvador phallus pink and viable. Dressing c/d/i  Scrotal skin somewhat congested on the left  Groin KENNEDY w/ ssd  STSG donor site dressing intact    I&O  UOP 7350  Groin KENNEDY 72  Abd KENNEDY 305    A/P: 35M w/ phalloplasty now POD 2 s/p glansplasty and scrotoplasty    - Keep salvador phallus dressing c/d/i  - Follow up on Tuesday, Sep 29 for dressing take down  - Scrotal drains removed  - Dry dressings to the perineum as needed    Rocky Mata MD  Plastic surgery resident  "

## 2020-09-26 NOTE — PROGRESS NOTES
"Urology Daily Progress Note    24 hour events/Subjective:     - No acute events overnight   - Pain well controlled on current regimen, some abdominal gas pain   - Tolerating diet; no nausea or vomiting   - Passing gas   - Ambulated yesterday    O:  Vitals: /64 (BP Location: Left arm)   Pulse 74   Temp 99.6  F (37.6  C) (Oral)   Resp 16   Ht 1.651 m (5' 5\")   Wt 71.4 kg (157 lb 6.5 oz)   SpO2 99%   BMI 26.19 kg/m      General: Alert, interactive, in NAD  Resp: Non-labored breathing on RA  Abdomen: Soft, appropriately tender, suprapubic tube in place  : Neophallus WWP, clean dressing in place. Scrotum and perineum covered with xeroform and kerlix  Ext: Warm and well perfused. R thigh donor site intact   Mcintyre: capped   Suprapubic tube: clear yellow urine   Abdominal drain: Scant   Scrotal drain: Serosanguinous    I/O  UOP 7000/2150  Abdominal drain 300/120  Scrotal drain 73/0    Labs  Hgb 11.6  WBC 8  Cr 0.9    Assessment/Plan  35 year old y/o adult POD#2  s/p Robotic Assisted Laparoscopic Total Vaginectomy, colpocleisis, urethroplasty, vulvectomy, cystoscopy with SPT insertion, glansplasty with urology and plastic surgery for gender dysphoria. Scrotal drain managed by plastics team, abdominal drain by urology.     Note - plan changes for the day are in BOLD  NEURO Pain well controlled on scheduled tylenol, PRN oxy and dilaudid   CV HDS.    PULM Aggressive pulmonary toilet and I/S.   FEN/GI Reg diet, TKO mIVF, bowel reg. Add simethicone for gas pain.    Continue SPT to drainage, detrol 2 mg for bladder spasms. Likely remove KENNEDY and scrotal drain before discharge   HEME Hgb as above. CTM   ID Afebrile, no leukocytosis.    Antibiotics: Completed periop antibiotics   ENDO No issues   ACTIVITY Up as tolerated  Ambulate at least TID    PPx SCDs, ambulation   HOME RX ON HOLD Hold testosterone for 2 weeks   DISPO Floor, likely home today     Seen with Dr. Purcell. To be discussed with Dr. Carter    --    Zaid " MD Daisy  Urology Resident

## 2020-09-26 NOTE — PROGRESS NOTES
Pt vitals stable. IV @ 100. Continues to have discomfort from bladder spasms. PRN Oxy and Dilaudid for pain given. Good PO. Good Urine output from suprapubic cath. Up with SBA walking in the halls.  KENNEDY x 2. Positive BS ,no gas, no BM. Old dark red drainage in scrotal area. Neophallus soft, warm, pink, normal capillary refill. Incisions c/d/i   R thigh donor site dressed and intact  Will continue to monitor for pain.

## 2020-09-26 NOTE — PLAN OF CARE
POD 2. Gas pain and incisional pain managed with Oxycodone and IV Dilaudid x1. Tolerating reg diet, no nausea reported. Suprapubic catheter with large urine volumes. Scrotal incisions with dried drainage, kerlix in place. R thigh graft site with drainage, transparent dressing in place. Abd lap sites EMMY. Did pass some gas, no BM this shift. PIV infusing NS @ 100. Up with SBA+IV pole. Continue POC.

## 2020-09-26 NOTE — PLAN OF CARE
AVSS on 9/26/2020  KENNEDY drains were removed  PIVs removed  Urethral catheter is capped  Suprapubic catheter is patent with adequate output, teaching was performed with patient for at home care  Right leg donor site covered with transparent dressing  Lap sites on abdomen EMMY  Scrotal incision, ABD pads and mesh panties in place  Pain managed with tylenol and oxycodone  Patient is up and walking independently  Tolerating regular diet  AVS reviewed with patient prior to discharge

## 2020-09-26 NOTE — DISCHARGE SUMMARY
Discharge Summary     Myrna Dee MRN# [id number]   YOB: 1985 Age: 35 year old     Date of Admission:  9/24/2020  Date of Discharge::  9/26/2020  2:09 PM  Admitting Physician:  Octavio Carter MD  Discharge Physician:  Zaid Villa MD  Primary Care Physician:         Clarissa Stroud          Admission Diagnoses:   Gender dysphoria [F64.9]          Discharge Diagnosis:   Same as above         Procedures:   : Procedure(s):  VAGINECTOMY, ROBOT-ASSISTED  URETHROPLASTY  CYSTOSCOPY, WITH SUPRAPUBIC CATHETER INSERTION  TRANSGENDER GLANSPLASTY WITH SKIN GRAFT  SCROTOPLASTY        Non-operative procedures:   None performed          Consultations:   VASCULAR ACCESS CARE ADULT IP CONSULT         Imaging Studies:     Results for orders placed or performed in visit on 10/19/18   CTA Upper Extremity Bilateral Runoff w Contr    Narrative    CTA of the pelvis and bilateral lower extremity runoff dated  10/19/2018    CLINICAL INFORMATION: Inflow and outflow for free flap tissue  planning. Gender dysphoria in adult.    TECHNIQUE:  Helical scans through the pelvis and legs were obtained  following the injection of contrast media  in the  arterial phase.  Source images were reviewed as well as 3D and multi-planar  reconstructions.    COMPARISON: None .    FINDINGS:      Abdominal aorta:     The abdominal aorta is normal in caliber. The celiac, superior  mesenteric, bilateral solitary renal arteries comments  Inferior mesenteric artery are patent and normal in caliber. There is  no significant vascular disease.    Right lower extremity:    GERMAN: Normal in caliber measuring 1.2 cm. No significant stenosis.  EIA: Normal caliber. No significant atherosclerotic disease or  stenosis.   CFA: Normal in caliber without significant stenosis.   DFA: Normal caliber without significant stenosis.  SFA: Normal caliber without significant stenosis.  Pop Art: Normal.  Common trunk: Normal.  OSWALDO: Patent  "to the ankle  PTA: Patent to the ankle  Peroneal: Patent to the ankle.    Left Leg:      GERMAN: Normal in caliber measuring 1.2 cm. No significant stenosis.  EIA: Normal caliber. No significant atherosclerotic disease or  stenosis.   CFA: Normal in caliber without significant stenosis.   DFA: Normal caliber without significant stenosis.  SFA: Normal caliber without significant stenosis.  Pop Art: Normal.  Common trunk: Normal.  OSWALDO: Patent to the ankle  PTA: Patent to the ankle  Peroneal: Patent to the ankle      Abdomen:     The spleen, liver, adrenal glands, pancreas, kidneys and bones show no  focal abnormalities.      Impression    Impression:    1. Abdominal aorta: Normal  2. Right leg: Normal anatomy.  No significant stenosis.     3. Left leg: Normal anatomy.  No significant stenosis.             I have personally reviewed the examination and initial interpretation  and I agree with the findings.    HEMA NUNEZ MD            Medications Prior to Admission:     No medications prior to admission.            Discharge Medications:     Discharge Medication List as of 9/26/2020 12:53 PM      START taking these medications    Details   ciprofloxacin (CIPRO) 500 MG tablet Take 1 tablet (500 mg) by mouth once for 1 dose Take the morning of your catheter removal, Disp-1 tablet,R-0, E-Prescribe      oxyCODONE (ROXICODONE) 5 MG tablet Take 1 tablet (5 mg) by mouth every 6 hours as needed for pain, Disp-12 tablet,R-0, Local Print         CONTINUE these medications which have NOT CHANGED    Details   B-D SYRINGE LUER-WU 1 ML MISC USE TO INJECT WEEKLY, LIZ, [place]      BD DISP NEEDLES 22G X 1-1/2\" MISC INJECT 0.25 MLS INTO THE MUSCLE ONCE A WEEK, [place]      Pediatric Multivit-Minerals-C (FLINTSTONES GUMMIES PO) Take 1 tablet by mouth 2 times daily., 1 tablet, Oral, 2 TIMES DAILY, Until Discontinued, Historical      Syringe/Needle, Disp, 22G X 1-1/2\" 1 ML MISC Inject 0.25 mLs into the muscle once a week, " Disp-30 each,R-4, E-Prescribe      testosterone cypionate (DEPOTESTOSTERONE) 200 MG/ML injection INJECT 0.25 ML INTO THE MUSCLES ONCE A WEEK., Disp-13 mL,R-0, E-Prescribe                    Brief History of Illness:   Reason for admission requiring a surgical or invasive procedure:   Gender dysphoria [F64.9]   The patient underwent the following procedure(s):   See above   There were no immediate complications during this procedure.    Please refer to the full operative summary for details.           Hospital Course:   The patient's hospital course was unremarkable.  Myrna Dee recovered as anticipated and experienced no post-operative complications.     On POD#2 patient was ambulating without assitance, tolerating the discharge diet, had pain controlled with PO medications to go home with, and requiring no IV medications or fluids. Patient was discharged home with appropriate contact information, follow-up and instructions as seen below in the discharge paperwork. His KENNEDY and scrotal drains were removed before discharge.         Final Pathology Result:   Pending at time of discharge         Discharge Instructions and Follow-Up:     Discharge Procedure Orders   Reason for your hospital stay   Order Comments: Second stage phalloplasty     Discharge Instructions   Order Comments: Activity  - No strenuous exercise for 4 weeks.  - No lifting, pushing, pulling more than 10 pounds for 4 weeks.   - Do not strain with bowel movements.  - Do not drive until you can press the brake pedal quickly and fully without pain.   - Do not operate a motor vehicle while taking narcotic pain medications.     Incisions  - Keep your incisions dry and in place until you follow up with Dr. Eastman on Tuesday. At that appointment Dr. Eastman will remove the dressings and instruct you how to care for them going forward  - If purple dermabond glue was used on your abdominal incisions, avoid applying any lotions or ointments.   - Cover your  perineal incision with gauze if the current dressing becomes saturated; Otherwise keep the current dressing in place. Keep your mesh panties on until you follow up with Dr. Eastman  - The stitches do not need to be removed, they will dissolve on their own.    Catheters  -Keep the suprapubic catheter to drainage and the the penile catheter capped  -Treat the catheters as extensions of your body; Do not let them get caught or snagged on anything  -Make sure that the catheters remain off tension    Medications  - Transition from narcotic pain medications to tylenol (acetaminophen) as you are able.  Wean yourself off all pain medications as you are able.  - Some pain medications contain both tylenol (acetaminophen) and a narcotic (Norco, vicodin, percocet), do not take more than 4,000mg of Tylenol (acetaminophen) from all sources in any 24 hour period.  - Narcotics can make you constipated.  Take over the counter fiber (metamucil or benefiber) and stool softeners (miralax, docusate or senna) while taking narcotic pain medications, but stop if you develop diarrhea.  - No driving or operating machinery while taking narcotic pain medications   - Ciprofloxacin - take the morning of your daniels catheter removal  - You can resume your testosterone therapy 2 weeks after surgery    Follow-Up:  - Follow up with Dr. Eastman on Tuesday for a wound check  - Follow up with Dr. Carter in 1 month with an imaging study to have your catheter removed  - Call your primary care provider to touch base regarding your recent admission.    - Call or return sooner than your regularly scheduled visit if you develop any of the following: fever (greater than 101.5), uncontrolled pain, uncontrolled nausea or vomiting, as well as increased redness, swelling, or drainage from your wound.     Phone numbers:   - Monday through Friday 8am to 4:30pm: Call [phone] with questions or to schedule or confirm appointment.    - Nights or weekends: call the after  "hours emergency pager - [phone] and tell the  \"I would like to page the Urology Resident on call.\"  - For emergencies, call 911            Discharge Disposition:   Discharged to Home      Condition at discharge: Good    --    Ziad Villa MD  Urology Resident    2:11 PM, 9/26/2020    "

## 2020-09-28 ENCOUNTER — PATIENT OUTREACH (OUTPATIENT)
Dept: PLASTIC SURGERY | Facility: CLINIC | Age: 35
End: 2020-09-28

## 2020-09-28 LAB — COPATH REPORT: NORMAL

## 2020-09-28 NOTE — PATIENT INSTRUCTIONS
Received call from pt stating he was told he had an appt with Dr. Eastman on 9/29/20, but is unsure what time this appt is. Called pt back and left VM indicating an appt has been scheduled for him on 9/29/20 at 2pm. Requested pt call back should he not be able to come at this time. Myesha HUSTON RNCC

## 2020-09-29 ENCOUNTER — OFFICE VISIT (OUTPATIENT)
Dept: PLASTIC SURGERY | Facility: CLINIC | Age: 35
End: 2020-09-29
Payer: COMMERCIAL

## 2020-09-29 DIAGNOSIS — F64.9 GENDER DYSPHORIA: Primary | ICD-10-CM

## 2020-09-29 DIAGNOSIS — N32.89 BLADDER SPASM: ICD-10-CM

## 2020-09-29 RX ORDER — TOLTERODINE 4 MG/1
4 CAPSULE, EXTENDED RELEASE ORAL DAILY
Qty: 21 CAPSULE | Refills: 0 | Status: SHIPPED | OUTPATIENT
Start: 2020-09-29 | End: 2020-10-19

## 2020-09-29 RX ORDER — OXYCODONE HYDROCHLORIDE 5 MG/1
5 TABLET ORAL EVERY 6 HOURS PRN
Qty: 12 TABLET | Refills: 0 | Status: SHIPPED | OUTPATIENT
Start: 2020-09-29 | End: 2021-04-21

## 2020-09-29 ASSESSMENT — PAIN SCALES - GENERAL: PAINLEVEL: MILD PAIN (3)

## 2020-09-29 NOTE — NURSING NOTE
Chief Complaint   Patient presents with     Surgical Followup     Post op visit. Date of surgery 9/24/2020.       There were no vitals filed for this visit.    There is no height or weight on file to calculate BMI.    Kimmy Li LPN

## 2020-09-29 NOTE — PROGRESS NOTES
Patient returns for a postoperative follow-up after undergoing second stage phalloplasty for gender dysphoria.    INTERVAL HISTORY: Pain is getting better, but continues to require some opioid pain medication at nighttime.  Is also complaining of bladder spasm type complaints.  Otherwise, reports he is doing well.    PHYSICAL EXAMINATION:  Examination was performed in the presence of chaperone.  This reveals a healthy penis with a viable tip.  Glansplasty bolster was removed revealing 100% take of the skin graft.  No infection and no sloughing.  Right thigh skin graft donor site dressing is intact.  Suprapubic catheter and Mcintyre stent are also intact.  There is a little patch of small blisters along the dorsum of the penile shaft were the dressing was likely irritating the skin.  Scrotal incision with partial necrosis along the distal aspect of the left scrotal labial flap.  No drainage.  There is scabbing occurring.    ASSESSMENT: 1 week status post second stage phalloplasty including a glans plasty and scrotoplasty.    PLAN: Patient is to continue daily and as needed dressing changes to the scrotal incisions as well as Adaptic and small Kerlix to the glansplasty site.  New prescriptions given for Detrol LA and PRN oxycodone.  Patient is to return to clinic in 2 weeks so that we may evaluate the left side of the scrotum for partial necrosis of the labial flap.  He may shower.  He is to keep his right thigh skin graft donor site dressing intact.    Total time spent with patient was 15 min of which greater than 50% was in counseling.

## 2020-09-29 NOTE — LETTER
9/29/2020     RE: Myrna Dee  [address]  [facility] [address]     Dear Colleague,    Thank you for referring your patient, Myrna Dee, to the [facility] PLASTIC AND RECONSTRUCTIVE SURGERY at [facility]. Please see a copy of my visit note below.    Patient returns for a postoperative follow-up after undergoing second stage phalloplasty for gender dysphoria.    INTERVAL HISTORY: Pain is getting better, but continues to require some opioid pain medication at nighttime.  Is also complaining of bladder spasm type complaints.  Otherwise, reports he is doing well.    PHYSICAL EXAMINATION:  Examination was performed in the presence of chaperone.  This reveals a healthy penis with a viable tip.  Glansplasty bolster was removed revealing 100% take of the skin graft.  No infection and no sloughing.  Right thigh skin graft donor site dressing is intact.  Suprapubic catheter and Mcintyre stent are also intact.  There is a little patch of small blisters along the dorsum of the penile shaft were the dressing was likely irritating the skin.  Scrotal incision with partial necrosis along the distal aspect of the left scrotal labial flap.  No drainage.  There is scabbing occurring.    ASSESSMENT: 1 week status post second stage phalloplasty including a glans plasty and scrotoplasty.    PLAN: Patient is to continue daily and as needed dressing changes to the scrotal incisions as well as Adaptic and small Kerlix to the glansplasty site.  New prescriptions given for Detrol LA and PRN oxycodone.  Patient is to return to clinic in 2 weeks so that we may evaluate the left side of the scrotum for partial necrosis of the labial flap.  He may shower.  He is to keep his right thigh skin graft donor site dressing intact.    Total time spent with patient was 15 min of which greater than 50% was in counseling.    Again, thank you for allowing me to participate in the care of your patient.       Sincerely,  Gavin Eastman MD

## 2020-09-30 ENCOUNTER — PATIENT OUTREACH (OUTPATIENT)
Dept: PLASTIC SURGERY | Facility: CLINIC | Age: 35
End: 2020-09-30

## 2020-09-30 NOTE — PATIENT INSTRUCTIONS
Returned call to pt regarding post op concerns. Pt states he had a small amount of urine leak from the tip of the penis when straining to have a BM. Explained that this is not concerning. If pt continues to have urine leak he should contact the clinic. Discussed measures to keep BM soft. Pt states understanding. Pt started Detrol today. No additional concerns. Myesha HUSTON RNCC

## 2020-09-30 NOTE — OP NOTE
DATE OF OPERATION: September 24, 2020     PREOPERATIVE DIAGNOSIS: Gender dysphoria status post first stage pedicled right SCIP and left ALT 2 flap phalloplasty, now ready for second stage phalloplasty.     POSTOPERATIVE DIAGNOSIS: Gender dysphoria status post first stage pedicled right SCIP and left ALT 2 flap phalloplasty, now ready for second stage phalloplasty.     PROCEDURES PERFORMED:   1.  Scrotoplasty using bilateral anteriorly based labia majora flaps, size 12 x 5 cm each.  2.  Glencoe of skin flap and coverage of distal flap glansplasty wound, size 15 x 5 cm.     SURGEON: Gavin Eastman MD     CO SURGEON: Octavio Carter MD (co-surgeon was needed for this case given the need for expertise from 2 separate subspecialties in the setting of inadequate appropriate resident assistance.)     ASSISTANT: Dionicio Jamison MD     ANESTHESIA: General.     ESTIMATED BLOOD LOSS: 25 mL for plastic surgery portion.     FINDINGS: Well-perfused labial flaps used for scrotoplasty.  Distal aspect of the phallus well-perfused following glansplasty.     SPECIMENS: None.     COMPLICATIONS: None.     DRAINS: None.     IMPLANTS: None.     INDICATIONS:  Patient is a 35-year-old trans-man who prefers he him pronouns.  On February 3, 2020, he underwent first stage pedicled right SCIP and left ALT 2 flap phalloplasty.  He now returns to the operating room for second stage phalloplasty after discussion of risks benefits and alternatives.  He accepted the associated risks and wished to proceed with surgery.     DESCRIPTION OF PROCEDURE:  Informed consent and markings were performed in the preoperative holding area.  Patient was then taken to the operating room with the urology team.  By the time the plastic surgery team was called, patient was already in a lithotomy position under general anesthesia.  The urology team had already performed robotic assisted vaginectomy, colpocleisis, urethroplasties, and suprapubic catheter insertion.   A second timeout was performed.     Adductor tendons were palpated and marked.  Anteriorly based 12 x 5 cm bilateral labia majora flaps were marked starting at that level.  Approximately 1 to 2 cm of perineal skin was saved on either side laterally for closure.  The proposed incisions were made with a 15 blade scalpel and Bovie cautery was used to incise through the dermis and obtain hemostasis.  Anteriorly based labial flaps were raised measuring approximately 1 cm thick.  This created a perineal skin defect over the urethroplasty, which was then closed in layers.     The right side labial flap was turned counterclockwise and the left side labial flap was turned clockwise so that the tips would reach the anterior portion of the vulvectomy wound such that a bilobed scrotum would be created.  Perfusion of the distal aspects of the labial flap was confirmed by visualizing punctate bleeding.  The labial flaps were then sewn together to create a salvador-scrotum.  Hemostatic agents were sprayed on the tissues prior to final closure.  A 10 Azeri drain was placed through a right groin incision.     Following this, a glans was designed over the distal aspect of the phallus such that it would be larger along the dorsum of the phallus and thinner along the ventral aspect with a 1 cm skin bridge left along the ventral aspect where the phallus scar was present.  While Dr. Carter incised the distal flap and rearrange this tissue to obtain a circumcised appearance and a glans to the distal flap, I harvested a 15 x 5 cm split-thickness skin graft from the left thigh measuring 0.014 inches thick.     The skin graft was then applied over the wounds left behind from the adjacent tissue arrangement for glansplasty.  The proximal edge was sewn down using 4-0 Vicryl suture.  The distal edge along the glans was then sewn down.  Then a mattress 4-0 Vicryl suture was used to tack down the junction between the horizontal penile shaft wound  skin graft and the vertically oriented glans skin graft.  Xeroform, cotton ball, and mineral oil bolster was applied over this and sewn down using 3-0 silk sutures in an interrupted fashion.  The skin graft donor site was covered with Aquacel Ag and Tegaderm.     The case was then turned back to the urology team.  All counts were correct for the plastic surgery portion of the procedure.     DISPOSITION: Inpatient floor.

## 2020-10-06 ENCOUNTER — PRE VISIT (OUTPATIENT)
Dept: UROLOGY | Facility: CLINIC | Age: 35
End: 2020-10-06

## 2020-10-06 NOTE — TELEPHONE ENCOUNTER
Visit Type : Clinic-Post Op    Hx/Sx: s/p Vaginectomy    Records/Orders: Imaging scheduled     Pt Contacted: n/a    At Rooming: Normal, possible SP removal

## 2020-10-11 DIAGNOSIS — F64.9 GENDER DYSPHORIA: ICD-10-CM

## 2020-10-13 ENCOUNTER — OFFICE VISIT (OUTPATIENT)
Dept: PLASTIC SURGERY | Facility: CLINIC | Age: 35
End: 2020-10-13
Payer: COMMERCIAL

## 2020-10-13 VITALS
HEIGHT: 65 IN | OXYGEN SATURATION: 99 % | HEART RATE: 53 BPM | SYSTOLIC BLOOD PRESSURE: 117 MMHG | BODY MASS INDEX: 26.19 KG/M2 | DIASTOLIC BLOOD PRESSURE: 69 MMHG | TEMPERATURE: 98.3 F

## 2020-10-13 DIAGNOSIS — F64.0 GENDER DYSPHORIA IN ADULT: Primary | ICD-10-CM

## 2020-10-13 PROCEDURE — 99024 POSTOP FOLLOW-UP VISIT: CPT | Performed by: PLASTIC SURGERY

## 2020-10-13 RX ORDER — CIPROFLOXACIN 500 MG/1
TABLET, FILM COATED ORAL
COMMUNITY
Start: 2020-09-26 | End: 2020-11-10

## 2020-10-13 RX ORDER — SULFAMETHOXAZOLE/TRIMETHOPRIM 800-160 MG
1 TABLET ORAL 2 TIMES DAILY
Qty: 10 TABLET | Refills: 0 | Status: SHIPPED | OUTPATIENT
Start: 2020-10-13 | End: 2020-10-18

## 2020-10-13 ASSESSMENT — PAIN SCALES - GENERAL: PAINLEVEL: MILD PAIN (3)

## 2020-10-13 NOTE — PROGRESS NOTES
"Patient returns for a postoperative follow-up after undergoing second stage phalloplasty for gender dysphoria.    INTERVAL HISTORY: Reports malodor from the scrotum.  Otherwise, doing well.    PHYSICAL EXAMINATION:  /69 (BP Location: Left arm, Patient Position: Chair, Cuff Size: Adult Regular)   Pulse 53   Temp 98.3  F (36.8  C) (Oral)   Ht 1.651 m (5' 5\")   SpO2 99%   BMI 26.19 kg/m    Genital examination was performed at [facility].  Flap is healing appropriately.  However, the anterior scrotum is an area of necrosis measuring about 4 x 2 cm mostly from the left labial flap.  This is malodorous and requires debridement.  Otherwise, surgical sites are healing appropriately.    ASSESSMENT: 2.5 weeks status post second stage phalloplasty.  This is complicated by left labial flap scrotoplasty distal tip necrosis.    PLAN:   Anterior scrotum debrided at the bedside.  Start 3 times daily wet-to-dry dressing changes, supplies given.  5 days of Bactrim.  Return to see me in 1 week.  Removed right thigh skin graft donor site dressing.  1 more week of dry dressing changes to the glansplasty site.  Next surgery will be prosthesis placement, patient will be a candidate for this once there is sensation through the tip of the penis.    Total time spent with patient was 15 min of which greater than 50% was in counseling.  "

## 2020-10-13 NOTE — LETTER
"10/13/2020       RE: Myrna Dee  [address]  [facility] [address]     Dear Colleague,    Thank you for referring your patient, Myrna Dee, to the [facility]LAST[facility]GERY [facility] [place] [facility] Please see a copy of my visit note below.    Patient returns for a postoperative follow-up after undergoing second stage phalloplasty for gender dysphoria.    INTERVAL HISTORY: Reports malodor from the scrotum.  Otherwise, doing well.    PHYSICAL EXAMINATION:  /69 (BP Location: Left arm, Patient Position: Chair, Cuff Size: Adult Regular)   Pulse 53   Temp 98.3  F (36.8  C) (Oral)   Ht 1.651 m (5' 5\")   SpO2 99%   BMI 26.19 kg/m    Genital examination was performed at [facility].  Flap is healing appropriately.  However, the anterior scrotum is an area of necrosis measuring about 4 x 2 cm mostly from the left labial flap.  This is malodorous and requires debridement.  Otherwise, surgical sites are healing appropriately.    ASSESSMENT: 2.5 weeks status post second stage phalloplasty.  This is complicated by left labial flap scrotoplasty distal tip necrosis.    PLAN:   Anterior scrotum debrided at the bedside.  Start 3 times daily wet-to-dry dressing changes, supplies given.  5 days of Bactrim.  Return to see me in 1 week.  Removed right thigh skin graft donor site dressing.  1 more week of dry dressing changes to the glansplasty site.  Next surgery will be prosthesis placement, patient will be a candidate for this once there is sensation through the tip of the penis.    Total time spent with patient was 15 min of which greater than 50% was in counseling.      Again, thank you for allowing me to participate in the care of your patient.      Sincerely,    Gavin Eastman MD      "

## 2020-10-13 NOTE — NURSING NOTE
"Chief Complaint   Patient presents with     RECHECK     2.5wk post op DOS 9/24/20       Vitals:    10/13/20 1407   BP: 117/69   BP Location: Left arm   Patient Position: Chair   Cuff Size: Adult Regular   Pulse: 53   Temp: 98.3  F (36.8  C)   TempSrc: Oral   SpO2: 99%   Height: 1.651 m (5' 5\")       Body mass index is 26.19 kg/m .    Gopal Willoughby, EMT    "

## 2020-10-13 NOTE — LETTER
"10/13/2020       RE: Myran Dee  [address]  [facility] [address]     Dear Colleague,    Thank you for referring your patient, Myrna Dee, to the [facility] PLASTIC AND RECONSTRUCTIVE SURGERY CLINIC [place] at [facility]. Please see a copy of my visit note below.    Patient returns for a postoperative follow-up after undergoing second stage phalloplasty for gender dysphoria.    INTERVAL HISTORY: Reports malodor from the scrotum.  Otherwise, doing well.    PHYSICAL EXAMINATION:  /69 (BP Location: Left arm, Patient Position: Chair, Cuff Size: Adult Regular)   Pulse 53   Temp 98.3  F (36.8  C) (Oral)   Ht 1.651 m (5' 5\")   SpO2 99%   BMI 26.19 kg/m    Genital examination was performed at [facility].  Flap is healing appropriately.  However, the anterior scrotum is an area of necrosis measuring about 4 x 2 cm mostly from the left labial flap.  This is malodorous and requires debridement.  Otherwise, surgical sites are healing appropriately.    ASSESSMENT: 2.5 weeks status post second stage phalloplasty.  This is complicated by left labial flap scrotoplasty distal tip necrosis.    PLAN:   Anterior scrotum debrided at the bedside.  Start 3 times daily wet-to-dry dressing changes, supplies given.  5 days of Bactrim.  Return to see me in 1 week.  Removed right thigh skin graft donor site dressing.  1 more week of dry dressing changes to the glansplasty site.  Next surgery will be prosthesis placement, patient will be a candidate for this once there is sensation through the tip of the penis.    Total time spent with patient was 15 min of which greater than 50% was in counseling.    Again, thank you for allowing me to participate in the care of your patient.  Sincerely,    Gavin Eastman MD  "

## 2020-10-14 NOTE — TELEPHONE ENCOUNTER
TOLTERODINE TART ER 4 MG CAP      Last Written Prescription Date:  9-29-20  Last Fill Quantity: 21,   # refills: 0  Last Office Visit : 10-13-20  Future Office visit:  10-20-20    Routing refill request to provider for review/approval because:  Med not on protocol  Last note unsigned  ? tx completed, please discontinue any                   meds not active.

## 2020-10-19 RX ORDER — TOLTERODINE 4 MG/1
4 CAPSULE, EXTENDED RELEASE ORAL DAILY
Qty: 21 CAPSULE | Refills: 0 | Status: SHIPPED | OUTPATIENT
Start: 2020-10-19 | End: 2020-11-09

## 2020-10-20 ENCOUNTER — OFFICE VISIT (OUTPATIENT)
Dept: PLASTIC SURGERY | Facility: CLINIC | Age: 35
End: 2020-10-20
Payer: COMMERCIAL

## 2020-10-20 ENCOUNTER — ANCILLARY PROCEDURE (OUTPATIENT)
Dept: GENERAL RADIOLOGY | Facility: CLINIC | Age: 35
End: 2020-10-20
Attending: UROLOGY
Payer: COMMERCIAL

## 2020-10-20 VITALS
HEIGHT: 65 IN | BODY MASS INDEX: 26.19 KG/M2 | TEMPERATURE: 98.1 F | DIASTOLIC BLOOD PRESSURE: 76 MMHG | OXYGEN SATURATION: 95 % | SYSTOLIC BLOOD PRESSURE: 118 MMHG | HEART RATE: 91 BPM

## 2020-10-20 DIAGNOSIS — F64.9 GENDER DYSPHORIA: ICD-10-CM

## 2020-10-20 DIAGNOSIS — F64.0 GENDER DYSPHORIA IN ADULT: Primary | ICD-10-CM

## 2020-10-20 PROCEDURE — 74455 X-RAY URETHRA/BLADDER: CPT | Mod: 52 | Performed by: RADIOLOGY

## 2020-10-20 PROCEDURE — 99024 POSTOP FOLLOW-UP VISIT: CPT | Performed by: PLASTIC SURGERY

## 2020-10-20 ASSESSMENT — PAIN SCALES - GENERAL: PAINLEVEL: MODERATE PAIN (4)

## 2020-10-20 NOTE — LETTER
"10/20/2020       RE: Myrna Dee  [address]  [facility] [address]     Dear Colleague,    Thank you for referring your patient, Myrna Dee, to the [facility] PLASTIC AND RECONSTRUCTIVE SURGERY CLINIC [place] at [facility]. Please see a copy of my visit note below.    Patient returns for a postoperative follow-up after undergoing second stage phalloplasty for gender dysphoria.    INTERVAL HISTORY: He is doing well packing the scrotal wound.  He has been packing it 3 times daily.  Denies any fevers.  Needs more supplies.  He is scheduled for VCUG later today.    PHYSICAL EXAMINATION:  /76 (BP Location: Left arm, Patient Position: Chair, Cuff Size: Adult Regular)   Pulse 91   Temp 98.1  F (36.7  C) (Oral)   Ht 1.651 m (5' 5\")   SpO2 95%   BMI 26.19 kg/m    On examination of the scrotum, wound is healing appropriately with no further sign of infection or necrotic tissue.  Everything appears clean.  No malodor.  I palpated within the wound and there is no connection with the urethra.    ASSESSMENT: 4 weeks status post second stage phalloplasty.  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    PLAN: May perform twice daily wet-to-dry dressing changes to the scrotal wound.  Patient is keep his VCUG today.  He may stop doing dressing changes to other parts.  I will see him back in 3 to 4 weeks to track his wound progress.  If he is doing well from now in 3 to 4 weeks, I will start him on once daily dressing changes rather than twice daily.    Total time spent with patient was 15 min of which greater than 50% was in counseling.      Again, thank you for allowing me to participate in the care of your patient.      Sincerely,    Gavin Eastman MD      "

## 2020-10-20 NOTE — NURSING NOTE
"Chief Complaint   Patient presents with     RECHECK     1mo post op, DOS 9/24       Vitals:    10/20/20 1219   BP: 118/76   BP Location: Left arm   Patient Position: Chair   Cuff Size: Adult Regular   Pulse: 91   Temp: 98.1  F (36.7  C)   TempSrc: Oral   SpO2: 95%   Height: 1.651 m (5' 5\")       Body mass index is 26.19 kg/m .    Gopal Willoughby, EMT    "

## 2020-10-20 NOTE — PROGRESS NOTES
"Patient returns for a postoperative follow-up after undergoing second stage phalloplasty for gender dysphoria.    INTERVAL HISTORY: He is doing well packing the scrotal wound.  He has been packing it 3 times daily.  Denies any fevers.  Needs more supplies.  He is scheduled for VCUG later today.    PHYSICAL EXAMINATION:  /76 (BP Location: Left arm, Patient Position: Chair, Cuff Size: Adult Regular)   Pulse 91   Temp 98.1  F (36.7  C) (Oral)   Ht 1.651 m (5' 5\")   SpO2 95%   BMI 26.19 kg/m    On examination of the scrotum, wound is healing appropriately with no further sign of infection or necrotic tissue.  Everything appears clean.  No malodor.  I palpated within the wound and there is no connection with the urethra.    ASSESSMENT: 4 weeks status post second stage phalloplasty.  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    PLAN: May perform twice daily wet-to-dry dressing changes to the scrotal wound.  Patient is keep his VCUG today.  He may stop doing dressing changes to other parts.  I will see him back in 3 to 4 weeks to track his wound progress.  If he is doing well from now in 3 to 4 weeks, I will start him on once daily dressing changes rather than twice daily.    Total time spent with patient was 15 min of which greater than 50% was in counseling.  "

## 2020-10-21 ENCOUNTER — OFFICE VISIT (OUTPATIENT)
Dept: UROLOGY | Facility: CLINIC | Age: 35
End: 2020-10-21
Payer: COMMERCIAL

## 2020-10-21 DIAGNOSIS — F64.9 GENDER DYSPHORIA: Primary | ICD-10-CM

## 2020-10-21 PROCEDURE — 99024 POSTOP FOLLOW-UP VISIT: CPT | Performed by: UROLOGY

## 2020-10-21 RX ORDER — CIPROFLOXACIN 500 MG/1
500 TABLET, FILM COATED ORAL ONCE
Status: COMPLETED | OUTPATIENT
Start: 2020-10-21 | End: 2020-10-21

## 2020-10-21 RX ADMIN — CIPROFLOXACIN 500 MG: 500 TABLET, FILM COATED ORAL at 08:48

## 2020-10-21 ASSESSMENT — PAIN SCALES - GENERAL: PAINLEVEL: NO PAIN (0)

## 2020-10-21 NOTE — PATIENT INSTRUCTIONS
Return in three months for a cystoscopy with Dr. Carter.    It was a pleasure meeting with you today.  Thank you for allowing me and my team the privilege of caring for you today.  YOU are the reason we are here, and I truly hope we provided you with the excellent service you deserve.  Please let us know if there is anything else we can do for you so that we can be sure you are leaving completely satisfied with your care experience.        Alexa Angeles, CMA

## 2020-10-21 NOTE — PROGRESS NOTES
Myrna Dee is a 35 year old adult who is 4 weeks s/p second stage phalloplasty.  He had a scrotal dehiscence which he is packing. It looks good and healing nicely.  He underwent VCUG yesterday which shows no abnormality.  Vital signs reviewed    Exam:  Phallo well healed.  Glansplasty healing well.  Scrotal dehiscence though clean.  Scrotum is somewhat small    A/P   Excellent outcome after second stage phalloplasty. Has a wound dehiscence on scrotum but urinating well after excellent VCUG yesterday.  Voiding trial performed today which he easily passed.  SPT removed.  Cipro x 1 given  Stop Tolterodine.  Followup 3 months cystoscopy.  Already has followup with Dr. Raiza Carter MD

## 2020-10-21 NOTE — LETTER
10/21/2020       RE: Myrna Dee  [address]  [facility] [address]     Dear Colleague,    Thank you for referring your patient, Myrna Dee, to the [facility] UROLOGY CLINIC [place] at [facility]. Please see a copy of my visit note below.    Myrna Dee is a 35 year old adult who is 4 weeks s/p second stage phalloplasty.  He had a scrotal dehiscence which he is packing. It looks good and healing nicely.  He underwent VCUG yesterday which shows no abnormality.  Vital signs reviewed    Exam:  Phallo well healed.  Glansplasty healing well.  Scrotal dehiscence though clean.  Scrotum is somewhat small    A/P   Excellent outcome after second stage phalloplasty. Has a wound dehiscence on scrotum but urinating well after excellent VCUG yesterday.  Voiding trial performed today which he easily passed.  SPT removed.  Cipro x 1 given  Stop Tolterodine.  Followup 3 months cystoscopy.  Already has followup with Dr. Raiza Carter MD

## 2020-10-21 NOTE — NURSING NOTE
"Chief Complaint   Patient presents with     RECHECK     post op - Voiding trial     Myrna Dee comes into clinic today at the request of Dr. Carter.    The following medication was given:     MEDICATION:  Ciprofloxacin  ROUTE: PO  SITE: Medication was given orally   DOSE: 500 mg  LOT #: [id number]  : VivoText  EXPIRATION DATE: 12/21  NDC#: 44926 070 11   Was there drug waste? No    Prior to administration, verified patient identity using patient's name and date of birth.    Drug Amount Wasted:  None.  Vial/Syringe: single      Approx 300 mL of sterile water instilled into the bladder via catheter.      Removal:  16 Fr straight tipped silicone daniels catheter removed from suprapubic meatus without difficulty after removing 10 mL of fluid from the balloon, balloon intact.    Patient voided approx 200 mL of clear urine.     Post-void residual was: 100 mL per bladder scan.    Patient tolerated procedure well.      Education: Teaching done with patient verbally as to where to go or call  if unable to urinate post-catheter removal. Increase fluids.   Plan: Follow-up with a cystoscopy in 3 months      This service provided today was under the supervising provider of the day Dr. Carter, who was available if needed.    Alexa Angeles CMA      not currently breastfeeding. There is no height or weight on file to calculate BMI.    Patient Active Problem List   Diagnosis     Problem drinking     High risk HPV infection     Gender dysphoria in adolescent and adult     Papanicolaou smear of cervix with atypical squamous cells cannot exclude high grade squamous intraepithelial lesion (ASC-H)     Gender dysphoria in adult     Adjustment reaction     Gender dysphoria       No Known Allergies    Current Outpatient Medications   Medication Sig Dispense Refill     B-D SYRINGE LUER-WU 1 ML MISC USE TO INJECT WEEKLY       BD DISP NEEDLES 22G X 1-1/2\" MISC INJECT 0.25 MLS INTO THE MUSCLE ONCE A WEEK  " "     Pediatric Multivit-Minerals-C (FLINTSTONES GUMMIES PO) Take 1 tablet by mouth 2 times daily.       testosterone cypionate (DEPOTESTOSTERONE) 200 MG/ML injection INJECT 0.25 ML INTO THE MUSCLES ONCE A WEEK. (Patient taking differently: Inject into the muscle once a week INJECT 0.25 ML INTO THE MUSCLES ONCE A WEEK.) 13 mL 0     tolterodine ER (DETROL LA) 4 MG 24 hr capsule TAKE 1 CAPSULE (4 MG) BY MOUTH DAILY FOR 21 DAYS 21 capsule 0     ciprofloxacin (CIPRO) 500 MG tablet        oxyCODONE (ROXICODONE) 5 MG tablet Take 1 tablet (5 mg) by mouth every 6 hours as needed for pain 12 tablet 0     Syringe/Needle, Disp, 22G X 1-1/2\" 1 ML MISC Inject 0.25 mLs into the muscle once a week 30 each 4       Social History     Tobacco Use     Smoking status: Never Smoker     Smokeless tobacco: Never Used   Substance Use Topics     Alcohol use: Yes     Drug use: No       Alexa Angeles CMA  10/21/2020  8:20 AM     "

## 2020-11-10 ENCOUNTER — OFFICE VISIT (OUTPATIENT)
Dept: PLASTIC SURGERY | Facility: CLINIC | Age: 35
End: 2020-11-10
Payer: COMMERCIAL

## 2020-11-10 VITALS
TEMPERATURE: 97.4 F | HEIGHT: 65 IN | HEART RATE: 55 BPM | BODY MASS INDEX: 26.19 KG/M2 | SYSTOLIC BLOOD PRESSURE: 123 MMHG | OXYGEN SATURATION: 100 % | DIASTOLIC BLOOD PRESSURE: 76 MMHG

## 2020-11-10 DIAGNOSIS — F64.0 GENDER DYSPHORIA IN ADULT: Primary | ICD-10-CM

## 2020-11-10 PROCEDURE — 99024 POSTOP FOLLOW-UP VISIT: CPT | Performed by: PLASTIC SURGERY

## 2020-11-10 ASSESSMENT — PAIN SCALES - GENERAL: PAINLEVEL: NO PAIN (0)

## 2020-11-10 NOTE — LETTER
"11/10/2020       RE: Myrna Dee  [address]  [facility] [address]     Dear Colleague,    Thank you for referring your patient, Myrna Dee, to the [facility] PLASTIC AND RECONSTRUCTIVE SURGERY CLINIC [place] at [facility]. Please see a copy of my visit note below.    Patient returns for a postoperative follow-up after undergoing second stage phalloplasty for gender dysphoria.    INTERVAL HISTORY: States that the scrotal wound has now healed.  He is urinating while standing.  He has not done it in public yet.  Has not had an orgasm since the second stage phalloplasty.    PHYSICAL EXAMINATION:  /76 (BP Location: Left arm, Patient Position: Chair, Cuff Size: Adult Regular)   Pulse 55   Temp 97.4  F (36.3  C) (Oral)   Ht 1.651 m (5' 5\")   SpO2 100%   BMI 26.19 kg/m    On examination of genitalia, chaperone was present.  Scrotal wound is fully healed.  Skin graft donor site also well-healed.  Glansplasty well-healed.  Light touch sensation intact all the way up to the glansplasty.  There is some light touch sensation along the corona at the midline.    ASSESSMENT: 6 weeks status post second stage phalloplasty.  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    PLAN: Patient may discontinue dressing changes.  No activity restrictions.  Activity as tolerated.  Lotion to skin graft donor sites and skin graft sites.  Patient may begin to explore erogenous sensation.  He is to keep his scheduled appointment with Dr. Carter in 6 months or so to discuss prosthesis placement.  He may return to see me in 1 year.  Photos were taken today.    Total time spent with patient was 15 min of which greater than 50% was in counseling.    Gavin Eastman MD      "

## 2020-11-10 NOTE — PROGRESS NOTES
"Patient returns for a postoperative follow-up after undergoing second stage phalloplasty for gender dysphoria.    INTERVAL HISTORY: States that the scrotal wound has now healed.  He is urinating while standing.  He has not done it in public yet.  Has not had an orgasm since the second stage phalloplasty.    PHYSICAL EXAMINATION:  /76 (BP Location: Left arm, Patient Position: Chair, Cuff Size: Adult Regular)   Pulse 55   Temp 97.4  F (36.3  C) (Oral)   Ht 1.651 m (5' 5\")   SpO2 100%   BMI 26.19 kg/m    On examination of genitalia, chaperone was present.  Scrotal wound is fully healed.  Skin graft donor site also well-healed.  Glansplasty well-healed.  Light touch sensation intact all the way up to the glansplasty.  There is some light touch sensation along the corona at the midline.    ASSESSMENT: 6 weeks status post second stage phalloplasty.  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    PLAN: Patient may discontinue dressing changes.  No activity restrictions.  Activity as tolerated.  Lotion to skin graft donor sites and skin graft sites.  Patient may begin to explore erogenous sensation.  He is to keep his scheduled appointment with Dr. Carter in 6 months or so to discuss prosthesis placement.  He may return to see me in 1 year.  Photos were taken today.    Total time spent with patient was 15 min of which greater than 50% was in counseling.  "

## 2020-11-10 NOTE — NURSING NOTE
"Chief Complaint   Patient presents with     RECHECK     3wk rechec, 2nd stage phallo DOS 9/24       Vitals:    11/10/20 1505   BP: 123/76   BP Location: Left arm   Patient Position: Chair   Cuff Size: Adult Regular   Pulse: 55   Temp: 97.4  F (36.3  C)   TempSrc: Oral   SpO2: 100%   Height: 1.651 m (5' 5\")       Body mass index is 26.19 kg/m .    Gopal Willoughby, EMT    "

## 2020-12-13 ENCOUNTER — HEALTH MAINTENANCE LETTER (OUTPATIENT)
Age: 35
End: 2020-12-13

## 2020-12-14 NOTE — TELEPHONE ENCOUNTER
"Request for medication refill:    Providers if patient needs an appointment and you are willing to give a one month supply please refill for one month and  send a letter/MyChart using \".SMILLIMITEDREFILL\" .smillimited and route chart to \"P SMI \" (Giving one month refill in non controlled medications is strongly recommended before denial)    If refill has been denied, meaning absolutely no refills without visit, please complete the smart phrase \".smirxrefuse\" and route it to the \"P SMI MED REFILLS\"  pool to inform the patient and the pharmacy.    Rosy Levy CMA        " ----- Message from Shelia Mireles MD sent at 12/14/2020 12:06 PM CST -----  Just making sure she is coming in on 12/22 to be seen

## 2021-01-13 ENCOUNTER — PRE VISIT (OUTPATIENT)
Dept: UROLOGY | Facility: CLINIC | Age: 36
End: 2021-01-13

## 2021-01-13 NOTE — TELEPHONE ENCOUNTER
Reason for visit: Cystoscopy      Relevant information: 3 month post phaloplasty    Records/imaging/labs/orders: all records available    Pt called: no need for a call    At Rooming: no uro-jet

## 2021-01-20 ENCOUNTER — OFFICE VISIT (OUTPATIENT)
Dept: UROLOGY | Facility: CLINIC | Age: 36
End: 2021-01-20
Payer: COMMERCIAL

## 2021-01-20 VITALS — DIASTOLIC BLOOD PRESSURE: 75 MMHG | SYSTOLIC BLOOD PRESSURE: 113 MMHG | HEART RATE: 55 BPM

## 2021-01-20 DIAGNOSIS — N35.919 STRICTURE OF MALE URETHRA, UNSPECIFIED STRICTURE TYPE: Primary | ICD-10-CM

## 2021-01-20 DIAGNOSIS — N52.9 ORGANIC ERECTILE DYSFUNCTION: ICD-10-CM

## 2021-01-20 PROCEDURE — 52000 CYSTOURETHROSCOPY: CPT | Performed by: UROLOGY

## 2021-01-20 ASSESSMENT — PAIN SCALES - GENERAL: PAINLEVEL: NO PAIN (1)

## 2021-01-20 NOTE — LETTER
1/20/2021       RE: Myrna Dee  [address]  [facility] [address]     Dear Colleague,    Thank you for referring your patient, Myrna Dee, to the [facility]OLOG[facility] [place] at [facility]. Please see a copy of my visit note below.    Urethroplasty Follow-up Visit with Surveillance Urethroscopy    PRE-PROCEDURE DIAGNOSIS:   1. Gender Dysphoria  2. S/p urethroplasty    POST-PROCEDURE DIAGNOSIS:   1. Gender Dysphoria  2. S/p urethroplasty    PROCEDURE: Urethroscopy    HISTORY: Myrna Dee is a 35 year old transgender male.  He underwent an ALT + SCIP staged phalloplasty.  First stage phallo was Feb 2020.  Second stage was Sept 2020.  He is voiding well. Sometimes a little pressure.  He is regaining sensation. It approaches the glansplasty site.  His scrotum did have a wound issue, but it is now completely healed, though has a minor groove and is somewhat small.    DESCRIPTION OF PROCEDURE:  After informed consent was obtained, the patient was brought to the procedure room where he was placed in the supine position with all pressure points well padded.  He was prepped and draped in a sterile fashion. A flexible cystoscope was introduced through a well-lubricated urethra.  The pendulous urethra was normal. The transition from the pendulous urethra to the pars fixa was about 18 Fr. The pars fixa was open. The native female urethra was visualized. The scope was removed. There was no significant hair in the urethra.    ASSESSMENT AND PLAN:  Excellent outcome after phalloplasty. No urethral pathology seen.  We discussed IPP insertion. I discussed risks and benefits and demonstrated Coloplast device.  He wants to work towards IPP. Given scrotal size, I recommend against testicular implant.  IPP would allow him to have penetrative intercourse with his wife.  Will send off prior auth for IPP insertion.    Octavio Carter MD

## 2021-01-20 NOTE — NURSING NOTE
"Chief Complaint   Patient presents with     Follow Up     3 month follow up      Cystoscopy       Blood pressure 113/75, pulse 55, not currently breastfeeding. There is no height or weight on file to calculate BMI.    Patient Active Problem List   Diagnosis     Problem drinking     High risk HPV infection     Gender dysphoria in adolescent and adult     Papanicolaou smear of cervix with atypical squamous cells cannot exclude high grade squamous intraepithelial lesion (ASC-H)     Gender dysphoria in adult     Adjustment reaction     Gender dysphoria       No Known Allergies    Current Outpatient Medications   Medication Sig Dispense Refill     B-D SYRINGE LUER-WU 1 ML MISC USE TO INJECT WEEKLY       BD DISP NEEDLES 22G X 1-1/2\" MISC INJECT 0.25 MLS INTO THE MUSCLE ONCE A WEEK       Pediatric Multivit-Minerals-C (FLINTSTONES GUMMIES PO) Take 1 tablet by mouth 2 times daily.       Syringe/Needle, Disp, 22G X 1-1/2\" 1 ML MISC Inject 0.25 mLs into the muscle once a week 30 each 4     testosterone cypionate (DEPOTESTOSTERONE) 200 MG/ML injection INJECT 0.25 ML INTO THE MUSCLES ONCE A WEEK. (Patient taking differently: Inject into the muscle once a week INJECT 0.25 ML INTO THE MUSCLES ONCE A WEEK.) 13 mL 0     oxyCODONE (ROXICODONE) 5 MG tablet Take 1 tablet (5 mg) by mouth every 6 hours as needed for pain (Patient not taking: Reported on 2021) 12 tablet 0       Social History     Tobacco Use     Smoking status: Never Smoker     Smokeless tobacco: Never Used   Substance Use Topics     Alcohol use: Yes     Drug use: No       Alexa Angeles CMA  2021  9:22 AM     Invasive Procedure Safety Checklist:    Procedure: Cystoscopy     Action: Complete sections and checkboxes as appropriate.    Pre-procedure:  1. Patient ID Verified with 2 identifiers (Lindsay and  or MRN) : YES    2. Procedure and site verified with patient/designee (when able) : YES    3. Accurate consent documentation in medical record : YES    4. " H&P (or appropriate assessment) documented in medical record : YES  H&P must be up to 30 days prior to procedure an updated within 24 hours of                 Procedure as applicable.     5. Relevant diagnostic and radiology test results appropriately labeled and displayed as applicable : YES    6. Blood products, implants, devices, and/or special equipment available for the procedure as applicable : YES    7. Procedure site(s) marked with provider initials [Exclusions: None] : NO    8. Marking not required. Reason : Yes  Procedure does not require site marking    Time Out:     Time-Out performed immediately prior to starting procedure, including verbal and active participation of all team members addressing: YES    1. Correct patient identity.  2. Confirmed that the correct side and site are marked.  3. An accurate procedure to be done.  4. Agreement on the procedure to be done.  5. Correct patient position.  6. Relevant images and results are properly labeled and appropriately displayed.  7. The need to administer antibiotics or fluids for irrigation purposes during the procedure as applicable.  8. Safety precautions based on patient history or medication use.    During Procedure: Verification of correct person, site, and procedure occurs any time the responsibility for care of the patient is transferred to another member of the care team.    No medications administered during this procedure.    Alexa Angeles CMA  January 20, 2021

## 2021-01-20 NOTE — PATIENT INSTRUCTIONS
"Schedule a repeat cystoscopy late September, early October with Dr. Carter.    It was a pleasure meeting with you today.  Thank you for allowing me and my team the privilege of caring for you today.  YOU are the reason we are here, and I truly hope we provided you with the excellent service you deserve.  Please let us know if there is anything else we can do for you so that we can be sure you are leaving completely satisfied with your care experience.        Alexa Angeles CMA    AFTER YOUR CYSTOSCOPY        You have just completed a cystoscopy, or \"cysto\", which allowed your physician to learn more about your bladder (or to remove a stent placed after surgery). We suggest that you continue to avoid caffeine, fruit juice, and alcohol for the next 24 hours, however, you are encouraged to return to your normal activities.         A few things that are considered normal after your cystoscopy:     * Small amount of bleeding (or spotting) that clears within the next 24 hours     * Slight burning sensation with urination     * Sensation to of needing to avoid more frequently     * The feeling of \"air\" in your urine     * Mild discomfort that is relieved with Tylenol        Please contact our office promptly if you:     * Develop a fever above 101 degrees     * Are unable to urinate     * Develop bright red blood that does not stop     * Severe pain or swelling         Please contact our office with any concerns or questions @[facility].  "

## 2021-01-20 NOTE — PROGRESS NOTES
Urethroplasty Follow-up Visit with Surveillance Urethroscopy    PRE-PROCEDURE DIAGNOSIS:   1. Gender Dysphoria  2. S/p urethroplasty    POST-PROCEDURE DIAGNOSIS:   1. Gender Dysphoria  2. S/p urethroplasty    PROCEDURE: Urethroscopy    HISTORY: Myrna Dee is a 35 year old transgender male.  He underwent an ALT + SCIP staged phalloplasty.  First stage phallo was Feb 2020.  Second stage was Sept 2020.  He is voiding well. Sometimes a little pressure.  He is regaining sensation. It approaches the glansplasty site.  His scrotum did have a wound issue, but it is now completely healed, though has a minor groove and is somewhat small.    DESCRIPTION OF PROCEDURE:  After informed consent was obtained, the patient was brought to the procedure room where he was placed in the supine position with all pressure points well padded.  He was prepped and draped in a sterile fashion. A flexible cystoscope was introduced through a well-lubricated urethra.  The pendulous urethra was normal. The transition from the pendulous urethra to the pars fixa was about 18 Fr. The pars fixa was open. The native female urethra was visualized. The scope was removed. There was no significant hair in the urethra.    ASSESSMENT AND PLAN:  Excellent outcome after phalloplasty. No urethral pathology seen.  We discussed IPP insertion. I discussed risks and benefits and demonstrated Coloplast device.  He wants to work towards IPP. Given scrotal size, I recommend against testicular implant.  IPP would allow him to have penetrative intercourse with his wife.  Will send off prior auth for IPP insertion.    Octavio Carter MD

## 2021-03-12 DIAGNOSIS — F64.9 GENDER DYSPHORIA: ICD-10-CM

## 2021-03-12 DIAGNOSIS — N52.39 OTHER POST-PROCEDURAL ERECTILE DYSFUNCTION: Primary | ICD-10-CM

## 2021-03-16 ENCOUNTER — TELEPHONE (OUTPATIENT)
Dept: UROLOGY | Facility: CLINIC | Age: 36
End: 2021-03-16

## 2021-03-16 PROBLEM — N52.39 OTHER POST-PROCEDURAL ERECTILE DYSFUNCTION: Status: ACTIVE | Noted: 2021-03-16

## 2021-03-17 ENCOUNTER — TELEPHONE (OUTPATIENT)
Dept: UROLOGY | Facility: CLINIC | Age: 36
End: 2021-03-17

## 2021-03-17 NOTE — CONFIDENTIAL NOTE
Patient is scheduled for surgery with Dr. Carter      Spoke with Inocencio    Date of Surgery: 5/3/21    Location: [facility] OR    Informed patient they will need an adult  yes    H&P: Scheduled with PAC 4/21/21    Additional imaging/appointments: Covid test 4/29/21    Surgery packet: to be mailed 3/17/21     Additional comments: n/a

## 2021-03-18 NOTE — TELEPHONE ENCOUNTER
FUTURE VISIT INFORMATION      SURGERY INFORMATION:    Date: 5.3.21    [facility]    Surgeon:  Emma    Anesthesia Type:  general    Procedure: INSERTION, PENILE PROSTHESIS, INFLATABLE    Consult: 1.20.21    RECORDS REQUESTED FROM:       Primary Care Provider: Dr. Stroud

## 2021-04-09 ENCOUNTER — TELEPHONE (OUTPATIENT)
Dept: FAMILY MEDICINE | Facility: CLINIC | Age: 36
End: 2021-04-09

## 2021-04-09 DIAGNOSIS — F64.0 GENDER DYSPHORIA IN ADOLESCENT AND ADULT: Primary | ICD-10-CM

## 2021-04-09 RX ORDER — SYRINGE, DISPOSABLE, 1 ML
SYRINGE, EMPTY DISPOSABLE MISCELLANEOUS
Qty: 25 EACH | Refills: 3 | Status: SHIPPED | OUTPATIENT
Start: 2021-04-09 | End: 2022-09-07

## 2021-04-09 NOTE — TELEPHONE ENCOUNTER
Pt requesting additional supplies for hormone injections. RN reordered per prootcol as patient has follow-up scheduled with Dr. Stroud in May 2021.     Brayden Nielsen RN

## 2021-04-09 NOTE — TELEPHONE ENCOUNTER
"Verify that the refill encounter hasn't been started Yes    [facility] phone call message- patient requesting a refill:    Full Medication Name:   BD DISP NEEDLES 22G X 1-1/2\" MISC   Syringe/Needle, Disp, 22G X 1-1/2\" 1 ML MISC       Dose:      Pharmacy confirmed as   [id number] [place] [facility] [address]  [address] [facility] [address]  Phone: [phone] Fax: [phone]  : Yes    Medication tab checked to see if medication has been sent  Yes    Additional Comments: Pt requesting 23G X 1\" (smaller paola) out of medication     OK to leave a message on voice mail? Yes    Advised patient refill may take up to 2 business days? No:     Primary language: English      needed? No    Call taken on April 9, 2021 at 1:09 PM by Nikole Chavez    Route to P [facility] MED REFILL    "

## 2021-04-20 DIAGNOSIS — Z11.59 ENCOUNTER FOR SCREENING FOR OTHER VIRAL DISEASES: ICD-10-CM

## 2021-04-21 ENCOUNTER — VIRTUAL VISIT (OUTPATIENT)
Dept: SURGERY | Facility: CLINIC | Age: 36
End: 2021-04-21
Payer: COMMERCIAL

## 2021-04-21 ENCOUNTER — PRE VISIT (OUTPATIENT)
Dept: SURGERY | Facility: CLINIC | Age: 36
End: 2021-04-21

## 2021-04-21 ENCOUNTER — ANESTHESIA EVENT (OUTPATIENT)
Dept: SURGERY | Facility: CLINIC | Age: 36
End: 2021-04-21
Payer: COMMERCIAL

## 2021-04-21 DIAGNOSIS — Z01.818 PREOP EXAMINATION: Primary | ICD-10-CM

## 2021-04-21 PROCEDURE — 99203 OFFICE O/P NEW LOW 30 MIN: CPT | Mod: 95 | Performed by: PHYSICIAN ASSISTANT

## 2021-04-21 ASSESSMENT — ENCOUNTER SYMPTOMS: SEIZURES: 0

## 2021-04-21 ASSESSMENT — PAIN SCALES - GENERAL: PAINLEVEL: NO PAIN (0)

## 2021-04-21 NOTE — H&P
Pre-Operative H & P     CC:  Preoperative exam to assess for increased cardiopulmonary risk while undergoing surgery and anesthesia.    Date of Encounter: 4/21/2021  Primary Care Physician:  Clarissa Stroud  Associated Diagnosis: post procedure erectile dysfunction, gender dysphoria        Video-Visit Details    Type of service:  Video Visit    Patient verbally consented to video service today: YES      Video Start Time: 1329  Video End Time (time video stopped): 1338    Originating Location (pt. Location): Home    Distant Location (provider location):  home    Mode of Communication:  Video Conference via [facility]  Myrna Dee is a 35 year old adult who presents for pre-operative H & P in preparation for insertion of inflatable penile prosthesis with Dr. Carter on 5/3/21 at [facility].     This is a 35 year old transgender male with PMH significant for gender dysphoria. Preferred pronouns are he/him/his. He is s/p hysterectomy and mastectomy, and trachelectomy 7/2019.    He is now s/p two staged procedure for phallopasty with all associated procedures.  He has recovered well and is now scheduled for the above procedure.      History is obtained from the patient and the medical record.     Past Medical History  Past Medical History:   Diagnosis Date     Aseptic meningitis      Complication of anesthesia     HX slow wake up,HX panic attacks with vicodin postop     Gender dysphoria in adolescent and adult      High risk HPV infection      Papanicolaou smear of cervix with atypical squamous cells cannot exclude high grade squamous intraepithelial lesion (ASC-H)        Past Surgical History  Past Surgical History:   Procedure Laterality Date     CYSTOSCOPY N/A 7/12/2019    Procedure: Cystoscopy;  Surgeon: Myesha Hernández MD;  [facility] OR     CYSTOSCOPY FLEXIBLE, CYSTOSTOMY, INSERT TUBE SUPRAPUBIC, COMBINED N/A 9/24/2020    Procedure: CYSTOSCOPY, WITH  SUPRAPUBIC CATHETER INSERTION;  Surgeon: Octavio Carter MD;  [facility]     DAVINCI LAPAROSCOPIC CERVICECTOMY (TRACHELECTOMY) N/A 7/12/2019    Procedure: Davinci Laparoscopic Trachelectomy;  Surgeon: Myesha Hernández MD;  [facility]     GRAFT FREE VASCULARIZED (LOCATION) Left 2/3/2020    Procedure: with left thigh flap and right groin flap;  Surgeon: Gavin Eastman MD;  [facility]     HYSTERECTOMY      and oophorectomy, but still has cervix and will require pap every 3 years, At [facility] MASTECTOMY PARTIAL      chest surgery w/ contouring      SCROTOPLASTY N/A 9/24/2020    Procedure: SCROTOPLASTY;  Surgeon: Gavin Eastman MD;  [facility]     TRANSGENDER GLANSPLASTY WITH SKIN GRAFT N/A 9/24/2020    Procedure: TRANSGENDER GLANSPLASTY WITH SKIN GRAFT;  Surgeon: Gavin Eastman MD;  [facility]     TRANSGENDER PHALLOPLASTY Bilateral 2/3/2020    Procedure: Phalloplasty;  Surgeon: Gavin Eastman MD;  [facility]     URETHROPLASTY N/A 9/24/2020    Procedure: URETHROPLASTY;  Surgeon: Octavio Carter MD;  [facility]     VAGINECTOMY, ROBOT-ASSISTED N/A 9/24/2020    Procedure: VAGINECTOMY, ROBOT-ASSISTED;  Surgeon: Octavio Carter MD;  [facility]       Hx of Blood transfusions/reactions: denies     Hx of abnormal bleeding or anti-platelet use: denies    Menstrual history: No LMP recorded. Patient has had a hysterectomy.:     Steroid use in the last year: denies    Personal or FH with difficulty with Anesthesia:  Pt had panic attack post procedure with Vicodin.  He was also slow to wake.    Prior to Admission Medications  Current Outpatient Medications   Medication Sig Dispense Refill     Pediatric Multivit-Minerals-C (FLINTSTONES GUMMIES PO) Take 2 tablets by mouth every morning        testosterone cypionate (DEPOTESTOSTERONE) 200 MG/ML injection INJECT 0.25 ML INTO THE MUSCLES ONCE A WEEK. (Patient taking differently: Inject into the muscle once a week INJECT 0.25 ML INTO THE  "MUSCLES ONCE A WEEK.) 13 mL 0     B-D SYRINGE LUER-WU 1 ML MISC Use for weekly hormone injection 25 each 3     BD DISP NEEDLES 22G X 1-1/2\" MISC INJECT 0.25 MLS INTO THE MUSCLE ONCE A WEEK       Needle, Disp, 23G X 1\" MISC Use weekly for hormone injection 25 each 3     Syringe/Needle, Disp, 22G X 1-1/2\" 1 ML MISC Inject 0.25 mLs into the muscle once a week 30 each 4       Allergies  No Known Allergies    Social History  Social History     Socioeconomic History     Marital status:      Spouse name: Not on file     Number of children: Not on file     Years of education: Not on file     Highest education level: Not on file   Occupational History     Not on file   Social Needs     Financial resource strain: Not on file     Food insecurity     Worry: Not on file     Inability: Not on file     Transportation needs     Medical: Not on file     Non-medical: Not on file   Tobacco Use     Smoking status: Never Smoker     Smokeless tobacco: Never Used   Substance and Sexual Activity     Alcohol use: Yes     Drug use: No     Sexual activity: Yes     Partners: Female     Birth control/protection: None   Lifestyle     Physical activity     Days per week: Not on file     Minutes per session: Not on file     Stress: Not on file   Relationships     Social connections     Talks on phone: Not on file     Gets together: Not on file     Attends [employer]ism service: Not on file     Active member of club or organization: Not on file     Attends meetings of clubs or organizations: Not on file     Relationship status: Not on file     Intimate partner violence     Fear of current or ex partner: Not on file     Emotionally abused: Not on file     Physically abused: Not on file     Forced sexual activity: Not on file   Other Topics Concern     Parent/sibling w/ CABG, MI or angioplasty before 65F 55M? No   Social History Narrative    Lives with wife (Meliza), bought a house in [place]. Wife pregnant.     Works as a  at " [employer].     Bikes or runs to commute, very fit. Good dairy intake.     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        Family History  Family History   Problem Relation Age of Onset     Dementia Father         frontotemporal dementia      Alcoholism Brother         possible     Diabetes Maternal Grandfather      Anesthesia Reaction Sister      Cardiovascular No family hx of      Deep Vein Thrombosis No family hx of          No Known Allergies   Social History     Tobacco Use     Smoking status: Never Smoker     Smokeless tobacco: Never Used   Substance Use Topics     Alcohol use: Yes      Wt Readings from Last 1 Encounters:   20 71.4 kg (157 lb 6.5 oz)        Anesthesia Evaluation   Pt has had prior anesthetic.     No history of anesthetic complications       ROS/MED HX  ENT/Pulmonary:       Neurologic: Comment: Hx  meningitis, viral. No residual deficits.      (+) peripheral neuropathy, - mild carpal tunnel, right.  (-) no seizures and no CVA   Cardiovascular:  - neg cardiovascular ROS   (+) -----No previous cardiac testing     METS/Exercise Tolerance: >4 METS    Hematologic:  - neg hematologic  ROS  (-) history of blood clots and history of blood transfusion   Musculoskeletal:  - neg musculoskeletal ROS     GI/Hepatic:  - neg GI/hepatic ROS     Renal/Genitourinary:  - neg Renal ROS     Endo: Comment: Testosterone injections   (-) Type I DM, Type II DM and thyroid disease   Psychiatric/Substance Use:  - neg psychiatric ROS     Infectious Disease:  - neg infectious disease ROS     Malignancy:  - neg malignancy ROS     Other:  - neg other ROS    (+) , no H/O Chronic Pain,        The complete review of systems is negative other than noted in the HPI or here.      Physical Exam    Please refer to the physical examination documented by the anesthesiologist in the anesthesia record on the day of surgery    Constitutional: Awake, alert, cooperative, no  "apparent distress, and appears stated age.  Respiratory: non labored breathing  Neurologic: Awake, alert, oriented to name, place and time.   Neuropsychiatric: Calm, cooperative. Normal affect.     PRIOR LABS/DIAGNOSTIC STUDIES:  All labs and imaging personally reviewed    Component      Latest Ref Rng & Units 9/26/2020   WBC      4.0 - 11.0 10e9/L 8.6   RBC Count      4.4 - 5.9 10e12/L 3.85 (L)   Hemoglobin      13.3 - 17.7 g/dL 11.6 (L)   Hematocrit      40.0 - 53.0 % 35.7 (L)   MCV      78 - 100 fl 93   MCH      26.5 - 33.0 pg 30.1   MCHC      31.5 - 36.5 g/dL 32.5   RDW      10.0 - 15.0 % 13.3   Platelet Count      150 - 450 10e9/L 190     Component      Latest Ref Rng & Units 9/26/2020   Sodium      133 - 144 mmol/L 139   Potassium      3.4 - 5.3 mmol/L 3.8   Chloride      94 - 109 mmol/L 110 (H)   Carbon Dioxide      20 - 32 mmol/L 25   Anion Gap      3 - 14 mmol/L 4   Glucose      70 - 99 mg/dL 95   Urea Nitrogen      7 - 30 mg/dL 6 (L)   Creatinine      0.66 - 1.25 mg/dL 0.85   GFR Estimate      >60 mL/min/1.73:m2 >90   GFR Estimate If Black      >60 mL/min/1.73:m2 >90   Calcium      8.5 - 10.1 mg/dL 8.2 (L)         Outside records reviewed from: care everywhere    ASSESSMENT and PLAN  \"Inocencio\" Luiz is a 35 year old transgender male scheduled for INSERTION, PENILE PROSTHESIS, INFLATABLE on 5/3/21 by Dr. Carter in treatment of gender dysphoria, post procedure erectile dysfunction.  PAC referral for risk assessment and optimization for anesthesia:    Pre-operative considerations:  - RCRI : No serious cardiac risks.   - Anesthesia considerations:  Refer to PAC assessment in anesthesia records  - VTE risk: 0.26-0.5%  - ALEIDA # of risks 1/8 = Low risk  - Risk of PONV score = 2.  If > 2, anti-emetic intervention recommended.     --Gender dysphoria s/p multiple procedures, last first stage phalloplasty with ALT + SCIP on 2/3/20.  Second stage procedure completed 9/24/2020, phalloplasty including a glans plasty and " scrotoplasty . Above procedure planned.  --Generally healthy with no significant cardiopulmonary history. Nonsmoker. Good activity tolerance.        Patient is optimized and is acceptable candidate for the proposed procedure.  No further diagnostic evaluation is needed.     Michelle Juarez PA-C  Preoperative Assessment Center  [facility]  Phone: [phone]  Fax: [phone]

## 2021-04-21 NOTE — PATIENT INSTRUCTIONS
Preparing for Your Surgery      Name:  Myrna Dee   MRN:  [id number]   :  1985   Today's Date:  2021       Arriving for surgery:  Surgery date:  5/3/21  Arrival time:  5:45 am    Restrictions due to COVID 19:  One consistent visitor per patient is allowed.  The visitor will be allowed in the pre-op area.  Visitors are asked to leave the building during the surgery.  No ill visitors.  All visitors must wear face mask.    [employer] parking is available for anyone with mobility limitations or disabilities.  ([employer]  24 hours/ 7 days a week; [employer]  7 am- 3:30 pm, Mon- Fri)    Please come to:     [facility] [address]  [address] [place], [place] [address]. [place]  [address]    -Proceed to the 3rd floor, check in at the Adult Surgery Waiting Lounge. [phone]    If an escort is needed stop at the Information Desk in the lobby. Inform the information person that you are here for surgery. An escort to the Adult Surgery Waiting Lounge will be provided.    What can I eat or drink?  -  You may eat and drink normally for up to 8 hours before your surgery. (Until 11:45 pm 21)  -  You may have clear liquids until 2 hours before surgery. (Until 5:45 am)    Examples of clear liquids:  Water  Clear broth  Juices (apple, white grape, white cranberry  and cider) without pulp  Noncarbonated, powder based beverages  (lemonade and Julio César-Aid)  Sodas (Sprite, 7-Up, ginger ale and seltzer)  Coffee or tea (without milk or cream)  Gatorade    -  No Alcohol for at least 24 hours before surgery     Which medicines can I take?  Hold Aspirin for 7 days before surgery.    * Hold Multivitamins for 7 days before surgery. Take last Multivitamin on 21 and then hold until surgery.  Hold Supplements for 7 days before surgery.  Hold Ibuprofen (Advil, Motrin) for 1 day before surgery--unless otherwise directed by surgeon.  Hold Naproxen (Aleve) for 4 days before surgery.    -   PLEASE TAKE these medications the day of surgery:  Acetaminophen (Tylenol) if needed    How do I prepare myself?  - Please take 2 showers before surgery using Scrubcare or Hibiclens soap.    Use this soap only from the neck to your toes.     Leave the soap on your skin for one minute--then rinse thoroughly.      You may use your own shampoo and conditioner; no other hair products.   - Please remove all jewelry and body piercings.  - No lotions, deodorants or fragrance.  - Bring your ID and insurance card.    - All patients are required to have a Covid-19 test within 4 days of surgery/procedure.      -Patients will be contacted by the [facility] scheduling team within 1 week of surgery to make an appointment.      - Patients may call the Scheduling team at [phone] if they have not been scheduled within 4 days of  surgery.      ALL PATIENTS GOING HOME THE SAME DAY OF SURGERY ARE REQUIRED TO HAVE A RESPONSIBLE ADULT TO DRIVE AND BE IN ATTENDANCE WITH THEM FOR 24 HOURS FOLLOWING SURGERY.    IF THE RESPONSIBLE ADULT IS REQUIRED FOR POST OP TEACHING THE POST OP RN WILL ASK THEM TO COME BACK TO THE RECOVERY AREA.    Questions or Concerns:    - For any questions regarding the day of surgery or your hospital stay, please contact the Pre Admission Nursing Office at [phone].       - If you have health changes between today and your surgery please call your surgeon.       For questions after surgery please call your surgeons office.     AFTER YOUR SURGERY  Breathing exercises   Breathing exercises help you recover faster. Take deep breaths and let the air out slowly. This will:     Help you wake up after surgery.    Help prevent complications like pneumonia.  Preventing complications will help you go home sooner.    Nausea and vomiting   You may feel sick to your stomach after surgery; if so, let your nurse know.    Pain control:  After surgery, you may have pain. Our goal is to help you manage your pain. Pain  medicine will help you feel comfortable enough to do activities that will help you heal.  These activities may include breathing exercises, walking and physical therapy.   To help your health care team treat your pain we will ask: 1) If you have pain  2) where it is located 3) describe your pain in your words  Methods of pain control include medications given by mouth, vein or by nerve block for some surgeries.  Sequential Compression Device (SCD):  You may need to wear SCD S (also called pneumo boots)on your legs or feet. These are wraps connected to a machine that pumps in air and releases it. The repeated pumping helps prevent blood clots from forming.

## 2021-04-21 NOTE — ANESTHESIA PREPROCEDURE EVALUATION
Anesthesia Pre-Procedure Evaluation    Patient: Myrna Dee   MRN: [id number] : 1985        Preoperative Diagnosis: * No surgery found *   Procedure :      Past Medical History:   Diagnosis Date     Aseptic meningitis      Complication of anesthesia     HX slow wake up,HX panic attacks with vicodin postop     Gender dysphoria in adolescent and adult      High risk HPV infection      Papanicolaou smear of cervix with atypical squamous cells cannot exclude high grade squamous intraepithelial lesion (ASC-H)       Past Surgical History:   Procedure Laterality Date     CYSTOSCOPY N/A 2019    Procedure: Cystoscopy;  Surgeon: Myesha Hernández MD;  Location: [facility] OR     CYSTOSCOPY FLEXIBLE, CYSTOSTOMY, INSERT TUBE SUPRAPUBIC, COMBINED N/A 2020    Procedure: CYSTOSCOPY, WITH SUPRAPUBIC CATHETER INSERTION;  Surgeon: Octavio Carter MD;  [facility]     DAVINCI LAPAROSCOPIC CERVICECTOMY (TRACHELECTOMY) N/A 2019    Procedure: Davinci Laparoscopic Trachelectomy;  Surgeon: Myesha Hernández MD;  Location: [facility] OR     GRAFT FREE VASCULARIZED (LOCATION) Left 2/3/2020    Procedure: with left thigh flap and right groin flap;  Surgeon: Gavin Eastman MD;  [facility]     HYSTERECTOMY      and oophorectomy, but still has cervix and will require pap every 3 years, At [facility] MASTECTOMY PARTIAL      chest surgery w/ contouring      SCROTOPLASTY N/A 2020    Procedure: SCROTOPLASTY;  Surgeon: Gavin Eastman MD;  [facility]     TRANSGENDER GLANSPLASTY WITH SKIN GRAFT N/A 2020    Procedure: TRANSGENDER GLANSPLASTY WITH SKIN GRAFT;  Surgeon: Gavin Eastman MD;  [facility]     TRANSGENDER PHALLOPLASTY Bilateral 2/3/2020    Procedure: Phalloplasty;  Surgeon: Gavin Eastman MD;  [facility]     URETHROPLASTY N/A 2020    Procedure: URETHROPLASTY;  Surgeon: Octavio Carter MD;  [facility]     VAGINECTOMY, ROBOT-ASSISTED N/A 2020    Procedure: VAGINECTOMY,  ROBOT-ASSISTED;  Surgeon: Octavio Carter MD;  [facility]      No Known Allergies   Social History     Tobacco Use     Smoking status: Never Smoker     Smokeless tobacco: Never Used   Substance Use Topics     Alcohol use: Yes      Wt Readings from Last 1 Encounters:   20 71.4 kg (157 lb 6.5 oz)        Anesthesia Evaluation   Pt has had prior anesthetic. Type: General.    No history of anesthetic complications       ROS/MED HX  ENT/Pulmonary:       Neurologic: Comment: Hx  meningitis, viral. No residual deficits.      (+) peripheral neuropathy, - mild carpal tunnel, right.  (-) no seizures and no CVA   Cardiovascular:  - neg cardiovascular ROS   (+) -----No previous cardiac testing     METS/Exercise Tolerance: >4 METS    Hematologic:  - neg hematologic  ROS  (-) history of blood clots and history of blood transfusion   Musculoskeletal:  - neg musculoskeletal ROS     GI/Hepatic:  - neg GI/hepatic ROS     Renal/Genitourinary: Comment:  Other post-procedural erectile dysfunction       Gender dysphoria - neg Renal ROS     Endo: Comment: Testosterone injections   (-) Type I DM, Type II DM and thyroid disease   Psychiatric/Substance Use:     (+) psychiatric history other (comment) (Gender dysphoria in adolescent and adult)     Infectious Disease:  - neg infectious disease ROS     Malignancy:  - neg malignancy ROS     Other:  - neg other ROS    (+) , no H/O Chronic Pain,        Physical Exam    Airway        Mallampati: I   TM distance: > 3 FB   Neck ROM: full   Mouth opening: > 3 cm    Respiratory Devices and Support         Dental  no notable dental history         Cardiovascular          Rhythm and rate: regular and normal     Pulmonary           breath sounds clear to auscultation           OUTSIDE LABS:  CBC:   Lab Results   Component Value Date    WBC 8.6 2020    WBC 10.1 2020    HGB 11.6 (L) 2020    HGB 11.1 (L) 2020    HCT 35.7 (L) 2020    HCT 32.6 (L) 2020      09/26/2020     09/25/2020     BMP:   Lab Results   Component Value Date     09/26/2020     09/25/2020    POTASSIUM 3.8 09/26/2020    POTASSIUM 3.5 09/25/2020    CHLORIDE 110 (H) 09/26/2020    CHLORIDE 105 09/25/2020    CO2 25 09/26/2020    CO2 26 09/25/2020    BUN 6 (L) 09/26/2020    BUN 7 09/25/2020    CR 0.85 09/26/2020    CR 0.77 09/25/2020    GLC 95 09/26/2020     (H) 09/25/2020     COAGS:   Lab Results   Component Value Date    PTT 30 04/01/2011    INR 1.08 04/01/2011     POC:   Lab Results   Component Value Date     (H) 09/25/2020     HEPATIC:   Lab Results   Component Value Date    ALBUMIN 4.4 09/03/2013    PROTTOTAL 7.5 08/23/2019    ALT 9.2 08/23/2019    AST 17.6 08/23/2019    ALKPHOS 47.8 08/23/2019    BILITOTAL 1.6 (H) 08/23/2019     OTHER:   Lab Results   Component Value Date    ADELAIDE 8.2 (L) 09/26/2020    PHOS 3.4 04/01/2011    TSH 1.75 10/05/2009       Anesthesia Plan    ASA Status:  1      Anesthesia Type: General.     - Airway: LMA   Induction: Intravenous, Propofol.   Maintenance: Balanced.        Consents         - Extended Intubation/Ventilatory Support Discussed: No.      - Patient is DNR/DNI Status: No    Use of blood products discussed: No .     Postoperative Care    Pain management: IV analgesics, Oral pain medications.   PONV prophylaxis: Ondansetron (or other 5HT-3)     Comments:    36 yo for INSERTION, PENILE PROSTHESIS, INFLATABLE under GA. Anesthesia risks and benefits discussed. Questions answered. Patient understands and agrees to proceed with anesthesia plan.        H&P reviewed: Unable to attach VIRTUAL H&P to encounter due to EHR limitations. Appropriate H&P reviewed. The physical exam performed by anesthesia during this surgical encounter serves as the physical portion of that virtual H&P.  Any significant changes noted within this preop evaluation.        PAC Discussion and Assessment    ASA Classification: 2  Case is suitable for: [facility]  Anesthetic techniques and relevant risks discussed: GA  Invasive monitoring and risk discussed: No    Possibility and Risk of blood transfusion discussed: No            PAC Resident/NP Anesthesia Assessment: Eugenides \"Inocencio\" Luiz is a 35 year old transgender male scheduled for INSERTION, PENILE PROSTHESIS, INFLATABLE on 5/3/21 by Dr. Carter in treatment of gender dysphoria, post procedure erectile dysfunction.  PAC referral for risk assessment and optimization for anesthesia:    Pre-operative considerations:  - RCRI : No serious cardiac risks.   - Anesthesia considerations:  Refer to PAC assessment in anesthesia records  - VTE risk: 0.26-0.5%  - ALEIDA # of risks 1/8 = Low risk  - Risk of PONV score = 2.  If > 2, anti-emetic intervention recommended.     --Gender dysphoria s/p multiple procedures, last first stage phalloplasty with ALT + SCIP on 2/3/20.  Second stage procedure completed 9/24/2020, phalloplasty including a glans plasty and scrotoplasty . Above procedure planned.  --Generally healthy with no significant cardiopulmonary history. Nonsmoker. Good activity tolerance.        Patient is optimized and is acceptable candidate for the proposed procedure.  No further diagnostic evaluation is needed.       **For further details of assessment, testing, and physical exam please see H and P completed on same date.          Michelle Juarez PA-C, [facility]    Reviewed and Signed by PAC Mid-Level Provider/Resident  Mid-Level Provider/Resident: Michelle Juarez  Date: 4/21/21                                 Michelle Juarez PA-C  "

## 2021-04-21 NOTE — PROGRESS NOTES
Inocencio is a 35 year old who is being evaluated via a billable video visit.      How would you like to obtain your AVS? MyChart    Will anyone else be joining your video visit? No      HPI         Review of Systems     Objective    Vitals - Patient Reported  Pain Score: No Pain (0)        Physical Exam     DANGELO Valentin LPN

## 2021-04-23 ENCOUNTER — TELEPHONE (OUTPATIENT)
Dept: UROLOGY | Facility: CLINIC | Age: 36
End: 2021-04-23

## 2021-04-23 DIAGNOSIS — N39.0 URINARY TRACT INFECTION: Primary | ICD-10-CM

## 2021-04-23 NOTE — TELEPHONE ENCOUNTER
Sent Norse message to remind patient of what is needed prior to the procedure with Dr. Carter. UA needed    Zoraida Rodriguez, RN   Care Coordinator Urology

## 2021-04-23 NOTE — TELEPHONE ENCOUNTER
----- Message from Zoraida Rodriguez, NAVIN sent at 3/18/2021  4:59 PM CDT -----  Regarding: FW: teaching    ----- Message -----  From: Christina Chavez  Sent: 3/17/2021   9:00 AM CDT  To: Jolie Kendrick RN, Zoraida Rodriguez, NAVIN  Subject: teaching                                         [place]    INSERTION, PENILE PROSTHESIS, INFLATABLE    Surgery scheduled on 5/3/21 @ [facility]    Patient informed, packet to be mailed on 3/17/21, please call for teaching    Iggy Vasquez

## 2021-04-29 DIAGNOSIS — N39.0 URINARY TRACT INFECTION: ICD-10-CM

## 2021-04-29 DIAGNOSIS — Z11.59 ENCOUNTER FOR SCREENING FOR OTHER VIRAL DISEASES: ICD-10-CM

## 2021-04-29 LAB
ALBUMIN UR-MCNC: NEGATIVE MG/DL
APPEARANCE UR: CLEAR
BILIRUB UR QL STRIP: NEGATIVE
COLOR UR AUTO: YELLOW
GLUCOSE UR STRIP-MCNC: NEGATIVE MG/DL
HGB UR QL STRIP: NEGATIVE
KETONES UR STRIP-MCNC: NEGATIVE MG/DL
LEUKOCYTE ESTERASE UR QL STRIP: NEGATIVE
NITRATE UR QL: NEGATIVE
NON-SQ EPI CELLS #/AREA URNS LPF: NORMAL /LPF
PH UR STRIP: 7 PH (ref 5–7)
RBC #/AREA URNS AUTO: NORMAL /HPF
SARS-COV-2 RNA RESP QL NAA+PROBE: NORMAL
SOURCE: NORMAL
SP GR UR STRIP: 1.01 (ref 1–1.03)
SPECIMEN SOURCE: NORMAL
UROBILINOGEN UR STRIP-ACNC: 0.2 EU/DL (ref 0.2–1)
WBC #/AREA URNS AUTO: NORMAL /HPF

## 2021-04-29 PROCEDURE — U0003 INFECTIOUS AGENT DETECTION BY NUCLEIC ACID (DNA OR RNA); SEVERE ACUTE RESPIRATORY SYNDROME CORONAVIRUS 2 (SARS-COV-2) (CORONAVIRUS DISEASE [COVID-19]), AMPLIFIED PROBE TECHNIQUE, MAKING USE OF HIGH THROUGHPUT TECHNOLOGIES AS DESCRIBED BY CMS-2020-01-R: HCPCS | Performed by: UROLOGY

## 2021-04-29 PROCEDURE — 81001 URINALYSIS AUTO W/SCOPE: CPT | Performed by: UROLOGY

## 2021-04-29 PROCEDURE — U0005 INFEC AGEN DETEC AMPLI PROBE: HCPCS | Performed by: UROLOGY

## 2021-04-30 LAB
LABORATORY COMMENT REPORT: NORMAL
SARS-COV-2 RNA RESP QL NAA+PROBE: NEGATIVE
SPECIMEN SOURCE: NORMAL

## 2021-05-03 ENCOUNTER — HOSPITAL ENCOUNTER (OUTPATIENT)
Facility: CLINIC | Age: 36
Discharge: HOME | End: 2021-05-03
Attending: UROLOGY | Admitting: UROLOGY
Payer: COMMERCIAL

## 2021-05-03 ENCOUNTER — ANESTHESIA (OUTPATIENT)
Dept: SURGERY | Facility: CLINIC | Age: 36
End: 2021-05-03
Payer: COMMERCIAL

## 2021-05-03 VITALS
DIASTOLIC BLOOD PRESSURE: 79 MMHG | RESPIRATION RATE: 12 BRPM | HEIGHT: 65 IN | SYSTOLIC BLOOD PRESSURE: 119 MMHG | BODY MASS INDEX: 27.22 KG/M2 | TEMPERATURE: 98.8 F | OXYGEN SATURATION: 98 % | HEART RATE: 74 BPM | WEIGHT: 163.36 LBS

## 2021-05-03 DIAGNOSIS — F64.9 GENDER DYSPHORIA: ICD-10-CM

## 2021-05-03 DIAGNOSIS — N52.39 OTHER POST-PROCEDURAL ERECTILE DYSFUNCTION: ICD-10-CM

## 2021-05-03 LAB
CREAT SERPL-MCNC: 0.77 MG/DL (ref 0.66–1.25)
GFR SERPL CREATININE-BSD FRML MDRD: >90 ML/MIN/{1.73_M2}
GLUCOSE BLDC GLUCOMTR-MCNC: 90 MG/DL (ref 70–99)
HGB BLD-MCNC: 13.6 G/DL (ref 13.3–17.7)
POTASSIUM SERPL-SCNC: 3.5 MMOL/L (ref 3.4–5.3)

## 2021-05-03 PROCEDURE — 272N000001 HC OR GENERAL SUPPLY STERILE: Performed by: UROLOGY

## 2021-05-03 PROCEDURE — 36415 COLL VENOUS BLD VENIPUNCTURE: CPT | Performed by: ANESTHESIOLOGY

## 2021-05-03 PROCEDURE — 250N000009 HC RX 250: Performed by: UROLOGY

## 2021-05-03 PROCEDURE — 250N000025 HC SEVOFLURANE, PER MIN: Performed by: UROLOGY

## 2021-05-03 PROCEDURE — 370N000017 HC ANESTHESIA TECHNICAL FEE, PER MIN: Performed by: UROLOGY

## 2021-05-03 PROCEDURE — 250N000011 HC RX IP 250 OP 636: Performed by: UROLOGY

## 2021-05-03 PROCEDURE — 84132 ASSAY OF SERUM POTASSIUM: CPT | Performed by: ANESTHESIOLOGY

## 2021-05-03 PROCEDURE — 999N000141 HC STATISTIC PRE-PROCEDURE NURSING ASSESSMENT: Performed by: UROLOGY

## 2021-05-03 PROCEDURE — 250N000011 HC RX IP 250 OP 636: Performed by: ANESTHESIOLOGY

## 2021-05-03 PROCEDURE — 278N000051 HC OR IMPLANT GENERAL: Performed by: UROLOGY

## 2021-05-03 PROCEDURE — 710N000010 HC RECOVERY PHASE 1, LEVEL 2, PER MIN: Performed by: UROLOGY

## 2021-05-03 PROCEDURE — 250N000011 HC RX IP 250 OP 636: Performed by: NURSE ANESTHETIST, CERTIFIED REGISTERED

## 2021-05-03 PROCEDURE — 82565 ASSAY OF CREATININE: CPT | Performed by: ANESTHESIOLOGY

## 2021-05-03 PROCEDURE — 360N000076 HC SURGERY LEVEL 3, PER MIN: Performed by: UROLOGY

## 2021-05-03 PROCEDURE — C1813 PROSTHESIS, PENILE, INFLATAB: HCPCS | Performed by: UROLOGY

## 2021-05-03 PROCEDURE — 82962 GLUCOSE BLOOD TEST: CPT

## 2021-05-03 PROCEDURE — 250N000013 HC RX MED GY IP 250 OP 250 PS 637: Performed by: ANESTHESIOLOGY

## 2021-05-03 PROCEDURE — 258N000003 HC RX IP 258 OP 636: Performed by: UROLOGY

## 2021-05-03 PROCEDURE — 250N000009 HC RX 250: Performed by: NURSE ANESTHETIST, CERTIFIED REGISTERED

## 2021-05-03 PROCEDURE — 258N000003 HC RX IP 258 OP 636: Performed by: ANESTHESIOLOGY

## 2021-05-03 PROCEDURE — 710N000012 HC RECOVERY PHASE 2, PER MINUTE: Performed by: UROLOGY

## 2021-05-03 PROCEDURE — 85018 HEMOGLOBIN: CPT | Performed by: ANESTHESIOLOGY

## 2021-05-03 DEVICE — IMPLANTABLE DEVICE: Type: IMPLANTABLE DEVICE | Site: PENIS | Status: FUNCTIONAL

## 2021-05-03 DEVICE — CL RESERVOIR
Type: IMPLANTABLE DEVICE | Site: PENIS | Status: FUNCTIONAL
Brand: TITAN

## 2021-05-03 DEVICE — TITAN® TOUCH SCROTAL ZERO DEGREE ANGLE CYLINDER SET WITH PUMP
Type: IMPLANTABLE DEVICE | Site: PENIS | Status: FUNCTIONAL
Brand: TITAN

## 2021-05-03 DEVICE — ASSEMBLY KIT
Type: IMPLANTABLE DEVICE | Site: PENIS | Status: NON-FUNCTIONAL
Brand: TITAN
Removed: 2021-10-18

## 2021-05-03 RX ORDER — FENTANYL CITRATE 50 UG/ML
25-50 INJECTION, SOLUTION INTRAMUSCULAR; INTRAVENOUS
Status: DISCONTINUED | OUTPATIENT
Start: 2021-05-03 | End: 2021-05-10 | Stop reason: HOSPADM

## 2021-05-03 RX ORDER — NALOXONE HYDROCHLORIDE 0.4 MG/ML
0.4 INJECTION, SOLUTION INTRAMUSCULAR; INTRAVENOUS; SUBCUTANEOUS
Status: DISCONTINUED | OUTPATIENT
Start: 2021-05-03 | End: 2021-05-10 | Stop reason: HOSPADM

## 2021-05-03 RX ORDER — SODIUM CHLORIDE, SODIUM LACTATE, POTASSIUM CHLORIDE, CALCIUM CHLORIDE 600; 310; 30; 20 MG/100ML; MG/100ML; MG/100ML; MG/100ML
INJECTION, SOLUTION INTRAVENOUS CONTINUOUS
Status: DISCONTINUED | OUTPATIENT
Start: 2021-05-03 | End: 2021-05-03 | Stop reason: HOSPADM

## 2021-05-03 RX ORDER — ONDANSETRON 2 MG/ML
4 INJECTION INTRAMUSCULAR; INTRAVENOUS EVERY 30 MIN PRN
Status: DISCONTINUED | OUTPATIENT
Start: 2021-05-03 | End: 2021-05-10 | Stop reason: HOSPADM

## 2021-05-03 RX ORDER — PROPOFOL 10 MG/ML
INJECTION, EMULSION INTRAVENOUS PRN
Status: DISCONTINUED | OUTPATIENT
Start: 2021-05-03 | End: 2021-05-03

## 2021-05-03 RX ORDER — SODIUM CHLORIDE, SODIUM LACTATE, POTASSIUM CHLORIDE, CALCIUM CHLORIDE 600; 310; 30; 20 MG/100ML; MG/100ML; MG/100ML; MG/100ML
INJECTION, SOLUTION INTRAVENOUS CONTINUOUS
Status: DISCONTINUED | OUTPATIENT
Start: 2021-05-03 | End: 2021-05-10 | Stop reason: HOSPADM

## 2021-05-03 RX ORDER — ONDANSETRON 4 MG/1
4 TABLET, ORALLY DISINTEGRATING ORAL EVERY 30 MIN PRN
Status: DISCONTINUED | OUTPATIENT
Start: 2021-05-03 | End: 2021-05-10 | Stop reason: HOSPADM

## 2021-05-03 RX ORDER — PROPOFOL 10 MG/ML
INJECTION, EMULSION INTRAVENOUS CONTINUOUS PRN
Status: DISCONTINUED | OUTPATIENT
Start: 2021-05-03 | End: 2021-05-03

## 2021-05-03 RX ORDER — EPHEDRINE SULFATE 50 MG/ML
INJECTION, SOLUTION INTRAMUSCULAR; INTRAVENOUS; SUBCUTANEOUS PRN
Status: DISCONTINUED | OUTPATIENT
Start: 2021-05-03 | End: 2021-05-03

## 2021-05-03 RX ORDER — BUPIVACAINE HYDROCHLORIDE AND EPINEPHRINE 2.5; 5 MG/ML; UG/ML
INJECTION, SOLUTION INFILTRATION; PERINEURAL PRN
Status: DISCONTINUED | OUTPATIENT
Start: 2021-05-03 | End: 2021-05-10 | Stop reason: HOSPADM

## 2021-05-03 RX ORDER — OXYCODONE HYDROCHLORIDE 5 MG/1
5 TABLET ORAL EVERY 6 HOURS PRN
Qty: 28 TABLET | Refills: 0 | Status: SHIPPED | OUTPATIENT
Start: 2021-05-03 | End: 2021-06-07

## 2021-05-03 RX ORDER — NALOXONE HYDROCHLORIDE 0.4 MG/ML
0.2 INJECTION, SOLUTION INTRAMUSCULAR; INTRAVENOUS; SUBCUTANEOUS
Status: DISCONTINUED | OUTPATIENT
Start: 2021-05-03 | End: 2021-05-10 | Stop reason: HOSPADM

## 2021-05-03 RX ORDER — VANCOMYCIN HYDROCHLORIDE 1 G/200ML
1000 INJECTION, SOLUTION INTRAVENOUS SEE ADMIN INSTRUCTIONS
Status: DISCONTINUED | OUTPATIENT
Start: 2021-05-03 | End: 2021-05-03 | Stop reason: HOSPADM

## 2021-05-03 RX ORDER — DIMENHYDRINATE 50 MG/ML
25 INJECTION, SOLUTION INTRAMUSCULAR; INTRAVENOUS
Status: DISCONTINUED | OUTPATIENT
Start: 2021-05-03 | End: 2021-05-10 | Stop reason: HOSPADM

## 2021-05-03 RX ORDER — OXYCODONE HCL 5 MG/5 ML
5 SOLUTION, ORAL ORAL EVERY 4 HOURS PRN
Status: DISCONTINUED | OUTPATIENT
Start: 2021-05-03 | End: 2021-05-10 | Stop reason: HOSPADM

## 2021-05-03 RX ORDER — ONDANSETRON 2 MG/ML
INJECTION INTRAMUSCULAR; INTRAVENOUS PRN
Status: DISCONTINUED | OUTPATIENT
Start: 2021-05-03 | End: 2021-05-03

## 2021-05-03 RX ORDER — OXYCODONE HYDROCHLORIDE 5 MG/1
5 TABLET ORAL EVERY 4 HOURS PRN
Status: DISCONTINUED | OUTPATIENT
Start: 2021-05-03 | End: 2021-05-10 | Stop reason: HOSPADM

## 2021-05-03 RX ORDER — SULFAMETHOXAZOLE/TRIMETHOPRIM 800-160 MG
1 TABLET ORAL 2 TIMES DAILY
Qty: 14 TABLET | Refills: 0 | Status: SHIPPED | OUTPATIENT
Start: 2021-05-03 | End: 2021-05-10

## 2021-05-03 RX ORDER — DEXAMETHASONE SODIUM PHOSPHATE 4 MG/ML
INJECTION, SOLUTION INTRA-ARTICULAR; INTRALESIONAL; INTRAMUSCULAR; INTRAVENOUS; SOFT TISSUE PRN
Status: DISCONTINUED | OUTPATIENT
Start: 2021-05-03 | End: 2021-05-03

## 2021-05-03 RX ORDER — LIDOCAINE HYDROCHLORIDE 20 MG/ML
INJECTION, SOLUTION INFILTRATION; PERINEURAL PRN
Status: DISCONTINUED | OUTPATIENT
Start: 2021-05-03 | End: 2021-05-03

## 2021-05-03 RX ORDER — VANCOMYCIN HYDROCHLORIDE 1 G/200ML
1000 INJECTION, SOLUTION INTRAVENOUS
Status: COMPLETED | OUTPATIENT
Start: 2021-05-03 | End: 2021-05-03

## 2021-05-03 RX ORDER — ACETAMINOPHEN 325 MG/1
975 TABLET ORAL ONCE
Status: COMPLETED | OUTPATIENT
Start: 2021-05-03 | End: 2021-05-03

## 2021-05-03 RX ORDER — FENTANYL CITRATE 50 UG/ML
INJECTION, SOLUTION INTRAMUSCULAR; INTRAVENOUS PRN
Status: DISCONTINUED | OUTPATIENT
Start: 2021-05-03 | End: 2021-05-03

## 2021-05-03 RX ADMIN — OXYCODONE HYDROCHLORIDE 5 MG: 5 SOLUTION ORAL at 12:49

## 2021-05-03 RX ADMIN — GENTAMICIN SULFATE 240 MG: 40 INJECTION, SOLUTION INTRAMUSCULAR; INTRAVENOUS at 08:10

## 2021-05-03 RX ADMIN — FENTANYL CITRATE 25 MCG: 50 INJECTION, SOLUTION INTRAMUSCULAR; INTRAVENOUS at 10:57

## 2021-05-03 RX ADMIN — VANCOMYCIN HYDROCHLORIDE 1 G: 1 INJECTION, SOLUTION INTRAVENOUS at 08:10

## 2021-05-03 RX ADMIN — PROPOFOL 150 MG: 10 INJECTION, EMULSION INTRAVENOUS at 07:57

## 2021-05-03 RX ADMIN — SODIUM CHLORIDE, POTASSIUM CHLORIDE, SODIUM LACTATE AND CALCIUM CHLORIDE: 600; 310; 30; 20 INJECTION, SOLUTION INTRAVENOUS at 07:45

## 2021-05-03 RX ADMIN — MIDAZOLAM 2 MG: 1 INJECTION INTRAMUSCULAR; INTRAVENOUS at 07:43

## 2021-05-03 RX ADMIN — LIDOCAINE HYDROCHLORIDE 100 MG: 20 INJECTION, SOLUTION INFILTRATION; PERINEURAL at 07:57

## 2021-05-03 RX ADMIN — ACETAMINOPHEN 975 MG: 325 TABLET, FILM COATED ORAL at 11:30

## 2021-05-03 RX ADMIN — ONDANSETRON 4 MG: 2 INJECTION INTRAMUSCULAR; INTRAVENOUS at 09:48

## 2021-05-03 RX ADMIN — HYDROMORPHONE HYDROCHLORIDE 0.5 MG: 1 INJECTION, SOLUTION INTRAMUSCULAR; INTRAVENOUS; SUBCUTANEOUS at 09:48

## 2021-05-03 RX ADMIN — FENTANYL CITRATE 25 MCG: 50 INJECTION, SOLUTION INTRAMUSCULAR; INTRAVENOUS at 09:17

## 2021-05-03 RX ADMIN — OXYCODONE HYDROCHLORIDE 5 MG: 5 SOLUTION ORAL at 11:14

## 2021-05-03 RX ADMIN — PROPOFOL 25 MCG/KG/MIN: 10 INJECTION, EMULSION INTRAVENOUS at 07:55

## 2021-05-03 RX ADMIN — FENTANYL CITRATE 25 MCG: 50 INJECTION, SOLUTION INTRAMUSCULAR; INTRAVENOUS at 07:55

## 2021-05-03 RX ADMIN — Medication 5 MG: at 08:17

## 2021-05-03 RX ADMIN — DEXAMETHASONE SODIUM PHOSPHATE 4 MG: 4 INJECTION, SOLUTION INTRAMUSCULAR; INTRAVENOUS at 08:25

## 2021-05-03 RX ADMIN — FENTANYL CITRATE 25 MCG: 50 INJECTION, SOLUTION INTRAMUSCULAR; INTRAVENOUS at 11:21

## 2021-05-03 RX ADMIN — FENTANYL CITRATE 50 MCG: 50 INJECTION, SOLUTION INTRAMUSCULAR; INTRAVENOUS at 08:33

## 2021-05-03 ASSESSMENT — MIFFLIN-ST. JEOR: SCORE: 1602.88

## 2021-05-03 NOTE — ANESTHESIA CARE TRANSFER NOTE
Patient: Myrna Dee    Procedure(s):  INSERTION, PENILE PROSTHESIS, INFLATABLE    Diagnosis: Other post-procedural erectile dysfunction [N52.39]  Gender dysphoria [F64.9]  Diagnosis Additional Information: No value filed.    Anesthesia Type:   General     Note:    Oropharynx: spontaneously breathing and oral airway in place    Oxygen Supplementation: face mask  Level of Supplemental Oxygen (L/min / FiO2): 10  Independent Airway: airway patency satisfactory and stable  Dentition: dentition unchanged  Vital Signs Stable: post-procedure vital signs reviewed and stable  Report to RN Given: handoff report given  Patient transferred to: PACU    Handoff Report: Identifed the Patient, Identified the Reponsible Provider, Reviewed the pertinent medical history, Discussed the surgical course, Reviewed Intra-OP anesthesia mangement and issues during anesthesia, Set expectations for post-procedure period and Allowed opportunity for questions and acknowledgement of understanding      Vitals: (Last set prior to Anesthesia Care Transfer)  CRNA VITALS  5/3/2021 0957 - 5/3/2021 1034      5/3/2021             Temp:  36.6  C (97.9  F)     ax        Electronically Signed By: DENISE Velázquez CRNA  May 3, 2021  10:34 AM

## 2021-05-03 NOTE — OR NURSING
Arrived from OR per cart.  Pt sedated and not moving extremities when touched or talked too.  Oral airway present with strong spontaneous breathing and clear breath sounds to bases heard anterior chest   2 surgical sites clear without dressings.

## 2021-05-03 NOTE — BRIEF OP NOTE
[facility]    Brief Operative Note    Pre-operative diagnosis: Other post-procedural erectile dysfunction [N52.39]  Gender dysphoria [F64.9]  Post-operative diagnosis Same as pre-operative diagnosis    Procedure: Procedure(s):  INSERTION, PENILE PROSTHESIS, INFLATABLE  Surgeon: Surgeon(s) and Role:     * Octavio Carter MD - Primary     * Jie Vera MD - Resident - Assisting     * Brooklynn Castillo MD - Resident - Assisting  Anesthesia: General   Estimated blood loss: 10cc  Drains: None  Specimens: * No specimens in log *  Findings:   reservoir placed in right submuscular position, 60cc water in 125cc balloon. Single 20cm cylinder used. Pump placed in left scrotum. .  Complications: None.  Implants:   Implant Name Type Inv. Item Serial No.  Lot No. LRB No. Used Action   IMP PROSTHESIS PENILE TITAN STD ASSEMBLY KIT [id number] Penile Implant IMP PROSTHESIS PENILE TITAN STD ASSEMBLY KIT [id number]  COLOPLAST [id number] N/A 1 Implanted   RESERVOIR TITAN CL [id number] Pump RESERVOIR TITAN CL [id number]  COLOPLAST [id number] N/A 1 Implanted   PUMP TITAN SCROTAL 20CM [id number] Pump PUMP TITAN SCROTAL 20CM [id number]  COLOPLAST [id number] N/A 1 Implanted   True-Lock Plug [place]    COLOPLAST [id number] N/A 1 Implanted   TUTOPLAST Pericardium 4 x 7 cm   [id number] COLOPLAST [id number] N/A 1 Implanted

## 2021-05-03 NOTE — DISCHARGE INSTRUCTIONS
Same-Day Surgery   Adult Discharge Orders & Instructions     For 24 hours after surgery:  1. Get plenty of rest.  A responsible adult must stay with you for at least 24 hours after you leave the hospital.   2. Pain medication can slow your reflexes. Do not drive or use heavy equipment.  If you have weakness or tingling, don't drive or use heavy equipment until this feeling goes away.  3. Mixing alcohol and pain medication can cause dizziness and slow your breathing. It can even be fatal. Do not drink alcohol while taking pain medication.  4. Avoid strenuous or risky activities.  Ask for help when climbing stairs.   5. You may feel lightheaded.  If so, sit for a few minutes before standing.  Have someone help you get up.   6. If you have nausea (feel sick to your stomach), drink only clear liquids such as apple juice, ginger ale, broth or 7-Up.  Rest may also help.  Be sure to drink enough fluids.  Move to a regular diet as you feel able. Take pain medications with a small amount of solid food, such as toast or crackers, to avoid nausea.   7. A slight fever is normal. Call the doctor if your fever is over 100 F (37.7 C) (taken under the tongue) or lasts longer than 24 hours.  8. You may have a dry mouth, muscle aches, trouble sleeping or a sore throat.  These symptoms should go away after 24 hours.  9. Do not make important or legal decisions.   Pain Management:      1. Take pain medication (if prescribed) for pain as directed by your physician.        2. WARNING: If the pain medication you have been prescribed contains Tylenol  (acetaminophen), DO NOT take additional doses of Tylenol (acetaminophen).     Call your doctor for any of the followin.  Signs of infection (fever, growing tenderness at the surgery site, severe pain, a large amount of drainage or bleeding, foul-smelling drainage, redness, swelling).    2.  It has been over 8 to 10 hours since surgery and you are still not able to urinate (pee).    3.   Headache for over 24 hours.    4.  Numbness, tingling or weakness the day after surgery (if you had spinal anesthesia).  To contact a doctor, call _____________________________________ or:      [phone] and ask for the Resident On Call for:          __________________________________________ (answered 24 hours a day)      Emergency Department:  [facility]: [phone]  [facility]: [phone]               Rev. 10/2014

## 2021-05-03 NOTE — OP NOTE
OPERATIVE REPORT     PREOPERATIVE DIAGNOSIS:   1. Erectile Dysfunction  2. Gender Dysphoria  3. S/p phalloplasty    POSTOPERATIVE DIAGNOSIS:   1. Erectile Dysfunction  2. Gender Dysphoria  3. S/p phalloplasty    PROCEDURES PERFORMED:   1. Insertion of 3 piece inflatable penile prosthesis, including placement of pump, cylinders and reservoir (Coloplast Titan 20cm - One Cylinder with a Piece of Tutoplast on the Tip, 125cc cloverleaf reservoir filled with with 65cc) - COMPLEX    SURGEON: Octavio Carter MD  FELLOW: Frank Castillo MD  RESIDENT: Joleen Vera MD    ANESTHESIA: General    ESTIMATED BLOOD LOSS: 25 mL.     COMPLICATIONS: None.     SIGNIFICANT FINDINGS:   1. Proper fitting of implant with proximal aspect of cylinder sitting in infrapubic position- sutured in place with 2 x #2 Fiberwire sutures through pubic symphysis and proximal aspect of prosthesis silicone block. Device functions well during test inflation intraoperatively.  2. No apparent bladder, urethral or neophallus injury    BRIEF OPERATIVE INDICATIONS: Myrna Dee is a 35 year old transgender male who previously underwent a left ALT plus right SCIP phalloplasty in a staged fashion. 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 Thus, we counseled him for penile prosthesis insertion.      The insertion of penile prosthesis is far more complicated in a neophallus and is far more specialized and risky than a usual penile prosthesis insertion. He was counseled on the alternatives and risks (including but not limited to: infection, mechanical malfunction, distal erosion, urethral injury, bladder injury, migration of any prosthetic material, complete neophallus flap loss, anesthesia risks and poor functional result).  We discussed that due to the small size of his scrotum this will be especially difficult to place the pump. He elected to proceed.     DESCRIPTION OF PROCEDURE:    After full informed voluntary consent was obtained, the patient was transported to the operating room to the table. Pneumo boots were applied, and a general anesthetic was induced without complication. He voided immediately prior to OR.      A timeout was taken to confirm correct patient, procedure and laterality. The genitals were shaved, prepped and draped in the low lithotomy position using first Betadine then Chlorhexadine/Alcohol prep.      We started by making an infrapubic incision transversely with a scalpel, approximately 5 cm in length, favoring the left side of the patient slightly. We chose this incision because there was right-sided vascular SCIP anastomosis so we wanted to avoid any significant blood supply to the neophallus. We used a prior incision site to minimize scar. We then dissected down to identify the pubic symphysis. We then dissected underneath the pubic symphysis for 2 cm. This dissection is unique to placing a penile prosthesis in a neophallus and required 30 minutes of additional dissection than the usual dissection required for a penile prosthesis insertion.     We then turned our attention to dilating the neophallus distally. We initially created a space in the middle of the neophallus with Metzenbaum scissors. Great care was taken to avoid creating a space too slow to any skin. Serial Hegar dilators up to size 12 were used to dilate the tract just a few cm proximal to the tip of the penis. Next, the Ibeth device was used to identify and measure the corporal lengths; we held the Ibeth tool in place and measured 18cm in lengthl. Next, antibiotic irrigation was used to irrigate the entire wound. A Coloplast Titan inflatable implant, 20cm zero degree, was selected. The Coloplast titan implant was then prepared on the backtable and soaked in vancomycin/gentamicin antibiotic solution. One cylinder was removed, and the tubing was capped to that cylinder. This is because the neophallus  would not accommodate 2 cylinders.      Next, we turned our attention to the reservoir. We elected to perform an ectopic reservoir placement. We made a right lower quadrant transverse incision. We dissected down to the rectus fascia, which was incised. We split the rectus muscle and developed a space just beneath the rectus muscle. Blunt finger dissection was performed to make an adequate space. 2-0 PDS sutures were placed in an interrupted fashion to close the fascia. Prior to closure, a 125cc [place] leaf reservoir was placed into the space, and the device was filled with 65cc of sterile saline. The device sat nicely. The sutures were then tied to close the fascia.     The penile prosthesis was then brought to the field. Two sutures were then placed as U sutures - through the pubic symphysis and through the proximal silicone block of the Coloplast titan cylinder Two #2 Fiberwire sutures were placed using this technique. The sutures were tied. Fixation felt adequate on a manual test.     The Indra needle was also used to lara a small piece of Tutoplast which covered the distal tip of the cylinder. This acted to recreate a distal corporal body in an effort to prevent distal erosion. The distal aspect of the penile prosthesis (with Tutoplast on the tip) was then passed through the premade space through the neophallus. The needle was passed through the tip of the neophallus in the middle. The suture was removed.     The pump tubing was then connected to the reservoir tubing using the Coloplast connection device in a standard fashion in the right lower quadrant incision. The pump was placed in the neoscrotum after creating adequate space.     A test inflation was performed. The device inflated easily with proper filling and tip of implants in the mid glans.. No device surfaces were too close to the skin. There was adequate cosmetic appearance to the erection. Additional antibiotic solution was used to washout all  cavities.     For the infrapubic incision, it was closed in two layers using monocryl. The incision copiously irrigated after closure of each layer.     The left lower quadrant incision was closed in 2 layers - first a deep layer to cover the device components using 3-0 Monocryl. Then the skin using running 4-0 Monocryl. The incision copiously irrigated after closure of each skin layer. Dermabond was applied to both incisions. We left the device semi-activated. The patient was awakened and transferred to the recovery room in stable condition.     As attending surgeon, Octavio ANN MD, was present for the entire procedure.

## 2021-05-03 NOTE — OR NURSING
"Waking up and spitting out oral airway.  Complains of \"sharp pain of 7 \" at right incision site.  Medicated for pain .  Incision site clear and intact without drainage.  "

## 2021-05-03 NOTE — ANESTHESIA PROCEDURE NOTES
Airway       Patient location during procedure: OR       Procedure Start/Stop Times: 5/3/2021 7:59 AM  Staff -        CRNA: Roderick Olsen APRN CRNA       Other Anesthesia Staff: Audie Shin       Performed By: YAEL  Consent for Airway        Urgency: elective  Indications and Patient Condition       Indications for airway management: katie-procedural       Induction type:intravenous       Mask difficulty assessment: 0 - not attempted    Final Airway Details       Final airway type: supraglottic airway    Supraglottic Airway Details        Type: LMA       Brand: Air-Q       LMA size: 3.5    Post intubation assessment        Placement verified by: capnometry, equal breath sounds and chest rise        Number of attempts at approach: 1       Number of other approaches attempted: 0       Secured with: silk tape       Ease of procedure: easy       Dentition: Intact and Unchanged

## 2021-05-03 NOTE — ANESTHESIA PROCEDURE NOTES
Airway       Patient location during procedure: OR       Procedure Start/Stop Times: 5/3/2021 7:57 AM and 5/3/2021 7:59 AM  Staff -        Anesthesiologist:  Sophia Rowan MD       CRNA: Roderick Olsen APRN CRNA       Other Anesthesia Staff: Audie Shin       Performed By: SRNA  Consent for Airway        Urgency: elective  Indications and Patient Condition       Indications for airway management: katie-procedural and airway protection       Induction type:intravenous       Mask difficulty assessment: 0 - not attempted    Final Airway Details       Final airway type: supraglottic airway    Supraglottic Airway Details        Type: LMA       Brand: Air-Q       LMA size: 3.5    Post intubation assessment        Placement verified by: capnometry, equal breath sounds and chest rise        Number of attempts at approach: 1       Number of other approaches attempted: 0       Secured with: silk tape       Ease of procedure: easy       Dentition: Intact and Unchanged

## 2021-05-03 NOTE — ANESTHESIA POSTPROCEDURE EVALUATION
Patient: Myrna Dee    Procedure(s):  INSERTION, PENILE PROSTHESIS, INFLATABLE    Diagnosis:Other post-procedural erectile dysfunction [N52.39]  Gender dysphoria [F64.9]  Diagnosis Additional Information: No value filed.    Anesthesia Type:  General    Note:  Disposition: Admission   Postop Pain Control: Uneventful            Sign Out: Well controlled pain   PONV: No   Neuro/Psych: Uneventful            Sign Out: Acceptable/Baseline neuro status   Airway/Respiratory: Uneventful            Sign Out: Acceptable/Baseline resp. status   CV/Hemodynamics: Uneventful            Sign Out: Acceptable CV status; No obvious hypovolemia; No obvious fluid overload   Other NRE: NONE   DID A NON-ROUTINE EVENT OCCUR? No           Last vitals:  Vitals:    05/03/21 1200 05/03/21 1213 05/03/21 1245   BP: 125/89 126/69 119/79   Pulse: 61 67 74   Resp: 9 12 12   Temp:  36.7  C (98.1  F) 37.1  C (98.8  F)   SpO2: 100% 100% 98%       Last vitals prior to Anesthesia Care Transfer:  CRNA VITALS  5/3/2021 0957 - 5/3/2021 1057      5/3/2021             Temp:  36.6  C (97.9  F)     ax          Electronically Signed By: Sophia Rowan MD  May 3, 2021  2:58 PM

## 2021-05-03 NOTE — OR NURSING
"Awake and alert.  States \"wish to move on with recovery and get up\".  Vital signs stable.  Pt states \"pain controlled\".  Meets discharge criteria.  "

## 2021-05-04 ENCOUNTER — PRE VISIT (OUTPATIENT)
Dept: UROLOGY | Facility: CLINIC | Age: 36
End: 2021-05-04

## 2021-05-04 NOTE — TELEPHONE ENCOUNTER
Reason for visit: Post op follow up     Relevant information: Inflatable penile prosthesis insertion    Records/imaging/labs/orders: in EPIC    Pt called: no    At Rooming: normal

## 2021-05-14 ENCOUNTER — DOCUMENTATION ONLY (OUTPATIENT)
Dept: UROLOGY | Facility: CLINIC | Age: 36
End: 2021-05-14

## 2021-05-14 ENCOUNTER — TELEPHONE (OUTPATIENT)
Dept: FAMILY MEDICINE | Facility: CLINIC | Age: 36
End: 2021-05-14

## 2021-05-14 NOTE — TELEPHONE ENCOUNTER
Dr. Stroud is out of office. Lab orders will be entered prior to lab visit that is scheduled. Please communicate to patient    Peter Mcgrath, DO

## 2021-05-14 NOTE — PROGRESS NOTES
TELEPHONE CALL:  A page came through for On-call Urology to contact Mr. Dee who is a transgender male s/p phalloplasty who most recently underwent 5/3/21 3-piece IPP Titan placement with Dr. Carter.  He tells me that his IPP was left partially inflated postop, but today when he was checking placement of the pump and moving it into the dependent scrotum he fears he deflated the cylinders completely.  He is otherwise having no pain, using no narcotics and the wounds are healing normally and he has no further concerns.     Discussed with Dr. Carter, then patient:  - OK to put one or two pumps of fluid into the cylinders.  Not more than that.  We do not want to over-inflate.  Alternatively, ok to just leave things alone.    - Patient has known followup with Dr. Carter on 5/19/21.     [facility]

## 2021-05-14 NOTE — TELEPHONE ENCOUNTER
RN contacted patient and relayed message from Dr. Mcgrath. He verbalized understanding.   Shena Claudio RN

## 2021-05-14 NOTE — TELEPHONE ENCOUNTER
[facility] phone call message- general phone call:    Reason for call: Patient calling to schedule CPE and labs prior. Patient scheduled for lab only appointment on 6/1/21 and CPE with Dr Stroud on 6/7/21.    Action Desired: Patient requesting that Dr Stroud please place orders for labs that she would like patient to have drawn.    Return call needed: No    OK to leave a message on voice mail? Yes    Advised patient response may take up to 2 business days: No - n/a    Primary language: English      needed? No    Call taken on May 14, 2021 at 10:34 AM by Lata Camara    Route to [facility] TRIAGE

## 2021-05-19 ENCOUNTER — OFFICE VISIT (OUTPATIENT)
Dept: UROLOGY | Facility: CLINIC | Age: 36
End: 2021-05-19
Payer: COMMERCIAL

## 2021-05-19 VITALS — SYSTOLIC BLOOD PRESSURE: 115 MMHG | DIASTOLIC BLOOD PRESSURE: 74 MMHG | HEART RATE: 73 BPM

## 2021-05-19 DIAGNOSIS — N52.9 ORGANIC ERECTILE DYSFUNCTION: Primary | ICD-10-CM

## 2021-05-19 DIAGNOSIS — F64.9 GENDER DYSPHORIA: ICD-10-CM

## 2021-05-19 PROCEDURE — 99024 POSTOP FOLLOW-UP VISIT: CPT | Performed by: UROLOGY

## 2021-05-19 ASSESSMENT — PAIN SCALES - GENERAL: PAINLEVEL: MILD PAIN (2)

## 2021-05-19 NOTE — LETTER
5/19/2021       RE: Myrna Dee  [address] [facility] [id number]     Dear Colleague,    Thank you for referring your patient, Myrna Dee, to the [facility] [place] at [facility]. Please see a copy of my visit note below.    Myrna Dee is a 36 year old transgender male.  He underwent phalloplasty in staged manner.  He recently underwent an IPP placement with me 5/3/2021  He is doing well    Vital signs reviewed    Exam:  Incision clean, dry, intact  No tenderness or evidence of cellulitis  No hematoma  Phallus healthy  Scrotum somewhat small.  Pump dependent in left hemiscrotum  IPP cycles well.  He demonstrates ability to use it well.    A/P   Excellent outcome after IPP placement for a transgender male for ED in a [facility]  I demonstrated device to him today  He demonstrated excellent ability to use it.  He is ok to use it 6-8 weeks postop once his wounds have completely healed.  He'll send me a mychart update with his progress at that time.    Octavio Carter MD

## 2021-05-19 NOTE — NURSING NOTE
"Chief Complaint   Patient presents with     RECHECK     IPP post op        Blood pressure 115/74, pulse 73, not currently breastfeeding. There is no height or weight on file to calculate BMI.    Patient Active Problem List   Diagnosis     Problem drinking     High risk HPV infection     Gender dysphoria in adolescent and adult     Papanicolaou smear of cervix with atypical squamous cells cannot exclude high grade squamous intraepithelial lesion (ASC-H)     Gender dysphoria in adult     Adjustment reaction     Gender dysphoria     Other post-procedural erectile dysfunction       No Known Allergies    Current Outpatient Medications   Medication Sig Dispense Refill     B-D SYRINGE LUER-WU 1 ML MISC Use for weekly hormone injection 25 each 3     Needle, Disp, 23G X 1\" MISC Use weekly for hormone injection 25 each 3     Pediatric Multivit-Minerals-C (FLINTSTONES GUMMIES PO) Take 2 tablets by mouth every morning        testosterone cypionate (DEPOTESTOSTERONE) 200 MG/ML injection INJECT 0.25 ML INTO THE MUSCLES ONCE A WEEK. (Patient taking differently: Inject into the muscle once a week INJECT 0.25 ML INTO THE MUSCLES ONCE A WEEK.) 13 mL 0     BD DISP NEEDLES 22G X 1-1/2\" MISC INJECT 0.25 MLS INTO THE MUSCLE ONCE A WEEK       oxyCODONE (ROXICODONE) 5 MG tablet Take 1 tablet (5 mg) by mouth every 6 hours as needed for pain (Patient not taking: Reported on 5/19/2021) 28 tablet 0     Syringe/Needle, Disp, 22G X 1-1/2\" 1 ML MISC Inject 0.25 mLs into the muscle once a week 30 each 4       Social History     Tobacco Use     Smoking status: Never Smoker     Smokeless tobacco: Never Used   Substance Use Topics     Alcohol use: Yes     Drug use: No       Alexa Angeles CMA  5/19/2021  11:04 AM     "

## 2021-05-19 NOTE — PATIENT INSTRUCTIONS
Follow up as needed. Please MyChart Dr. Carter after the second time you use your device to notify him how it went.    It was a pleasure meeting with you today.  Thank you for allowing me and my team the privilege of caring for you today.  YOU are the reason we are here, and I truly hope we provided you with the excellent service you deserve.  Please let us know if there is anything else we can do for you so that we can be sure you are leaving completely satisfied with your care experience.        Alexa Angeles, [facility]     Carol Nichols(Attending)

## 2021-05-25 NOTE — PROGRESS NOTES
Myrna Dee is a 36 year old transgender male.  He underwent phalloplasty in staged manner.  He recently underwent an IPP placement with me 5/3/2021  He is doing well    Vital signs reviewed    Exam:  Incision clean, dry, intact  No tenderness or evidence of cellulitis  No hematoma  Phallus healthy  Scrotum somewhat small.  Pump dependent in left hemiscrotum  IPP cycles well.  He demonstrates ability to use it well.    A/P   Excellent outcome after IPP placement for a transgender male for ED in a [facility]  I demonstrated device to him today  He demonstrated excellent ability to use it.  He is ok to use it 6-8 weeks postop once his wounds have completely healed.  He'll send me a mychart update with his progress at that time.    Octavio Carter MD

## 2021-06-01 DIAGNOSIS — F64.0 GENDER DYSPHORIA IN ADOLESCENT AND ADULT: ICD-10-CM

## 2021-06-01 DIAGNOSIS — F64.0 GENDER DYSPHORIA IN ADOLESCENT AND ADULT: Primary | ICD-10-CM

## 2021-06-01 LAB
% IMMATURE GRANULOCYTES: 0 % (ref 0–0)
ABS IMMATURE GRANULOCYTES: 0 10E9/L (ref 0–0)
ALBUMIN SERPL-MCNC: 4.1 G/DL (ref 3.4–5)
ALP SERPL-CCNC: 61 U/L (ref 40–150)
ALT SERPL W P-5'-P-CCNC: 16 U/L (ref 0–70)
ANION GAP SERPL CALCULATED.3IONS-SCNC: 4 MMOL/L (ref 3–14)
AST SERPL W P-5'-P-CCNC: 17 U/L (ref 0–45)
BASOPHILS # BLD AUTO: 0 10E9/L (ref 0–0.2)
BASOPHILS NFR BLD AUTO: 1 % (ref 0–2)
BILIRUB SERPL-MCNC: 1 MG/DL (ref 0.2–1.3)
BUN SERPL-MCNC: 14 MG/DL (ref 7–30)
CALCIUM SERPL-MCNC: 9.1 MG/DL (ref 8.5–10.1)
CHLORIDE SERPL-SCNC: 106 MMOL/L (ref 94–109)
CHOLEST SERPL-MCNC: 231 MG/DL
CO2 SERPL-SCNC: 28 MMOL/L (ref 20–32)
CREAT SERPL-MCNC: 0.77 MG/DL (ref 0.66–1.25)
EOSINOPHIL # BLD AUTO: 0.3 10E9/L (ref 0–0.7)
EOSINOPHIL NFR BLD AUTO: 5 % (ref 0–6)
ERYTHROCYTE [DISTWIDTH] IN BLOOD BY AUTOMATED COUNT: 13.1 % (ref 10–15)
GFR SERPL CREATININE-BSD FRML MDRD: >90 ML/MIN/{1.73_M2}
GLUCOSE SERPL-MCNC: 76 MG/DL (ref 70–99)
HCT VFR BLD AUTO: 40 % (ref 40–53)
HDLC SERPL-MCNC: 127 MG/DL
HEMOGLOBIN: 13.5 G/DL (ref 13.3–17.7)
LDLC SERPL CALC-MCNC: 96 MG/DL
LYMPHOCYTES # BLD AUTO: 3.2 10E9/L (ref 0.8–5.3)
LYMPHOCYTES NFR BLD AUTO: 47.9 % (ref 20–48)
MCH RBC QN AUTO: 29.6 PG (ref 26.5–35)
MCHC RBC AUTO-ENTMCNC: 33.8 G/DL (ref 32–36)
MCV RBC AUTO: 87.7 FL (ref 78–100)
MONOCYTES # BLD AUTO: 0.6 10E9/L (ref 0–1.3)
MONOCYTES NFR BLD AUTO: 9 % (ref 0–12)
NEUTROPHILS # BLD AUTO: 2.5 10E9/L (ref 1.6–8.3)
NEUTROPHILS NFR BLD AUTO: 37.6 % (ref 40–75)
NONHDLC SERPL-MCNC: 104 MG/DL
PLATELET # BLD AUTO: 221 10E9/L (ref 150–450)
POTASSIUM SERPL-SCNC: 3.8 MMOL/L (ref 3.4–5.3)
PROT SERPL-MCNC: 7.5 G/DL (ref 6.8–8.8)
RBC # BLD AUTO: 4.56 10E12/L (ref 4.4–5.9)
SODIUM SERPL-SCNC: 138 MMOL/L (ref 133–144)
TRIGL SERPL-MCNC: 41 MG/DL
WBC # BLD AUTO: 6.6 10E9/L (ref 4–11)

## 2021-06-01 PROCEDURE — 80061 LIPID PANEL: CPT | Performed by: FAMILY MEDICINE

## 2021-06-01 PROCEDURE — 80053 COMPREHEN METABOLIC PANEL: CPT | Performed by: FAMILY MEDICINE

## 2021-06-01 PROCEDURE — 85025 COMPLETE CBC W/AUTO DIFF WBC: CPT

## 2021-06-01 PROCEDURE — 36415 COLL VENOUS BLD VENIPUNCTURE: CPT | Performed by: FAMILY MEDICINE

## 2021-06-01 PROCEDURE — 84403 ASSAY OF TOTAL TESTOSTERONE: CPT | Performed by: FAMILY MEDICINE

## 2021-06-03 LAB — TESTOST SERPL-MCNC: 457 NG/DL (ref 240–950)

## 2021-06-07 ENCOUNTER — OFFICE VISIT (OUTPATIENT)
Dept: FAMILY MEDICINE | Facility: CLINIC | Age: 36
End: 2021-06-07
Payer: COMMERCIAL

## 2021-06-07 VITALS
DIASTOLIC BLOOD PRESSURE: 80 MMHG | WEIGHT: 164 LBS | BODY MASS INDEX: 27.29 KG/M2 | RESPIRATION RATE: 16 BRPM | HEART RATE: 66 BPM | TEMPERATURE: 97.7 F | SYSTOLIC BLOOD PRESSURE: 118 MMHG | OXYGEN SATURATION: 99 %

## 2021-06-07 DIAGNOSIS — B97.7 HIGH RISK HPV INFECTION: ICD-10-CM

## 2021-06-07 DIAGNOSIS — Z00.00 ROUTINE GENERAL MEDICAL EXAMINATION AT A HEALTH CARE FACILITY: Primary | ICD-10-CM

## 2021-06-07 DIAGNOSIS — F64.0 GENDER DYSPHORIA IN ADULT: ICD-10-CM

## 2021-06-07 DIAGNOSIS — L53.9 SKIN ERYTHEMA: ICD-10-CM

## 2021-06-07 DIAGNOSIS — R87.611 PAPANICOLAOU SMEAR OF CERVIX WITH ATYPICAL SQUAMOUS CELLS CANNOT EXCLUDE HIGH GRADE SQUAMOUS INTRAEPITHELIAL LESION (ASC-H): ICD-10-CM

## 2021-06-07 PROBLEM — F64.9 GENDER DYSPHORIA: Status: RESOLVED | Noted: 2020-02-03 | Resolved: 2021-06-07

## 2021-06-07 PROCEDURE — 99395 PREV VISIT EST AGE 18-39: CPT | Performed by: FAMILY MEDICINE

## 2021-06-07 PROCEDURE — 99213 OFFICE O/P EST LOW 20 MIN: CPT | Mod: 25 | Performed by: FAMILY MEDICINE

## 2021-06-07 SDOH — ECONOMIC STABILITY: TRANSPORTATION INSECURITY
IN THE PAST 12 MONTHS, HAS LACK OF TRANSPORTATION KEPT YOU FROM MEETINGS, WORK, OR FROM GETTING THINGS NEEDED FOR DAILY LIVING?: NO

## 2021-06-07 SDOH — SOCIAL STABILITY: SOCIAL INSECURITY
WITHIN THE LAST YEAR, HAVE YOU BEEN KICKED, HIT, SLAPPED, OR OTHERWISE PHYSICALLY HURT BY YOUR PARTNER OR EX-PARTNER?: NO

## 2021-06-07 SDOH — ECONOMIC STABILITY: INCOME INSECURITY: HOW HARD IS IT FOR YOU TO PAY FOR THE VERY BASICS LIKE FOOD, HOUSING, MEDICAL CARE, AND HEATING?: NOT HARD AT ALL

## 2021-06-07 SDOH — ECONOMIC STABILITY: FOOD INSECURITY: WITHIN THE PAST 12 MONTHS, YOU WORRIED THAT YOUR FOOD WOULD RUN OUT BEFORE YOU GOT MONEY TO BUY MORE.: NEVER TRUE

## 2021-06-07 SDOH — HEALTH STABILITY: MENTAL HEALTH: HOW OFTEN DO YOU HAVE A DRINK CONTAINING ALCOHOL?: 4 OR MORE TIMES A WEEK

## 2021-06-07 SDOH — SOCIAL STABILITY: SOCIAL NETWORK
DO YOU BELONG TO ANY CLUBS OR ORGANIZATIONS SUCH AS CHURCH GROUPS UNIONS, FRATERNAL OR ATHLETIC GROUPS, OR SCHOOL GROUPS?: NOT ASKED

## 2021-06-07 SDOH — SOCIAL STABILITY: SOCIAL NETWORK: HOW OFTEN DO YOU ATTEND CHURCH OR RELIGIOUS SERVICES?: NOT ASKED

## 2021-06-07 SDOH — SOCIAL STABILITY: SOCIAL NETWORK: HOW OFTEN DO YOU ATTENT MEETINGS OF THE CLUB OR ORGANIZATION YOU BELONG TO?: NOT ASKED

## 2021-06-07 SDOH — SOCIAL STABILITY: SOCIAL INSECURITY: WITHIN THE LAST YEAR, HAVE YOU BEEN HUMILIATED OR EMOTIONALLY ABUSED IN OTHER WAYS BY YOUR PARTNER OR EX-PARTNER?: NO

## 2021-06-07 SDOH — SOCIAL STABILITY: SOCIAL NETWORK: HOW OFTEN DO YOU GET TOGETHER WITH FRIENDS OR RELATIVES?: NOT ASKED

## 2021-06-07 SDOH — SOCIAL STABILITY: SOCIAL INSECURITY: WITHIN THE LAST YEAR, HAVE YOU BEEN AFRAID OF YOUR PARTNER OR EX-PARTNER?: NO

## 2021-06-07 SDOH — HEALTH STABILITY: PHYSICAL HEALTH: ON AVERAGE, HOW MANY MINUTES DO YOU ENGAGE IN EXERCISE AT THIS LEVEL?: 20 MIN

## 2021-06-07 SDOH — SOCIAL STABILITY: SOCIAL INSECURITY
WITHIN THE LAST YEAR, HAVE TO BEEN RAPED OR FORCED TO HAVE ANY KIND OF SEXUAL ACTIVITY BY YOUR PARTNER OR EX-PARTNER?: NO

## 2021-06-07 SDOH — SOCIAL STABILITY: SOCIAL NETWORK: IN A TYPICAL WEEK, HOW MANY TIMES DO YOU TALK ON THE PHONE WITH FAMILY, FRIENDS, OR NEIGHBORS?: NOT ASKED

## 2021-06-07 SDOH — ECONOMIC STABILITY: TRANSPORTATION INSECURITY
IN THE PAST 12 MONTHS, HAS THE LACK OF TRANSPORTATION KEPT YOU FROM MEDICAL APPOINTMENTS OR FROM GETTING MEDICATIONS?: NO

## 2021-06-07 SDOH — ECONOMIC STABILITY: FOOD INSECURITY: WITHIN THE PAST 12 MONTHS, THE FOOD YOU BOUGHT JUST DIDN'T LAST AND YOU DIDN'T HAVE MONEY TO GET MORE.: NEVER TRUE

## 2021-06-07 SDOH — SOCIAL STABILITY: SOCIAL NETWORK: ARE YOU MARRIED, WIDOWED, DIVORCED, SEPARATED, NEVER MARRIED, OR LIVING WITH A PARTNER?: NOT ASKED

## 2021-06-07 SDOH — HEALTH STABILITY: MENTAL HEALTH: HOW MANY STANDARD DRINKS CONTAINING ALCOHOL DO YOU HAVE ON A TYPICAL DAY?: NOT ASKED

## 2021-06-07 SDOH — HEALTH STABILITY: MENTAL HEALTH: HOW OFTEN DO YOU HAVE 6 OR MORE DRINKS ON ONE OCCASION?: LESS THAN MONTHLY

## 2021-06-07 SDOH — HEALTH STABILITY: PHYSICAL HEALTH: ON AVERAGE, HOW MANY DAYS PER WEEK DO YOU ENGAGE IN MODERATE TO STRENUOUS EXERCISE (LIKE A BRISK WALK)?: 3 DAYS

## 2021-06-07 SDOH — HEALTH STABILITY: MENTAL HEALTH
STRESS IS WHEN SOMEONE FEELS TENSE, NERVOUS, ANXIOUS, OR CAN'T SLEEP AT NIGHT BECAUSE THEIR MIND IS TROUBLED. HOW STRESSED ARE YOU?: TO SOME EXTENT

## 2021-06-07 NOTE — PROGRESS NOTES
"  Female Physical Note          HPI         Concerns today: {[facility] CONCERNS:341288}  {:833604}    Patient Active Problem List   Diagnosis     Problem drinking     High risk HPV infection     Gender dysphoria in adolescent and adult     Papanicolaou smear of cervix with atypical squamous cells cannot exclude high grade squamous intraepithelial lesion (ASC-H)     Gender dysphoria in adult     Adjustment reaction     Gender dysphoria     Other post-procedural erectile dysfunction       Past Medical History:   Diagnosis Date     Aseptic meningitis      Complication of anesthesia     HX slow wake up,HX panic attacks with vicodin postop     Gender dysphoria in adolescent and adult      High risk HPV infection      Papanicolaou smear of cervix with atypical squamous cells cannot exclude high grade squamous intraepithelial lesion (ASC-H)        Previous Medical Care   ***     Family History   Problem Relation Age of Onset     Dementia Father         frontotemporal dementia      Alcoholism Brother         possible     Diabetes Maternal Grandfather      Anesthesia Reaction Sister      Cardiovascular No family hx of      Deep Vein Thrombosis No family hx of               Review of Systems:     Review of Systems:  {ROS NORMAL:281022::\"CONSTITUTIONAL: NEGATIVE for fever, chills, change in weight\",\"INTEGUMENTARY/SKIN: NEGATIVE for worrisome rashes, moles or lesions\",\"EYES: NEGATIVE for vision changes or irritation\",\"ENT/MOUTH: NEGATIVE for ear, mouth and throat problems\",\"RESP: NEGATIVE for significant cough or SOB\",\"BREAST: NEGATIVE for masses, tenderness or discharge\",\"CV: NEGATIVE for chest pain, palpitations or peripheral edema\",\"GI: NEGATIVE for nausea, abdominal pain, heartburn, or change in bowel habits\",\": NEGATIVE for frequency, dysuria, or hematuria\",\"MUSCULOSKELETAL: NEGATIVE for significant arthralgias or myalgia\",\"NEURO: NEGATIVE for weakness, dizziness or paresthesias\",\"ENDOCRINE: NEGATIVE for temperature " "intolerance, skin/hair changes\",\"HEME/ALLERGY: NEGATIVE for bleeding problems\",\"PSYCHIATRIC: NEGATIVE for changes in mood or affect\"}  Sleep:   Do you snore most or the night (as reported by a family member)? {NO IS DEFAULT/YES:65122769::\"No\"}  Do you feel sleepy or extremely tired during most of the day? {NO IS DEFAULT/YES:21878676::\"No\"}  {If both questions are answered with \"yes\" consider evaluating the patient for ALEIDA with the dot phrase \".smics\" either this visit or at a follow up visit }           Social History     Social History     Socioeconomic History     Marital status:      Spouse name: Not on file     Number of children: Not on file     Years of education: Not on file     Highest education level: Not on file   Occupational History     Not on file   Social Needs     Financial resource strain: Not on file     Food insecurity     Worry: Not on file     Inability: Not on file     Transportation needs     Medical: Not on file     Non-medical: Not on file   Tobacco Use     Smoking status: Never Smoker     Smokeless tobacco: Never Used   Substance and Sexual Activity     Alcohol use: Yes     Drug use: No     Sexual activity: Yes     Partners: Female     Birth control/protection: None   Lifestyle     Physical activity     Days per week: Not on file     Minutes per session: Not on file     Stress: Not on file   Relationships     Social connections     Talks on phone: Not on file     Gets together: Not on file     Attends [employer]alian service: Not on file     Active member of club or organization: Not on file     Attends meetings of clubs or organizations: Not on file     Relationship status: Not on file     Intimate partner violence     Fear of current or ex partner: Not on file     Emotionally abused: Not on file     Physically abused: Not on file     Forced sexual activity: Not on file   Other Topics Concern     Parent/sibling w/ CABG, MI or angioplasty before 65F 55M? No   Social History Narrative    " "Lives with wife (Meliza), bought a house in [place]. Wife pregnant.     Works as a  at [employer].     Bikes or runs to commute, very fit. Good dairy intake.     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        Marital Status:{MARITAL STATUS:738980}  Who lives in your household? ***    Has anyone hurt you physically, for example by pushing, hitting, slapping or kicking you or forcing you to have sex? {[facility] DENIES:340247::\"Denies\"}  Do you feel threatened or controlled by a partner, ex-partner or anyone in your life? {[facility] DENIES:477364::\"Denies\"}    Sexual Health     Sexual concerns: {YES / NO:413284::\"Yes\"}   STI History: {NEG/POS-NEG DEFAULT:137980::\"Neg\"}  Pregnancy History:   LMP No LMP recorded. Patient has had a hysterectomy. {[facility] LMP:429700}  Last Pap Smear Date:   Lab Results   Component Value Date    PAP ASC-US 2020    PAP ASC-US 2018    PAP ASC-H 2018     Abnormal Pap History: {[facility] ABNORMAL PAP:235627}    Recommended Screening     {[facility] USPSTF LEVEL A:139321}  {[facility] USPSTF LEVEL B:985792}  {B-RST BRCA- Risk Tool}         Physical Exam:     Vitals: Blood Pressure 118/80   Pulse 66   Temperature 97.7  F (36.5  C) (Oral)   Respiration 16   Weight 74.4 kg (164 lb)   Oxygen Saturation 99%   Body Mass Index 27.29 kg/m    BMI= Body mass index is 27.29 kg/m .   {EXAM - FEMALE- zebra stripe:563994::\"GENERAL: healthy, alert and no distress\",\"EYES: Eyes grossly normal to inspection, extraocular movements - intact, and PERRL\",\"HENT: ear canals- normal; TMs- normal; Nose- normal; Mouth- no ulcers, no lesions\",\"NECK: no tenderness, no adenopathy, no asymmetry, no masses, no stiffness; thyroid- normal to palpation\",\"RESP: lungs clear to auscultation - no rales, no rhonchi, no wheezes\",\"BREAST: no masses, no tenderness, no nipple discharge, no palpable axillary masses or adenopathy\",\"CV: regular rates and rhythm, normal S1 S2, " "no S3 or S4 and no murmur, no click or rub -\",\"ABDOMEN: soft, no tenderness, no  hepatosplenomegaly, no masses, normal bowel sounds\",\"MS: extremities- no gross deformities noted, no edema\",\"SKIN: no suspicious lesions, no rashes\",\"NEURO: strength and tone- normal, sensory exam- grossly normal, mentation- intact, speech- normal, reflexes- symmetric\",\"BACK: no CVA tenderness, no paralumbar tenderness\",\"- female: cervix- normal, adnexae- normal; uterus- normal, no masses, no discharge\",\"RECTAL- female: no masses, no hemorrhoids\",\"PSYCH: Alert and oriented times 3; speech- coherent , normal rate and volume; able to articulate logical thoughts, able to abstract reason, no tangential thoughts, no hallucinations or delusions, affect- normal\",\"LYMPHATICS: ant. cervical- normal, post. cervical- normal, axillary- normal, supraclavicular- normal, inguinal- normal\"}      Assessment and Plan      Myrna was seen today for physical.    Diagnoses and all orders for this visit:    Routine general medical examination at a health care facility        {[facility] FEMALE ASSESSMENT:451099::\"1. Health Care Maintenance: Normal Physical Exam\"}      1. {[facility] FEMALE PLAN 1:965244}  2.Contraception: {[facility] CONTRACEPTION:645140}    Options for treatment and follow-up care were reviewed with the patient . Myrna Dee and/or guardian engaged in the decision making process and verbalized understanding of the options discussed and agreed with the final plan.    Mario Alberto Evans  "

## 2021-06-07 NOTE — PROGRESS NOTES
"  Female Physical Note          HPI         Concerns today: {[facility] CONCERNS:833987}  {:043547}    Patient Active Problem List   Diagnosis     Problem drinking     High risk HPV infection     Gender dysphoria in adolescent and adult     Papanicolaou smear of cervix with atypical squamous cells cannot exclude high grade squamous intraepithelial lesion (ASC-H)     Gender dysphoria in adult     Adjustment reaction     Gender dysphoria     Other post-procedural erectile dysfunction       Past Medical History:   Diagnosis Date     Aseptic meningitis      Complication of anesthesia     HX slow wake up,HX panic attacks with vicodin postop     Gender dysphoria in adolescent and adult      High risk HPV infection      Papanicolaou smear of cervix with atypical squamous cells cannot exclude high grade squamous intraepithelial lesion (ASC-H)        Previous Medical Care   ***     Family History   Problem Relation Age of Onset     Dementia Father         frontotemporal dementia      Alcoholism Brother         possible     Diabetes Maternal Grandfather      Anesthesia Reaction Sister      Cardiovascular No family hx of      Deep Vein Thrombosis No family hx of               Review of Systems:     Review of Systems:  {ROS NORMAL:777130::\"CONSTITUTIONAL: NEGATIVE for fever, chills, change in weight\",\"INTEGUMENTARY/SKIN: NEGATIVE for worrisome rashes, moles or lesions\",\"EYES: NEGATIVE for vision changes or irritation\",\"ENT/MOUTH: NEGATIVE for ear, mouth and throat problems\",\"RESP: NEGATIVE for significant cough or SOB\",\"BREAST: NEGATIVE for masses, tenderness or discharge\",\"CV: NEGATIVE for chest pain, palpitations or peripheral edema\",\"GI: NEGATIVE for nausea, abdominal pain, heartburn, or change in bowel habits\",\": NEGATIVE for frequency, dysuria, or hematuria\",\"MUSCULOSKELETAL: NEGATIVE for significant arthralgias or myalgia\",\"NEURO: NEGATIVE for weakness, dizziness or paresthesias\",\"ENDOCRINE: NEGATIVE for temperature " "intolerance, skin/hair changes\",\"HEME/ALLERGY: NEGATIVE for bleeding problems\",\"PSYCHIATRIC: NEGATIVE for changes in mood or affect\"}  Sleep:   Do you snore most or the night (as reported by a family member)? {NO IS DEFAULT/YES:31465490::\"No\"}  Do you feel sleepy or extremely tired during most of the day? {NO IS DEFAULT/YES:07329587::\"No\"}  {If both questions are answered with \"yes\" consider evaluating the patient for ALEIDA with the dot phrase \".smics\" either this visit or at a follow up visit }           Social History     Social History     Socioeconomic History     Marital status:      Spouse name: Not on file     Number of children: Not on file     Years of education: Not on file     Highest education level: Not on file   Occupational History     Not on file   Social Needs     Financial resource strain: Not on file     Food insecurity     Worry: Not on file     Inability: Not on file     Transportation needs     Medical: Not on file     Non-medical: Not on file   Tobacco Use     Smoking status: Never Smoker     Smokeless tobacco: Never Used   Substance and Sexual Activity     Alcohol use: Yes     Drug use: No     Sexual activity: Yes     Partners: Female     Birth control/protection: None   Lifestyle     Physical activity     Days per week: Not on file     Minutes per session: Not on file     Stress: Not on file   Relationships     Social connections     Talks on phone: Not on file     Gets together: Not on file     Attends [employer] service: Not on file     Active member of club or organization: Not on file     Attends meetings of clubs or organizations: Not on file     Relationship status: Not on file     Intimate partner violence     Fear of current or ex partner: Not on file     Emotionally abused: Not on file     Physically abused: Not on file     Forced sexual activity: Not on file   Other Topics Concern     Parent/sibling w/ CABG, MI or angioplasty before 65F 55M? No   Social History Narrative    " "Lives with wife (Meliza), bought a house in [place]. Wife pregnant.     Works as a  at [employer].     Bikes or runs to commute, very fit. Good dairy intake.     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        Marital Status:{MARITAL STATUS:008758}  Who lives in your household? ***    Has anyone hurt you physically, for example by pushing, hitting, slapping or kicking you or forcing you to have sex? {[facility] DENIES:788769::\"Denies\"}  Do you feel threatened or controlled by a partner, ex-partner or anyone in your life? {[facility] DENIES:241994::\"Denies\"}    Sexual Health     Sexual concerns: {YES / NO:863567::\"Yes\"}   STI History: {NEG/POS-NEG DEFAULT:763603::\"Neg\"}  Pregnancy History:   LMP No LMP recorded. Patient has had a hysterectomy. {[facility] LMP:170214}  Last Pap Smear Date:   Lab Results   Component Value Date    PAP ASC-US 2020    PAP ASC-US 2018    PAP ASC-H 2018     Abnormal Pap History: {[facility] ABNORMAL PAP:363946}    Recommended Screening     {[facility] USPSTF LEVEL A:915328}  {[facility] USPSTF LEVEL B:542206}  {B-RST BRCA- Risk Tool}         Physical Exam:     Vitals: Blood Pressure 118/80   Pulse 66   Temperature 97.7  F (36.5  C) (Oral)   Respiration 16   Weight 74.4 kg (164 lb)   Oxygen Saturation 99%   Body Mass Index 27.29 kg/m    BMI= Body mass index is 27.29 kg/m .   {EXAM - FEMALE- zebra stripe:854904::\"GENERAL: healthy, alert and no distress\",\"EYES: Eyes grossly normal to inspection, extraocular movements - intact, and PERRL\",\"HENT: ear canals- normal; TMs- normal; Nose- normal; Mouth- no ulcers, no lesions\",\"NECK: no tenderness, no adenopathy, no asymmetry, no masses, no stiffness; thyroid- normal to palpation\",\"RESP: lungs clear to auscultation - no rales, no rhonchi, no wheezes\",\"BREAST: no masses, no tenderness, no nipple discharge, no palpable axillary masses or adenopathy\",\"CV: regular rates and rhythm, normal S1 S2, " "no S3 or S4 and no murmur, no click or rub -\",\"ABDOMEN: soft, no tenderness, no  hepatosplenomegaly, no masses, normal bowel sounds\",\"MS: extremities- no gross deformities noted, no edema\",\"SKIN: no suspicious lesions, no rashes\",\"NEURO: strength and tone- normal, sensory exam- grossly normal, mentation- intact, speech- normal, reflexes- symmetric\",\"BACK: no CVA tenderness, no paralumbar tenderness\",\"- female: cervix- normal, adnexae- normal; uterus- normal, no masses, no discharge\",\"RECTAL- female: no masses, no hemorrhoids\",\"PSYCH: Alert and oriented times 3; speech- coherent , normal rate and volume; able to articulate logical thoughts, able to abstract reason, no tangential thoughts, no hallucinations or delusions, affect- normal\",\"LYMPHATICS: ant. cervical- normal, post. cervical- normal, axillary- normal, supraclavicular- normal, inguinal- normal\"}      Assessment and Plan      Myrna was seen today for physical.    Diagnoses and all orders for this visit:    Routine general medical examination at a health care facility        {[facility] FEMALE ASSESSMENT:919563::\"1. Health Care Maintenance: Normal Physical Exam\"}      1. {[facility] FEMALE PLAN 1:121017}  2.Contraception: {[facility] CONTRACEPTION:496365}    Options for treatment and follow-up care were reviewed with the patient . Myrna Dee and/or guardian engaged in the decision making process and verbalized understanding of the options discussed and agreed with the final plan.    Mario Alberto Evans  "

## 2021-06-07 NOTE — PROGRESS NOTES
Male Physical Note          HPI   37 y/o individual presents for physical today.    Concerns today: Sexual health    Preventative  Hasn't been exercising since surgery but has plans to bike when he's allowed to post-op. Has been walking circles in basement, stretching, reading books. When not potty-training, he takes at least one family walk outside a day. Is planning to f/u for dental visit now that he's COVID-19 vaccinated. Gets three servings of dairy/calcium daily. Applies sunscreen when outside. Has questions regarding immunizations.    Mood  Reports excessive drinking for a two-month period during pandemic, but started limited drinking again. Will occasionally have more than 6 drinks in a day. Mood is okay, has no concerns around mood.    Gender  Has some sharp pain next to the scrotum, will be following up with urology. It hurts at different times, wakes him up. Has questions regarding sexual health post-surgery. Planning to open marriage soon to other partners. Needs to wait 6-8 weeks post-op before having sex, so hasn't tried any condoms since before last surgery, when he was using condoms over coban to induce erection. Doesn't have the emotional bandwidth for emotional sex. Is not interested in PrEP unless wife is. Has questions regarding T dose and labs.    Social Hx  Employed. . Wife is currently 7 months pregnant. Has a 19 month old daughter. No tobacco use. Alcohol use, 15 drinks a week on average and occasional 0 drink days.    FHx  - Paternal grandmother - Breast CA    Patient Active Problem List   Diagnosis     High risk HPV infection     Gender dysphoria in adult     Other post-procedural erectile dysfunction       Past Medical History:   Diagnosis Date     Aseptic meningitis      Complication of anesthesia     HX slow wake up,HX panic attacks with vicodin postop     Gender dysphoria in adolescent and adult      High risk HPV infection      Papanicolaou smear of cervix with atypical  squamous cells cannot exclude high grade squamous intraepithelial lesion (ASC-H)        Previous Medical Care   [facility]     Family History   Problem Relation Age of Onset     Dementia Father         frontotemporal dementia      Alcoholism Brother         possible     Diabetes Maternal Grandfather      Anesthesia Reaction Sister      Breast Cancer Paternal Grandmother      Cardiovascular No family hx of      Deep Vein Thrombosis No family hx of            Review of Systems:     Review of Systems:  CONSTITUTIONAL: NEGATIVE for fever, chills, change in weight  INTEGUMENTARY/SKIN: NEGATIVE for worrisome rashes, moles or lesions  EYES: NEGATIVE for vision changes or irritation  ENT/MOUTH: NEGATIVE for ear, mouth and throat problems  RESP: NEGATIVE for significant cough or SOB  BREAST: NEGATIVE for masses, tenderness or discharge  CV: NEGATIVE for chest pain, palpitations or peripheral edema  GI: NEGATIVE for nausea, abdominal pain, heartburn, or change in bowel habits  : NEGATIVE for frequency, dysuria, or hematuria  MUSCULOSKELETAL: NEGATIVE for significant arthralgias or myalgia  NEURO: NEGATIVE for weakness, dizziness or paresthesias  ENDOCRINE: NEGATIVE for temperature intolerance, skin/hair changes  HEME/ALLERGY: NEGATIVE for bleeding problems  PSYCHIATRIC: NEGATIVE for changes in mood or affect  Sleep:   Do you snore most or the night (as reported by a family member)? Yes  Do you feel sleepy or extremely tired during most of the day? No    See HPI for additional sx.    This document serves as a record of the services and decisions personally performed and made by Clarissa Stroud MD. It was created on his/her behalf by Mario Alberto Evans, a trained medical scribe. The creation of this document is based the provider's statements to the medical scribe.  Scribkarina Evans 10:28 AM, June 7, 2021       Social History     Social History     Socioeconomic History     Marital status:      Spouse name: Meliza Saavedra  of children: 1     Years of education: 16     Highest education level: Not on file   Occupational History     Not on file   Social Needs     Financial resource strain: Not hard at all     Food insecurity     Worry: Never true     Inability: Never true     Transportation needs     Medical: No     Non-medical: No   Tobacco Use     Smoking status: Never Smoker     Smokeless tobacco: Never Used   Substance and Sexual Activity     Alcohol use: Yes     Alcohol/week: 15.0 standard drinks     Types: 15 Cans of beer per week     Frequency: 4 or more times a week     Binge frequency: Less than monthly     Drug use: No     Sexual activity: Yes     Partners: Female     Birth control/protection: None   Lifestyle     Physical activity     Days per week: 3 days     Minutes per session: 20 min     Stress: To some extent   Relationships     Social connections     Talks on phone: Not on file     Gets together: Not on file     Attends Hoahaoism service: Not on file     Active member of club or organization: Not on file     Attends meetings of clubs or organizations: Not on file     Relationship status: Not on file     Intimate partner violence     Fear of current or ex partner: No     Emotionally abused: No     Physically abused: No     Forced sexual activity: No   Other Topics Concern     Parent/sibling w/ CABG, MI or angioplasty before 65F 55M? No   Social History Narrative    Lives with wife (Meliza), bought a house in [place], has a 19 mo, Hecate. Wife pregnant.     Bikes or runs to commute, very fit. Good dairy intake.     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     Diet adequate in calcium        Marital Status:  Who lives in your household? Wife, 19 month old daughter    Has anyone hurt you physically, for example by pushing, hitting, slapping or kicking you or forcing you to have sex? Denies  Do you feel threatened or controlled by a partner,  ex-partner or anyone in your life? Denies    Sexual Health     Sexual concerns: No   STI History: Neg    Recommended Screening     HIV screening:  Not indicated at this time, but discussed future screening.  Breast CA Screening (>41 yo or 10 y before 1st degree relative diagnosis): Not indicated.         Physical Exam:   Vitals: Blood Pressure 118/80   Pulse 66   Temperature 97.7  F (36.5  C) (Oral)   Respiration 16   Weight 74.4 kg (164 lb)   Oxygen Saturation 99%   Body Mass Index 27.29 kg/m    BMI= Body mass index is 27.29 kg/m .   Vitals were reviewed and were normal.    Physical Exam:  GENERAL: healthy, alert and no distress  EYES: Eyes grossly normal to inspection, extraocular movements - intact, and PERRL  HENT: ear canals- normal; TMs- normal; Nose- normal; Mouth- no ulcers, no lesions  NECK: no tenderness, no adenopathy, no asymmetry, no masses, no stiffness; thyroid- normal to palpation  RESP: lungs clear to auscultation - no rales, no rhonchi, no wheezes  CV: regular rates and rhythm, normal S1 S2, no S3 or S4 and no murmur, no click or rub -  ABDOMEN: soft, no tenderness, no  hepatosplenomegaly, no masses, normal bowel sounds  MS: extremities- no gross deformities noted, no edema  SKIN: 9 linear surgical scars scattered over the abdomen and pelvis, normal healing- clean, dry and intact. Small amount of erythema, dime-shaped area at the upper right aspect of the scrotal tissue without drainage, punctum, induration and warmth, no evidence of stitch. Immediately adjacent to this scar on the right hand side - post op changes consistent with phalloplasty with circumferential at the base of the penis. Pump bulb present in the scrotum. Right scrotum somewhat hypoplastic, consistent with hx of necrotic area/skin infectoin after scrotoplasty, appears well-healed, no drainage. Left anterolateral thigh with extensive grafting scar.  NEURO: strength and tone- normal, sensory exam- grossly normal, mentation-  intact, speech- normal, reflexes- symmetric  BACK: no CVA tenderness, no paralumbar tenderness  PSYCH: Alert and oriented times 3; speech- coherent , normal rate and volume; able to articulate logical thoughts, able to abstract reason, affect- bright, full range  LYMPHATICS: ant. cervical- normal, post. cervical- normal, supraclavicular- normal, inguinal- normal  Assessment and Plan   Inocencio was seen today for physical.    Diagnoses and all orders for this visit:    Routine general medical examination at a health care facility  Restart exercise as able in postoperative period   Cont dietary choices   Watch drinking behavior  - ok now. Hx of at risk drinking     Gender dysphoria in adult  - Labs reviewed. No other interventions needed since dysphoria is controlled. Discussed sexual health and prevention at length. Follow up 1 year.     High risk HPV infection  Papanicolaou smear of cervix with atypical squamous cells cannot exclude high grade squamous intraepithelial lesion (ASC-H)  - Hx of high risk HPV; however, had pre and post lower surgery HPV swabs and paps without concern, and the entire surgical area was removed, there is nothing left after masculinizing procedures. So this was removed from health maintenance, and he was educated on no further need for any sort of HPV or pap testing.    Skin Erythema  Same area with sharp pain , adjacent to penile incision for implantable pump. Likely incisional pain, rec massage, howvever I did speak with Dr. Carter about it, and infection is the most concerning thing given that device is fully functional for achieving erection and he is doing this daily. Will contact Inocencio to watch - if more red, warm, or more painful, needs to see Dr. Carter to eval for device infection. No indication for abx given recent labs with normal WBC.     Follow up with me in one year for routine physical or otherwise PRN.      Options for treatment and follow-up care were reviewed with the patient.  Inocencio Dee and/or guardian engaged in the decision making process and verbalized understanding of the options discussed and agreed with the final plan.    The information in this document, created by the medical scribe for me, accurately reflects the services I personally performed and the decisions made by me. I have reviewed and approved this document for accuracy prior to leaving the patient care area.    Clarissa Stroud MD  10:29 AM, 06/07/21  42 minutes spent on the date of the encounter doing chart review, review of test results, interpretation of tests, patient visit, documentation and and preventive visit. Educated about split billing.

## 2021-06-07 NOTE — PATIENT INSTRUCTIONS
Preventative  1. Continue going for walks and stretching  2. Inquire about age of onset for your paternal grandmother's breast cancer and how it was treated  3. Continue making healthy food choices  4. I recommend getting your flu shot for Fall 2021  5. Follow up with me in one year for routine physical or otherwise sooner.    Mood  1. Continue monitoring your mood symptoms. If any symptoms worsen, please let me know.    Sexual Health  1. Consider PrEP for HIV pre-exposure prevention  - There's also post-exposure prevention for potential HIV exposures  2. Continue your safe sex practices  3. If sharp pain does not improve, I would consider using a topical gel for neurological pain      Preventive Health Recommendations  Male Ages 26 - 39    Yearly exam:             See your health care provider every year in order to  o   Review health changes.   o   Discuss preventive care.    o   Review your medicines if your doctor has prescribed any.    You should be tested each year for STDs (sexually transmitted diseases), if you re at risk.     After age 35, talk to your provider about cholesterol testing. If you are at risk for heart disease, have your cholesterol tested at least every 5 years.     If you are at risk for diabetes, you should have a diabetes test (fasting glucose).  Shots: Get a flu shot each year. Get a tetanus shot every 10 years.     Nutrition:    Eat at least 5 servings of fruits and vegetables daily.     Eat whole-grain bread, whole-wheat pasta and brown rice instead of white grains and rice.     Get adequate Calcium and Vitamin D.     Lifestyle    Exercise for at least 150 minutes a week (30 minutes a day, 5 days a week). This will help you control your weight and prevent disease.     Limit alcohol to one drink per day.     No smoking.     Wear sunscreen to prevent skin cancer.     See your dentist every six months for an exam and cleaning.

## 2021-07-28 ENCOUNTER — OFFICE VISIT (OUTPATIENT)
Dept: FAMILY MEDICINE | Facility: CLINIC | Age: 36
End: 2021-07-28
Payer: COMMERCIAL

## 2021-07-28 VITALS
TEMPERATURE: 97.9 F | WEIGHT: 163.6 LBS | OXYGEN SATURATION: 98 % | SYSTOLIC BLOOD PRESSURE: 117 MMHG | BODY MASS INDEX: 26.29 KG/M2 | HEIGHT: 66 IN | DIASTOLIC BLOOD PRESSURE: 79 MMHG | HEART RATE: 76 BPM

## 2021-07-28 DIAGNOSIS — L90.5 SCAR CONDITION AND FIBROSIS OF SKIN: ICD-10-CM

## 2021-07-28 DIAGNOSIS — H60.391 INFECTIVE OTITIS EXTERNA, RIGHT: ICD-10-CM

## 2021-07-28 DIAGNOSIS — H66.001 NON-RECURRENT ACUTE SUPPURATIVE OTITIS MEDIA OF RIGHT EAR WITHOUT SPONTANEOUS RUPTURE OF TYMPANIC MEMBRANE: Primary | ICD-10-CM

## 2021-07-28 PROCEDURE — 99214 OFFICE O/P EST MOD 30 MIN: CPT | Mod: 25 | Performed by: FAMILY MEDICINE

## 2021-07-28 PROCEDURE — G0268 REMOVAL OF IMPACTED WAX MD: HCPCS | Performed by: FAMILY MEDICINE

## 2021-07-28 RX ORDER — AMOXICILLIN 875 MG
875 TABLET ORAL 2 TIMES DAILY
Qty: 20 TABLET | Refills: 0 | Status: SHIPPED | OUTPATIENT
Start: 2021-07-28 | End: 2021-08-07

## 2021-07-28 ASSESSMENT — MIFFLIN-ST. JEOR: SCORE: 1606.89

## 2021-07-28 NOTE — Clinical Note
Óscar Diallo. Saw Inocencio today. Doing great. Only had vaginal intercourse x1 so far so he didn't message you - pretty sore over pubis after, but wants to try again and will message you. Wife Meliza is pregnant so not as many opportunities. But that area of erythema is improved and I think his pain is scar related.   c

## 2021-07-28 NOTE — PROGRESS NOTES
Assessment & Plan     Non-recurrent acute suppurative otitis media of right ear without spontaneous rupture of tympanic membrane  - amoxicillin (AMOXIL) 875 MG tablet  Dispense: 20 tablet; Refill: 0    Infective otitis externa, right  - ciprofloxacin-hydrocortisone (CIPRO HC OTIC) 0.2-1 % otic suspension  Dispense: 10 mL; Refill: 0     REMOVAL OF IMPACTED WAX MD      Scar condition and fibrosis of skin  Pain in this area appears to be related to scar, not infection. Device function intact, no redness. No white count needed. Updated Dr. Carter electronically as this was being monitored for possible implant infection, which is clear now that implant is fine.        39 minutes spent on the date of the encounter doing chart review, history and exam, documentation and further activities per the note      The information in this document, created by the medical scribe for me, accurately reflects the services I personally performed and the decisions made by me. I have reviewed and approved this document for accuracy prior to leaving the patient care area.  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 He has tried warm water with peroxide which did not help his symptoms. He heard some bubbling, but his symptoms didn't change. The right ear is worse than the left. The patient reports sore throat more so on the right side than the left.     Scar  The patient has a red spot on his genitals. He massages it frequently and reports the pain is not as bad as it used to be. He reports he has only used it once for penetration and his pubic bone was very sore after. He reports it has been working well for masturbation.       Review of Systems   Positive for: decreased hearing, sore throat, pain in genitals    Negative for:  "disequilibrium    This document serves as a record of the services and decisions personally performed and made by Clarissa Stroud MD. It was created on his/her behalf by Brenda Soto, a trained medical scribe. The creation of this document is based the provider's statements to the medical scribe.  Joeibkarina Soto 4:28 PM, July 28, 2021      Objective    /79   Pulse 76   Temp 97.9  F (36.6  C) (Oral)   Ht 1.664 m (5' 5.5\")   Wt 74.2 kg (163 lb 9.6 oz)   SpO2 98%   BMI 26.81 kg/m    Body mass index is 26.81 kg/m .   Vitals were reviewed and were normal.     Physical Exam   GENERAL: healthy, alert and no distress  Right ear: Erythematous tympanic membrane with increased vascularity, opaque inferior aspect only, minor fluid bubbles at 5 o'clock.   Left Ear: curettage of adherent wax of left ear canal with underlying ulceration  : No inguinal lymphadenopathy, palpable implant bulb in apporotpriate position in left sccrotal aspect. Right scrotum hypoplastic with palpable scar tissue, decreased area of erythema on right side. tubing for penile pump palpable at the left base of the penis and at the attachment at 12 o'clock to the pubis which is mildly tender to palpation today  SKIN: large anterolateral thigh grafting site scar  PSYCH: mentation appears normal, affect normal/bright          "

## 2021-07-28 NOTE — PATIENT INSTRUCTIONS
Ear Pain  1.  Start taking amoxicillin. Take it twice a day for 10 days.   2. I have ordered ear drops for you, use them in both ears. Lay down and drop 4-6 drops in your ear. Use your tragus as a pump to disperse the drops. Stay laying down for 10 minutes before doing the other ear. Use the drops twice a day for 3-5 days.     Scar  1. Start massaging the scar tissue daily to every other day    Preventative  1. Schedule follow up as needed

## 2021-08-19 ENCOUNTER — PRE VISIT (OUTPATIENT)
Dept: UROLOGY | Facility: CLINIC | Age: 36
End: 2021-08-19

## 2021-08-19 NOTE — TELEPHONE ENCOUNTER
Reason for visit: Follow up     Relevant information: IPP malfunction    Records/imaging/labs/orders: in EPIC    Pt called: no    At Rooming: normal

## 2021-08-25 ENCOUNTER — OFFICE VISIT (OUTPATIENT)
Dept: UROLOGY | Facility: CLINIC | Age: 36
End: 2021-08-25
Payer: COMMERCIAL

## 2021-08-25 ENCOUNTER — TELEPHONE (OUTPATIENT)
Dept: UROLOGY | Facility: CLINIC | Age: 36
End: 2021-08-25

## 2021-08-25 VITALS
WEIGHT: 160 LBS | HEIGHT: 65 IN | HEART RATE: 83 BPM | SYSTOLIC BLOOD PRESSURE: 118 MMHG | BODY MASS INDEX: 26.66 KG/M2 | DIASTOLIC BLOOD PRESSURE: 76 MMHG

## 2021-08-25 DIAGNOSIS — Z11.59 ENCOUNTER FOR SCREENING FOR OTHER VIRAL DISEASES: ICD-10-CM

## 2021-08-25 DIAGNOSIS — N52.8 OTHER MALE ERECTILE DYSFUNCTION: Primary | ICD-10-CM

## 2021-08-25 PROCEDURE — 99213 OFFICE O/P EST LOW 20 MIN: CPT | Performed by: UROLOGY

## 2021-08-25 ASSESSMENT — MIFFLIN-ST. JEOR: SCORE: 1582.64

## 2021-08-25 ASSESSMENT — PAIN SCALES - GENERAL: PAINLEVEL: NO PAIN (0)

## 2021-08-25 NOTE — PROGRESS NOTES
"HPI:  Myrna Dee is a 36 year old transgender male with hx of phalloplasty (ALT + SCIP)  He underwent IPP insertion 5/3/2021.  He recovered well and was happy with result. He was able to have intercourse.  But now the pump squeezes but the device does not inflate.  It's very frustrating to him.   He needs a revision due to broken device.    Exam:  /76   Pulse 83   Ht 1.651 m (5' 5\")   Wt 72.6 kg (160 lb)   BMI 26.63 kg/m    NAD  Abdomen soft  Incisions healed well  S/p phalloplasty  Cylinder and pump in good position  I squeeze the pump, and there is no inflation. It feels as though air/fluid is moving but nothing happens.  I suspect a leak in the system      Assessment & Plan     Mechanical malfunction of inflatable penile prosthesis 3 months postop.    Revision vs. Replacement  Probably go for reservoir first. Maybe there is a leak in reservoir or tubing for simple repair  Then go for pump.  Then go for cylinder.  Given complexity of trans-IPP in a phallo, I favor trying to salvage those components (like the cylinder) which require additional technical modification to place. However, he understands this may be a complete removal and replacement if needed.    Octavio Carter MD  Reconstructive Urology  [facility]    "

## 2021-08-25 NOTE — TELEPHONE ENCOUNTER
Left message for patient to reschedule surgery with Dr. Carter from 09/20/21 per patient request. Left my direct extension to call back. Sent patient a [employer] message as well.

## 2021-08-25 NOTE — NURSING NOTE
"Chief Complaint   Patient presents with     RECHECK     IPP malfunction, liquid is not staying in the barrel        Blood pressure 118/76, pulse 83, height 1.651 m (5' 5\"), weight 72.6 kg (160 lb), not currently breastfeeding. Body mass index is 26.63 kg/m .    Patient Active Problem List   Diagnosis     High risk HPV infection     Gender dysphoria in adult     Other post-procedural erectile dysfunction       No Known Allergies    Current Outpatient Medications   Medication Sig Dispense Refill     B-D SYRINGE LUER-WU 1 ML MISC Use for weekly hormone injection 25 each 3     Needle, Disp, 23G X 1\" MISC Use weekly for hormone injection 25 each 3     Pediatric Multivit-Minerals-C (FLINTSTONES GUMMIES PO) Take 2 tablets by mouth every morning        testosterone cypionate (DEPOTESTOSTERONE) 200 MG/ML injection INJECT 0.25 ML INTO THE MUSCLES ONCE A WEEK. (Patient taking differently: Inject into the muscle once a week INJECT 0.25 ML INTO THE MUSCLES ONCE A WEEK.) 13 mL 0       Social History     Tobacco Use     Smoking status: Never Smoker     Smokeless tobacco: Never Used   Substance Use Topics     Alcohol use: Yes     Alcohol/week: 15.0 standard drinks     Types: 15 Cans of beer per week     Drug use: No       Alexa Angeles CMA  8/25/2021  8:58 AM     "

## 2021-08-25 NOTE — LETTER
"8/25/2021       RE: Myrna Dee  [address]  [facility] [address]     Dear Colleague,    Thank you for referring your patient, Myrna Dee, to the [facility] [place] at [facility]. Please see a copy of my visit note below.    HPI:  Myrna Dee is a 36 year old transgender male with hx of phalloplasty (ALT + SCIP)  He underwent IPP insertion 5/3/2021.  He recovered well and was happy with result. He was able to have intercourse.  But now the pump squeezes but the device does not inflate.  It's very frustrating to him.   He needs a revision due to broken device.    Exam:  /76   Pulse 83   Ht 1.651 m (5' 5\")   Wt 72.6 kg (160 lb)   BMI 26.63 kg/m    NAD  Abdomen soft  Incisions healed well  S/p phalloplasty  Cylinder and pump in good position  I squeeze the pump, and there is no inflation. It feels as though air/fluid is moving but nothing happens.  I suspect a leak in the system      Assessment & Plan     Mechanical malfunction of inflatable penile prosthesis 3 months postop.    Revision vs. Replacement  Probably go for reservoir first. Maybe there is a leak in reservoir or tubing for simple repair  Then go for pump.  Then go for cylinder.  Given complexity of trans-IPP in a phallo, I favor trying to salvage those components (like the cylinder) which require additional technical modification to place. However, he understands this may be a complete removal and replacement if needed.    Octavio Carter MD  Reconstructive Urology  [facility]    "

## 2021-08-26 NOTE — TELEPHONE ENCOUNTER
Patient is scheduled for surgery with Dr. Carter    Spoke with: Patient    Date of Surgery: Monday 10/18/21    Location: [facility]     Informed patient they will need an adult  Yes    Pre op with Provider laurie    H&P: Scheduled with Patient will call and schedule pre-op with their PCP Link[facility]ar[facility]    Pre-procedure COVID-19 Test: Thursday 10/14/21 @ 10:00am [facility]    Additional imaging/appointments: 2-3 week post op in person w/Dr. Carter Wednesday 11/03/21 @ 9:00am     Surgery packet: Jolie HOLDEN gave to patient in clinic 08/25/21     Additional comments: patient declined 09/20/21 surgery date.

## 2021-09-15 ENCOUNTER — TELEPHONE (OUTPATIENT)
Dept: UROLOGY | Facility: CLINIC | Age: 36
End: 2021-09-15

## 2021-09-15 NOTE — TELEPHONE ENCOUNTER
Left message for patient to see if he can move his surgery date with Dr. Carter from 10/18/21 to 10/08/21.

## 2021-09-26 ENCOUNTER — HEALTH MAINTENANCE LETTER (OUTPATIENT)
Age: 36
End: 2021-09-26

## 2021-10-04 DIAGNOSIS — F64.0 GENDER DYSPHORIA IN ADOLESCENT AND ADULT: ICD-10-CM

## 2021-10-04 RX ORDER — TESTOSTERONE CYPIONATE 200 MG/ML
INJECTION, SOLUTION INTRAMUSCULAR
Qty: 13 ML | Refills: 1 | Status: SHIPPED | OUTPATIENT
Start: 2021-10-04 | End: 2022-11-07

## 2021-10-04 NOTE — TELEPHONE ENCOUNTER

## 2021-10-14 ENCOUNTER — PATIENT OUTREACH (OUTPATIENT)
Dept: UROLOGY | Facility: CLINIC | Age: 36
End: 2021-10-14

## 2021-10-14 ENCOUNTER — LAB (OUTPATIENT)
Dept: URGENT CARE | Facility: URGENT CARE | Age: 36
End: 2021-10-14
Payer: COMMERCIAL

## 2021-10-14 DIAGNOSIS — Z11.59 ENCOUNTER FOR SCREENING FOR OTHER VIRAL DISEASES: ICD-10-CM

## 2021-10-14 LAB — SARS-COV-2 RNA RESP QL NAA+PROBE: NEGATIVE

## 2021-10-14 PROCEDURE — U0003 INFECTIOUS AGENT DETECTION BY NUCLEIC ACID (DNA OR RNA); SEVERE ACUTE RESPIRATORY SYNDROME CORONAVIRUS 2 (SARS-COV-2) (CORONAVIRUS DISEASE [COVID-19]), AMPLIFIED PROBE TECHNIQUE, MAKING USE OF HIGH THROUGHPUT TECHNOLOGIES AS DESCRIBED BY CMS-2020-01-R: HCPCS

## 2021-10-14 PROCEDURE — U0005 INFEC AGEN DETEC AMPLI PROBE: HCPCS

## 2021-10-15 ENCOUNTER — OFFICE VISIT (OUTPATIENT)
Dept: FAMILY MEDICINE | Facility: CLINIC | Age: 36
End: 2021-10-15
Payer: COMMERCIAL

## 2021-10-15 ENCOUNTER — TELEPHONE (OUTPATIENT)
Dept: FAMILY MEDICINE | Facility: CLINIC | Age: 36
End: 2021-10-15

## 2021-10-15 VITALS
BODY MASS INDEX: 27.52 KG/M2 | OXYGEN SATURATION: 98 % | DIASTOLIC BLOOD PRESSURE: 68 MMHG | SYSTOLIC BLOOD PRESSURE: 99 MMHG | WEIGHT: 165.4 LBS | HEART RATE: 80 BPM | RESPIRATION RATE: 16 BRPM | TEMPERATURE: 98.1 F

## 2021-10-15 DIAGNOSIS — Z23 NEED FOR PROPHYLACTIC VACCINATION AND INOCULATION AGAINST INFLUENZA: Primary | ICD-10-CM

## 2021-10-15 DIAGNOSIS — Z01.818 PREOP GENERAL PHYSICAL EXAM: ICD-10-CM

## 2021-10-15 PROCEDURE — 90471 IMMUNIZATION ADMIN: CPT | Performed by: STUDENT IN AN ORGANIZED HEALTH CARE EDUCATION/TRAINING PROGRAM

## 2021-10-15 PROCEDURE — 99203 OFFICE O/P NEW LOW 30 MIN: CPT | Mod: 25 | Performed by: STUDENT IN AN ORGANIZED HEALTH CARE EDUCATION/TRAINING PROGRAM

## 2021-10-15 PROCEDURE — 90686 IIV4 VACC NO PRSV 0.5 ML IM: CPT | Performed by: STUDENT IN AN ORGANIZED HEALTH CARE EDUCATION/TRAINING PROGRAM

## 2021-10-15 NOTE — PROGRESS NOTES
[address] [address]  Phone: [phone]  Fax: [phone]  Primary Provider: Clarissa Stroud  Pre-op Performing Provider: SHERLY SPICER    56}  PREOPERATIVE EVALUATION:  Today's date: 10/15/2021    Myrna Dee is a 36 year old adult who presents for a preoperative evaluation.    Surgical Information:  Surgery/Procedure: Revision/Replaccement of penile implant  Surgery Location: [facility]   Surgeon: Dr. Octavio Carter  Surgery Date: 10/18/2021  Time of Surgery: 0745  Where patient plans to recover: At home with family  Fax number for surgical facility: Note does not need to be faxed, will be available electronically in Epic.    Type of Anesthesia Anticipated: General    Assessment & Plan     The proposed surgical procedure is considered LOW risk.    Preop general physical exam  Patient is healthy and has no contraindications to surgery. Reports history of adverse reaction to anesthesia when he was in [place] 10 years ago - was slow to wake up out of surgery and had panic attacks with administration of pain medications, thought to be related somehow to anesthesia. Has had several surgeries since that time which were done under general anesthesia without complication.     Need for prophylactic vaccination and inoculation against influenza  - INFLUENZA VACCINE IM > 6 MONTHS VALENT IIV4 (AFLURIA/FLUZONE)       Medication Instructions:  Patient is to take all scheduled medications on the day of surgery    RECOMMENDATION:  APPROVAL GIVEN to proceed with proposed procedure, without further diagnostic evaluation.    Review of external notes as documented above   Independent interpretation of a test performed by another physician/other qualified health care professional (not separately reported) - reviewed June 2021 labs - all normal.      Sherly Spicer MD  PGY-1, [facility]        Subjective     HPI related to upcoming  procedure: Revision/Replaccement of penile implant      Preop Questions 10/15/2021   1. Have you ever had a heart attack or stroke? No   2. Have you ever had surgery on your heart or blood vessels, such as a stent placement, a coronary artery bypass, or surgery on an artery in your head, neck, heart, or legs? No   3. Do you have chest pain with activity? No   4. Do you have a history of  heart failure? No   5. Do you currently have a cold, bronchitis or symptoms of other infection? No   6. Do you have a cough, shortness of breath, or wheezing? No   7. Do you or anyone in your family have previous history of blood clots? No   8. Do you or does anyone in your family have a serious bleeding problem such as prolonged bleeding following surgeries or cuts? No   9. Have you ever had problems with anemia or been told to take iron pills? YES - when he was menstruating, over a decade ago, no longer an issue.   10. Have you had any abnormal blood loss such as black, tarry or bloody stools, or abnormal vaginal bleeding? No   10. Have you had any abnormal blood loss such as black, tarry or bloody stools? No   11. Have you ever had a blood transfusion? No   12. Are you willing to have a blood transfusion if it is medically needed before, during, or after your surgery? Yes   13. Have you or any of your relatives ever had problems with anesthesia? YES - trouble waking up out of surgery 10 years ago (in [place])   14. Do you have sleep apnea, excessive snoring or daytime drowsiness? No   15. Do you have any artifical heart valves or other implanted medical devices like a pacemaker, defibrillator, or continuous glucose monitor? No   16. Do you have artificial joints? No   17. Are you allergic to latex? No   18. Is there any chance that you may be pregnant? No     Health Care Directive:  Patient has a Health Care Directive on file  6}    Status of Chronic Conditions:  N/A    Review of Systems  CONSTITUTIONAL: NEGATIVE for fever,  chills, change in weight  INTEGUMENTARY/SKIN: NEGATIVE for worrisome rashes, moles or lesions  EYES: NEGATIVE for vision changes or irritation  ENT/MOUTH: NEGATIVE for ear, mouth and throat problems  RESP: NEGATIVE for significant cough or SOB  CV: NEGATIVE for chest pain, palpitations or peripheral edema  GI: NEGATIVE for nausea, abdominal pain, heartburn, or change in bowel habits  : NEGATIVE for frequency, dysuria, or hematuria  MUSCULOSKELETAL: NEGATIVE for significant arthralgias or myalgia  NEURO: NEGATIVE for weakness, dizziness or paresthesias  ENDOCRINE: NEGATIVE for temperature intolerance, skin/hair changes  HEME: NEGATIVE for bleeding problems  PSYCHIATRIC: NEGATIVE for changes in mood or affect    Patient Active Problem List    Diagnosis Date Noted     Other post-procedural erectile dysfunction 03/16/2021     Priority: Medium     Added automatically from request for surgery [id number]       Gender dysphoria in adult 11/05/2019     Priority: Medium     Added automatically from request for surgery [id number]       High risk HPV infection 09/12/2016     Priority: Medium     7/23/16: NIL pos HR other HPV (not 16/18)  2/2018: pos HR other HPV (not 16/18), ASC-H. Plan: colpo  6/12/18: colpo not satisfactory; no lesions, but hard exam due to low estrogen status; ECC neg, Bx x 2 CIN1, focal atypia on the 3rd; repeat pap ASCUS  - recommend f/u cotesting in 12 and 24 months; (if any of these paps is HSIL or ASC-H - consider diagnostic excision)   *Has had HPV shot series.  2019: Hx of SHERRI 2 - but having genital affirmation surgery. Cervix removed 7/2019. Next surgical stages: January, 20 - building phallus; March, 20 - connect phallic urethra to existing urethra; September, 20 - construction of the glans; December, 20- prosthetic implant.   Surgical margins from trachelectomy July 2019 were negative but OBGYN ([facility]) recommended f/unit(s) pap/HPV testing for abnormal cells.   09/11/20 f/u pap, pre-vaginectomy: ASCUS  and + HR HPV. Had complete vaginectomy for confirmation surgery 09/20. Path: vagina negative for dysplasia or malignancy, vulva with focal chronic inflammation of squamous mucosa. Per OBGYN - no further monitoring needed.        Past Medical History:   Diagnosis Date     Aseptic meningitis      Complication of anesthesia     HX slow wake up,HX panic attacks with vicodin postop     Gender dysphoria in adolescent and adult      High risk HPV infection      Papanicolaou smear of cervix with atypical squamous cells cannot exclude high grade squamous intraepithelial lesion (ASC-H)      Past Surgical History:   Procedure Laterality Date     CYSTOSCOPY N/A 7/12/2019    Procedure: Cystoscopy;  Surgeon: Myesha Hernández MD;  [facility]     CYSTOSCOPY FLEXIBLE, CYSTOSTOMY, INSERT TUBE SUPRAPUBIC, COMBINED N/A 9/24/2020    Procedure: CYSTOSCOPY, WITH SUPRAPUBIC CATHETER INSERTION;  Surgeon: Octavio Carter MD;  [facility]     DAVINCI LAPAROSCOPIC CERVICECTOMY (TRACHELECTOMY) N/A 7/12/2019    Procedure: Davinci Laparoscopic Trachelectomy;  Surgeon: Myesha Hernández MD;  [facility]     GRAFT FREE VASCULARIZED (LOCATION) Left 2/3/2020    Procedure: with left thigh flap and right groin flap;  Surgeon: Gavin Eastman MD;  [facility]     HYSTERECTOMY      and oophorectomy, but still has cervix and will require pap every 3 years, At [facility]     IMPLANT PROSTHESIS PENIS INFLATABLE N/A 5/3/2021    Procedure: INSERTION, PENILE PROSTHESIS, INFLATABLE;  Surgeon: Octavio Carter MD;  [facility]     MASTECTOMY PARTIAL      chest surgery w/ contouring      SCROTOPLASTY N/A 9/24/2020    Procedure: SCROTOPLASTY;  Surgeon: Gavin Eastman MD;  [facility]     TRANSGENDER GLANSPLASTY WITH SKIN GRAFT N/A 9/24/2020    Procedure: TRANSGENDER GLANSPLASTY WITH SKIN GRAFT;  Surgeon: Gavin Eastman MD;  [facility]     TRANSGENDER PHALLOPLASTY Bilateral 2/3/2020    Procedure: Phalloplasty;  Surgeon: Raiza  "MD Gavin;  [facility]     URETHROPLASTY N/A 9/24/2020    Procedure: URETHROPLASTY;  Surgeon: Octavio Carter MD;  [facility]     VAGINECTOMY, ROBOT-ASSISTED N/A 9/24/2020    Procedure: VAGINECTOMY, ROBOT-ASSISTED;  Surgeon: Octavio Carter MD;  [facility]     Current Outpatient Medications   Medication Sig Dispense Refill     B-D SYRINGE LUER-WU 1 ML MISC Use for weekly hormone injection 25 each 3     Needle, Disp, 23G X 1\" MISC Use weekly for hormone injection 25 each 3     Pediatric Multivit-Minerals-C (FLINTSTONES GUMMIES PO) Take 2 tablets by mouth every morning        testosterone cypionate (DEPOTESTOSTERONE) 200 MG/ML injection INJECT 0.25 ML INTO THE MUSCLES ONCE A WEEK. 13 mL 1       No Known Allergies     Social History     Tobacco Use     Smoking status: Never Smoker     Smokeless tobacco: Never Used   Substance Use Topics     Alcohol use: Yes     Alcohol/week: 15.0 standard drinks     Types: 15 Cans of beer per week     Comment: daily patient reports \"usually one drink\". On day off once a week \"usually 5 drinks\".     Family History   Problem Relation Age of Onset     Dementia Father         frontotemporal dementia      Alcoholism Brother         possible     Diabetes Maternal Grandfather      Anesthesia Reaction Sister      Breast Cancer Paternal Grandmother      Cardiovascular No family hx of      Deep Vein Thrombosis No family hx of      History   Drug Use Unknown         Objective     BP 99/68 (BP Location: Left arm, Patient Position: Sitting, Cuff Size: Adult Regular)   Pulse 80   Temp 98.1  F (36.7  C) (Oral)   Resp 16   Wt 75 kg (165 lb 6.4 oz)   SpO2 98%   Breastfeeding No   BMI 27.52 kg/m      Physical Exam  Constitutional:       Appearance: Normal appearance. He is normal weight.   HENT:      Head: Normocephalic and atraumatic.   Eyes:      Extraocular Movements: Extraocular movements intact.      Conjunctiva/sclera: Conjunctivae normal.   Neck:      Thyroid: No " thyroid mass, thyromegaly or thyroid tenderness.   Cardiovascular:      Rate and Rhythm: Normal rate and regular rhythm.      Pulses: Normal pulses.      Heart sounds: Normal heart sounds.   Pulmonary:      Effort: Pulmonary effort is normal. No respiratory distress.      Breath sounds: Normal breath sounds. No wheezing.   Abdominal:      General: Abdomen is flat. Bowel sounds are normal. There is no distension.      Palpations: Abdomen is soft. There is no mass.      Tenderness: There is abdominal tenderness. There is no guarding.      Hernia: No hernia is present.      Comments: Mild tenderness around implant site in RLQ. No overlying erythema, no rebound or guarding.    Musculoskeletal:         General: Normal range of motion.      Cervical back: Normal range of motion and neck supple. No rigidity or tenderness.   Lymphadenopathy:      Cervical: No cervical adenopathy.   Skin:     General: Skin is warm and dry.      Findings: No bruising or erythema.   Neurological:      General: No focal deficit present.      Mental Status: He is alert and oriented to person, place, and time. Mental status is at baseline.   Psychiatric:         Mood and Affect: Mood normal.         Behavior: Behavior normal.         Thought Content: Thought content normal.         Judgment: Judgment normal.         Recent Labs   Lab Test 06/01/21  1110 06/01/21  0816 05/03/21  0633 09/26/20  0643 09/26/20  0643 09/25/20  0645 09/25/20  0645   HGB  --  13.5 13.6   < > 11.6*   < > 11.1*   PLT  --   --   --   --  190  --  191     --   --   --  139   < > 137   POTASSIUM 3.8  --  3.5   < > 3.8   < > 3.5   CR 0.77  --  0.77   < > 0.85   < > 0.77    < > = values in this interval not displayed.        Diagnostics:  No labs were ordered during this visit.   No EKG required, no history of coronary heart disease, significant arrhythmia, peripheral arterial disease or other structural heart disease.    Revised Cardiac Risk Index (RCRI):  The patient  has the following serious cardiovascular risks for perioperative complications:   - No serious cardiac risks = 0 points     RCRI Interpretation: 0 points: Class I (very low risk - 0.4% complication rate)         Signed Electronically by: Sherly Enrique MD  Copy of this evaluation report is provided to requesting physician.

## 2021-10-15 NOTE — PROGRESS NOTES
Preceptor Attestation:   Patient seen, evaluated and discussed with the resident. I have verified the content of the note, which accurately reflects my assessment of the patient and the plan of care. Note - Hx of anaesthetic complication in prior surgery - although more recent surgeries have been uneventful.   Supervising Physician:  Trenton Haider MD

## 2021-10-15 NOTE — PATIENT INSTRUCTIONS
Preparing for Your Surgery  Getting started  A nurse will call you to review your health history and instructions. They will give you an arrival time based on your scheduled surgery time.  Please be ready to share the following:    Your doctor's clinic name and phone number    Your medical, surgical and anesthesia history    A list of allergies and sensitivities    A list of medicines, including herbal treatments and over-the-counter drugs    Whether the patient has a legal guardian (ask how to send us the papers in advance)  If you have a child who's having surgery, please ask for a copy of Preparing for Your Child's Surgery.    Preparing for surgery    Within 30 days of surgery: Have a pre-op exam (sometimes called an H&P, or History and Physical). This can be done at a clinic or pre-operative center.  ? If you're having a , you may not need this exam. Talk to your care team    At your pre-op exam, talk to your care team about all medicines you take. If you need to stop any medicines before surgery, ask when to start taking them again.  ? We do this for your safety. Many medicines can make you bleed too much during surgery. Some change how well surgery (anesthesia) drugs work.    Call your insurance company to let them know you're having surgery. (If you don't have insurance, call [phone].)    Call your clinic if there's any change in your health. This includes signs of a cold or flu (sore throat, runny nose, cough, rash, fever). It also includes a scrape or scratch near the surgery site.    If you have questions on the day of surgery, call your hospital or surgery center.  Eating and drinking guidelines  For your safety: Unless your surgeon tells you otherwise, follow the guidelines below.    Eat and drink as usual until 8 hours before surgery. After that, no food or milk.    Drink clear liquids until 2 hours before surgery. These are liquids you can see through, like water, Gatorade and Propel  Water. You may also have black coffee and tea (no cream or milk).    Nothing by mouth within 2 hours of surgery. This includes gum, candy and breath mints.    If you drink, stop drinking alcohol the night before surgery.    If your care team tells you to take medicine on the morning of surgery, it's okay to take it with a sip of water.  Preventing infection    Shower or bathe the night before and morning of your surgery. Follow the instructions your clinic gave you. (If no instructions, use regular soap.)    Don't shave or clip hair near your surgery site. We'll remove the hair if needed.    Don't smoke or vape the morning of surgery. You may chew nicotine gum up to 2 hours before surgery. A nicotine patch is okay.  ? Note: Some surgeries require you to completely quit smoking and nicotine. Check with your surgeon.    Your care team will make every effort to keep you safe from infection. We will:  ? Clean our hands often with soap and water (or an alcohol-based hand rub).  ? Clean the skin at your surgery site with a special soap that kills germs.  ? Give you a special gown to keep you warm. (Cold raises the risk of infection.)  ? Wear special hair covers, masks, gowns and gloves during surgery.  ? Give antibiotic medicine, if prescribed. Not all surgeries need antibiotics.  What to bring on the day of surgery    Photo ID and insurance card    Copy of your health care directive, if you have one    Glasses and hearing aides (bring cases)  ? You can't wear contacts during surgery    Inhaler and eye drops, if you use them (tell us about these when you arrive)    CPAP machine or breathing device, if you use them    A few personal items, if spending the night    If you have . . .  ? A pacemaker or ICD (cardiac defibrillator): Bring the ID card.  ? An implanted stimulator: Bring the remote control.  ? A legal guardian: Bring a copy of the certified (court-stamped) guardianship papers.  Please remove any jewelry, including  body piercings. Leave jewelry and other valuables at home.  If you're going home the day of surgery  Important: If you don't follow the rules below, we must cancel your surgery.     Arrange for someone to drive you home after surgery. You may not drive, take a taxi or take public transportation by yourself (unless you'll have local anesthesia only).    Arrange for a responsible adult to stay with you overnight. If you don't, we may keep you in the hospital overnight, and you may need to pay the costs yourself.  Questions?   If you have any questions for your care team, list them here: _________________________________________________________________________________________________________________________________________________________________________________________________________________________________________________________________________________________________________________________  For informational purposes only. Not to replace the advice of your health care provider. Copyright   2003, 2019 [employer]. All rights reserved. Clinically reviewed by Wilma Sellers MD. [employer] 596119 - REV 4/20.    +++++++++++++++++++++++++++++  Here is the plan from today's visit    1. Need for prophylactic vaccination and inoculation against influenza  - INFLUENZA VACCINE IM > 6 MONTHS VALENT IIV4 (AFLURIA/FLUZONE)    2. Preop general physical exam      Follow up plan    No follow-ups on file.     Thank you for coming to [facility] today.  Lab Testing:  **If you had lab testing today and your results are reassuring or normal they will be mailed to you or sent through [employer] within 7 days.   **If the lab tests need quick action we will call you with the results.  The phone number we will call with results is # [phone] (home) . If this is not the best number please call our clinic and change the number.  Medication Refills:  If you need any refills please call your pharmacy and they will contact us.    If you need to  your refill at a new pharmacy, please contact the new pharmacy directly. The new pharmacy will help you get your medications transferred faster.   Scheduling:  If you have any concerns about today's visit or wish to schedule another appointment please call our office during normal business hours [phone] (8-5:00 M-F)   eferrals to [facility] please call [phone].   Mammogram Scheduling [phone]     XRay/CT/Ultrasound/MRI Scheduling [phone]    Medical Concerns:  If you have urgent medical concerns please call [phone] at any time of the day.    Sherly Enrique MD

## 2021-10-15 NOTE — TELEPHONE ENCOUNTER
Prior Authorization Retail Medication Request  -Patient will need PA on file for further refills     Medication/Dose: testosterone cypionate (DEPOTESTOSTERONE) 200 MG/ML injection  ICD code (if different than what is on RX):  Gender dysphoria in adolescent and adult [F64.0]   Previously Tried and Failed:  See chart  Rationale:  See chart    Insurance Name:  [employer]  Insurance ID:  [id number]       Pharmacy Information (if different than what is on RX)  Name:  [facility] in [facility] [place], [place]  Phone:  [phone]

## 2021-10-17 ENCOUNTER — ANESTHESIA EVENT (OUTPATIENT)
Dept: SURGERY | Facility: CLINIC | Age: 36
End: 2021-10-17
Payer: COMMERCIAL

## 2021-10-18 ENCOUNTER — HOSPITAL ENCOUNTER (OUTPATIENT)
Facility: CLINIC | Age: 36
Discharge: HOME | End: 2021-10-18
Attending: UROLOGY | Admitting: UROLOGY
Payer: COMMERCIAL

## 2021-10-18 ENCOUNTER — ANESTHESIA (OUTPATIENT)
Dept: SURGERY | Facility: CLINIC | Age: 36
End: 2021-10-18
Payer: COMMERCIAL

## 2021-10-18 ENCOUNTER — PRE VISIT (OUTPATIENT)
Dept: UROLOGY | Facility: CLINIC | Age: 36
End: 2021-10-18

## 2021-10-18 VITALS
WEIGHT: 168.65 LBS | BODY MASS INDEX: 28.1 KG/M2 | HEART RATE: 62 BPM | SYSTOLIC BLOOD PRESSURE: 108 MMHG | HEIGHT: 65 IN | RESPIRATION RATE: 18 BRPM | OXYGEN SATURATION: 96 % | TEMPERATURE: 97.9 F | DIASTOLIC BLOOD PRESSURE: 68 MMHG

## 2021-10-18 DIAGNOSIS — N52.8 OTHER MALE ERECTILE DYSFUNCTION: ICD-10-CM

## 2021-10-18 DIAGNOSIS — F64.0 GENDER DYSPHORIA IN ADULT: Primary | ICD-10-CM

## 2021-10-18 LAB — GLUCOSE BLDC GLUCOMTR-MCNC: 93 MG/DL (ref 70–99)

## 2021-10-18 PROCEDURE — 710N000010 HC RECOVERY PHASE 1, LEVEL 2, PER MIN: Performed by: UROLOGY

## 2021-10-18 PROCEDURE — 999N000141 HC STATISTIC PRE-PROCEDURE NURSING ASSESSMENT: Performed by: UROLOGY

## 2021-10-18 PROCEDURE — 250N000011 HC RX IP 250 OP 636: Performed by: UROLOGY

## 2021-10-18 PROCEDURE — 250N000025 HC SEVOFLURANE, PER MIN: Performed by: UROLOGY

## 2021-10-18 PROCEDURE — 54408 REPAIR MULTI-COMP PENIS PROS: CPT | Performed by: STUDENT IN AN ORGANIZED HEALTH CARE EDUCATION/TRAINING PROGRAM

## 2021-10-18 PROCEDURE — 82962 GLUCOSE BLOOD TEST: CPT

## 2021-10-18 PROCEDURE — 272N000001 HC OR GENERAL SUPPLY STERILE: Performed by: UROLOGY

## 2021-10-18 PROCEDURE — 370N000017 HC ANESTHESIA TECHNICAL FEE, PER MIN: Performed by: UROLOGY

## 2021-10-18 PROCEDURE — 710N000012 HC RECOVERY PHASE 2, PER MINUTE: Performed by: UROLOGY

## 2021-10-18 PROCEDURE — 258N000003 HC RX IP 258 OP 636: Performed by: NURSE ANESTHETIST, CERTIFIED REGISTERED

## 2021-10-18 PROCEDURE — 258N000003 HC RX IP 258 OP 636: Performed by: UROLOGY

## 2021-10-18 PROCEDURE — 360N000076 HC SURGERY LEVEL 3, PER MIN: Performed by: UROLOGY

## 2021-10-18 PROCEDURE — C1813 PROSTHESIS, PENILE, INFLATAB: HCPCS | Performed by: UROLOGY

## 2021-10-18 PROCEDURE — 250N000011 HC RX IP 250 OP 636: Performed by: NURSE ANESTHETIST, CERTIFIED REGISTERED

## 2021-10-18 PROCEDURE — 250N000009 HC RX 250: Performed by: NURSE ANESTHETIST, CERTIFIED REGISTERED

## 2021-10-18 PROCEDURE — 250N000009 HC RX 250: Performed by: UROLOGY

## 2021-10-18 DEVICE — ASSEMBLY KIT
Type: IMPLANTABLE DEVICE | Site: PENIS | Status: FUNCTIONAL
Brand: TITAN

## 2021-10-18 RX ORDER — DEXAMETHASONE SODIUM PHOSPHATE 4 MG/ML
INJECTION, SOLUTION INTRA-ARTICULAR; INTRALESIONAL; INTRAMUSCULAR; INTRAVENOUS; SOFT TISSUE PRN
Status: DISCONTINUED | OUTPATIENT
Start: 2021-10-18 | End: 2021-10-18

## 2021-10-18 RX ORDER — SODIUM CHLORIDE, SODIUM LACTATE, POTASSIUM CHLORIDE, CALCIUM CHLORIDE 600; 310; 30; 20 MG/100ML; MG/100ML; MG/100ML; MG/100ML
INJECTION, SOLUTION INTRAVENOUS CONTINUOUS
Status: DISCONTINUED | OUTPATIENT
Start: 2021-10-18 | End: 2021-10-18 | Stop reason: HOSPADM

## 2021-10-18 RX ORDER — OXYCODONE HYDROCHLORIDE 5 MG/1
5 TABLET ORAL EVERY 4 HOURS PRN
Status: DISCONTINUED | OUTPATIENT
Start: 2021-10-18 | End: 2021-10-18 | Stop reason: HOSPADM

## 2021-10-18 RX ORDER — FENTANYL CITRATE 50 UG/ML
INJECTION, SOLUTION INTRAMUSCULAR; INTRAVENOUS PRN
Status: DISCONTINUED | OUTPATIENT
Start: 2021-10-18 | End: 2021-10-18

## 2021-10-18 RX ORDER — MEPERIDINE HYDROCHLORIDE 25 MG/ML
12.5 INJECTION INTRAMUSCULAR; INTRAVENOUS; SUBCUTANEOUS
Status: DISCONTINUED | OUTPATIENT
Start: 2021-10-18 | End: 2021-10-18 | Stop reason: HOSPADM

## 2021-10-18 RX ORDER — FENTANYL CITRATE 50 UG/ML
25 INJECTION, SOLUTION INTRAMUSCULAR; INTRAVENOUS
Status: DISCONTINUED | OUTPATIENT
Start: 2021-10-18 | End: 2021-10-18 | Stop reason: HOSPADM

## 2021-10-18 RX ORDER — PROPOFOL 10 MG/ML
INJECTION, EMULSION INTRAVENOUS PRN
Status: DISCONTINUED | OUTPATIENT
Start: 2021-10-18 | End: 2021-10-18

## 2021-10-18 RX ORDER — PROPOFOL 10 MG/ML
INJECTION, EMULSION INTRAVENOUS CONTINUOUS PRN
Status: DISCONTINUED | OUTPATIENT
Start: 2021-10-18 | End: 2021-10-18

## 2021-10-18 RX ORDER — ONDANSETRON 2 MG/ML
INJECTION INTRAMUSCULAR; INTRAVENOUS PRN
Status: DISCONTINUED | OUTPATIENT
Start: 2021-10-18 | End: 2021-10-18

## 2021-10-18 RX ORDER — OXYCODONE HYDROCHLORIDE 5 MG/1
5 TABLET ORAL EVERY 6 HOURS PRN
Qty: 6 TABLET | Refills: 0 | Status: SHIPPED | OUTPATIENT
Start: 2021-10-18 | End: 2021-10-21

## 2021-10-18 RX ORDER — SODIUM CHLORIDE, SODIUM LACTATE, POTASSIUM CHLORIDE, CALCIUM CHLORIDE 600; 310; 30; 20 MG/100ML; MG/100ML; MG/100ML; MG/100ML
INJECTION, SOLUTION INTRAVENOUS CONTINUOUS PRN
Status: DISCONTINUED | OUTPATIENT
Start: 2021-10-18 | End: 2021-10-18

## 2021-10-18 RX ORDER — HYDROMORPHONE HCL IN WATER/PF 6 MG/30 ML
0.2 PATIENT CONTROLLED ANALGESIA SYRINGE INTRAVENOUS EVERY 5 MIN PRN
Status: DISCONTINUED | OUTPATIENT
Start: 2021-10-18 | End: 2021-10-18 | Stop reason: HOSPADM

## 2021-10-18 RX ORDER — ONDANSETRON 4 MG/1
4 TABLET, ORALLY DISINTEGRATING ORAL EVERY 30 MIN PRN
Status: DISCONTINUED | OUTPATIENT
Start: 2021-10-18 | End: 2021-10-18 | Stop reason: HOSPADM

## 2021-10-18 RX ORDER — FENTANYL CITRATE 50 UG/ML
50 INJECTION, SOLUTION INTRAMUSCULAR; INTRAVENOUS EVERY 5 MIN PRN
Status: DISCONTINUED | OUTPATIENT
Start: 2021-10-18 | End: 2021-10-18 | Stop reason: HOSPADM

## 2021-10-18 RX ORDER — ONDANSETRON 2 MG/ML
4 INJECTION INTRAMUSCULAR; INTRAVENOUS EVERY 30 MIN PRN
Status: DISCONTINUED | OUTPATIENT
Start: 2021-10-18 | End: 2021-10-18 | Stop reason: HOSPADM

## 2021-10-18 RX ORDER — VANCOMYCIN HYDROCHLORIDE 1 G/200ML
1000 INJECTION, SOLUTION INTRAVENOUS
Status: COMPLETED | OUTPATIENT
Start: 2021-10-18 | End: 2021-10-18

## 2021-10-18 RX ORDER — BUPIVACAINE HYDROCHLORIDE AND EPINEPHRINE 5; 5 MG/ML; UG/ML
INJECTION, SOLUTION PERINEURAL PRN
Status: DISCONTINUED | OUTPATIENT
Start: 2021-10-18 | End: 2021-10-18 | Stop reason: HOSPADM

## 2021-10-18 RX ORDER — GLYCOPYRROLATE 0.2 MG/ML
INJECTION, SOLUTION INTRAMUSCULAR; INTRAVENOUS PRN
Status: DISCONTINUED | OUTPATIENT
Start: 2021-10-18 | End: 2021-10-18

## 2021-10-18 RX ADMIN — SODIUM CHLORIDE, POTASSIUM CHLORIDE, SODIUM LACTATE AND CALCIUM CHLORIDE: 600; 310; 30; 20 INJECTION, SOLUTION INTRAVENOUS at 07:44

## 2021-10-18 RX ADMIN — VANCOMYCIN HYDROCHLORIDE 1000 MG: 1 INJECTION, SOLUTION INTRAVENOUS at 07:23

## 2021-10-18 RX ADMIN — PROPOFOL 25 MCG/KG/MIN: 10 INJECTION, EMULSION INTRAVENOUS at 07:57

## 2021-10-18 RX ADMIN — ONDANSETRON 4 MG: 2 INJECTION INTRAMUSCULAR; INTRAVENOUS at 08:35

## 2021-10-18 RX ADMIN — MIDAZOLAM 1 MG: 1 INJECTION INTRAMUSCULAR; INTRAVENOUS at 07:44

## 2021-10-18 RX ADMIN — FENTANYL CITRATE 50 MCG: 50 INJECTION, SOLUTION INTRAMUSCULAR; INTRAVENOUS at 08:16

## 2021-10-18 RX ADMIN — GLYCOPYRROLATE 0.2 MG: 0.2 INJECTION, SOLUTION INTRAMUSCULAR; INTRAVENOUS at 07:50

## 2021-10-18 RX ADMIN — PROPOFOL 100 MG: 10 INJECTION, EMULSION INTRAVENOUS at 07:50

## 2021-10-18 RX ADMIN — DEXAMETHASONE SODIUM PHOSPHATE 8 MG: 4 INJECTION, SOLUTION INTRAMUSCULAR; INTRAVENOUS at 07:50

## 2021-10-18 RX ADMIN — FENTANYL CITRATE 50 MCG: 50 INJECTION, SOLUTION INTRAMUSCULAR; INTRAVENOUS at 07:50

## 2021-10-18 RX ADMIN — GENTAMICIN SULFATE 320 MG: 40 INJECTION, SOLUTION INTRAMUSCULAR; INTRAVENOUS at 08:09

## 2021-10-18 ASSESSMENT — ENCOUNTER SYMPTOMS: APNEA: 0

## 2021-10-18 ASSESSMENT — MIFFLIN-ST. JEOR: SCORE: 1621.88

## 2021-10-18 NOTE — ANESTHESIA PROCEDURE NOTES
Airway       Patient location during procedure: OR       Procedure Start/Stop Times: 10/18/2021 7:53 AM and 10/18/2021 7:53 AM  Staff -        CRNA: Tashia Womack APRN CRNA       Performed By: CRNA  Consent for Airway        Urgency: elective  Indications and Patient Condition       Indications for airway management: katie-procedural       Induction type:intravenous       Mask difficulty assessment: 1 - vent by mask    Final Airway Details       Final airway type: supraglottic airway    Supraglottic Airway Details        Type: LMA       Brand: Air-Q       LMA size: 3.5    Post intubation assessment        Placement verified by: capnometry, equal breath sounds and chest rise        Number of attempts at approach: 1       Secured with: silk tape       Ease of procedure: easy       Dentition: Intact and Unchanged

## 2021-10-18 NOTE — DISCHARGE INSTRUCTIONS
Same-Day Surgery   Adult Discharge Orders & Instructions     For 24 hours after surgery:  1. Get plenty of rest.  A responsible adult must stay with you for at least 24 hours after you leave the hospital.   2. Pain medication can slow your reflexes. Do not drive or use heavy equipment.  If you have weakness or tingling, don't drive or use heavy equipment until this feeling goes away.  3. Mixing alcohol and pain medication can cause dizziness and slow your breathing. It can even be fatal. Do not drink alcohol while taking pain medication.  4. Avoid strenuous or risky activities.  Ask for help when climbing stairs.   5. You may feel lightheaded.  If so, sit for a few minutes before standing.  Have someone help you get up.   6. If you have nausea (feel sick to your stomach), drink only clear liquids such as apple juice, ginger ale, broth or 7-Up.  Rest may also help.  Be sure to drink enough fluids.  Move to a regular diet as you feel able. Take pain medications with a small amount of solid food, such as toast or crackers, to avoid nausea.   7. A slight fever is normal. Call the doctor if your fever is over 100 F (37.7 C) (taken under the tongue) or lasts longer than 24 hours.  8. You may have a dry mouth, muscle aches, trouble sleeping or a sore throat.  These symptoms should go away after 24 hours.  9. Do not make important or legal decisions.   Pain Management:      1. Take pain medication (if prescribed) for pain as directed by your physician.        2. WARNING: If the pain medication you have been prescribed contains Tylenol  (acetaminophen), DO NOT take additional doses of Tylenol (acetaminophen).     Call your doctor for any of the followin.  Signs of infection (fever, growing tenderness at the surgery site, severe pain, a large amount of drainage or bleeding, foul-smelling drainage, redness, swelling).    2.  It has been over 8 to 10 hours since surgery and you are still not able to urinate (pee).    3.   Headache for over 24 hours.    4.  Numbness, tingling or weakness the day after surgery (if you had spinal anesthesia).  To contact a doctor, call Dr. Octavio Carter's clinic [phone]   or:      [phone] and ask for the Resident On Call for:          Urology (answered 24 hours a day)      Emergency Department:  [facility]: [phone]  [facility]: [phone]               Rev. 10/2014

## 2021-10-18 NOTE — OP NOTE
OPERATIVE REPORT  10/18/2021    PRE-OPERATIVE DIAGNOSIS:   1. Malfunction of inflatable penile prosthesis  2. Gender dysphoria  3. S/p phalloplasty    POST-OPERATIVE DIAGNOSIS:   Same    PROCEDURES PERFORMED:   Revision of inflatable penile prosthesis    STAFF SURGEON: Octavio Carter MD  FELLOW SURGEON: Shena Wells MD  RESIDENT SURGEON:  Maral Jensen MD    ANESTHESIA: General  ESTIMATED BLOOD LOSS: 5 mL.   SPECIMEN: none  DRAINS/TUBES: none    SIGNIFICANT FINDINGS:  Unable to compress pump at start of case  No leak identified  Tubing connection from reservoir to pump revised - worked well at end of case.    OPERATIVE INDICATIONS:   Myrna Dee is a 36 year old trans male with a history of gender dysphoria s/p L ALT and R SCIP phalloplasty followed by IPP in May 2021 with good result. Though the device worked initially the patient was unable to compress the pump within a few months of surgery. Repeated attempts by patient and physician resulted in ability to inflate the device. He presents today after discussing the risks, benefits, and alternatives for revision.     DESCRIPTION OF PROCEDURE:   After verification of informed consent was obtained, the patient was brought to the operating room, and moved to the operating table. After adequate anesthesia was induced and the operative site was prepped and draped in the usual sterile fashion. A formal timeout was performed to confirm the correct patient, procedure and operative site.     We started by trying to activate the device with the patient asleep. We were unable to compress the pump. An incision was made over the prior reservoir incision in the right lower quadrant. Dissection was continued through subcutaneous tissues with cautery. The tubing was identified. Rubber shods were applied to the tubing proximal to the connection on either side. The tubing was cut. The reservoir was drained and a full 65ml returned without any blood or air present,  indicating no leak. New sterile saline was used to fill the pump and the shod reapplied. A syringe with 70mls of sterile saline was connected to the tubing to the pump as a surrogate test. The pump was deployed without issue and the cylinder inflated easily, with good result. The pump was cycled multiple times and no leak was identified. The device cycled appropriate during surrogate test. The old connection was cut and the tubing reconnected using new connection tools. Shods were removed. The pump was cycled again repeatedly and worked very well. At this point, there was no suspicion of leak, and there was a functional device. The decision was made to stop the case. The incision was irrigated with antibiotic solution and hemostasis achieved. The incision was closed in two layers, with 3-0 monocryl on Jaleesa's and 4-0 monocryl in a running subcuticular fashion on the skin. The incision was reinforced with exofin.     The procedure was then concluded. The patient was transferred to the postanesthesia care unit in stable condition and tolerated the procedure well.    POST-OP PLAN:  Discharge home today  Okay to use pump in one week    As attending surgeon, I, Octavio Carter MD, was present for the entire procedure.

## 2021-10-18 NOTE — BRIEF OP NOTE
[facility]    Brief Operative Note    Pre-operative diagnosis: Other male erectile dysfunction [N52.8]  Post-operative diagnosis Same as pre-operative diagnosis    Procedure: Procedure(s):  REVISION  PENILE PROSTHESIS, INFLATABLE  Surgeon: Surgeon(s) and Role:     * Octavio Carter MD - Primary     * Maral Jensen MD - Resident - Assisting     * Shena Clarke MD - Fellow - Assisting  Anesthesia: General   Estimated Blood Loss: Less than 10 ml    Drains: None  Specimens: * No specimens in log *  Findings:  No leak found, connection revised from pump to reservoir and new saline replaced. Device now cycles normally.   Complications: None.  Implants:   Implant Name Type Inv. Item Serial No.  Lot No. LRB No. Used Action   IMP PROSTHESIS PENILE TITAN STD ASSEMBLY KIT [id number] - [id number] Penile Implant IMP PROSTHESIS PENILE TITAN STD ASSEMBLY KIT [id number]  [employer] [id number] N/A 1 Implanted   IMP PROSTHESIS PENILE TITAN STD ASSEMBLY KIT [id number] Penile Implant IMP PROSTHESIS PENILE TITAN STD ASSEMBLY KIT [id number]  COLOPLAST [id number] N/A 1 Explanted       Dispo:   PACU then home  OK to use the device starting in 5 days

## 2021-10-18 NOTE — TELEPHONE ENCOUNTER
Reason for visit: Post op follow up     Relevant information: s/p IPP revision    Records/imaging/labs/orders: in EPIC    Pt called: no    At Rooming: normal

## 2021-10-18 NOTE — ANESTHESIA CARE TRANSFER NOTE
Patient: Myrna Dee    Procedure: Procedure(s):  REVISION  PENILE PROSTHESIS, INFLATABLE       Diagnosis: Other male erectile dysfunction [N52.8]  Diagnosis Additional Information: No value filed.    Anesthesia Type:   General     Note:    Oropharynx: oropharynx clear of all foreign objects and spontaneously breathing  Level of Consciousness: drowsy  Oxygen Supplementation: face mask  Level of Supplemental Oxygen (L/min / FiO2): 6  Independent Airway: airway patency satisfactory and stable  Dentition: dentition unchanged  Vital Signs Stable: post-procedure vital signs reviewed and stable  Report to RN Given: handoff report given  Patient transferred to: PACU    Handoff Report: Identifed the Patient, Identified the Reponsible Provider, Reviewed the pertinent medical history, Discussed the surgical course, Reviewed Intra-OP anesthesia mangement and issues during anesthesia, Set expectations for post-procedure period and Allowed opportunity for questions and acknowledgement of understanding      Vitals:  Vitals Value Taken Time   /62 10/18/21 0850   Temp 36.6    Pulse 70 10/18/21 0850   Resp 12    SpO2 100 % 10/18/21 0852   Vitals shown include unvalidated device data.    Electronically Signed By: DENISE Reyes CRNA  October 18, 2021  8:53 AM

## 2021-10-18 NOTE — ANESTHESIA POSTPROCEDURE EVALUATION
Patient: Myrna Dee    Procedure: Procedure(s):  REVISION  PENILE PROSTHESIS, INFLATABLE       Diagnosis:Other male erectile dysfunction [N52.8]  Diagnosis Additional Information: No value filed.    Anesthesia Type:  General    Note:  Disposition: Outpatient   Postop Pain Control: Uneventful            Sign Out: Well controlled pain   PONV: No   Neuro/Psych: Uneventful            Sign Out: Acceptable/Baseline neuro status   Airway/Respiratory: Uneventful            Sign Out: Acceptable/Baseline resp. status   CV/Hemodynamics: Uneventful            Sign Out: Acceptable CV status; No obvious hypovolemia; No obvious fluid overload   Other NRE: NONE   DID A NON-ROUTINE EVENT OCCUR? No    Event details/Postop Comments:  Awakening satisfactorily; strong; breathing well; oriented; communicating effectively; no complaints or complications; comfortable.            Last vitals:  Vitals Value Taken Time   /69 10/18/21 1000   Temp 36.3  C (97.4  F) 10/18/21 0940   Pulse 75 10/18/21 1000   Resp 16 10/18/21 1000   SpO2 97 % 10/18/21 1003   Vitals shown include unvalidated device data.    Electronically Signed By: Modesto Callahan MD  October 18, 2021  10:05 AM

## 2021-10-19 NOTE — TELEPHONE ENCOUNTER
Prior Authorization Approval    Authorization Effective Date: 9/19/2021  Authorization Expiration Date: 10/18/2026  Medication: testosterone cypionate (DEPOTESTOSTERONE) 200 MG/ML injection  Approved Dose/Quantity:   Reference #: [id number]   Insurance Company: [employer] - Phone [phone] Fax [phone]  Which Pharmacy is filling the prescription (Not needed for infusion/clinic administered): [facility] [id number] [place] [address]  Pharmacy Notified: Yes  Patient Notified: Yes

## 2021-10-19 NOTE — TELEPHONE ENCOUNTER
PA Initiation    Medication: testosterone cypionate (DEPOTESTOSTERONE) 200 MG/ML injection  Insurance Company: [employer] - Phone [phone] Fax [phone]  Ph[facility] [id number] [place] [address]  Filling Pharmacy Phone: [phone]  Filling Pharmacy Fax:    Start Date: 10/19/2021    Central Prior Authorization Team   Phone: [phone]

## 2021-10-28 ENCOUNTER — TELEPHONE (OUTPATIENT)
Dept: UROLOGY | Facility: CLINIC | Age: 36
End: 2021-10-28

## 2021-10-28 NOTE — TELEPHONE ENCOUNTER
[facility] Call Center    Phone Message    May a detailed message be left on voicemail: yes     Reason for Call: Other: . please cancel pts post op on 11/3- he doesn't feel like he needs the appt, and  said he could cancel if need be, thank you    Action Taken: Message routed to:  [facility] ([facility] uro    Travel Screening: Not Applicable

## 2021-11-19 ENCOUNTER — MYC MEDICAL ADVICE (OUTPATIENT)
Dept: UROLOGY | Facility: CLINIC | Age: 36
End: 2021-11-19
Payer: COMMERCIAL

## 2022-01-16 ENCOUNTER — HEALTH MAINTENANCE LETTER (OUTPATIENT)
Age: 37
End: 2022-01-16

## 2022-05-16 DIAGNOSIS — F64.0 GENDER DYSPHORIA IN ADOLESCENT AND ADULT: ICD-10-CM

## 2022-08-11 NOTE — ANESTHESIA PREPROCEDURE EVALUATION
"Anesthesia Pre-Procedure Evaluation    Patient: Myrna Dee   MRN:     [id number] Gender:   adult   Age:    36 year old :      1985        Preoperative Diagnosis: Other male erectile dysfunction [N52.8]   Procedure(s):  REVISION OR REPLACEMENT, PENILE PROSTHESIS, INFLATABLE     LABS:  CBC:   Lab Results   Component Value Date    WBC 8.6 2020    WBC 10.1 2020    HGB 13.5 2021    HGB 13.6 2021    HCT 40.0 2021    HCT 35.7 (L) 2020     2020     2020     BMP:   Lab Results   Component Value Date     2021     2020    POTASSIUM 3.8 2021    POTASSIUM 3.5 2021    CHLORIDE 106 2021    CHLORIDE 110 (H) 2020    CO2 28 2021    CO2 25 2020    BUN 14 2021    BUN 6 (L) 2020    CR 0.77 2021    CR 0.77 2021    GLC 93 10/18/2021    GLC 76 2021     COAGS:   Lab Results   Component Value Date    PTT 30 2011    INR 1.08 2011     POC:   Lab Results   Component Value Date    BGM 90 2021     OTHER:   Lab Results   Component Value Date    ADELAIDE 9.1 2021    PHOS 3.4 2011    ALBUMIN 4.1 2021    PROTTOTAL 7.5 2021    ALT 16 2021    AST 17 2021    ALKPHOS 61 2021    BILITOTAL 1.0 2021    TSH 1.75 10/05/2009        Preop Vitals    BP Readings from Last 3 Encounters:   10/18/21 107/71   10/15/21 99/68   21 118/76    Pulse Readings from Last 3 Encounters:   10/18/21 85   10/15/21 80   21 83      Resp Readings from Last 3 Encounters:   10/18/21 16   10/15/21 16   21 16    SpO2 Readings from Last 3 Encounters:   10/18/21 100%   10/15/21 98%   21 98%      Temp Readings from Last 1 Encounters:   10/18/21 36.4  C (97.5  F) (Oral)    Ht Readings from Last 1 Encounters:   10/18/21 1.651 m (5' 5\")      Wt Readings from Last 1 Encounters:   10/18/21 76.5 kg (168 lb 10.4 oz)    Estimated body mass index is " "28.07 kg/m  as calculated from the following:    Height as of this encounter: 1.651 m (5' 5\").    Weight as of this encounter: 76.5 kg (168 lb 10.4 oz).     LDA:  Negative Pressure Wound Therapy Leg Upper (Active)   Number of days: 623        Past Medical History:   Diagnosis Date     Aseptic meningitis      Complication of anesthesia     HX slow wake up,HX panic attacks with vicodin postop     Gender dysphoria in adolescent and adult      High risk HPV infection      Papanicolaou smear of cervix with atypical squamous cells cannot exclude high grade squamous intraepithelial lesion (ASC-H)       Past Surgical History:   Procedure Laterality Date     CYSTOSCOPY N/A 7/12/2019    Procedure: Cystoscopy;  Surgeon: Myesha Hernández MD;  Location: [facility]     CYSTOSCOPY FLEXIBLE, CYSTOSTOMY, INSERT TUBE SUPRAPUBIC, COMBINED N/A 9/24/2020    Procedure: CYSTOSCOPY, WITH SUPRAPUBIC CATHETER INSERTION;  Surgeon: Octavio Carter MD;  [facility]     DAVINCI LAPAROSCOPIC CERVICECTOMY (TRACHELECTOMY) N/A 7/12/2019    Procedure: Davinci Laparoscopic Trachelectomy;  Surgeon: Myesha Hernández MD;  Location[facility]     GRAFT FREE VASCULARIZED (LOCATION) Left 2/3/2020    Procedure: with left thigh flap and right groin flap;  Surgeon: Gavin Eastman MD;  [facility]     HYSTERECTOMY      and oophorectomy, but still has cervix and will require pap every 3 years, At [facility]     IMPLANT PROSTHESIS PENIS INFLATABLE N/A 5/3/2021    Procedure: INSERTION, PENILE PROSTHESIS, INFLATABLE;  Surgeon: Octavio Carter MD;  [facility]     MASTECTOMY PARTIAL      chest surgery w/ contouring      SCROTOPLASTY N/A 9/24/2020    Procedure: SCROTOPLASTY;  Surgeon: Gavin Eastman MD;  [facility]     TRANSGENDER GLANSPLASTY WITH SKIN GRAFT N/A 9/24/2020    Procedure: TRANSGENDER GLANSPLASTY WITH SKIN GRAFT;  Surgeon: Gavin Eastman MD;  [facility]     TRANSGENDER PHALLOPLASTY Bilateral 2/3/2020    Procedure: Phalloplasty;  " "Surgeon: Gavin Eastman MD;  [facility]     URETHROPLASTY N/A 9/24/2020    Procedure: URETHROPLASTY;  Surgeon: Octavio Carter MD;  [facility]     VAGINECTOMY, ROBOT-ASSISTED N/A 9/24/2020    Procedure: VAGINECTOMY, ROBOT-ASSISTED;  Surgeon: Octavio Carter MD;  [facility]      No Known Allergies     Anesthesia Evaluation    ROS/Med Hx    No history of anesthetic complications  (-) malignant hyperthermia and tuberculosis  Comments: Met with Inocencio - who is NPO and has tolerated anesthesia in the recent past.     Inocencio's problem list is as follows:  Gender dysphoria in adult  Post-procedural erectile dysfunction  High risk HPV infection        Cardiovascular Findings - negative ROS    Neuro Findings   Comments: Stable; no acute issues.     Pulmonary Findings   (-) asthma and apnea  Comments: Non smoker    HENT Findings - negative HENT ROS    Skin Findings - negative skin ROS      GI/Hepatic/Renal Findings   (-) GERD    Endocrine/Metabolic Findings - negative ROS      Genetic/Syndrome Findings - negative genetics/syndromes ROS    Hematology/Oncology Findings - negative hematology/oncology ROS    Additional Notes  Allergies:  No Known Allergies  Medications Prior to Admission:  Pediatric Multivit-Minerals-C (FLINTSTONES GUMMIES PO), Take 2 tablets by mouth every morning , Disp: , Rfl: , 10/17/2021 at 0800  testosterone cypionate (DEPOTESTOSTERONE) 200 MG/ML injection, INJECT 0.25 ML INTO THE MUSCLES ONCE A WEEK., Disp: 13 mL, Rfl: 1, 10/17/2021 at 2000  B-D SYRINGE LUER-WU 1 ML MISC, Use for weekly hormone injection, Disp: 25 each, Rfl: 3  Needle, Disp, 23G X 1\" MISC, Use weekly for hormone injection, Disp: 25 each, Rfl: 3            PHYSICAL EXAM:   Mental Status/Neuro: A/A/O   Airway: Facies: Feasible  Mallampati: II  Mouth/Opening: Full  TM distance: > 6 cm  Neck ROM: Full   Respiratory: Auscultation: CTAB     Resp. Rate: Normal     Resp. Effort: Normal      CV: Rhythm: Regular  Rate: Age " appropriate  Heart: Normal Sounds  Edema: None   Comments: Dentition: no acute issues                     Anesthesia Plan    ASA Status:  1   NPO Status:  NPO Appropriate    Anesthesia Type: General.     - Airway: LMA   Induction: Intravenous, Propofol.   Maintenance: Balanced.        Consents         - Extended Intubation/Ventilatory Support Discussed: No.      - Patient is DNR/DNI Status: No    Use of blood products discussed: No .     Postoperative Care    Pain management: IV analgesics, Oral pain medications.   PONV prophylaxis: Ondansetron (or other 5HT-3), Dexamethasone or Solumedrol     Comments:    Inocencio requests anesthesia. Procedures and risks explained. Inocencio understood and consented. Qs answered.      H&P reviewed: Unable to attach H&P to encounter due to EHR limitations. H&P Update: appropriate H&P reviewed, patient examined. No interval changes since H&P (within 30 days).      Modesto Callahan MD   room air

## 2022-09-07 DIAGNOSIS — F64.0 GENDER DYSPHORIA IN ADOLESCENT AND ADULT: ICD-10-CM

## 2022-09-07 RX ORDER — SYRINGE, DISPOSABLE, 1 ML
SYRINGE, EMPTY DISPOSABLE MISCELLANEOUS
Qty: 25 EACH | Refills: 3 | Status: SHIPPED | OUTPATIENT
Start: 2022-09-07 | End: 2022-12-19

## 2022-09-07 NOTE — TELEPHONE ENCOUNTER

## 2022-11-07 DIAGNOSIS — F64.0 GENDER DYSPHORIA IN ADULT: Primary | ICD-10-CM

## 2022-11-07 RX ORDER — TESTOSTERONE CYPIONATE 200 MG/ML
INJECTION, SOLUTION INTRAMUSCULAR
Qty: 6 ML | Refills: 0 | Status: SHIPPED | OUTPATIENT
Start: 2022-11-07 | End: 2022-12-19

## 2022-11-07 NOTE — TELEPHONE ENCOUNTER

## 2022-11-07 NOTE — TELEPHONE ENCOUNTER
[facility] phone call message- patient requesting a refill:    Full Medication Name: testosterone cypionate (DEPOTESTOSTERONE) 200 MG/ML injection        Ph[facility] confirmed [facility]       [id number] [address]  [address] [facility] [address]  Phone: [phone] Fax: [phone]    : Yes    Additional Comments: The patient is requesting a refill. The patient have an appt w/ [facility] on 12/19/2022. The patient also wants labs but there's no order for it on the patient's chart.     OK to leave a message on voice mail? Yes    Primary language: English      needed? No    Call taken on November 7, 2022 at 8:49 AM by Zoya Henderson

## 2022-12-19 ENCOUNTER — OFFICE VISIT (OUTPATIENT)
Dept: FAMILY MEDICINE | Facility: CLINIC | Age: 37
End: 2022-12-19
Payer: COMMERCIAL

## 2022-12-19 VITALS
HEART RATE: 62 BPM | SYSTOLIC BLOOD PRESSURE: 118 MMHG | WEIGHT: 167 LBS | BODY MASS INDEX: 27.82 KG/M2 | TEMPERATURE: 98 F | HEIGHT: 65 IN | OXYGEN SATURATION: 97 % | DIASTOLIC BLOOD PRESSURE: 77 MMHG | RESPIRATION RATE: 16 BRPM

## 2022-12-19 DIAGNOSIS — Z23 NEED FOR PROPHYLACTIC VACCINATION AND INOCULATION AGAINST INFLUENZA: ICD-10-CM

## 2022-12-19 DIAGNOSIS — Z20.2 CONTACT WITH AND (SUSPECTED) EXPOSURE TO INFECTIONS WITH A PREDOMINANTLY SEXUAL MODE OF TRANSMISSION: ICD-10-CM

## 2022-12-19 DIAGNOSIS — F64.9 GENDER INCONGRUENCE: Primary | ICD-10-CM

## 2022-12-19 LAB
HGB BLD-MCNC: 13.5 G/DL (ref 11.7–17.7)
HIV 1+2 AB+HIV1 P24 AG SERPL QL IA: NONREACTIVE

## 2022-12-19 PROCEDURE — 85018 HEMOGLOBIN: CPT | Performed by: FAMILY MEDICINE

## 2022-12-19 PROCEDURE — 86780 TREPONEMA PALLIDUM: CPT | Performed by: FAMILY MEDICINE

## 2022-12-19 PROCEDURE — 90471 IMMUNIZATION ADMIN: CPT | Performed by: FAMILY MEDICINE

## 2022-12-19 PROCEDURE — 87491 CHLMYD TRACH DNA AMP PROBE: CPT | Performed by: FAMILY MEDICINE

## 2022-12-19 PROCEDURE — 87389 HIV-1 AG W/HIV-1&-2 AB AG IA: CPT | Performed by: FAMILY MEDICINE

## 2022-12-19 PROCEDURE — 90686 IIV4 VACC NO PRSV 0.5 ML IM: CPT | Performed by: FAMILY MEDICINE

## 2022-12-19 PROCEDURE — 99395 PREV VISIT EST AGE 18-39: CPT | Mod: 25 | Performed by: FAMILY MEDICINE

## 2022-12-19 PROCEDURE — 36415 COLL VENOUS BLD VENIPUNCTURE: CPT | Performed by: FAMILY MEDICINE

## 2022-12-19 PROCEDURE — 87591 N.GONORRHOEAE DNA AMP PROB: CPT | Performed by: FAMILY MEDICINE

## 2022-12-19 RX ORDER — SYRINGE, DISPOSABLE, 1 ML
SYRINGE, EMPTY DISPOSABLE MISCELLANEOUS
Qty: 15 EACH | Refills: 3 | Status: SHIPPED | OUTPATIENT
Start: 2022-12-19 | End: 2023-12-27

## 2022-12-19 RX ORDER — TESTOSTERONE CYPIONATE 200 MG/ML
INJECTION, SOLUTION INTRAMUSCULAR
Qty: 13 ML | Refills: 3 | Status: SHIPPED | OUTPATIENT
Start: 2022-12-19 | End: 2023-12-27

## 2022-12-19 NOTE — PROGRESS NOTES
"  Assessment & Plan     Gender incongruence  Doing well without dysphoria. Has no unmet gender affirmation goals at this time. Discussed 1 ml vial being single use and this should solve issue with supply at the end of the month. He can continue to use closer to 0.21 than 0.25 since he feels well and this has been effective. Shot administered yesterday, so we will not check today. Sexual health is good but did have a malfunction of his implant that he went back to surgery for. He did not have complete replacement and so it is unclear how long he will be able to maintain erections with current device, but it is functional now. Plan return to clinic in 1 year with standing orders for Testosterone and hemoglobin levels ordered  - testosterone cypionate (DEPOTESTOSTERONE) 200 MG/ML injection  Dispense: 13 mL; Refill: 3  - Needle, Disp, 23G X 1\" MISC  Dispense: 15 each; Refill: 3  - B-D SYRINGE LUER-WU 1 ML MISC  Dispense: 15 each; Refill: 3  - Hemoglobin  - Testosterone total  - Hemoglobin    Contact with and (suspected) exposure to infections with a predominantly sexual mode of transmission  Discussed possible screening schedule, at this point recommend 6-12 months based on number of partners fluid bonded and proximity to future children. Does not appear to be in need of PrEP at this point, but discussed risk with serodiscordant partners. He will use barriers for genital contact.   - Treponema Abs w Reflex to RPR and Titer  - HIV Antigen Antibody Combo  - Chlamydia trachomatis/Neisseria gonorrhoeae by PCR - Clinic Collect  - Chlamydia trachomatis/Neisseria gonorrhoeae by PCR - Clinic Collect    Need for prophylactic vaccination and inoculation against influenza  - INFLUENZA VACCINE IM > 6 MONTHS VALENT IIV4 (AFLURIA/FLUZONE)  - INFLUENZA VACCINE IM > 6 MONTHS VALENT IIV4 (AFLURIA/FLUZONE)        48 minutes spent on the date of the encounter doing chart review, history and exam, documentation and further activities per " the note       No follow-ups on file.    The information in this document, created by the medical scribe for me, accurately reflects the services I personally performed and the decisions made by me. I have reviewed and approved this document for accuracy prior to leaving the patient care area.  Clarissa Stroud MD  10:09 AM, 12/19/22  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 They are considering having a third child. He spends his time taking care of his children and outside childcare. He does not have any questions regarding future family planning. His spouse has a history PCOS and is 32 years old. His wife uses a sperm donor, the patient's brother, to procreate.    They have a two week family vacation to [place] and [place] planned in April. He has 3 siblings permanently residing in [place].     Sexual Health  He reports that they are planning on an open marriage, and seeks guidance on mitigating risk for STIs. He is planning on oral sex without barriers and other penetrative sex with barriers.     Patient reports that his penile implant malfunctioned for which he has to see surgery to fix. This was not a full replacement, but he is able to maintain erections currently.    Social  He is socially active with other parents and is in frequent routine communication with friends and family. He reports being happy with his social life, but is trying to work on being less confined to the house, as his spouse has been extremely busy with work    Gender Health  Generally, he feels that he has achieved his gender goals. He still has habitual anxiety about public bathrooms, but is getting more comfortable with this. Normalizing his sex life is  "also slow-going, but he feels like he is making progress in this as well. He is feeling very good on his current dose of hormones. He has had to reduce his dose of testosterone to 0.21 due to his pharmacies vial and syringe stocks. He denies any symptoms from this slight drop, and generally does not notice differences when he misses doses. He takes his testosterone intramuscularly which he is satisfied with and able to administer himself. He administers shots on Sundays.    Review of Systems   Positive for: Gender incongruence  Negative for:    This document serves as a record of the services and decisions personally performed and made by Clarissa Stroud MD. It was created on his/her behalf by Blair Sanders, a trained medical scribe. The creation of this document is based the provider's statements to the medical scribe.  Hortencia Sanders 10:09 AM, December 19, 2022        Objective    /77   Pulse 62   Temp 98  F (36.7  C) (Oral)   Resp 16   Ht 1.651 m (5' 5\")   Wt 75.8 kg (167 lb)   SpO2 97%   BMI 27.79 kg/m    Body mass index is 27.79 kg/m .  Physical Exam   GENERAL: healthy, alert and no distress  HENT: Clear oral cavity. Moist mucosa, no exudates.  PSYCH: mentation appears normal, affect normal/bright              "

## 2022-12-19 NOTE — PATIENT INSTRUCTIONS
Testosterone, syringes, needles refilled    2.   You received your Flu shot today    Sexual health  1.   Consider seeing [facility] for Mpox vaccine    2.   STI screening today    3.   For engaging with new sexual partners, recommend STI screening every 3-12 months based on number of partners and partner's sexual history.     4.   Higher risk factors such as a partner with HIV may call for PrEP, not neccessary at this time.

## 2022-12-20 LAB
C TRACH DNA SPEC QL PROBE+SIG AMP: NEGATIVE
C TRACH DNA SPEC QL PROBE+SIG AMP: NEGATIVE
N GONORRHOEA DNA SPEC QL NAA+PROBE: NEGATIVE
N GONORRHOEA DNA SPEC QL NAA+PROBE: NEGATIVE
T PALLIDUM AB SER QL: NONREACTIVE

## 2023-04-23 ENCOUNTER — HEALTH MAINTENANCE LETTER (OUTPATIENT)
Age: 38
End: 2023-04-23

## 2023-09-05 ENCOUNTER — ANCILLARY PROCEDURE (OUTPATIENT)
Dept: GENERAL RADIOLOGY | Facility: CLINIC | Age: 38
End: 2023-09-05
Attending: FAMILY MEDICINE
Payer: COMMERCIAL

## 2023-09-05 ENCOUNTER — OFFICE VISIT (OUTPATIENT)
Dept: FAMILY MEDICINE | Facility: CLINIC | Age: 38
End: 2023-09-05
Payer: COMMERCIAL

## 2023-09-05 VITALS
BODY MASS INDEX: 27.82 KG/M2 | OXYGEN SATURATION: 96 % | TEMPERATURE: 97.9 F | HEIGHT: 65 IN | DIASTOLIC BLOOD PRESSURE: 81 MMHG | WEIGHT: 167 LBS | RESPIRATION RATE: 18 BRPM | HEART RATE: 101 BPM | SYSTOLIC BLOOD PRESSURE: 121 MMHG

## 2023-09-05 DIAGNOSIS — S92.302A CLOSED NON-PHYSEAL FRACTURE OF METATARSAL BONE OF LEFT FOOT, UNSPECIFIED METATARSAL, INITIAL ENCOUNTER: ICD-10-CM

## 2023-09-05 DIAGNOSIS — M79.672 LEFT FOOT PAIN: ICD-10-CM

## 2023-09-05 DIAGNOSIS — M79.672 LEFT FOOT PAIN: Primary | ICD-10-CM

## 2023-09-05 PROCEDURE — 73630 X-RAY EXAM OF FOOT: CPT | Mod: LT | Performed by: RADIOLOGY

## 2023-09-05 PROCEDURE — 99213 OFFICE O/P EST LOW 20 MIN: CPT | Performed by: FAMILY MEDICINE

## 2023-09-05 ASSESSMENT — PAIN SCALES - GENERAL: PAINLEVEL: NO PAIN (1)

## 2023-09-05 NOTE — PROGRESS NOTES
"  Assessment & Plan     Left foot pain  4th MTP, stress reaction  Mileage increased  Has rested 5-6 weeks  Return to run protocol with PT  - X-ray lt Foot G/E 3 vws*; Future  - Physical Therapy Referral; Future    Closed non-physeal fracture of metatarsal bone of left foot, unspecified metatarsal, initial encounter  4th MTp  - Physical Therapy Referral; Future             BMI:   Estimated body mass index is 27.79 kg/m  as calculated from the following:    Height as of this encounter: 1.651 m (5' 5\").    Weight as of this encounter: 75.8 kg (167 lb).   Weight management plan: Discussed healthy diet and exercise guidelines    Regular exercise  Guided exercises with Physical Therapy  See Patient Instructions    Return in about 6 weeks (around 10/17/2023), or if symptoms worsen or fail to improve.    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  Left Ankle has been rolled previously but about 10 years ago. Weaker than right ankle.  Dorsiflexion hurts. Resting helps. Kick for the garbage can is sore, hard to use.  Worse in the morning and better throughout the day.  Could run, painful for two weeks.  Backs off and it's better.  More supportive the shoes the better.  Pt reports barefoot is the worst.  Feels good if twisting to the right.  Twisting out isn't as bad. Tylenol and Ibuprofen to help with swelling but nothing for past 4 weeks.  Ramping up miles.  Probably 15-20 miles a week.  Training for half marathon.  Last run 7/27/23.  Rested off and on for 5-6 weeks  Painful over the peroneal tendons, lateral left ankle.  How many servings of fruits and vegetables do you eat daily?  4 or more  On average, how many sweetened beverages do you drink each day (Examples: soda, juice, sweet tea, etc.  Do NOT count diet or " "artificially sweetened beverages)?   0  How many days per week do you exercise enough to make your heart beat faster? 7  How many minutes a day do you exercise enough to make your heart beat faster? 30 - 60  How many days per week do you miss taking your medication? 0        Review of Systems   Constitutional, HEENT, cardiovascular, pulmonary, gi and gu systems are negative, except as otherwise noted.      Objective    /81   Pulse 101   Temp 97.9  F (36.6  C)   Resp 18   Ht 1.651 m (5' 5\")   Wt 75.8 kg (167 lb)   SpO2 96%   BMI 27.79 kg/m    Body mass index is 27.79 kg/m .  Physical Exam     Left Foot/Ankle:   Inspection: Swelling - none; Bruising - none   Sensation: intact in peroneal, tibial, sural, and saphenous distribution   ROM: Dorsiflexion - tender; Plantarflexion - none; Inversion -none; Eversion - none;    Strength: 5/5 in all motions; Pain w/ resisted none  Bony tenderness: Medial malleolus? - none; Lateral malleolus? - mild; Navicular? - none; 5th Metatarsal? - mild; Other - over 4th MTP, radiates  Ligaments Tenderness: ATFL/CFL -none; Deltoid - none; Syndesmosis - none; LisFranc - none  Tendons: Posterior Tibialis - none; Peroneals - mild; Achilles - none   Maneuvers: Anterior Drawer - none; Talar Tilt - none; Squeeze - not performed; Hop - not performed; Singleton - not performed      Xray - Reviewed and interpreted by me.  Older appearing 4th MTP fracture at base                  "

## 2023-09-05 NOTE — PATIENT INSTRUCTIONS
Physical therapy      Left foot pain  4th MTP, stress reaction  Mileage increased  Has rested 5-6 weeks  Return to run protocol with PT  - X-ray lt Foot G/E 3 vws*; Future  - Physical Therapy Referral; Future

## 2023-09-12 ENCOUNTER — THERAPY VISIT (OUTPATIENT)
Dept: PHYSICAL THERAPY | Facility: CLINIC | Age: 38
End: 2023-09-12
Payer: COMMERCIAL

## 2023-09-12 DIAGNOSIS — S92.302A CLOSED NON-PHYSEAL FRACTURE OF METATARSAL BONE OF LEFT FOOT, UNSPECIFIED METATARSAL, INITIAL ENCOUNTER: ICD-10-CM

## 2023-09-12 DIAGNOSIS — M79.672 LEFT FOOT PAIN: ICD-10-CM

## 2023-09-12 PROCEDURE — 97110 THERAPEUTIC EXERCISES: CPT | Mod: GP | Performed by: PHYSICAL THERAPIST

## 2023-09-12 PROCEDURE — 97140 MANUAL THERAPY 1/> REGIONS: CPT | Mod: GP | Performed by: PHYSICAL THERAPIST

## 2023-09-12 PROCEDURE — 97161 PT EVAL LOW COMPLEX 20 MIN: CPT | Mod: GP | Performed by: PHYSICAL THERAPIST

## 2023-09-12 NOTE — PROGRESS NOTES
PHYSICAL THERAPY EVALUATION  Type of Visit: Evaluation    See electronic medical record for Abuse and Falls Screening details.    Subjective       Presenting condition or subjective complaint: L foot pain in foot with running. Last run on July 26- tried resting w/o resolution.   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  Hobbies/Interests: Bike, [employer] board, running    Patient goals for therapy: Return to running; walk w/o inc pain    Pain assessment: See objective evaluation for additional pain details     Objective   FOOT/ANKLE EVALUATION  PAIN:   Pain Level at Rest: 0/10  Pain Level with Use: 3/10  Pain Location: foot  and L lateral foot, into 4th and 5th metatarsal, post to lateral malleolus  Pain Quality: Aching and Sharp  Pain Frequency: intermittent  Pain is Worst: First thing in the morning stiffness, improves with use  Pain is Exacerbated By: Running, high impact, prolonged sitting/laying down  Pain is Relieved By: rest and stretch    POSTURE: Standing Posture: Rounded shoulders, ant pelvic tilt, inc pronation L > R, slight toe out B    GAIT:   Weightbearing Status: WBAT  Assistive Device(s): None  Gait Deviations: Pronation increased L, Heel strike decreased L, Push off decreased L     Trendelenberg L, Trendelenberg R    ROM:  L foot dec forefoot adduction, L ankle dec inv passive and active ROM. Dec L 1st MTP. PF/DF and Ev comparable B    STRENGTH: Dec L ankle Gastroc/Soleus strength, L peroneal, L post tib, L intrinsic strength    FUNCTIONAL TESTS: Double Leg Squat: Anterior knee translation, Hip internal rotation, and Improper use of  glutes/hips  Single Leg Squat: Anterior knee translation, Hip internal rotation, and Dec depth L vs R  SLS: Inc dynamic sway on L, inc pronation L,  trendelenberg B  PALPATION:  Ttp L base of 4th and 5th metatarsal, L cuboid tender and hypomobile, ttp L ankle along peroneals distal insertion to midsubstance lateral LE  JOINT MOBILITY:  Dec post TCJ glide L ankle, Dec subtalar mobility L ankle, dec 1st MTP L ankle    Assessment & Plan   CLINICAL IMPRESSIONS  Medical Diagnosis: L foot 4th Met stress injury    Treatment Diagnosis: L foot pain   Impression/Assessment: Patient is a 38 year old adult with L foot complaints.  The following significant findings have been identified: Pain, Decreased ROM/flexibility, Decreased joint mobility, Decreased strength, Decreased proprioception, Impaired gait, and Decreased activity tolerance. These impairments interfere with their ability to perform recreational activities, household mobility, and community mobility as compared to previous level of function.     Clinical Decision Making (Complexity):  Clinical Presentation: Stable/Uncomplicated  Clinical Presentation Rationale: based on medical and personal factors listed in PT evaluation  Clinical Decision Making (Complexity): Low complexity    PLAN OF CARE  Treatment Interventions:  Interventions: Gait Training, Manual Therapy, Neuromuscular Re-education, Therapeutic Activity, Therapeutic Exercise, Self-Care/Home Management    Long Term Goals     PT Goal 1  Goal Identifier: Ambulation  Goal Description: Unrestricted running d/t foot pain  Rationale: to maximize safety and independence within the community;to maximize safety and independence with performance of ADLs and functional tasks;to maximize safety and independence within the home  Target Date: 12/05/23      Frequency of Treatment: wkly to bi-monthly  Duration of Treatment: 12 wks    Recommended Referrals to Other Professionals: Physical Therapy  Education Assessment:    Learner/Method: Patient;Pictures/Video    Risks and benefits of evaluation/treatment have been explained.   Patient/Family/caregiver agrees with Plan of Care.     Evaluation Time:     PT Eval, Low Complexity Minutes (07572): 20    Signing Clinician: Francesca Herrera PT

## 2023-09-22 ENCOUNTER — THERAPY VISIT (OUTPATIENT)
Dept: PHYSICAL THERAPY | Facility: CLINIC | Age: 38
End: 2023-09-22
Payer: COMMERCIAL

## 2023-09-22 DIAGNOSIS — M79.672 LEFT FOOT PAIN: Primary | ICD-10-CM

## 2023-09-22 DIAGNOSIS — S92.302A CLOSED NON-PHYSEAL FRACTURE OF METATARSAL BONE OF LEFT FOOT, UNSPECIFIED METATARSAL, INITIAL ENCOUNTER: ICD-10-CM

## 2023-09-22 PROCEDURE — 97140 MANUAL THERAPY 1/> REGIONS: CPT | Mod: GP | Performed by: PHYSICAL THERAPIST

## 2023-09-22 PROCEDURE — 97110 THERAPEUTIC EXERCISES: CPT | Mod: GP | Performed by: PHYSICAL THERAPIST

## 2023-09-26 ENCOUNTER — THERAPY VISIT (OUTPATIENT)
Dept: PHYSICAL THERAPY | Facility: CLINIC | Age: 38
End: 2023-09-26
Attending: FAMILY MEDICINE
Payer: COMMERCIAL

## 2023-09-26 DIAGNOSIS — M79.672 LEFT FOOT PAIN: Primary | ICD-10-CM

## 2023-09-26 DIAGNOSIS — S92.302A CLOSED NON-PHYSEAL FRACTURE OF METATARSAL BONE OF LEFT FOOT, UNSPECIFIED METATARSAL, INITIAL ENCOUNTER: ICD-10-CM

## 2023-09-26 PROCEDURE — 97140 MANUAL THERAPY 1/> REGIONS: CPT | Mod: GP | Performed by: PHYSICAL THERAPIST

## 2023-10-20 ENCOUNTER — THERAPY VISIT (OUTPATIENT)
Dept: PHYSICAL THERAPY | Facility: CLINIC | Age: 38
End: 2023-10-20
Payer: COMMERCIAL

## 2023-10-20 DIAGNOSIS — M79.672 LEFT FOOT PAIN: Primary | ICD-10-CM

## 2023-10-20 DIAGNOSIS — S92.302A CLOSED NON-PHYSEAL FRACTURE OF METATARSAL BONE OF LEFT FOOT, UNSPECIFIED METATARSAL, INITIAL ENCOUNTER: ICD-10-CM

## 2023-10-20 PROCEDURE — 97140 MANUAL THERAPY 1/> REGIONS: CPT | Mod: GP | Performed by: PHYSICAL THERAPIST

## 2023-10-20 PROCEDURE — 97110 THERAPEUTIC EXERCISES: CPT | Mod: GP | Performed by: PHYSICAL THERAPIST

## 2023-12-07 ENCOUNTER — OFFICE VISIT (OUTPATIENT)
Dept: FAMILY MEDICINE | Facility: CLINIC | Age: 38
End: 2023-12-07
Payer: COMMERCIAL

## 2023-12-07 VITALS
OXYGEN SATURATION: 99 % | TEMPERATURE: 98.8 F | DIASTOLIC BLOOD PRESSURE: 82 MMHG | SYSTOLIC BLOOD PRESSURE: 125 MMHG | RESPIRATION RATE: 18 BRPM | HEART RATE: 97 BPM

## 2023-12-07 DIAGNOSIS — R53.81 MALAISE: ICD-10-CM

## 2023-12-07 DIAGNOSIS — R05.1 ACUTE COUGH: ICD-10-CM

## 2023-12-07 DIAGNOSIS — R68.83 CHILLS: Primary | ICD-10-CM

## 2023-12-07 LAB
FLUAV RNA SPEC QL NAA+PROBE: NEGATIVE
FLUBV RNA RESP QL NAA+PROBE: NEGATIVE
RSV RNA SPEC NAA+PROBE: NEGATIVE
SARS-COV-2 RNA RESP QL NAA+PROBE: NEGATIVE

## 2023-12-07 PROCEDURE — 99213 OFFICE O/P EST LOW 20 MIN: CPT | Performed by: FAMILY MEDICINE

## 2023-12-07 PROCEDURE — 87637 SARSCOV2&INF A&B&RSV AMP PRB: CPT | Performed by: FAMILY MEDICINE

## 2023-12-07 NOTE — PATIENT INSTRUCTIONS
Nettipot with saline packets and distilled water twice daily  Any oral decongestant with sudaphed in it or a decongestant spray  Keep taking scheduled tylenol, can add ibuprofen    Patient Education   Here is the plan from today's visit    1. Chills  Malaise  3. Acute cough    - Symptomatic Influenza A/B, RSV, & SARS-CoV2 PCR (COVID-19) Nose; Future      Please call or return to clinic if your symptoms don't go away.    Follow up plan  No follow-ups on file.    Thank you for coming to [facility] today.  Lab Testing:  **If you had lab testing today and your results are reassuring or normal they will be mailed to you or sent through [employer] within 7 days.   **If the lab tests need quick action we will call you with the results.  **If you are having labs done on a different day, please call [phone] to schedule at [facility] or [phone] for other [facility] locations. Labs do not offer walk-in appointments.  The phone number we will call with results is # [phone] (home) . If this is not the best number please call our clinic and change the number.  Medication Refills:  If you need any refills please call your pharmacy and they will contact us.   If you need to  your refill at a new pharmacy, please contact the new pharmacy directly. The new pharmacy will help you get your medications transferred faster.   Scheduling:  If you have any concerns about today's visit or wish to schedule another appointment please call our office during normal business hours [phone] (8-5:00 M-F). If you can no longer make a scheduled visit, please cancel via [employer] or call us to cancel.   If a referral was made to an [facility] specialty provider and you do not get a call from central scheduling, please refer to directions on your visit summary or call our office during normal business hours for assistance.   If a Mammogram was ordered for you at the [facility] call [phone]  to schedule or change your appointment.  If you had an XRay/CT/Ultrasound/MRI ordered the number is [phone] to schedule or change your radiology appointment.   [facility] has limited ultrasound appointments available on Wednesdays, if you would like your ultrasound at [facility], please call [phone] to schedule.   Medical Concerns:  If you have urgent medical concerns please call [phone] at any time of the day.    Brigitte Marroquin MD

## 2023-12-07 NOTE — PROGRESS NOTES
Assessment & Plan     Chills  Malaise  Acute cough  Sinus pressure/congestion (nontender)  12 days of symptoms, sinus symptoms quite mild - otherwise would meet criteria for abx for bacterial sinusitis. If worsens or no improvement with adding symptomatic cares - would recommend abx treatment for sinusitis  - Symptomatic Influenza A/B, RSV, & SARS-CoV2 PCR (COVID-19) Nose; Future  Nettipot with saline packets and distilled water twice daily  Any oral decongestant with sudaphed in it or a decongestant spray (discussed low evidence for these meds and honey helpful for cough)  Keep taking scheduled tylenol, can add ibuprofen                 No follow-ups on file.    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   12/4-12/7 daily negative COVID tests  Headache at some point - attributes this to caffeine withdrawal because stopped coffee because couldn't taste it). Motivating self to eat is difficulty (because can't smell/taste). Simultaneously overhungry and nauseated all the time.   No diarrhea/vomiting.       Review of Systems         Objective    /82   Pulse 97   Temp 98.8  F (37.1  C) (Oral)   Resp 18   SpO2 99%   There is no height or weight on file to calculate BMI.  Physical Exam  Vitals reviewed.   Constitutional:       General: He is not in acute distress.     Appearance: He is  well-developed. He is not diaphoretic.   HENT:      Head: Normocephalic and atraumatic.      Right Ear: Tympanic membrane, ear canal and external ear normal.      Left Ear: Tympanic membrane, ear canal and external ear normal.      Nose: Mucosal edema and congestion present. No rhinorrhea.      Right Sinus: No maxillary sinus tenderness or frontal sinus tenderness.      Left Sinus: No maxillary sinus tenderness or frontal sinus tenderness.      Mouth/Throat:      Mouth: Mucous membranes are moist.      Pharynx: Uvula midline. Posterior oropharyngeal erythema present.   Neck:      Thyroid: No thyromegaly.   Cardiovascular:      Rate and Rhythm: Normal rate and regular rhythm.      Heart sounds: Normal heart sounds. No murmur heard.  Pulmonary:      Effort: Pulmonary effort is normal. No respiratory distress.      Breath sounds: Normal breath sounds. No wheezing.   Abdominal:      General: Bowel sounds are normal. There is no distension.      Palpations: Abdomen is soft.      Tenderness: There is no abdominal tenderness.   Musculoskeletal:         General: Normal range of motion.      Cervical back: Normal range of motion and neck supple.   Lymphadenopathy:      Cervical: Cervical adenopathy present.   Skin:     General: Skin is warm and dry.      Findings: No erythema or rash.   Neurological:      Mental Status: He is alert.   Psychiatric:         Behavior: Behavior normal.         Thought Content: Thought content normal.         Judgment: Judgment normal.

## 2023-12-08 NOTE — RESULT ENCOUNTER NOTE
Inocencio,   You RSV, Influenza, and COVID tests are negative. If your congestion/sinus symptoms do not improve or worsen despite the nettipot treatments - return to clinic as you may have a bacterial sinus infection and would benefit from antibiotics. f you have any further questions feel free to contact me via [employer] message.     Sincerely,   Brigitte Marroquin MD

## 2023-12-27 DIAGNOSIS — F64.9 GENDER INCONGRUENCE: ICD-10-CM

## 2023-12-27 RX ORDER — TESTOSTERONE CYPIONATE 200 MG/ML
INJECTION, SOLUTION INTRAMUSCULAR
Qty: 4 ML | Refills: 0 | Status: SHIPPED | OUTPATIENT
Start: 2023-12-27 | End: 2024-03-15

## 2023-12-27 RX ORDER — SYRINGE, DISPOSABLE, 1 ML
SYRINGE, EMPTY DISPOSABLE MISCELLANEOUS
Qty: 15 EACH | Refills: 3 | Status: SHIPPED | OUTPATIENT
Start: 2023-12-27 | End: 2024-02-05

## 2023-12-27 RX ORDER — NEEDLES, DISPOSABLE 25GX5/8"
NEEDLE, DISPOSABLE MISCELLANEOUS
Qty: 12 EACH | Refills: 4 | Status: SHIPPED | OUTPATIENT
Start: 2023-12-27

## 2024-02-05 ENCOUNTER — MYC MEDICAL ADVICE (OUTPATIENT)
Dept: FAMILY MEDICINE | Facility: CLINIC | Age: 39
End: 2024-02-05
Payer: COMMERCIAL

## 2024-02-05 DIAGNOSIS — F64.9 GENDER INCONGRUENCE: ICD-10-CM

## 2024-02-05 RX ORDER — SYRINGE, DISPOSABLE, 1 ML
SYRINGE, EMPTY DISPOSABLE MISCELLANEOUS
Qty: 15 EACH | Refills: 3 | Status: SHIPPED | OUTPATIENT
Start: 2024-02-05

## 2024-02-05 NOTE — TELEPHONE ENCOUNTER

## 2024-03-12 DIAGNOSIS — F64.9 GENDER INCONGRUENCE: ICD-10-CM

## 2024-03-15 RX ORDER — TESTOSTERONE CYPIONATE 200 MG/ML
INJECTION, SOLUTION INTRAMUSCULAR
Qty: 13 ML | Refills: 0 | Status: SHIPPED | OUTPATIENT
Start: 2024-03-15

## 2024-03-20 NOTE — PROGRESS NOTES
"Patient returns for a preoperative visit prior to undergoing phalloplasty for gender dysphoria.     INTERVAL HISTORY: Patient is prepared to undergo phalloplasty.  He understands the staged nature of the surgery.  He has met with Dr. Carter.    PHYSICAL EXAMINATION:  /80 (BP Location: Left arm, Patient Position: Chair, Cuff Size: Adult Regular)   Pulse 51   Ht 1.651 m (5' 5\")   Wt 68.9 kg (152 lb)   SpO2 99%   BMI 25.29 kg/m    Examination was performed presence of a chaperone.  Left thigh hair removal evident.  Right groin hair removal evident.    ASSESSMENT: Gender dysphoria, candidate for ALT and SCIP flap phalloplasty.    PLAN: We discussed the operation in detail today.  I explained the risks to include flap loss, graft loss, bleeding, infection, injury to surrounding structures, loss of sensation, wound healing difficulties, asymmetry, urethral stricture, urethral fistula, and need for revision surgery.  Patient accepts the associated risks and wishes to proceed.    Total time spent with patient was 15 min of which greater than 50% was in counseling.  " Bed/Stretcher in lowest position, wheels locked, appropriate side rails in place/Call bell, personal items and telephone in reach/Instruct patient to call for assistance before getting out of bed/chair/stretcher/Non-slip footwear applied when patient is off stretcher/[place] to call system/Physically safe environment - no spills, clutter or unnecessary equipment/Purposeful proactive rounding/Room/bathroom lighting operational, light cord in reach

## 2024-03-21 ENCOUNTER — LAB (OUTPATIENT)
Dept: LAB | Facility: CLINIC | Age: 39
End: 2024-03-21
Payer: COMMERCIAL

## 2024-03-21 ENCOUNTER — OFFICE VISIT (OUTPATIENT)
Dept: NEUROLOGY | Facility: CLINIC | Age: 39
End: 2024-03-21
Payer: COMMERCIAL

## 2024-03-21 DIAGNOSIS — Z84.89 FAMILY HISTORY OF GENETIC DISEASE: Primary | ICD-10-CM

## 2024-03-21 DIAGNOSIS — Z84.89 FAMILY HISTORY OF GENETIC DISEASE: ICD-10-CM

## 2024-03-21 PROCEDURE — 36415 COLL VENOUS BLD VENIPUNCTURE: CPT | Performed by: PATHOLOGY

## 2024-03-21 PROCEDURE — 99207 PR STATISTIC GENETIC COUNSELING, <16 MIN: CPT | Performed by: GENETIC COUNSELOR, MS

## 2024-03-21 NOTE — Clinical Note
3/21/2024       RE: Myrna Dee  [address]  [facility] [address]     Dear Colleague,    Thank you for referring your patient, Myrna Dee, to the [facility] NEUROLOGY CLINIC [place] [facility]. Please see a copy of my visit note below.    Inocencio Dee was seen for a genetic counseling appointment today given his family history of ALS. He was accompanied by his sister Joyce.    Pertinent Medical History: Inocencio is a 38 year old adult whose mother has ALS due to a repeat expansion in the A6mwf75 gene. 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 Inocencio denies history of muscle weakness, speech and swallowing difficulty and shortness of breath. Inocencio does not have a history of memory loss or personality changes.     Family History: A three generation pedigree was obtained at Inocencio's mother Dominique's previous appointment and scanned into the EMR. This family history is by patient report only and has not been verified with medical records except where noted. The following information is significant:   Inocencio does not have biological children.  Inocencio has two brothers and one sister. His sister, Joyce, (age 44) is alive and well and has the familial GRN [id number] variant. His brother (age 42) is alive and well and has two healthy daughters. His brother, Buster, (age 40) is alive and well, has the familial G1wnt09 repeat expansion, and has one biological son and one biological daughter that are raised by Inocencio. Inocencio's sisters (age 36 and 34) are alive and well.   Inocencio's father  at age 66 due to FTD caused by the familial GRN 675_[id number] variant. Limited information is available regarding his paternal half sister. Inocencio's paternal grandfather  due to esophageal cancer and had a history of smoking. Inocencio's paternal grandmother  at age 84 due to old age. She had breast cancer that was  diagnosed in her 60s.   Inocencio's mother, Dominique (age 71) has ALS due to a Z6zbt83 repeat expansion.  Dominique had one brother who  at age 67 due to suicide after he was diagnosed with Alzheimer's disease.  Dominique's father  at age 76 due to asthma. He had one half sister who  at age 90. Dominique's paternal grandfather  due to esophageal cancer. Cadence's paternal grandmother is . Paternal ancestry is [place].   Dominique's mother  at age 76 due to Alzheimer's disease. She had two siblings who  young and six siblings who were diagnosed with Alzheimer's disease and  later in life. Dominique reports no history of neurologic concerns for her maternal cousins. Dominique's maternal grandfather  at age 58 after he fell off a barn roof. Dominique's maternal grandmother is . Maternal ancestry is [place].  Family history is otherwise negative for ALS, dementia, parkinson's disease or other neurologic concerns. Consanguinity was denied.    Discussion: We reviewed clinical features of ***, as well as the genetics and inheritance of the condition, and genetic testing.     Plan:  1. *** sequencing and deletion/duplication analysis   2. Return pending results of above testing  3. Contact information was provided should any questions arise in the future.     Jen Negro MS [facility]  Genetic Counselor  Division of Genetics and Metabolism  p) [phone]  (f) [phone]     Total time spent in consultation with the family was approximately ___ minutes      Cc: No Letter     Inocencio Dee was seen for a genetic counseling appointment today given his family history of ALS. He was accompanied by his sister Joyce.    Pertinent Medical History: Inocencio is a 38 year old adult whose mother has ALS due to a repeat expansion in the H8iha70 gene. 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 Inocencio denies history of muscle weakness, speech and swallowing  difficulty and shortness of breath. Inocencio does not have a history of memory loss or personality changes.     Family History: A three generation pedigree was obtained at Inocencio's mother Dominique's previous appointment and scanned into the EMR. This family history is by patient report only and has not been verified with medical records except where noted. The following information is significant:   Inocencio does not have biological children.  Inocencio has two brothers and three sisters. His sister, Joyce, (age 44) is alive and well and has the familial GRN 675_676delCA variant. His brother (age 42) is alive and well and has two healthy daughters. His brother, Buster, (age 40) is alive and well, has the familial Y8zba73 repeat expansion, and has one biological son and one biological daughter that are raised by Inocencio. Inocencio's sisters (age 36 and 34) are alive and well.   Inocencio's father  at age 66 due to FTD caused by the familial GRN 675_676delCA variant. Limited information is available regarding his paternal half sister. nIocencio's paternal grandfather  due to esophageal cancer and had a history of smoking. Inocencio's paternal grandmother  at age 84 due to old age. She had breast cancer that was diagnosed in her 60s.   Inocencio's mother, Dominique (age 71) has ALS due to a G5egq37 repeat expansion.  Dominique had one brother who  at age 67 due to suicide after he was diagnosed with Alzheimer's disease.  Dominique's father  at age 76 due to asthma. He had one half sister who  at age 90. Dominique's paternal grandfather  due to esophageal cancer. Cadence's paternal grandmother is . Paternal ancestry is [facility].   Dominique's mother  at age 76 due to Alzheimer's disease. She had two siblings who  young and six siblings who were diagnosed with Alzheimer's disease and  later in life. Dominique reports no history of neurologic concerns for her maternal cousins. Dominique's maternal grandfather  at age 58 after he fell  off a barn roof. Dominique's maternal grandmother is . Maternal ancestry is [facility], [facility].  Family history is otherwise negative for ALS, dementia, parkinson's disease or other neurologic concerns. Consanguinity was denied.    Discussion:   Clinical Presentation of ALS and FTD  Amyotrophic lateral sclerosis (ALS) is a condition that affects the motor neurons.  Motor neurons are responsible for sending the necessary signals to the muscles, to allow for movement and speech. In ALS, these motor neurons break down and eventually lead to loss of speech and paralysis. Some individuals with ALS may also experience neuropsychological symptoms such as cognitive and/or behavioral dysfunction or frontotemporal dementia (FTD) in severe cases. FTD is a type of dementia that causes degeneration of a part of the brain called the frontal anterior temporal lobes. This can lead to memory loss, changes in personality and language impairment.     For patients with genetic ALS due to one v2uov88 repeat expansion, the age of onset of disease is typically younger than in those without the repeat expansion.  Additionally, patients with genetic ALS due to k6wwo24 repeat expansions are more likely to have frontotemporal dementia compared to ALS of unknown cause. While most individuals with genetic ALS due to x8jun77 repeat expansions will have a family history of disease, the genetic change has been seen in individuals without a family history and seemingly sporadic ALS.      A variant in the O6qmn21 gene presents differently in different people. Individuals with a variant in this gene may develop ALS, FTD or both of these conditions. Additionally, these conditions have a highly variable age of onset. Some individuals with G7dsi14 repeat expansions develop symptoms in their 20s while others stay healthy until they are in their 90s. Generally, the older an individual with a Y2dff46 repeat expansion is, the higher their risk of developing  ALS, FTD or both. We are unable to predict which features of this condition you will have in the future, the age of onset for these conditions or the speed of progression based on this genetic testing.     There are several genes that have been found to be associated with ALS and/or FTD. The most common genetic cause of ALS is a hexanucleotide repeat expansion (GGGGCC) in the A9drv30 gene. The number of hexanucleotide repeats in the Q2emi29 gene ranges from 2 to >4000. The precise cutoff of between normal and pathogenic (disease causing) is complicated by multiple factors, but generally <25 is considered normal and >30 is considered pathogenic.       We reviewed that if Inocencio tests positive for a repeat expansion in B6zih53, his risk to develop ALS or dementia increases as he ages. It is thought that about 99% of people with this repeat expansion develop ALS, dementia or both by the time they are 83 years of age.However, new data indicates that this risk may be lower than previously thought. This test can tell us whether or not Inocencio has an increased risk for ALS and/or dementia but it cannot tell us which one he will get or when he will develop these symptoms.      M3kjc54 repeat expansions are inherited in an autosomal dominant manner. This means that an individuals only needs one change in one of their T9bzs27 genes to be at an increased risk for ALS and/or dementia. For individuals who have a U2bra68 expansion, with each pregnancy, there is a 50% chance to have a child with this repeat expansion and an increased risk for ALS/dementia and a 50% chance to have a child without this repeat expansion. This indicates that Inocencio is at a 50% chance to have a U8uxl31 repeat expansion.     We discussed the availability of genetic testing for ALS. This testing would only look for the repeat expansion in the M8hqf01 gene that was identified in Inocencio's mother to determine If Jeffas a normal number of hexanucleotide repeats (<25) or  an expanded number of hexanucleotide repeats (>30). We went on to discuss the details, limitations, and possible outcomes of these multi-gene panels. In particular, we discussed that there are three possible results:  Negative: meaning normal or no mutations are identified in the genes that were tested/sequenced. This would mean that Inocencio is most likely not at an increased risk to develop ALS or dementia due to E6pen58.  Positive: meaning a mutation (repeat expansion) that is known to be associated with a particular set of symptoms is identified. In this case, a positive results would confirm an increased for Inocencio to develop ALS and/or FTD.  Variant of uncertain significance (VUS): meaning an intermediate number of repeats in the DNA sequence of the A1kuv91 gene was seen and there is not enough information or data yet to know if this will cause symptoms in the future.  In most cases, identification of a VUS does not confirm a diagnosis and does not result in any clinically actionable recommendations.     Risks benefits and limitations for testing in asymptomatic individuals were reviewed. In particular, we discussed the importance of having a good support system in place during and after this testing process. Pre-symptomatic testing is an individual choice that may have difficult consequences.  Some people prefer to know so they can be aware of any symptoms and seek appropriate medical care and for family planning.  Other people find it difficult to live in anticipation of the condition and prefer not to know.  Future insurability is often a question for individuals undergoing pre-symptomatic testing.  While there are federal and state laws that protect people from health insurance discrimination, there is not as much regulation on life and disability insurance.  There are many things to consider when contemplating pre-symptomatic testing. Genetic counseling as well taking to a psychologist can help in the decision  making process. As treatments for ALS are developed pre-symptomatic testing may be an avenue to detect those individuals at risk to develop ALS and provide preventative treatment. However, at the time of this appointment, there is no cure for ALS or FTD.      Inocencoi expressed an excellent understanding of this information and stated that he would like to proceed with L0rft80 genetic testing so that he can be well informed and make plans regarding how he lives his life and his future care. Inocencio is connected with a therapist that he is comfortable with. Inocencio will consider a neurology appointment if his testing is positive.     We discussed the option to complete this testing through the [employer] sponsored ALS panel or through insurance. Risks, benefits and limitations of these options were reviewed. Inocencio expressed an excellent understanding of this information and decided to proceed with the sponsored ALS panel.       Plan:  1. Sponsored ALS panel at [place] including W9nfk89 repeat analysis.   2. Contact information was provided should any questions arise in the future.      Jen Negro [facility]  Genetic Counselor  Division of Genetics and Metabolism  (p) [phone]  (f) [phone]     Total time spent in consultation with the family was approximately 15 minutes      Cc: No Letter       Again, thank you for allowing me to participate in the care of your patient.      Sincerely,    Jen Negro GC

## 2024-03-22 NOTE — PROGRESS NOTES
Inocencio Dee was seen for a genetic counseling appointment today given his family history of ALS. He was accompanied by his sister Joyce.    Pertinent Medical History: Inocencio is a 38 year old adult whose mother has ALS due to a repeat expansion in the O5egp57 gene. 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 Inocencio denies history of muscle weakness, speech and swallowing difficulty and shortness of breath. Inocencio does not have a history of memory loss or personality changes.     Family History: A three generation pedigree was obtained at Inocencio's mother Dominique's previous appointment and scanned into the EMR. This family history is by patient report only and has not been verified with medical records except where noted. The following information is significant:   Inocencio does not have biological children.  Inocencio has two brothers and three sisters. His sister, Joyce, (age 44) is alive and well and has the familial GRN 675_[id number] variant. His brother (age 42) is alive and well and has two healthy daughters. His brother, Buster, (age 40) is alive and well, has the familial J1wjw08 repeat expansion, and has one biological son and one biological daughter that are raised by Inocencio. Inocencio's sisters (age 36 and 34) are alive and well.   Inocencio's father  at age 66 due to FTD caused by the familial GRN 675_676delCA variant. Limited information is available regarding his paternal half sister. Inocencio's paternal grandfather  due to esophageal cancer and had a history of smoking. Inocencio's paternal grandmother  at age 84 due to old age. She had breast cancer that was diagnosed in her 60s.   Inocencio's mother, Dominique (age 71) has ALS due to a S8wap73 repeat expansion.  Dominique had one brother who  at age 67 due to suicide after he was diagnosed with Alzheimer's disease.  Dominique's father  at age 76 due to asthma. He had one half sister who  at age 90. Dominique's paternal  grandfather  due to esophageal cancer. Cadence's paternal grandmother is . Paternal ancestry is [place].   Dominique's mother  at age 76 due to Alzheimer's disease. She had two siblings who  young and six siblings who were diagnosed with Alzheimer's disease and  later in life. Dominique reports no history of neurologic concerns for her maternal cousins. Dominique's maternal grandfather  at age 58 after he fell off a barn roof. Dominique's maternal grandmother is . Maternal ancestry is [place].  Family history is otherwise negative for ALS, dementia, parkinson's disease or other neurologic concerns. Consanguinity was denied.    Discussion:   Clinical Presentation of ALS and FTD  Amyotrophic lateral sclerosis (ALS) is a condition that affects the motor neurons.  Motor neurons are responsible for sending the necessary signals to the muscles, to allow for movement and speech. In ALS, these motor neurons break down and eventually lead to loss of speech and paralysis. Some individuals with ALS may also experience neuropsychological symptoms such as cognitive and/or behavioral dysfunction or frontotemporal dementia (FTD) in severe cases. FTD is a type of dementia that causes degeneration of a part of the brain called the frontal anterior temporal lobes. This can lead to memory loss, changes in personality and language impairment.     For patients with genetic ALS due to one c6han09 repeat expansion, the age of onset of disease is typically younger than in those without the repeat expansion.  Additionally, patients with genetic ALS due to e7wzt39 repeat expansions are more likely to have frontotemporal dementia compared to ALS of unknown cause. While most individuals with genetic ALS due to v1raq79 repeat expansions will have a family history of disease, the genetic change has been seen in individuals without a family history and seemingly sporadic ALS.      A variant in the C9jon31 gene  presents differently in different people. Individuals with a variant in this gene may develop ALS, FTD or both of these conditions. Additionally, these conditions have a highly variable age of onset. Some individuals with V9ynb92 repeat expansions develop symptoms in their 20s while others stay healthy until they are in their 90s. Generally, the older an individual with a X6rak36 repeat expansion is, the higher their risk of developing ALS, FTD or both. We are unable to predict which features of this condition you will have in the future, the age of onset for these conditions or the speed of progression based on this genetic testing.     There are several genes that have been found to be associated with ALS and/or FTD. The most common genetic cause of ALS is a hexanucleotide repeat expansion (GGGGCC) in the Z6jwd66 gene. The number of hexanucleotide repeats in the X8imj55 gene ranges from 2 to >4000. The precise cutoff of between normal and pathogenic (disease causing) is complicated by multiple factors, but generally <25 is considered normal and >30 is considered pathogenic.       We reviewed that if Inocencio tests positive for a repeat expansion in L4xjg90, his risk to develop ALS or dementia increases as he ages. It is thought that about 99% of people with this repeat expansion develop ALS, dementia or both by the time they are 83 years of age.However, new data indicates that this risk may be lower than previously thought. This test can tell us whether or not Inocencio has an increased risk for ALS and/or dementia but it cannot tell us which one he will get or when he will develop these symptoms.      U2nhr16 repeat expansions are inherited in an autosomal dominant manner. This means that an individuals only needs one change in one of their K1ezo96 genes to be at an increased risk for ALS and/or dementia. For individuals who have a Q0tnb12 expansion, with each pregnancy, there is a 50% chance to have a child with this  repeat expansion and an increased risk for ALS/dementia and a 50% chance to have a child without this repeat expansion. This indicates that Inocencio is at a 50% chance to have a S1skf47 repeat expansion.     We discussed the availability of genetic testing for ALS. This testing would only look for the repeat expansion in the G1fgr27 gene that was identified in Inocencio's mother to determine If Jeffas a normal number of hexanucleotide repeats (<25) or an expanded number of hexanucleotide repeats (>30). We went on to discuss the details, limitations, and possible outcomes of these multi-gene panels. In particular, we discussed that there are three possible results:  Negative: meaning normal or no mutations are identified in the genes that were tested/sequenced. This would mean that Inocencio is most likely not at an increased risk to develop ALS or dementia due to Y5gfa57.  Positive: meaning a mutation (repeat expansion) that is known to be associated with a particular set of symptoms is identified. In this case, a positive results would confirm an increased for Inocencio to develop ALS and/or FTD.  Variant of uncertain significance (VUS): meaning an intermediate number of repeats in the DNA sequence of the V2ceq72 gene was seen and there is not enough information or data yet to know if this will cause symptoms in the future.  In most cases, identification of a VUS does not confirm a diagnosis and does not result in any clinically actionable recommendations.     Risks benefits and limitations for testing in asymptomatic individuals were reviewed. In particular, we discussed the importance of having a good support system in place during and after this testing process. Pre-symptomatic testing is an individual choice that may have difficult consequences.  Some people prefer to know so they can be aware of any symptoms and seek appropriate medical care and for family planning.  Other people find it difficult to live in anticipation of the  condition and prefer not to know.  Future insurability is often a question for individuals undergoing pre-symptomatic testing.  While there are federal and state laws that protect people from health insurance discrimination, there is not as much regulation on life and disability insurance.  There are many things to consider when contemplating pre-symptomatic testing. Genetic counseling as well taking to a psychologist can help in the decision making process. As treatments for ALS are developed pre-symptomatic testing may be an avenue to detect those individuals at risk to develop ALS and provide preventative treatment. However, at the time of this appointment, there is no cure for ALS or FTD.      Inocencio expressed an excellent understanding of this information and stated that he would like to proceed with E8odr03 genetic testing so that he can be well informed and make plans regarding how he lives his life and his future care. Inocencio is connected with a therapist that he is comfortable with. Inocencio will consider a neurology appointment if his testing is positive.     We discussed the option to complete this testing through the [employer] sponsored ALS panel or through insurance. Risks, benefits and limitations of these options were reviewed. Inocencio expressed an excellent understanding of this information and decided to proceed with the sponsored ALS panel.       Plan:  1. Sponsored ALS panel at [place] including Y6uao98 repeat analysis.   2. Contact information was provided should any questions arise in the future.      Jen Negro MS [facility]  Genetic Counselor  Division of Genetics and Metabolism  p) [phone]  (f) [phone]     Total time spent in consultation with the family was approximately 15 minutes      Cc: No Letter

## 2024-04-01 ENCOUNTER — TELEPHONE (OUTPATIENT)
Dept: CONSULT | Facility: CLINIC | Age: 39
End: 2024-04-01
Payer: COMMERCIAL

## 2024-04-01 NOTE — TELEPHONE ENCOUNTER
Contacted Inocencio and explained that the results of his J4dol93 repeat expansion testing were negative or normal. This means that he does not have same repeat expansion that was found in his mother. Inocencio's ALS gene panel is still in progress. I will contact him when results are available.    Jen Negro MS [facility]  Genetic Counselor  Division of Genetics and Metabolism  (p) [phone]  (f) [phone]

## 2024-04-04 ENCOUNTER — TELEPHONE (OUTPATIENT)
Dept: CONSULT | Facility: CLINIC | Age: 39
End: 2024-04-04
Payer: COMMERCIAL

## 2024-04-04 LAB — SCANNED LAB RESULT: NORMAL

## 2024-04-04 NOTE — TELEPHONE ENCOUNTER
Contacted Inocencio to let him know that the results of his P0uvt88, ATXN2, and ALS panel are available and are negative or normal. A copy of these results will be sent to Inocencio via mail. My contacted information was provided should he have questions or concerns in the future.    Jen Negro MS [facility]  Genetic Counselor  Division of Genetics and Metabolism  (p) [phone]  (f) [phone]

## 2024-05-17 ENCOUNTER — TELEPHONE (OUTPATIENT)
Dept: CONSULT | Facility: CLINIC | Age: 39
End: 2024-05-17
Payer: COMMERCIAL

## 2024-05-17 NOTE — TELEPHONE ENCOUNTER
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    Inocencio declined the referral at this time. He is working on becoming enrolled in research elsewhere that would monitor him for symptoms and update him on upcoming medication options. I advised him to make sure that the research group will release results and other desired information to him. He will contact me if the research study does not provide the information he is looking for or if he would like to schedule clinical care related to his pathogenic GRN variant.     Jen Negro MS [facility]  Genetic Counselor  Division of Genetics and Metabolism  (p) [phone]  (f) [phone]

## 2024-06-30 ENCOUNTER — HEALTH MAINTENANCE LETTER (OUTPATIENT)
Age: 39
End: 2024-06-30

## 2024-12-17 DIAGNOSIS — F64.9 GENDER INCONGRUENCE: ICD-10-CM

## 2024-12-18 RX ORDER — TESTOSTERONE CYPIONATE 200 MG/ML
INJECTION, SOLUTION INTRAMUSCULAR
Qty: 2 ML | Refills: 0 | Status: SHIPPED | OUTPATIENT
Start: 2024-12-18

## 2025-01-24 PROBLEM — M79.671 RIGHT FOOT PAIN: Status: ACTIVE | Noted: 2025-01-24

## 2025-01-24 PROBLEM — N52.39 OTHER POST-PROCEDURAL ERECTILE DYSFUNCTION: Status: RESOLVED | Noted: 2021-03-16 | Resolved: 2025-01-24

## 2025-01-24 PROBLEM — G56.01 CARPAL TUNNEL SYNDROME OF RIGHT WRIST: Status: ACTIVE | Noted: 2025-01-24

## 2025-01-24 PROBLEM — M79.672 LEFT FOOT PAIN: Status: RESOLVED | Noted: 2023-09-12 | Resolved: 2025-01-24

## 2025-01-27 ENCOUNTER — PATIENT OUTREACH (OUTPATIENT)
Dept: CARE COORDINATION | Facility: CLINIC | Age: 40
End: 2025-01-27
Payer: COMMERCIAL

## 2025-01-29 ENCOUNTER — PATIENT OUTREACH (OUTPATIENT)
Dept: CARE COORDINATION | Facility: CLINIC | Age: 40
End: 2025-01-29
Payer: COMMERCIAL

## 2025-03-03 DIAGNOSIS — F64.9 GENDER INCONGRUENCE: ICD-10-CM

## 2025-03-05 RX ORDER — TESTOSTERONE CYPIONATE 200 MG/ML
INJECTION, SOLUTION INTRAMUSCULAR
Qty: 13 ML | Refills: 1 | Status: SHIPPED | OUTPATIENT
Start: 2025-03-05

## 2025-03-17 DIAGNOSIS — F64.9 GENDER INCONGRUENCE: ICD-10-CM

## 2025-03-18 RX ORDER — NEEDLES, DISPOSABLE 25GX5/8"
NEEDLE, DISPOSABLE MISCELLANEOUS
Qty: 12 EACH | Refills: 4 | Status: SHIPPED | OUTPATIENT
Start: 2025-03-18

## 2025-04-02 DIAGNOSIS — F64.9 GENDER INCONGRUENCE: ICD-10-CM

## 2025-04-02 RX ORDER — SYRINGE, DISPOSABLE, 1 ML
SYRINGE, EMPTY DISPOSABLE MISCELLANEOUS
Qty: 12 EACH | Refills: 4 | Status: SHIPPED | OUTPATIENT
Start: 2025-04-02

## 2025-04-02 NOTE — TELEPHONE ENCOUNTER
"Request for medication refill:    Medication Name: B-D SYRINGE LUER-WU 1 ML MISC     Providers if patient needs an appointment and you are willing to give a one month supply please refill for one month and  send a MyChart using \".SMILLIMITEDREFILL\" .Or route chart to \"[facility] \" . To call patient and inform of limited refill and providers request to make an appointment. (Giving one month refill in non controlled medications is strongly recommended before denial)    If refill has been denied, meaning absolutely no refills without visit, please complete the smart phrase \".SMIRXREFUSE\" and route it to the \"[facility] MED REFILLS\"  pool to inform the patient and the pharmacy.    Malika Burns, [facility]     "

## 2025-05-05 ENCOUNTER — MYC REFILL (OUTPATIENT)
Dept: FAMILY MEDICINE | Facility: CLINIC | Age: 40
End: 2025-05-05
Payer: COMMERCIAL

## 2025-05-05 DIAGNOSIS — F64.9 GENDER INCONGRUENCE: ICD-10-CM

## 2025-05-05 RX ORDER — SYRINGE, DISPOSABLE, 1 ML
SYRINGE, EMPTY DISPOSABLE MISCELLANEOUS
Qty: 12 EACH | Refills: 4 | Status: CANCELLED | OUTPATIENT
Start: 2025-05-05

## 2025-05-06 ENCOUNTER — MYC MEDICAL ADVICE (OUTPATIENT)
Dept: FAMILY MEDICINE | Facility: CLINIC | Age: 40
End: 2025-05-06
Payer: COMMERCIAL

## 2025-05-06 DIAGNOSIS — F64.9 GENDER INCONGRUENCE: ICD-10-CM

## 2025-05-06 RX ORDER — SYRINGE, DISPOSABLE, 1 ML
SYRINGE, EMPTY DISPOSABLE MISCELLANEOUS
Qty: 12 EACH | Refills: 4 | Status: SHIPPED | OUTPATIENT
Start: 2025-05-06

## 2025-05-06 NOTE — TELEPHONE ENCOUNTER
Pharmacy gave the incorrect syringes to the patient on 4/2/25. Sending new script.    Tameka Thomas RN      Script was sent on 4/2/25, per patient pharmacy requesting a new script.    I called [facility] and spoke with pharmacist, they did receive the 4/2/25 script and patient picked up 12 syringes on 4/2/25, but then the script was inactivated, so they need a new script in order for patient to get any further syringes.    Patient should have enough syringes to get them through June, and Dr. Stroud has indicated that they need an appointment. Sent patient a My Chart to clarify the need for syringes at this time.    Tameka Thomas RN

## 2025-06-01 ENCOUNTER — HEALTH MAINTENANCE LETTER (OUTPATIENT)
Age: 40
End: 2025-06-01

## 2025-07-17 NOTE — LETTER
"10/20/2020       RE: Myrna Dee  [address]  [facility] [address]     Dear Colleague,    Thank you for referring your patient, Myrna Dee, to the [facility] [place] at [facility]. Please see a copy of my visit note below.    Patient returns for a postoperative follow-up after undergoing second stage phalloplasty for gender dysphoria.    INTERVAL HISTORY: He is doing well packing the scrotal wound.  He has been packing it 3 times daily.  Denies any fevers.  Needs more supplies.  He is scheduled for VCUG later today.    PHYSICAL EXAMINATION:  /76 (BP Location: Left arm, Patient Position: Chair, Cuff Size: Adult Regular)   Pulse 91   Temp 98.1  F (36.7  C) (Oral)   Ht 1.651 m (5' 5\")   SpO2 95%   BMI 26.19 kg/m    On examination of the scrotum, wound is healing appropriately with no further sign of infection or necrotic tissue.  Everything appears clean.  No malodor.  I palpated within the wound and there is no connection with the urethra.    ASSESSMENT: 4 weeks status post second stage phalloplasty.  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    PLAN: May perform twice daily wet-to-dry dressing changes to the scrotal wound.  Patient is keep his VCUG today.  He may stop doing dressing changes to other parts.  I will see him back in 3 to 4 weeks to track his wound progress.  If he is doing well from now in 3 to 4 weeks, I will start him on once daily dressing changes rather than twice daily.    Total time spent with patient was 15 min of which greater than 50% was in counseling.    Again, thank you for allowing me to participate in the care of your patient.  Sincerely,    Gavin Eastman MD  " Treatment Goal Explanation (Does Not Render In The Note): Stable for the purposes of categorizing medical decision making is defined by the specific treatment goals for an individual patient. A patient that is not at their treatment goal is not stable, even if the condition has not changed and there is no short- term threat to life or function.

## (undated) DEVICE — LINEN TOWEL PACK X5 5464

## (undated) DEVICE — PREP SKIN SCRUB TRAY 4461A

## (undated) DEVICE — RETR ELASTIC STAYS LONE STAR SHARP 5MM 8/PACK 3311-8G

## (undated) DEVICE — DRAIN JACKSON PRATT 10FR ROUND SU130-1321

## (undated) DEVICE — CLIP HORIZON LG ORANGE 004200

## (undated) DEVICE — SUTURE BOOTS 051003PBX

## (undated) DEVICE — SOL WATER IRRIG 1000ML BOTTLE 2F7114

## (undated) DEVICE — CUP AND LID 2PK 2OZ STERILE  SSK9006A

## (undated) DEVICE — RX SURGIFLO HEMOSTATIC MATRIX W/THROMBIN 8ML 2994

## (undated) DEVICE — SU PROLENE 2-0 RB-1DA 36" 8559H

## (undated) DEVICE — ANTIFOG SOLUTION W/FOAM PAD 31142527

## (undated) DEVICE — NDL 18GA 1.5" 305196

## (undated) DEVICE — CONNECTOR STOPCOCK 3 WAY MALE LL HI-FLO MX9311L

## (undated) DEVICE — DAVINCI XI MONOPOLAR SCISSORS HOT SHEARS 8MM 470179

## (undated) DEVICE — SU VICRYL 3-0 FS-1 27" J442H

## (undated) DEVICE — DRSG GAUZE 4X4" TRAY 6939

## (undated) DEVICE — DRAIN JACKSON PRATT RESERVOIR 100ML SU130-1305

## (undated) DEVICE — SYR 10ML SLIP TIP W/O NDL 303134

## (undated) DEVICE — TUBING IRRIG CYSTO/BLADDER SET 81" LF 2C4040

## (undated) DEVICE — Device

## (undated) DEVICE — SUCTION TIP YANKAUER W/O VENT K86

## (undated) DEVICE — ESU PENCIL W/HOLSTER E2350H

## (undated) DEVICE — SU VICRYL 4-0 RB-1 27" UND J214H

## (undated) DEVICE — DRSG AQUACEL AG EXTRA 8X12" 420679

## (undated) DEVICE — SU PDS II 2-0 CT-2 27"  Z333H

## (undated) DEVICE — SU PDS II 0 CT-2 27" Z334H

## (undated) DEVICE — UTERINE MANIPULATOR RUMI 5.1MMX6CM UML516

## (undated) DEVICE — SU VICRYL 2-0 CT-1 27" UND J259H

## (undated) DEVICE — GLOVE PROTEXIS POWDER FREE 7.5 ORTHOPEDIC 2D73ET75

## (undated) DEVICE — CONNECTOR WATER VALVE PERFUSION PACK STR 020272801

## (undated) DEVICE — SU WND CLOSURE VLOC 180 ABS 3-0 6" V-20 VLOCL0604

## (undated) DEVICE — DECANTER VIAL 2006S

## (undated) DEVICE — BLADE DERMATOME ZIMMER  00-8800-000-10

## (undated) DEVICE — DRAPE STOCKINETTE IMPERVIOUS 10" 21048

## (undated) DEVICE — STRAP KNEE/BODY 31143004

## (undated) DEVICE — CLIP GEM MICRO GEM2431

## (undated) DEVICE — LINEN ORTHO PACK 5446

## (undated) DEVICE — PREP POVIDONE IODINE SOLUTION 10% 4OZ BOTTLE 29906-004

## (undated) DEVICE — DRAPE POUCH IRR 1016

## (undated) DEVICE — GLOVE PROTEXIS BLUE W/NEU-THERA 7.5  2D73EB75

## (undated) DEVICE — CLIP GEM MICRO GEM1521

## (undated) DEVICE — SYR 10ML LL W/O NDL 302995

## (undated) DEVICE — SPONGE LAP 18X18" X8435

## (undated) DEVICE — DRSG ABDOMINAL 07 1/2X8" 7197D

## (undated) DEVICE — DRAPE U SPLIT 74X120" 29440

## (undated) DEVICE — SYR 30ML SLIP TIP W/O NDL 302833

## (undated) DEVICE — LINEN TOWEL PACK X6 WHITE 5487

## (undated) DEVICE — DAVINCI HOT SHEARS TIP COVER  400180

## (undated) DEVICE — BNDG KLING 3" 2232

## (undated) DEVICE — WIPE INSTRUMENT MEROCEL 400200

## (undated) DEVICE — PREP POVIDONE IODINE SCRUB 7.5% 4OZ APL82212

## (undated) DEVICE — RX BACITRACIN OINTMENT 0.9G 1/32OZ CUR001109

## (undated) DEVICE — DRAIN JACKSON PRATT CHANNEL 15FR ROUND HUBLESS SIL JP-2228

## (undated) DEVICE — DAVINCI XI GRASPER ENDOWRIST PROGRASP 470093

## (undated) DEVICE — DRAIN JACKSON PRATT CHANNEL 19FR ROUND HUBLESS SIL JP-2230

## (undated) DEVICE — DRSG TEGADERM 8X12" 1629

## (undated) DEVICE — SU MONOCRYL 4-0 PS-2 18" UND Y496G

## (undated) DEVICE — RETR BLADE LONE STAR 20MM SPIRA 3 FINGER 3335-4G

## (undated) DEVICE — SU MONOCRYL 2-0 SH 27" UND Y417H

## (undated) DEVICE — DRSG GAUZE 4X4" 2187

## (undated) DEVICE — ESU CORD BIPOLAR GREEN 10-4000

## (undated) DEVICE — SPONGE PACK VAGINAL 2"X9

## (undated) DEVICE — SU PDS II 5-0 RB-1 27" Z303H

## (undated) DEVICE — BLADE KNIFE SURG 15 371115

## (undated) DEVICE — GOWN XLG DISP 9545

## (undated) DEVICE — BNDG ELASTIC 4" DBL LENGTH UNSTERILE 6611-14

## (undated) DEVICE — SYR 70ML TOOMEY 041170

## (undated) DEVICE — BLADE CLIPPER SGL USE 9680

## (undated) DEVICE — DRSG AQUACEL EXTRA HYDROFIBER 4X5" (10X12CM) 420674

## (undated) DEVICE — SYR 50ML LL W/O NDL 309653

## (undated) DEVICE — DRAIN PENROSE 1/4X18" LATEX

## (undated) DEVICE — NDL INSUFFLATION 13GA 120MM C2201

## (undated) DEVICE — SU FIBERWIRE 2 38" T-8 NDL  AR-7206

## (undated) DEVICE — BAG URINARY DRAIN LUBRISIL IC 4000ML LF 253509A

## (undated) DEVICE — NDL COUNTER 20CT 31142493

## (undated) DEVICE — SYR 03ML LL W/O NDL 309657

## (undated) DEVICE — DERMACARRIERS 3:1 ZIMMER 00-2195-013-00

## (undated) DEVICE — SU VICRYL 3-0 SH 27" UND J416H

## (undated) DEVICE — BLADE KNIFE SURG 10 371110

## (undated) DEVICE — CLOSURE SYS SKIN PREMIERPRO EXOFIN FUSION 4X22CM STRL 3472

## (undated) DEVICE — SOL NACL 0.9% IRRIG 1000ML BOTTLE 2F7124

## (undated) DEVICE — SUCTION MANIFOLD NEPTUNE 2 SYS 4 PORT 0702-020-000

## (undated) DEVICE — PREP POVIDONE-IODINE 7.5% SCRUB 4OZ BOTTLE MDS093945

## (undated) DEVICE — PACK GOWN 3/PK DISP XL SBA32GPFCB

## (undated) DEVICE — SU ETHILON 9-0 BV100-4 5" 2829G

## (undated) DEVICE — DRAPE UNDER BUTTOCK 8483

## (undated) DEVICE — SU ETHILON 2-0 FS 18" 664H

## (undated) DEVICE — LIGHT HANDLE X1 31140133

## (undated) DEVICE — LINEN GOWN X4 5410

## (undated) DEVICE — LINEN GOWN XLG 5407

## (undated) DEVICE — CATH PLUG W/CAP 000076

## (undated) DEVICE — STPL SKIN 35W ROTATING HEAD PRW35

## (undated) DEVICE — DAVINCI S CANNULA SEAL 8.5-13MM 420206

## (undated) DEVICE — SYR 05ML LL W/O NDL

## (undated) DEVICE — BARRIER INTERCEED 3X4" 4350

## (undated) DEVICE — DAVINCI SI DRAPE ACCESSORY KIT 3-ARM 420290

## (undated) DEVICE — KIT PATIENT POSITIONING PIGAZZI LATEX FREE 40580

## (undated) DEVICE — SU SILK 3-0 SH CR 8X18" C013D

## (undated) DEVICE — SOL NACL 0.9% INJ 1000ML BAG 2B1324X

## (undated) DEVICE — SU STRATAFIX MONOCRYL 3-0 SPIRAL PS-2 45CM SXMP1B107

## (undated) DEVICE — DRAPE MAYO STAND 23X54 8337

## (undated) DEVICE — DAVINCI XI DRAPE ARM 470015

## (undated) DEVICE — DAVINCI S MONOPOLAR SCISSORS PRECURVED HOT SHEARS 420179

## (undated) DEVICE — ESU ELEC BLADE 6" COATED E1450-6

## (undated) DEVICE — DRAPE POUCH INSTRUMENT 1018

## (undated) DEVICE — SU MONOCRYL 3-0 SH 27" UND Y416H

## (undated) DEVICE — DEVICE CATH STABILIZATION STATLOCK FOLEY 3-WAY FOL0105

## (undated) DEVICE — LINEN TOWEL PACK X30 5481

## (undated) DEVICE — NDL SPINAL WHITACRE 22GA 3.5" 405010

## (undated) DEVICE — GLOVE PROTEXIS W/NEU-THERA 7.0  2D73TE70

## (undated) DEVICE — SUCTION MANIFOLD DORNOCH ULTRA CART UL-CL500

## (undated) DEVICE — SU PDS II 4-0 RB-1 27" Z304H

## (undated) DEVICE — DECANTER TRANSFER DEVICE 2008S

## (undated) DEVICE — TUBING CONMED AIRSEAL SMOKE EVAC INSUFFLATION ASM-EVAC

## (undated) DEVICE — DRSG PRIMAPORE 02X3" 7133

## (undated) DEVICE — NDL 25GA 2"  8881200441

## (undated) DEVICE — TUBING SMOKE EVAC PNEUVIEW 9660-XE

## (undated) DEVICE — PAD PERI INDIV WRAP 11" 2022A

## (undated) DEVICE — EYE PREP BETADINE 5% SOLUTION 30ML 0065-0411-30

## (undated) DEVICE — ADH SKIN CLOSURE PREMIERPRO EXOFIN 1.0ML 3470

## (undated) DEVICE — DRSG COTTON BALL 6PK LCB62

## (undated) DEVICE — DRSG KERLIX 4 1/2"X4YDS ROLL 6715

## (undated) DEVICE — LABEL MEDICATION SYSTEM 3303-P

## (undated) DEVICE — NDL 18GAX1.5" 305185

## (undated) DEVICE — SU PDS II 0 CT-1 27" Z340H

## (undated) DEVICE — DRAPE LAP W/ARMBOARD 29410

## (undated) DEVICE — CLIP HORIZON MED BLUE 002200

## (undated) DEVICE — CATH INTRODUCER SUPRAPUBIC 16FR SF-S16-851

## (undated) DEVICE — PAD CHUX UNDERPAD 30X36" P3036C

## (undated) DEVICE — DRSG DRAIN 4X4" 7086

## (undated) DEVICE — PREP CHLORAPREP 26ML TINTED ORANGE  260815

## (undated) DEVICE — SU MONOCRYL 4-0 PS-2 27" UND Y426H

## (undated) DEVICE — DRAPE MICRO ZEISS PENTERO 120X54" G650DL

## (undated) DEVICE — RETR RING LONE STAR 14.1X14.1CM 3307G

## (undated) DEVICE — ESU ELEC BLADE 2.75" COATED/INSULATED E1455

## (undated) DEVICE — BNDG KLING 2" 2231

## (undated) DEVICE — SU VICRYL 2-0 CT-2 27" J333H

## (undated) DEVICE — DRSG TEGADERM 4X4 3/4" 1626W

## (undated) DEVICE — ESU GROUND PAD UNIVERSAL W/O CORD

## (undated) DEVICE — NDL ANGIOCATH 20GA 1.25" 4056

## (undated) DEVICE — DAVINCI XI OBTURATOR BLADELESS 8MM 470359

## (undated) DEVICE — CATH FOLEY 14FR 5ML SILICONE LUBRI-SIL 175814

## (undated) DEVICE — BNDG ESMARK 4" STERILE LF 820-3412

## (undated) DEVICE — DRAPE IOBAN INCISE 23X17" 6650EZ

## (undated) DEVICE — SU PDS II 5-0 RB-1 DA 30" Z320H

## (undated) DEVICE — PREP POVIDONE IODINE SOLUTION 10% 4OZ

## (undated) DEVICE — DRSG XEROFORM 5X9" CUR253590W

## (undated) DEVICE — KIT SPY ELITE DISP LC3006

## (undated) DEVICE — GLOVE PROTEXIS W/NEU-THERA 7.5  2D73TE75

## (undated) DEVICE — DAVINCI S NDL DRIVER MEGA SUTURE CUT 420309

## (undated) DEVICE — ENDO TROCAR CONMED AIRSEAL BLADELESS 12X120MM IAS12-120LP

## (undated) DEVICE — KIT POSITIONER PINK PAD XL ADVANCED 40588

## (undated) DEVICE — COVER NEOPROBE SOFTFLEX 5X96" W/BANDS 20-PC596

## (undated) DEVICE — NDL BUTTERFLY 21GA .75" 367296

## (undated) DEVICE — CATH FOLYSIL 16FR 15ML AA6116

## (undated) DEVICE — GLOVE ESTEEM BLUE W/NEU-THERA 7.5  2D73PB75

## (undated) DEVICE — SU ETHILON 2-0 PS 18" 585H

## (undated) DEVICE — SYR 20ML LL W/O NDL 302830

## (undated) DEVICE — CATH FOLEY COUDE TIEMAN 16FR 30ML LATEX 0103SI16

## (undated) DEVICE — TUBING SUCTION MEDI-VAC 1/4"X20' N620A

## (undated) DEVICE — NDL ANGIOCATH 24GA 0.75" 4053

## (undated) DEVICE — JELLY LUBRICATING SURGILUBE 2OZ TUBE

## (undated) DEVICE — DRSG TEGADERM 4X10" 1627

## (undated) DEVICE — WIPES FOLEY CARE SURESTEP PROVON DFC100

## (undated) DEVICE — RETR ELASTIC STAYS LONE STAR BLUNT DUAL LEAD 3550-1G

## (undated) DEVICE — DAVINCI XI NDL DRIVER MEGA SUTURE CUT 8MM 470309

## (undated) DEVICE — SYR 50ML CATH TIP W/O NDL 309620

## (undated) DEVICE — SU VICRYL 3-0 CT-1 36" J344H

## (undated) DEVICE — PANTIES MESH LG/XLG 2PK 706M2

## (undated) DEVICE — SUCTION CARDIAC FRAZIER TIP 6FR STERILE 3" 10053

## (undated) DEVICE — SPONGE RAY-TEC 4X8" 7318

## (undated) DEVICE — SYR BULB IRRIG 50ML LATEX FREE 0035280

## (undated) DEVICE — NDL PERC ACCESS 18GX20CM M0067001220

## (undated) DEVICE — SYR 10ML FINGER CONTROL W/O NDL 309695

## (undated) DEVICE — PITCHER STERILE 1000ML  SSK9004A

## (undated) DEVICE — PAD CHUX UNDERPAD 23X24" 7136

## (undated) DEVICE — RETR ELASTIC STAYS LONE STAR BLUNT SGL PK 3350-1G

## (undated) DEVICE — SPONGE KITTNER 30-101

## (undated) DEVICE — CATH TRAY FOLEY SURESTEP 16FR WDRAIN BAG STLK LATEX A300316A

## (undated) DEVICE — EYE SPONGE SPEAR WECK CEL 0008685

## (undated) DEVICE — SU VICRYL 0 UR-6 27" J603H

## (undated) DEVICE — SU VICRYL 5-0 P-3 18" UND J493G

## (undated) DEVICE — DRSG WOUND VAC GRANUFOAM LG BLACK 26X15CM M8275053/5

## (undated) DEVICE — DAVINCI XI SEAL UNIVERSAL 5-8MM 470361

## (undated) DEVICE — SU ETHILON 3-0 PS-1 18" 1663H

## (undated) DEVICE — DRAIN JACKSON PRATT 10MM FLAT 4/4 PERF SU130-1311

## (undated) DEVICE — SU VICRYL 3-0 SH 27" J316H

## (undated) DEVICE — PREP CHLORAPREP 26ML TINTED HI-LITE ORANGE 930815

## (undated) DEVICE — LIGHT HANDLE X2

## (undated) DEVICE — TONGUE DEPRESSOR STERILE 6023

## (undated) DEVICE — TUBING SUCTION 10'X3/16" N510

## (undated) DEVICE — ENDO TROCAR FIRST ENTRY KII FIOS ADV FIX 05X100MM CFF03

## (undated) DEVICE — CLIP HORIZON SM RED WIDE SLOT 001201

## (undated) DEVICE — ESU PENCIL SMOKE EVAC W/ROCKER SWITCH 0703-047-000

## (undated) DEVICE — ESU ELEC NDL 1" COATED/INSULATED E1465

## (undated) DEVICE — PROTECTOR ARM ONE-STEP TRENDELENBURG 40418

## (undated) DEVICE — NDL BLUNT 18GA 1" W/O FILTER 305181

## (undated) DEVICE — ADAPTER DRAPE ALLY AU-AD

## (undated) DEVICE — COVER CAMERA IN-LIGHT DISP LT-C02

## (undated) DEVICE — STABILIZER ARCH KOH-EFFICIENT 2.5CM KC-ARCH-25

## (undated) DEVICE — DRAPE WARMER 66X44" ORS-300

## (undated) DEVICE — PACK DAVINCI UROL

## (undated) DEVICE — NDL 25GA 1.5" 305127

## (undated) DEVICE — ESU GROUND PAD ADULT W/CORD E7507

## (undated) DEVICE — GEL ULTRASOUND AQUASONIC 20GM 01-01

## (undated) DEVICE — GOWN IMPERVIOUS SPECIALTY XLG/XLONG 32474

## (undated) DEVICE — DRAPE URO 1071

## (undated) DEVICE — DAVINCI S CANNULA SEAL 8MM 400077

## (undated) DEVICE — RAD KNIFE HANDLE W/11 BLADE DISPOSABLE 371611

## (undated) DEVICE — DAVINCI OBTURATOR 8MM BLADELESS 420023

## (undated) DEVICE — DAVINCI S FCP BIPOLAR FENESTRATED 420205

## (undated) DEVICE — DAVINCI XI FCP BIPOLAR FENESTRATED 470205

## (undated) DEVICE — DRSG ADAPTIC 3X16"  6114

## (undated) DEVICE — DRAPE SHEET MED 44X70" 9355

## (undated) DEVICE — SU WND CLOSURE VLOC 180 ABS 2-0 12" GS-21 VLOCL0315

## (undated) DEVICE — DAVINCI XI DRAPE COLUMN 470341

## (undated) RX ORDER — HYDROMORPHONE HYDROCHLORIDE 1 MG/ML
INJECTION, SOLUTION INTRAMUSCULAR; INTRAVENOUS; SUBCUTANEOUS
Status: DISPENSED
Start: 2020-02-03

## (undated) RX ORDER — BUPIVACAINE HYDROCHLORIDE AND EPINEPHRINE 2.5; 5 MG/ML; UG/ML
INJECTION, SOLUTION INFILTRATION; PERINEURAL
Status: DISPENSED
Start: 2020-09-24

## (undated) RX ORDER — MINERAL OIL
OIL (ML) MISCELLANEOUS
Status: DISPENSED
Start: 2020-02-03

## (undated) RX ORDER — GLYCOPYRROLATE 0.2 MG/ML
INJECTION INTRAMUSCULAR; INTRAVENOUS
Status: DISPENSED
Start: 2021-10-18

## (undated) RX ORDER — LIDOCAINE HYDROCHLORIDE 20 MG/ML
INJECTION, SOLUTION EPIDURAL; INFILTRATION; INTRACAUDAL; PERINEURAL
Status: DISPENSED
Start: 2019-07-12

## (undated) RX ORDER — ACETAMINOPHEN 325 MG/1
TABLET ORAL
Status: DISPENSED
Start: 2021-05-03

## (undated) RX ORDER — ONDANSETRON 2 MG/ML
INJECTION INTRAMUSCULAR; INTRAVENOUS
Status: DISPENSED
Start: 2020-09-24

## (undated) RX ORDER — LIDOCAINE HYDROCHLORIDE 20 MG/ML
INJECTION, SOLUTION EPIDURAL; INFILTRATION; INTRACAUDAL; PERINEURAL
Status: DISPENSED
Start: 2020-09-24

## (undated) RX ORDER — HYDROMORPHONE HYDROCHLORIDE 1 MG/ML
INJECTION, SOLUTION INTRAMUSCULAR; INTRAVENOUS; SUBCUTANEOUS
Status: DISPENSED
Start: 2021-05-03

## (undated) RX ORDER — LIDOCAINE HYDROCHLORIDE 20 MG/ML
INJECTION, SOLUTION EPIDURAL; INFILTRATION; INTRACAUDAL; PERINEURAL
Status: DISPENSED
Start: 2021-10-18

## (undated) RX ORDER — DEXAMETHASONE SODIUM PHOSPHATE 4 MG/ML
INJECTION, SOLUTION INTRA-ARTICULAR; INTRALESIONAL; INTRAMUSCULAR; INTRAVENOUS; SOFT TISSUE
Status: DISPENSED
Start: 2020-02-03

## (undated) RX ORDER — GLYCOPYRROLATE 0.2 MG/ML
INJECTION, SOLUTION INTRAMUSCULAR; INTRAVENOUS
Status: DISPENSED
Start: 2020-09-24

## (undated) RX ORDER — FENTANYL CITRATE 50 UG/ML
INJECTION, SOLUTION INTRAMUSCULAR; INTRAVENOUS
Status: DISPENSED
Start: 2020-09-24

## (undated) RX ORDER — OXYCODONE HYDROCHLORIDE 5 MG/1
TABLET ORAL
Status: DISPENSED
Start: 2019-07-12

## (undated) RX ORDER — EPINEPHRINE 1 MG/ML
INJECTION, SOLUTION INTRAMUSCULAR; SUBCUTANEOUS
Status: DISPENSED
Start: 2020-09-24

## (undated) RX ORDER — FENTANYL CITRATE 50 UG/ML
INJECTION, SOLUTION INTRAMUSCULAR; INTRAVENOUS
Status: DISPENSED
Start: 2021-05-03

## (undated) RX ORDER — KETOROLAC TROMETHAMINE 30 MG/ML
INJECTION, SOLUTION INTRAMUSCULAR; INTRAVENOUS
Status: DISPENSED
Start: 2019-07-12

## (undated) RX ORDER — DEXAMETHASONE SODIUM PHOSPHATE 4 MG/ML
INJECTION, SOLUTION INTRA-ARTICULAR; INTRALESIONAL; INTRAMUSCULAR; INTRAVENOUS; SOFT TISSUE
Status: DISPENSED
Start: 2021-10-18

## (undated) RX ORDER — DEXAMETHASONE SODIUM PHOSPHATE 4 MG/ML
INJECTION, SOLUTION INTRA-ARTICULAR; INTRALESIONAL; INTRAMUSCULAR; INTRAVENOUS; SOFT TISSUE
Status: DISPENSED
Start: 2020-09-24

## (undated) RX ORDER — PROPOFOL 10 MG/ML
INJECTION, EMULSION INTRAVENOUS
Status: DISPENSED
Start: 2021-10-18

## (undated) RX ORDER — HYDROMORPHONE HYDROCHLORIDE 1 MG/ML
INJECTION, SOLUTION INTRAMUSCULAR; INTRAVENOUS; SUBCUTANEOUS
Status: DISPENSED
Start: 2020-09-24

## (undated) RX ORDER — PROPOFOL 10 MG/ML
INJECTION, EMULSION INTRAVENOUS
Status: DISPENSED
Start: 2021-05-03

## (undated) RX ORDER — CIPROFLOXACIN 500 MG/1
TABLET, FILM COATED ORAL
Status: DISPENSED
Start: 2020-10-21

## (undated) RX ORDER — FENTANYL CITRATE 50 UG/ML
INJECTION, SOLUTION INTRAMUSCULAR; INTRAVENOUS
Status: DISPENSED
Start: 2019-07-12

## (undated) RX ORDER — CEFAZOLIN SODIUM 1 G/3ML
INJECTION, POWDER, FOR SOLUTION INTRAMUSCULAR; INTRAVENOUS
Status: DISPENSED
Start: 2020-02-03

## (undated) RX ORDER — BUPIVACAINE HYDROCHLORIDE 2.5 MG/ML
INJECTION, SOLUTION EPIDURAL; INFILTRATION; INTRACAUDAL
Status: DISPENSED
Start: 2019-07-12

## (undated) RX ORDER — GLYCOPYRROLATE 0.2 MG/ML
INJECTION INTRAMUSCULAR; INTRAVENOUS
Status: DISPENSED
Start: 2021-05-03

## (undated) RX ORDER — VANCOMYCIN HYDROCHLORIDE 1 G/200ML
INJECTION, SOLUTION INTRAVENOUS
Status: DISPENSED
Start: 2021-10-18

## (undated) RX ORDER — ACETAMINOPHEN 325 MG/1
TABLET ORAL
Status: DISPENSED
Start: 2020-09-24

## (undated) RX ORDER — FENTANYL CITRATE-0.9 % NACL/PF 10 MCG/ML
PLASTIC BAG, INJECTION (ML) INTRAVENOUS
Status: DISPENSED
Start: 2021-05-03

## (undated) RX ORDER — ONDANSETRON 2 MG/ML
INJECTION INTRAMUSCULAR; INTRAVENOUS
Status: DISPENSED
Start: 2020-02-03

## (undated) RX ORDER — CEFAZOLIN SODIUM 1 G/3ML
INJECTION, POWDER, FOR SOLUTION INTRAMUSCULAR; INTRAVENOUS
Status: DISPENSED
Start: 2020-09-24

## (undated) RX ORDER — OXYCODONE HYDROCHLORIDE 5 MG/1
TABLET ORAL
Status: DISPENSED
Start: 2021-05-03

## (undated) RX ORDER — SODIUM CHLORIDE, SODIUM LACTATE, POTASSIUM CHLORIDE, CALCIUM CHLORIDE 600; 310; 30; 20 MG/100ML; MG/100ML; MG/100ML; MG/100ML
INJECTION, SOLUTION INTRAVENOUS
Status: DISPENSED
Start: 2020-02-03

## (undated) RX ORDER — OXYCODONE HYDROCHLORIDE 5 MG/1
TABLET ORAL
Status: DISPENSED
Start: 2020-09-24

## (undated) RX ORDER — PROPOFOL 10 MG/ML
INJECTION, EMULSION INTRAVENOUS
Status: DISPENSED
Start: 2020-09-24

## (undated) RX ORDER — PROPOFOL 10 MG/ML
INJECTION, EMULSION INTRAVENOUS
Status: DISPENSED
Start: 2019-07-12

## (undated) RX ORDER — OXYCODONE HCL 5 MG/5 ML
SOLUTION, ORAL ORAL
Status: DISPENSED
Start: 2021-05-03

## (undated) RX ORDER — CEFAZOLIN SODIUM 1 G/3ML
INJECTION, POWDER, FOR SOLUTION INTRAMUSCULAR; INTRAVENOUS
Status: DISPENSED
Start: 2019-07-12

## (undated) RX ORDER — CEFAZOLIN SODIUM 2 G/100ML
INJECTION, SOLUTION INTRAVENOUS
Status: DISPENSED
Start: 2020-09-24

## (undated) RX ORDER — ONDANSETRON 2 MG/ML
INJECTION INTRAMUSCULAR; INTRAVENOUS
Status: DISPENSED
Start: 2021-10-18

## (undated) RX ORDER — CEFAZOLIN SODIUM 2 G/100ML
INJECTION, SOLUTION INTRAVENOUS
Status: DISPENSED
Start: 2020-02-03

## (undated) RX ORDER — EPHEDRINE SULFATE 50 MG/ML
INJECTION, SOLUTION INTRAMUSCULAR; INTRAVENOUS; SUBCUTANEOUS
Status: DISPENSED
Start: 2021-05-03

## (undated) RX ORDER — ACETAMINOPHEN 325 MG/1
TABLET ORAL
Status: DISPENSED
Start: 2019-07-12

## (undated) RX ORDER — EPHEDRINE SULFATE 50 MG/ML
INJECTION, SOLUTION INTRAMUSCULAR; INTRAVENOUS; SUBCUTANEOUS
Status: DISPENSED
Start: 2020-02-03

## (undated) RX ORDER — HEPARIN SODIUM 5000 [USP'U]/.5ML
INJECTION, SOLUTION INTRAVENOUS; SUBCUTANEOUS
Status: DISPENSED
Start: 2020-09-24

## (undated) RX ORDER — PROPOFOL 10 MG/ML
INJECTION, EMULSION INTRAVENOUS
Status: DISPENSED
Start: 2020-02-03

## (undated) RX ORDER — LIDOCAINE HYDROCHLORIDE AND EPINEPHRINE 10; 10 MG/ML; UG/ML
INJECTION, SOLUTION INFILTRATION; PERINEURAL
Status: DISPENSED
Start: 2021-05-03

## (undated) RX ORDER — ONDANSETRON 2 MG/ML
INJECTION INTRAMUSCULAR; INTRAVENOUS
Status: DISPENSED
Start: 2021-05-03

## (undated) RX ORDER — GLYCOPYRROLATE 0.2 MG/ML
INJECTION, SOLUTION INTRAMUSCULAR; INTRAVENOUS
Status: DISPENSED
Start: 2020-02-03

## (undated) RX ORDER — BUPIVACAINE HYDROCHLORIDE AND EPINEPHRINE 5; 5 MG/ML; UG/ML
INJECTION, SOLUTION PERINEURAL
Status: DISPENSED
Start: 2021-10-18

## (undated) RX ORDER — VANCOMYCIN HYDROCHLORIDE 1 G/200ML
INJECTION, SOLUTION INTRAVENOUS
Status: DISPENSED
Start: 2021-05-03

## (undated) RX ORDER — FENTANYL CITRATE 50 UG/ML
INJECTION, SOLUTION INTRAMUSCULAR; INTRAVENOUS
Status: DISPENSED
Start: 2020-02-03

## (undated) RX ORDER — CEFAZOLIN SODIUM 2 G/100ML
INJECTION, SOLUTION INTRAVENOUS
Status: DISPENSED
Start: 2019-07-12

## (undated) RX ORDER — LIDOCAINE HYDROCHLORIDE 20 MG/ML
INJECTION, SOLUTION EPIDURAL; INFILTRATION; INTRACAUDAL; PERINEURAL
Status: DISPENSED
Start: 2021-05-03

## (undated) RX ORDER — MINERAL OIL
OIL (ML) MISCELLANEOUS
Status: DISPENSED
Start: 2020-09-24

## (undated) RX ORDER — KETAMINE HCL IN 0.9 % NACL 50 MG/5 ML
SYRINGE (ML) INTRAVENOUS
Status: DISPENSED
Start: 2019-07-12

## (undated) RX ORDER — FENTANYL CITRATE 50 UG/ML
INJECTION, SOLUTION INTRAMUSCULAR; INTRAVENOUS
Status: DISPENSED
Start: 2021-10-18

## (undated) RX ORDER — LIDOCAINE HYDROCHLORIDE 20 MG/ML
INJECTION, SOLUTION EPIDURAL; INFILTRATION; INTRACAUDAL; PERINEURAL
Status: DISPENSED
Start: 2020-02-03

## (undated) RX ORDER — ONDANSETRON 2 MG/ML
INJECTION INTRAMUSCULAR; INTRAVENOUS
Status: DISPENSED
Start: 2019-07-12

## (undated) RX ORDER — SODIUM CHLORIDE, SODIUM LACTATE, POTASSIUM CHLORIDE, CALCIUM CHLORIDE 600; 310; 30; 20 MG/100ML; MG/100ML; MG/100ML; MG/100ML
INJECTION, SOLUTION INTRAVENOUS
Status: DISPENSED
Start: 2020-09-24

## (undated) RX ORDER — PHENAZOPYRIDINE HYDROCHLORIDE 200 MG/1
TABLET, FILM COATED ORAL
Status: DISPENSED
Start: 2019-07-12